# Patient Record
Sex: MALE | Race: WHITE | HISPANIC OR LATINO | Employment: OTHER | URBAN - METROPOLITAN AREA
[De-identification: names, ages, dates, MRNs, and addresses within clinical notes are randomized per-mention and may not be internally consistent; named-entity substitution may affect disease eponyms.]

---

## 2018-07-19 DIAGNOSIS — I10 ESSENTIAL HYPERTENSION: Primary | ICD-10-CM

## 2018-07-19 RX ORDER — LOSARTAN POTASSIUM 50 MG/1
TABLET ORAL EVERY 24 HOURS
COMMUNITY
Start: 2017-05-11 | End: 2018-07-19 | Stop reason: SDUPTHER

## 2018-07-19 RX ORDER — LOSARTAN POTASSIUM 50 MG/1
50 TABLET ORAL EVERY 24 HOURS
Qty: 30 TABLET | Refills: 5 | Status: SHIPPED | OUTPATIENT
Start: 2018-07-19 | End: 2018-11-26 | Stop reason: SDUPTHER

## 2018-09-10 RX ORDER — DOXYCYCLINE 100 MG/1
CAPSULE ORAL
Qty: 30 CAPSULE | OUTPATIENT
Start: 2018-09-10

## 2018-10-05 RX ORDER — DOXYCYCLINE 100 MG/1
CAPSULE ORAL
Qty: 30 CAPSULE | OUTPATIENT
Start: 2018-10-05

## 2018-10-10 RX ORDER — DOXYCYCLINE 100 MG/1
CAPSULE ORAL
Qty: 30 CAPSULE | OUTPATIENT
Start: 2018-10-10

## 2018-11-14 DIAGNOSIS — I38 CHRONIC INFECTIVE ENDOCARDITIS, DUE TO UNSPECIFIED ORGANISM: Primary | ICD-10-CM

## 2018-11-14 RX ORDER — CALCITRIOL 0.5 UG/1
CAPSULE, LIQUID FILLED ORAL
COMMUNITY
End: 2021-07-30 | Stop reason: ALTCHOICE

## 2018-11-14 RX ORDER — ROSUVASTATIN CALCIUM 20 MG/1
TABLET, COATED ORAL
COMMUNITY
Start: 2017-07-25 | End: 2019-06-21 | Stop reason: SDUPTHER

## 2018-11-14 RX ORDER — AMLODIPINE BESYLATE 10 MG/1
TABLET ORAL
COMMUNITY
Start: 2018-06-05 | End: 2019-06-21 | Stop reason: SDUPTHER

## 2018-11-14 RX ORDER — ACETAMINOPHEN 160 MG
TABLET,DISINTEGRATING ORAL EVERY 24 HOURS
COMMUNITY

## 2018-11-14 RX ORDER — BISOPROLOL FUMARATE 5 MG/1
TABLET ORAL EVERY 24 HOURS
COMMUNITY
Start: 2018-03-09 | End: 2019-08-09 | Stop reason: SDUPTHER

## 2018-11-14 RX ORDER — DOXYCYCLINE 100 MG/1
100 CAPSULE ORAL DAILY
Qty: 30 CAPSULE | Refills: 5 | Status: SHIPPED | OUTPATIENT
Start: 2018-11-14 | End: 2018-12-14

## 2018-11-14 RX ORDER — DOXYCYCLINE 100 MG/1
100 CAPSULE ORAL DAILY
Refills: 5 | COMMUNITY
Start: 2018-09-08 | End: 2018-11-14 | Stop reason: SDUPTHER

## 2018-11-26 DIAGNOSIS — I10 ESSENTIAL HYPERTENSION: ICD-10-CM

## 2018-11-28 RX ORDER — LOSARTAN POTASSIUM 50 MG/1
TABLET ORAL
Qty: 30 TABLET | Refills: 5 | Status: SHIPPED | OUTPATIENT
Start: 2018-11-28 | End: 2019-08-09 | Stop reason: SDUPTHER

## 2018-12-14 PROBLEM — Z95.1 HX OF CABG: Status: ACTIVE | Noted: 2018-12-14

## 2018-12-14 PROBLEM — N18.4 CHRONIC KIDNEY DISEASE, STAGE IV (SEVERE) (HCC): Status: ACTIVE | Noted: 2018-08-27

## 2018-12-17 ENCOUNTER — OFFICE VISIT (OUTPATIENT)
Dept: CARDIOLOGY CLINIC | Facility: CLINIC | Age: 73
End: 2018-12-17
Payer: COMMERCIAL

## 2018-12-17 VITALS
SYSTOLIC BLOOD PRESSURE: 130 MMHG | HEART RATE: 66 BPM | OXYGEN SATURATION: 98 % | WEIGHT: 185.2 LBS | HEIGHT: 68 IN | DIASTOLIC BLOOD PRESSURE: 56 MMHG | BODY MASS INDEX: 28.07 KG/M2

## 2018-12-17 DIAGNOSIS — I63.9 CEREBROVASCULAR ACCIDENT (CVA), UNSPECIFIED MECHANISM (HCC): ICD-10-CM

## 2018-12-17 DIAGNOSIS — Z95.1 HX OF CABG: ICD-10-CM

## 2018-12-17 DIAGNOSIS — N18.4 CHRONIC KIDNEY DISEASE, STAGE IV (SEVERE) (HCC): ICD-10-CM

## 2018-12-17 DIAGNOSIS — M79.10 MYALGIA DUE TO STATIN: ICD-10-CM

## 2018-12-17 DIAGNOSIS — Z95.4 HISTORY OF AORTIC VALVE REPLACEMENT WITH TISSUE GRAFT: ICD-10-CM

## 2018-12-17 DIAGNOSIS — T46.6X5A MYALGIA DUE TO STATIN: ICD-10-CM

## 2018-12-17 DIAGNOSIS — E78.2 MIXED HYPERLIPIDEMIA: ICD-10-CM

## 2018-12-17 DIAGNOSIS — I10 ESSENTIAL HYPERTENSION: ICD-10-CM

## 2018-12-17 DIAGNOSIS — R55 NEAR SYNCOPE: ICD-10-CM

## 2018-12-17 DIAGNOSIS — I25.10 CHRONIC CORONARY ARTERY DISEASE: Primary | ICD-10-CM

## 2018-12-17 PROCEDURE — 93000 ELECTROCARDIOGRAM COMPLETE: CPT | Performed by: INTERNAL MEDICINE

## 2018-12-17 PROCEDURE — 99214 OFFICE O/P EST MOD 30 MIN: CPT | Performed by: INTERNAL MEDICINE

## 2018-12-17 RX ORDER — ASPIRIN 325 MG
325 TABLET ORAL
COMMUNITY
End: 2019-08-16 | Stop reason: CLARIF

## 2018-12-17 RX ORDER — DOXYCYCLINE 100 MG/1
100 CAPSULE ORAL ONCE
COMMUNITY
End: 2019-05-20 | Stop reason: SDUPTHER

## 2018-12-17 NOTE — PROGRESS NOTES
Cardiology Follow Up    Brennan Burch  1945  [id number]  [address] [facility] [facility]  [address], [facility] [id number] [phone]    1  Chronic coronary artery disease  POCT ECG   2  Hx of CABG     3  History of aortic valve replacement with tissue graft     4  Essential hypertension     5  Mixed hyperlipidemia     6  Chronic kidney disease, stage IV (severe) ([address] )         Patient was 1st seen on October 6, 2016  He was a previous patient of Dr Kishore Bryson  In extensive cardiac history which included rheumatic heart disease as a child, coronary artery disease, severe aortic stenosis and subsequently status post bio aortic valve replacement in 2005, infective endocarditis in 2006, chronic kidney disease followed by Nephrology with an AV fistula in his right arm, multiple CVAs, hypertension, hyperlipidemia, recurrent DVT and IVC filter  05/23/2017 echocardiogram: Bovine aortic valve prosthesis which appears to 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 but stable  Aortic valve velocity 3 1 m/sec  Biatrial enlargement  Normal LV systolic function, EF 03%  Mild LVH  Grade 1 diastolic dysfunction  05/23/2017 duplex carotid ultrasound: Moderate 16-49% bilateral internal carotid stenosis  05/25/2018 chemistry:  BUN 41, creatinine 3 5, GFR 17    Interval History:   Patient that denies chest discomfort of any type  He denies dyspnea on exertion  He sometimes feels dizzy but has no true syncope  He is inactive because of his previous cerebral vascular accident leaving him with a right hemiparesis  He has a tissue prosthetic aortic valve with malfunction and an increased aortic valve velocity of 3 1 m/sec  He is known to be intolerant of statins      Patient Active Problem List   Diagnosis    Chronic coronary artery disease    Chronic kidney disease, stage IV (severe) (HCC)    Endocarditis    History of aortic valve replacement with tissue graft    HTN (hypertension)    HLD (hyperlipidemia)    Hx of CABG     No past medical history on file  Social History     Social History    Marital status:      Spouse name: N/A    Number of children: N/A    Years of education: N/A     Occupational History    Not on file  Social History Main Topics    Smoking status: Former Smoker     Packs/day: 1 00     Types: Cigarettes     Quit date: 1/1/1993    Smokeless tobacco: Never Used    Alcohol use Not on file    Drug use: Unknown    Sexual activity: Not on file     Other Topics Concern    Not on file     Social History Narrative    No narrative on file      Family History   Problem Relation Age of Onset    Diabetes Mother     Hypertension Mother     Dementia Mother     Other Father         fall gangrene    Peripheral vascular disease Sister      Past Surgical History:   Procedure Laterality Date    AORTIC VALVE REPLACEMENT  2005    Tissue valve    BLADDER SURGERY      23 yrs ago      CORONARY ARTERY BYPASS GRAFT  2005    x1    RENAL ARTERY STENT  11/2005       Current Outpatient Prescriptions:     amLODIPine (NORVASC) 10 mg tablet, take 1 tablet by oral route  every evening, Disp: , Rfl:     aspirin 325 mg tablet, Take 325 mg by mouth, Disp: , Rfl:     bisoprolol (ZEBETA) 5 mg tablet, every 24 hours, Disp: , Rfl:     calcitriol (ROCALTROL) 0 5 MCG capsule, take 1 capsule by oral route  3 times weekly, Disp: , Rfl:     Cholecalciferol (VITAMIN D3) 2000 units capsule, every 24 hours, Disp: , Rfl:     doxycycline monohydrate (MONODOX) 100 mg capsule, Take 100 mg by mouth once, Disp: , Rfl:     esomeprazole (NEXIUM) 20 mg capsule, every 24 hours, Disp: , Rfl:     losartan (COZAAR) 50 mg tablet, TAKE 1 TABLET BY MOUTH EVERY 24 HRS, Disp: 30 tablet, Rfl: 5    rosuvastatin (CRESTOR) 20 MG tablet, 1 tablet every other day, Disp: , Rfl:     sodium bicarbonate 650 mg tablet, 2  tablets bid, Disp: , Rfl:   Allergies   Allergen Reactions    Latex     Nitrofurantoin Other (See Comments)     neck pain    Other Itching       Results for orders placed or performed in visit on 12/17/18   POCT ECG    Narrative    Normal sinus rhythm at a rate of 66 beats per minute  First degree AV block  Left anterior hemiblock  Cannot rule out old anterior wall myocardial infarction  Left ventricular hypertrophy  Nonspecific ST T wave abnormalities  Review of Systems:  Review of Systems   Constitutional: Negative for activity change  Respiratory: Negative for cough, chest tightness, shortness of breath and wheezing  Cardiovascular: Negative for chest pain, palpitations and leg swelling  Musculoskeletal: Negative for gait problem  Skin: Negative for color change  Neurological: Negative for dizziness, tremors, syncope, weakness, light-headedness and headaches  Right hemiparesis with reasonable motion in right arm but very little motion in right leg  Psychiatric/Behavioral: Negative for agitation and confusion  Physical Exam:  /56   Pulse 66   Ht 5' 8" (1 727 m)   Wt 84 kg (185 lb 3 2 oz)   SpO2 98%   BMI 28 16 kg/m²   Physical Exam   Constitutional: He is oriented to person, place, and time  He appears well-developed and well-nourished  No distress  HENT:   Head: Normocephalic and atraumatic  Neck: No JVD present  Cardiovascular: Normal rate, regular rhythm and intact distal pulses  Exam reveals no gallop and no friction rub  Murmur heard  G3/6 mid peaking systolic ejection murmur radiating to carotids  Pulmonary/Chest: Effort normal and breath sounds normal  No respiratory distress  He has no wheezes  He has no rales  He exhibits no tenderness  Abdominal: Soft  Bowel sounds are normal  He exhibits no distension  Musculoskeletal: He exhibits edema  1 to 2+ pre tibial edema  Neurological: He is alert and oriented to person, place, and time  Skin: Skin is warm and dry     Psychiatric: He has a normal mood and affect  His behavior is normal        Discussion/Summary:  1  Obtain echocardiogram to monitor deterioration in patient's aortic valve  2    Return in 6 months

## 2019-01-02 ENCOUNTER — HOSPITAL ENCOUNTER (OUTPATIENT)
Dept: NON INVASIVE DIAGNOSTICS | Facility: HOSPITAL | Age: 74
Discharge: HOME | End: 2019-01-02
Payer: COMMERCIAL

## 2019-01-02 DIAGNOSIS — Z95.4 HISTORY OF AORTIC VALVE REPLACEMENT WITH TISSUE GRAFT: ICD-10-CM

## 2019-01-02 PROCEDURE — 93306 TTE W/DOPPLER COMPLETE: CPT

## 2019-01-02 PROCEDURE — 93306 TTE W/DOPPLER COMPLETE: CPT | Performed by: INTERNAL MEDICINE

## 2019-05-20 DIAGNOSIS — I38 CHRONIC INFECTIVE ENDOCARDITIS, DUE TO UNSPECIFIED ORGANISM: Primary | ICD-10-CM

## 2019-05-20 RX ORDER — DOXYCYCLINE 100 MG/1
100 CAPSULE ORAL ONCE
Qty: 1 CAPSULE | Refills: 2 | Status: SHIPPED | OUTPATIENT
Start: 2019-05-20 | End: 2019-05-20

## 2019-06-21 ENCOUNTER — OFFICE VISIT (OUTPATIENT)
Dept: CARDIOLOGY CLINIC | Facility: CLINIC | Age: 74
End: 2019-06-21
Payer: COMMERCIAL

## 2019-06-21 VITALS
WEIGHT: 179 LBS | HEART RATE: 62 BPM | DIASTOLIC BLOOD PRESSURE: 70 MMHG | BODY MASS INDEX: 27.13 KG/M2 | HEIGHT: 68 IN | SYSTOLIC BLOOD PRESSURE: 120 MMHG

## 2019-06-21 DIAGNOSIS — I10 ESSENTIAL HYPERTENSION: ICD-10-CM

## 2019-06-21 DIAGNOSIS — E78.2 MIXED HYPERLIPIDEMIA: ICD-10-CM

## 2019-06-21 DIAGNOSIS — Z95.1 HX OF CABG: ICD-10-CM

## 2019-06-21 DIAGNOSIS — I25.10 CHRONIC CORONARY ARTERY DISEASE: Primary | ICD-10-CM

## 2019-06-21 DIAGNOSIS — N18.4 CHRONIC KIDNEY DISEASE, STAGE IV (SEVERE) (HCC): ICD-10-CM

## 2019-06-21 DIAGNOSIS — T82.01XD PROSTHETIC HEART VALVE FAILURE, SUBSEQUENT ENCOUNTER: ICD-10-CM

## 2019-06-21 DIAGNOSIS — Z95.4 HISTORY OF AORTIC VALVE REPLACEMENT WITH TISSUE GRAFT: ICD-10-CM

## 2019-06-21 PROBLEM — M79.10 MYALGIA DUE TO STATIN: Status: RESOLVED | Noted: 2018-12-17 | Resolved: 2019-06-21

## 2019-06-21 PROBLEM — T46.6X5A MYALGIA DUE TO STATIN: Status: RESOLVED | Noted: 2018-12-17 | Resolved: 2019-06-21

## 2019-06-21 PROBLEM — T82.01XA PROSTHETIC VALVE MALFUNCTION: Status: ACTIVE | Noted: 2019-06-21

## 2019-06-21 PROCEDURE — 93000 ELECTROCARDIOGRAM COMPLETE: CPT | Performed by: INTERNAL MEDICINE

## 2019-06-21 PROCEDURE — 99215 OFFICE O/P EST HI 40 MIN: CPT | Performed by: INTERNAL MEDICINE

## 2019-06-21 RX ORDER — ROSUVASTATIN CALCIUM 20 MG/1
20 TABLET, COATED ORAL EVERY OTHER DAY
Qty: 30 TABLET | Refills: 5 | Status: SHIPPED | OUTPATIENT
Start: 2019-06-21 | End: 2020-01-06

## 2019-06-21 RX ORDER — AMLODIPINE BESYLATE 10 MG/1
10 TABLET ORAL EVERY EVENING
Qty: 30 TABLET | Refills: 5 | Status: SHIPPED | OUTPATIENT
Start: 2019-06-21 | End: 2020-01-06 | Stop reason: SDUPTHER

## 2019-08-06 DIAGNOSIS — I38 CHRONIC INFECTIVE ENDOCARDITIS, DUE TO UNSPECIFIED ORGANISM: ICD-10-CM

## 2019-08-09 DIAGNOSIS — I10 ESSENTIAL HYPERTENSION: ICD-10-CM

## 2019-08-12 RX ORDER — LOSARTAN POTASSIUM 50 MG/1
TABLET ORAL
Qty: 90 TABLET | Refills: 3 | Status: SHIPPED | OUTPATIENT
Start: 2019-08-12 | End: 2021-09-14 | Stop reason: HOSPADM

## 2019-08-12 RX ORDER — BISOPROLOL FUMARATE 5 MG/1
TABLET ORAL
Qty: 90 TABLET | Refills: 3 | Status: SHIPPED | OUTPATIENT
Start: 2019-08-12 | End: 2020-04-06

## 2019-08-13 RX ORDER — DOXYCYCLINE 100 MG/1
CAPSULE ORAL
Qty: 30 CAPSULE | OUTPATIENT
Start: 2019-08-13

## 2019-08-15 DIAGNOSIS — I25.10 CORONARY ARTERY DISEASE INVOLVING NATIVE CORONARY ARTERY OF NATIVE HEART WITHOUT ANGINA PECTORIS: Primary | ICD-10-CM

## 2019-08-16 RX ORDER — ASPIRIN 325 MG/1
TABLET, COATED ORAL
Qty: 90 TABLET | Refills: 3 | Status: SHIPPED | OUTPATIENT
Start: 2019-08-16 | End: 2021-10-04 | Stop reason: HOSPADM

## 2020-01-06 ENCOUNTER — OFFICE VISIT (OUTPATIENT)
Dept: CARDIOLOGY CLINIC | Facility: CLINIC | Age: 75
End: 2020-01-06
Payer: COMMERCIAL

## 2020-01-06 VITALS
HEIGHT: 68 IN | HEART RATE: 65 BPM | DIASTOLIC BLOOD PRESSURE: 62 MMHG | SYSTOLIC BLOOD PRESSURE: 158 MMHG | BODY MASS INDEX: 25.76 KG/M2 | RESPIRATION RATE: 16 BRPM | WEIGHT: 170 LBS

## 2020-01-06 DIAGNOSIS — E78.2 MIXED HYPERLIPIDEMIA: ICD-10-CM

## 2020-01-06 DIAGNOSIS — N18.4 CHRONIC KIDNEY DISEASE, STAGE IV (SEVERE) (HCC): ICD-10-CM

## 2020-01-06 DIAGNOSIS — I10 ESSENTIAL HYPERTENSION: ICD-10-CM

## 2020-01-06 DIAGNOSIS — Z95.4 HISTORY OF AORTIC VALVE REPLACEMENT WITH TISSUE GRAFT: ICD-10-CM

## 2020-01-06 DIAGNOSIS — I25.10 CHRONIC CORONARY ARTERY DISEASE: Primary | ICD-10-CM

## 2020-01-06 DIAGNOSIS — I63.9 CEREBROVASCULAR ACCIDENT (CVA), UNSPECIFIED MECHANISM (HCC): ICD-10-CM

## 2020-01-06 DIAGNOSIS — Z95.1 HX OF CABG: ICD-10-CM

## 2020-01-06 PROCEDURE — 93000 ELECTROCARDIOGRAM COMPLETE: CPT | Performed by: INTERNAL MEDICINE

## 2020-01-06 PROCEDURE — 99215 OFFICE O/P EST HI 40 MIN: CPT | Performed by: INTERNAL MEDICINE

## 2020-01-06 RX ORDER — DOXYCYCLINE HYCLATE 100 MG/1
100 CAPSULE ORAL DAILY
Status: ON HOLD | COMMUNITY
End: 2021-09-14 | Stop reason: SDUPTHER

## 2020-01-06 RX ORDER — AMLODIPINE BESYLATE 10 MG/1
10 TABLET ORAL EVERY EVENING
Qty: 30 TABLET | Refills: 5 | Status: SHIPPED | OUTPATIENT
Start: 2020-01-06 | End: 2020-06-16

## 2020-01-06 RX ORDER — ROSUVASTATIN CALCIUM 20 MG/1
20 TABLET, COATED ORAL DAILY
Qty: 30 TABLET | Refills: 5 | Status: SHIPPED | OUTPATIENT
Start: 2020-01-06 | End: 2020-07-22

## 2020-01-06 NOTE — PROGRESS NOTES
Cardiology Follow Up    Stefano Carrillo  1945  [id number]  [address] [id number] [phone]    1  Chronic coronary artery disease  POCT ECG   2  Hx of CABG  Lipid Panel with Direct LDL reflex   3  History of aortic valve replacement with tissue graft     4  Mixed hyperlipidemia  rosuvastatin (CRESTOR) 20 MG tablet    Lipid Panel with Direct LDL reflex   5  Essential hypertension  amLODIPine (NORVASC) 10 mg tablet   6  Chronic kidney disease, stage IV (severe) ([facility] [address] )     7  Cerebrovascular accident (CVA), unspecified mechanism ([facility] )         Patient was 1st seen on October 6, 2016  He was a previous patient of Dr Ganesh Dunn  In extensive cardiac history which included rheumatic heart disease as a child, coronary artery disease, severe aortic stenosis and subsequently status post bio aortic valve replacement in 2005, infective endocarditis in 2006, chronic kidney disease followed by Nephrology with an AV fistula in his right arm, multiple CVAs, hypertension, hyperlipidemia, recurrent DVT and IVC filter  His aortic valve replacement, CABG and cerebral vascular accident occurred in [place]  He has a right hemiparesis with some motion of his right arm but very little motion of his left arm  Because of patient's CVA, he has significant leg weakness and uses a motorized vehicle to ambulate  05/23/2017 echocardiogram: Bovine aortic valve prosthesis which appears to 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 but stable  Aortic valve velocity 3 1 m/sec  Biatrial enlargement  Normal LV systolic function, EF 77%  Mild LVH  Grade 1 diastolic dysfunction  05/23/2017 duplex carotid ultrasound: Moderate 16-49% bilateral internal carotid stenosis  05/25/2018 chemistry: BUN 41, creatinine 3 5, GFR 17    05/31/2000 7:24 p m   Holter monitor performed at [address] [place] [address] Drive:  PVCs: 1066 (1 2%)  Couplets: 0 Events  Triplets: 0 Events   Ventricular Runs: 0    Pauses:  Longest R-R interval: 1 9 sec at 5:10:11 AM  Drop/Late beats: 0/18    SUPRAVENTRICULAR ECTOPIC BEATS:  PACs: 24 (0 0%)  Atrial Pairs: 0 Events  Atrial Run: 0 beats at 0 BPM  Afib: 0 (0 0%) total beats with a duration of 0 0 min  _____________________________________________________________________________  IMPRESSION:   24 hour study  UNderlying rhythm sinus  Adequate quality  Frequent premature ventricular contractions (1 2% of daily beats)  No  ventricular runs  Rare premature atrial contractions  No atrial runs  No prolonged pause or high grade AV block  The patient maintained a diary without symptoms  05/21/2019 lipid profile: Total cholesterol 209, triglycerides 159, HDL 35, LDL calculated 141, non HDL cholesterol 173  Interval History:  Patient has no cardiac complaints  He denies chest discomfort or shortness of breath  His kidney disease, which is severe, appears to be stable  His last echocardiogram demonstrated moderately severe prosthetic valve aortic stenosis  He started Crestor after his last visit, 1 tablet twice a week  He then went from twice a week to every other day and is presently taking it daily  He denies muscle or joint discomfort  Patient Active Problem List   Diagnosis    Chronic coronary artery disease    Chronic kidney disease, stage IV (severe) ([facility] )    Endocarditis    History of aortic valve replacement with tissue graft    HTN (hypertension)    HLD (hyperlipidemia)    Hx of CABG    Cerebrovascular accident (CVA) ([facility] )    Near syncope    Prosthetic aortic valve malfunction     History reviewed  No pertinent past medical history    Social History     Socioeconomic History    Marital status:      Spouse name: Not on file    Number of children: Not on file    Years of education: Not on file    Highest education level: Not on file   Occupational History    Not on file   Social Needs    Financial resource strain: Not on file    Food insecurity:     Worry: Not on file     Inability: Not on file    Transportation needs:     Medical: Not on file     Non-medical: Not on file   Tobacco Use    Smoking status: Former Smoker     Packs/day: 1 00     Types: Cigarettes     Last attempt to quit: 1993     Years since quittin 0    Smokeless tobacco: Never Used   Substance and Sexual Activity    Alcohol use: Not on file    Drug use: Not on file    Sexual activity: Not on file   Lifestyle    Physical activity:     Days per week: Not on file     Minutes per session: Not on file    Stress: Not on file   Relationships    Social connections:     Talks on phone: Not on file     Gets together: Not on file     Attends Zoroastrian service: Not on file     Active member of club or organization: Not on file     Attends meetings of clubs or organizations: Not on file     Relationship status: Not on file    Intimate partner violence:     Fear of current or ex partner: Not on file     Emotionally abused: Not on file     Physically abused: Not on file     Forced sexual activity: Not on file   Other Topics Concern    Not on file   Social History Narrative    Not on file      Family History   Problem Relation Age of Onset    Diabetes Mother     Hypertension Mother     Dementia Mother     Other Father         fall gangrene    Peripheral vascular disease Sister      Past Surgical History:   Procedure Laterality Date    AORTIC VALVE REPLACEMENT      Tissue valve    BLADDER SURGERY      23 yrs ago      CORONARY ARTERY BYPASS GRAFT  2005    x1    RENAL ARTERY STENT  2005       Current Outpatient Medications:     amLODIPine (NORVASC) 10 mg tablet, Take 1 tablet (10 mg total) by mouth every evening, Disp: 30 tablet, Rfl: 5    bisoprolol (ZEBETA) 5 mg tablet, TAKE 1 TABLET BY ORAL ROUTE EVERY DAY, Disp: 90 tablet, Rfl: 3    calcitriol (ROCALTROL) 0 5 MCG capsule, , Disp: , Rfl:     Cholecalciferol (VITAMIN D3) 2000 units capsule, every 24 hours, Disp: , Rfl:     doxycycline hyclate (VIBRAMYCIN) 100 mg capsule, Take 100 mg by mouth daily , Disp: , Rfl:     ECPIRIN 325 MG EC tablet, TAKE 1 TABLET BY ORAL ROUTE EVERY DAY, Disp: 90 tablet, Rfl: 3    esomeprazole (NEXIUM) 20 mg capsule, every 24 hours, Disp: , Rfl:     losartan (COZAAR) 50 mg tablet, TAKE 1 TABLET BY MOUTH EVERY 24 HRS, Disp: 90 tablet, Rfl: 3    rosuvastatin (CRESTOR) 20 MG tablet, Take 1 tablet (20 mg total) by mouth daily, Disp: 30 tablet, Rfl: 5    sodium bicarbonate 650 mg tablet, 2  tablets bid, Disp: , Rfl:   Allergies   Allergen Reactions    Latex     Nitrofurantoin Other (See Comments)     neck pain    Other Itching       Results for orders placed or performed in visit on 01/06/20   POCT ECG    Narrative    Normal sinus rhythm at a rate of 65 beats per minute  Left bundle branch block pattern with left axis deviation and secondary ST T-wave changes  Baseline artifact  Abnormal EKG  Review of Systems:  Review of Systems   Constitutional: Negative for activity change  Respiratory: Negative for cough, chest tightness, shortness of breath and wheezing  Cardiovascular: Negative for chest pain, palpitations and leg swelling  Musculoskeletal: Positive for gait problem  Right leg weakness and difficulty ambulating  Uses a motorized vehicle  Skin: Negative for color change  Neurological: Negative for dizziness, tremors, syncope, weakness, light-headedness and headaches  Psychiatric/Behavioral: Negative for agitation and confusion  Physical Exam:  /62   Pulse 65   Resp 16   Ht 5' 8" (1 727 m)   Wt 77 1 kg (170 lb)   BMI 25 85 kg/m²    Physical Exam   Constitutional: He is oriented to person, place, and time  He appears well-developed and well-nourished  HENT:   Head: Normocephalic and atraumatic  Neck: Normal range of motion  Neck supple  No JVD present  No tracheal deviation present  Cardiovascular: Normal rate, regular rhythm and intact distal pulses  Murmur heard  Grade 3/6 mid peaking systolic ejection murmur radiating bilaterally to the carotids  Pulmonary/Chest: Effort normal and breath sounds normal  No respiratory distress  He has no wheezes  He has no rales  Abdominal: Soft  Bowel sounds are normal    Musculoskeletal: He exhibits no edema  Neurological: He is alert and oriented to person, place, and time  Skin: Skin is warm and dry  Psychiatric: He has a normal mood and affect  His behavior is normal        Discussion/Summary:  1  Obtain fasting lipid profile  2  Return in 6 months

## 2020-01-26 LAB
CHOLEST SERPL-MCNC: 162 MG/DL
CHOLEST/HDLC SERPL: 4.5 (CALC)
HDLC SERPL-MCNC: 36 MG/DL
LDLC SERPL CALC-MCNC: 101 MG/DL (CALC)
NONHDLC SERPL-MCNC: 126 MG/DL (CALC)
TRIGL SERPL-MCNC: 152 MG/DL

## 2020-01-31 ENCOUNTER — TELEPHONE (OUTPATIENT)
Dept: CARDIOLOGY CLINIC | Facility: CLINIC | Age: 75
End: 2020-01-31

## 2020-01-31 DIAGNOSIS — I25.10 CHRONIC CORONARY ARTERY DISEASE: Primary | ICD-10-CM

## 2020-01-31 RX ORDER — EZETIMIBE 10 MG/1
10 TABLET ORAL DAILY
Qty: 30 TABLET | Refills: 5 | Status: CANCELLED | OUTPATIENT
Start: 2020-01-31

## 2020-01-31 NOTE — TELEPHONE ENCOUNTER
----- Message from Claudia Pierce MD sent at 1/30/2020 12:28 PM EST -----  Call patient and see if he is taking his rosuvastatin (Crestor) daily or if he frequently misses his medication  If he is taking it daily and not missing frequently, tell him that I would like to add an additional medication to the rosuvastatin to help get his bad cholesterol lower than what it is  Tell him it is still too high  I would like him to start ezetimibe 10 mg daily in addition to the rosuvastatin 20 mg daily    If he is agreeable put in a prescription for me to sign for ezetimibe 10 mg daily, 5 refills  ----- Message -----  From: Ted Simon Lab Results In  Sent: 1/26/2020   9:30 AM EST  To: Claudia Pierce MD

## 2020-01-31 NOTE — TELEPHONE ENCOUNTER
Spoke to Mr  Juaquin Ramon, he is agreeable to starting the Ezetimibe 10MG and he states that he is taking the crestor daily

## 2020-02-10 DIAGNOSIS — I10 ESSENTIAL HYPERTENSION: Primary | ICD-10-CM

## 2020-02-10 RX ORDER — EZETIMIBE 10 MG/1
10 TABLET ORAL DAILY
Qty: 30 TABLET | Refills: 5 | Status: SHIPPED | OUTPATIENT
Start: 2020-02-10 | End: 2020-07-22

## 2020-04-06 DIAGNOSIS — I10 ESSENTIAL HYPERTENSION: ICD-10-CM

## 2020-04-06 RX ORDER — BISOPROLOL FUMARATE 5 MG/1
TABLET ORAL
Qty: 90 TABLET | Refills: 3 | Status: SHIPPED | OUTPATIENT
Start: 2020-04-06 | End: 2021-03-16

## 2020-06-15 DIAGNOSIS — I10 ESSENTIAL HYPERTENSION: ICD-10-CM

## 2020-06-16 RX ORDER — AMLODIPINE BESYLATE 10 MG/1
10 TABLET ORAL EVERY EVENING
Qty: 30 TABLET | Refills: 5 | Status: SHIPPED | OUTPATIENT
Start: 2020-06-16

## 2020-07-21 DIAGNOSIS — I10 ESSENTIAL HYPERTENSION: ICD-10-CM

## 2020-07-21 DIAGNOSIS — E78.2 MIXED HYPERLIPIDEMIA: ICD-10-CM

## 2020-07-22 RX ORDER — EZETIMIBE 10 MG/1
10 TABLET ORAL DAILY
Qty: 30 TABLET | Refills: 5 | Status: SHIPPED | OUTPATIENT
Start: 2020-07-22 | End: 2020-12-27

## 2020-07-22 RX ORDER — ROSUVASTATIN CALCIUM 20 MG/1
TABLET, COATED ORAL
Qty: 30 TABLET | Refills: 5 | Status: SHIPPED | OUTPATIENT
Start: 2020-07-22

## 2020-07-24 ENCOUNTER — OFFICE VISIT (OUTPATIENT)
Dept: CARDIOLOGY CLINIC | Facility: CLINIC | Age: 75
End: 2020-07-24
Payer: COMMERCIAL

## 2020-07-24 VITALS
TEMPERATURE: 99.4 F | BODY MASS INDEX: 26.52 KG/M2 | DIASTOLIC BLOOD PRESSURE: 60 MMHG | SYSTOLIC BLOOD PRESSURE: 138 MMHG | HEART RATE: 72 BPM | WEIGHT: 174.4 LBS

## 2020-07-24 DIAGNOSIS — Z95.1 HX OF CABG: ICD-10-CM

## 2020-07-24 DIAGNOSIS — Z95.4 HISTORY OF AORTIC VALVE REPLACEMENT WITH TISSUE GRAFT: ICD-10-CM

## 2020-07-24 DIAGNOSIS — E78.2 MIXED HYPERLIPIDEMIA: ICD-10-CM

## 2020-07-24 DIAGNOSIS — I10 ESSENTIAL HYPERTENSION: ICD-10-CM

## 2020-07-24 DIAGNOSIS — T82.01XD PROSTHETIC HEART VALVE FAILURE, SUBSEQUENT ENCOUNTER: ICD-10-CM

## 2020-07-24 DIAGNOSIS — I25.10 CHRONIC CORONARY ARTERY DISEASE: Primary | ICD-10-CM

## 2020-07-24 DIAGNOSIS — N18.4 CHRONIC KIDNEY DISEASE, STAGE IV (SEVERE) (HCC): ICD-10-CM

## 2020-07-24 PROCEDURE — 99214 OFFICE O/P EST MOD 30 MIN: CPT | Performed by: INTERNAL MEDICINE

## 2020-07-24 RX ORDER — ALLOPURINOL 100 MG/1
100 TABLET ORAL DAILY
COMMUNITY
Start: 2020-07-22 | End: 2021-10-04 | Stop reason: HOSPADM

## 2020-07-24 NOTE — LETTER
July 24, 2020     Phi Cooks, [address]    Patient: Sunil Hu   YOB: 1945   Date of Visit: 7/24/2020       Dear Dr Kari Andrews: Thank you for referring Sunil Hu to me for evaluation  Below are my notes for this consultation  If you have questions, please do not hesitate to call me  I look forward to following your patient along with you  Sincerely,        Abimael Boston MD        CC: No Recipients  Abimael Boston MD  7/24/2020  1:19 PM  Sign at close encounter                                             Cardiology Follow Up    Sunil Hu  1945  [id number]  [address]  [address] [facility] [address] [phone]    1  Chronic coronary artery disease     2  Hx of CABG     3  History of aortic valve replacement with tissue graft     4  Mixed hyperlipidemia     5  Essential hypertension     6  Prosthetic heart valve failure, subsequent encounter     7  Chronic kidney disease, stage IV (severe) ([address] )         Patient was 1st seen on October 6, 2016  He was a previous patient of Dr Tommie Ba  In extensive cardiac history which included rheumatic heart disease as a child, coronary artery disease, severe aortic stenosis and subsequently status post bio aortic valve replacement in 2005, infective endocarditis in 2006, chronic kidney disease followed by Nephrology with an AV fistula in his right arm, multiple CVAs, hypertension, hyperlipidemia, recurrent DVT and IVC filter  His aortic valve replacement, CABG and cerebral vascular accident occurred in [place]  He has a right hemiparesis with some motion of his right arm but very little motion of his left arm  Because of patient's CVA, he has significant leg weakness and uses a motorized vehicle to ambulate  05/23/2017 echocardiogram: Bovine aortic valve prosthesis which appears to 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 but stable  Aortic valve velocity 3 1 m/sec  Biatrial enlargement  Normal LV systolic function, EF 09%  Mild LVH  Grade 1 diastolic dysfunction  05/23/2017 duplex carotid ultrasound: Moderate 16-49% bilateral internal carotid stenosis  05/25/2018 chemistry: BUN 41, creatinine 3 5, GFR 17    05/31/2000 7:24 p m  Holter monitor performed at [address] [place] [address] Drive:  PVCs: 1066 (1 2%)  Couplets: 0 Events  Triplets: 0 Events   Ventricular Runs: 0    Pauses:  Longest R-R interval: 1 9 sec at 5:10:11 AM  Drop/Late beats: 0/18    SUPRAVENTRICULAR ECTOPIC BEATS:  PACs: 24 (0 0%)  Atrial Pairs: 0 Events  Atrial Run: 0 beats at 0 BPM  Afib: 0 (0 0%) total beats with a duration of 0 0 min  _____________________________________________________________________________  IMPRESSION:   24 hour study  UNderlying rhythm sinus  Adequate quality  Frequent premature ventricular contractions (1 2% of daily beats)  No  ventricular runs  Rare premature atrial contractions  No atrial runs  No prolonged pause or high grade AV block  The patient maintained a diary without symptoms  05/21/2019 lipid profile: Total cholesterol 209, triglycerides 159, HDL 35, LDL calculated 141, non HDL cholesterol 173    01/25/2020 lipid profile: Total cholesterol 162, triglycerides 152, HDL 36, LDL calculated 101, non HDL cholesterol 126    Interval History:  Patient with a history of moderate aortic stenosis of a prosthetic aortic valve, coronary artery disease with prior coronary bypass surgery, hypertension and hyperlipidemia returns  He denies chest discomfort, shortness of breath but occasionally has some lightheadedness  He is very careful not to stand up too fast   He has ambulatory dysfunction and uses a motorized vehicle for ambulating  He is considerable muscle skeletal discomfort  He tried increasing his Crestor to daily but had too many muscle aches in went back to every other day    He is able to tolerate every other day Crestor 20 mg daily       Discussion/Summary:  1  Continue present medications  2  Obtain fasting lipid profile between now and next visit  3  Return in 6 months      Patient Active Problem List   Diagnosis    Chronic coronary artery disease    Chronic kidney disease, stage IV (severe) ([facility] )    Endocarditis    History of aortic valve replacement with tissue graft    HTN (hypertension)    HLD (hyperlipidemia)    Hx of CABG    Cerebrovascular accident (CVA) ([facility] )    Near syncope    Prosthetic aortic valve malfunction     No past medical history on file    Social History     Socioeconomic History    Marital status:      Spouse name: Not on file    Number of children: Not on file    Years of education: Not on file    Highest education level: Not on file   Occupational History    Not on file   Social Needs    Financial resource strain: Not on file    Food insecurity:     Worry: Not on file     Inability: Not on file    Transportation needs:     Medical: Not on file     Non-medical: Not on file   Tobacco Use    Smoking status: Former Smoker     Packs/day: 1      Types: Cigarettes     Last attempt to quit: 1993     Years since quittin 5    Smokeless tobacco: Never Used   Substance and Sexual Activity    Alcohol use: Not on file    Drug use: Not on file    Sexual activity: Not on file   Lifestyle    Physical activity:     Days per week: Not on file     Minutes per session: Not on file    Stress: Not on file   Relationships    Social connections:     Talks on phone: Not on file     Gets together: Not on file     Attends [employer] service: Not on file     Active member of club or organization: Not on file     Attends meetings of clubs or organizations: Not on file     Relationship status: Not on file    Intimate partner violence:     Fear of current or ex partner: Not on file     Emotionally abused: Not on file     Physically abused: Not on file     Forced sexual activity: Not on file   Other Topics Concern    Not on file   Social History Narrative    Not on file      Family History   Problem Relation Age of Onset    Diabetes Mother     Hypertension Mother     Dementia Mother     Other Father         fall gangrene    Peripheral vascular disease Sister      Past Surgical History:   Procedure Laterality Date    AORTIC VALVE REPLACEMENT  2005    Tissue valve    BLADDER SURGERY      23 yrs ago   CORONARY ARTERY BYPASS GRAFT  2005    x1    RENAL ARTERY STENT  11/2005       Current Outpatient Medications:     amLODIPine (NORVASC) 10 mg tablet, TAKE 1 TABLET (10 MG TOTAL) BY MOUTH EVERY EVENING, Disp: 30 tablet, Rfl: 5    bisoprolol (ZEBETA) 5 mg tablet, TAKE 1 TABLET BY ORAL ROUTE EVERY DAY, Disp: 90 tablet, Rfl: 3    calcitriol (ROCALTROL) 0 5 MCG capsule, , Disp: , Rfl:     Cholecalciferol (VITAMIN D3) 2000 units capsule, every 24 hours, Disp: , Rfl:     doxycycline hyclate (VIBRAMYCIN) 100 mg capsule, Take 100 mg by mouth daily , Disp: , Rfl:     ECPIRIN 325 MG EC tablet, TAKE 1 TABLET BY ORAL ROUTE EVERY DAY, Disp: 90 tablet, Rfl: 3    esomeprazole (NEXIUM) 20 mg capsule, every 24 hours, Disp: , Rfl:     ezetimibe (ZETIA) 10 mg tablet, TAKE 1 TABLET (10 MG TOTAL) BY MOUTH DAILY, Disp: 30 tablet, Rfl: 5    losartan (COZAAR) 50 mg tablet, TAKE 1 TABLET BY MOUTH EVERY 24 HRS, Disp: 90 tablet, Rfl: 3    rosuvastatin (CRESTOR) 20 MG tablet, TAKE 1 TABLET (20 MG TOTAL) BY MOUTH EVERY OTHER DAY, Disp: 30 tablet, Rfl: 5    sodium bicarbonate 650 mg tablet, 2  tablets bid, Disp: , Rfl:   Allergies   Allergen Reactions    Latex     Nitrofurantoin Other (See Comments)     neck pain    Other Itching       No results found for this visit on 07/24/20  Review of Systems:  Review of Systems    Physical Exam:  There were no vitals taken for this visit    Physical Exam      --------------------------------------------------------------------------------  ECHO:   Results for orders placed during the hospital encounter of 19   Echo complete with contrast if indicated    Narrative [address]  [facility], [address] [place] [address]    Transthoracic Echocardiogram  2D, M-mode, Doppler, and Color Doppler    Study date:  2019    Patient: Ruddy Flores  MR number: [id number]  Account number: [de-identified]  : 1945  Age: 72 years  Gender: Male  Status: Outpatient  Location: [address] [place] [address]  Height: 68 in  Weight: 184 6 lb  BP: 130/ 56 mmHg    Indications: AoV disease    Diagnoses: Z95 2 - Presence of prosthetic heart valve    Sonographer:  Theodore Bay RDCS  Primary Physician:  Jaxon Bobby MD  Referring Physician:  Jamie Bell MD  Group:  [facility]  Interpreting Physician:  Jamie Bell MD    SUMMARY    SUMMARY:  1  Sinus rhythm  2  Good technical quality  3  Bioprosthetic aortic valve with mild stenosis  4  Normal left ventricular systolic function  5  Grade 1 left ventricular diastolic dysfunction  6  Moderate concentric left ventricular hypertrophy  7  Mild pulmonary hypertension  8  Biatrial enlargement    LEFT VENTRICLE:  Normal left ventricular systolic function, EF 67 percent  Normal left ventricular cavity size  Moderate concentric left ventricular hypertrophy  Normal left ventricular wall motion without regional wall motion abnormalities  Grade 1 left  ventricular diastolic dysfunction  Normal left ventricular filling pressures  LEFT ATRIUM:  Moderate left atrial enlargement  RIGHT ATRIUM:  Mild right atrial enlargement  MITRAL VALVE:  Moderately severe mitral annulus calcification  Normal mitral valve function without significant mitral regurgitation  AORTIC VALVE:  Known bioprosthetic aortic valve  Increased aortic valve velocity at 3 31 meters/second  Mean aortic valve gradient 24 mmHg  Aortic valve area 1 0-1 1 cm squared  TRICUSPID VALVE:  Mild tricuspid regurgitation      PULMONIC VALVE:  No pulmonic regurgitation  PULMONARY ARTERIES:  Mild pulmonary systolic hypertension, pulmonary artery systolic pressure is estimated at 43 mmHg  PROCEDURE: The study was performed in the [facility]  This was a routine study  The transthoracic approach was used  The study included complete 2D imaging, M-mode, complete spectral Doppler, and color Doppler  The heart rate was  65 bpm, at the start of the study  Image quality was good  LEFT VENTRICLE: Normal left ventricular systolic function, EF 67 percent  Normal left ventricular cavity size  Moderate concentric left ventricular hypertrophy  Normal left ventricular wall motion without regional wall motion  abnormalities  Grade 1 left ventricular diastolic dysfunction  Normal left ventricular filling pressures  RIGHT VENTRICLE: The size was normal  Systolic function was normal  Wall thickness was normal     LEFT ATRIUM: Moderate left atrial enlargement  RIGHT ATRIUM: Mild right atrial enlargement  MITRAL VALVE: Moderately severe mitral annulus calcification  Normal mitral valve function without significant mitral regurgitation  AORTIC VALVE: Known bioprosthetic aortic valve  Increased aortic valve velocity at 3 31 meters/second  Mean aortic valve gradient 24 mmHg  Aortic valve area 1 0-1 1 cm squared  TRICUSPID VALVE: Mild tricuspid regurgitation  PULMONIC VALVE: No pulmonic regurgitation  PERICARDIUM: There was no pericardial effusion  The pericardium was normal in appearance  AORTA: The root exhibited normal size  PULMONARY ARTERY: Mild pulmonary systolic hypertension, pulmonary artery systolic pressure is estimated at 43 mmHg      SYSTEM MEASUREMENT TABLES    2D  %FS: 35 09 %  Ao Diam: 3 08 cm  EDV(Teich): 93 39 ml  EF(Teich): 64 52 %  ESV(Teich): 33 13 ml  IVSd: 1 61 cm  LA Area: 24 9 cm2  LA Diam: 4 52 cm  LVEDV MOD A4C: 127 1 ml  LVEF MOD A4C: 66 5 %  LVESV MOD A4C: 42 58 ml  LVIDd: 4 52 cm  LVIDs: 2 93 cm  LVLd A4C: 9 45 cm  LVLs A4C: 7 24 cm  LVOT Diam: 2 01 cm  LVPWd: 1 37 cm  RA Area: 20 43 cm2  RVIDd: 3 28 cm  SV MOD A4C: 84 52 ml  SV(Teich): 60 26 ml    CW  AV Env  Ti: 333 72 ms  AV VTI: 75 33 cm  AV Vmax: 3 31 m/s  AV Vmean: 2 26 m/s  AV maxP 91 mmHg  AV meanP 27 mmHg  TR Vmax: 2 89 m/s  TR maxP 48 mmHg    MM  TAPSE: 2 38 cm    PW  JANET (VTI): 1 04 cm2  JANET Vmax: 1 06 cm2  AVAI (VTI): 0 cm2/m2  AVAI Vmax: 0 cm2/m2  E': 0 07 m/s  E/E': 13 45  LVOT Env  Ti: 322 95 ms  LVOT VTI: 24 57 cm  LVOT Vmax: 1 1 m/s  LVOT Vmean: 0 76 m/s  LVOT maxP 84 mmHg  LVOT meanP 81 mmHg  LVSI Dopp: 39 48 ml/m2  LVSV Dopp: 78 18 ml  MV A Yuniel: 1 36 m/s  MV Dec [place]in: 2 94 m/s2  MV DecT: 327 95 ms  MV E Yuniel: 0 97 m/s  MV E/A Ratio: 0 71  MV PHT: 95 11 ms  MVA By PHT: 2 31 cm2    [employer] Accredited [facility]    Prepared and electronically signed by    Michelle Haq MD  Signed 2019 16:26:15         Lab Results   Component Value Date    HDL 36 (L) 2020     Lab Results   Component Value Date    LDLCALC 101 (H) 2020     Lab Results   Component Value Date    TRIG 152 (H) 2020         Imaging:   I have personally reviewed pertinent reports  Portions of the record may have been created with voice recognition software  Occasional wrong word or "sound a like" substitutions may have occurred due to the inherent limitations of voice recognition software  Read the chart carefully and recognize, using context, where substitutions have occurred

## 2020-07-24 NOTE — PROGRESS NOTES
Cardiology Follow Up    Claudeen Russell  1945  [id number]  [address] [address]  [phone]    1  Chronic coronary artery disease     2  Hx of CABG     3  History of aortic valve replacement with tissue graft     4  Mixed hyperlipidemia  Lipid Panel with Direct LDL reflex   5  Essential hypertension     6  Prosthetic heart valve failure, subsequent encounter     7  Chronic kidney disease, stage IV (severe) ([facility] [address] [facility] )         Patient was 1st seen on October 6, 2016  He was a previous patient of Dr Raquel Oviedo  In extensive cardiac history which included rheumatic heart disease as a child, coronary artery disease, severe aortic stenosis and subsequently status post bio aortic valve replacement in 2005, infective endocarditis in 2006, chronic kidney disease followed by Nephrology with an AV fistula in his right arm, multiple CVAs, hypertension, hyperlipidemia, recurrent DVT and IVC filter  His aortic valve replacement, CABG and cerebral vascular accident occurred in [place]  He has a right hemiparesis with some motion of his right arm but very little motion of his left arm  Because of patient's CVA, he has significant leg weakness and uses a motorized vehicle to ambulate  05/23/2017 echocardiogram: Bovine aortic valve prosthesis which appears to 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 but stable  Aortic valve velocity 3 1 m/sec  Biatrial enlargement  Normal LV systolic function, EF 26%  Mild LVH  Grade 1 diastolic dysfunction  05/23/2017 duplex carotid ultrasound: Moderate 16-49% bilateral internal carotid stenosis  05/25/2018 chemistry: BUN 41, creatinine 3 5, GFR 17    05/31/2000 7:24 p m   Holter monitor performed at [address] [place] [address] Drive:  PVCs: 1066 (1 2%)  Couplets: 0 Events  Triplets: 0 Events   Ventricular Runs: 0    Pauses:  Longest R-R interval: 1 9 sec at 5:10:11 AM  Drop/Late beats: 0/18    SUPRAVENTRICULAR ECTOPIC BEATS:  PACs: 24 (0 0%)  Atrial Pairs: 0 Events  Atrial Run: 0 beats at 0 BPM  Afib: 0 (0 0%) total beats with a duration of 0 0 min  _____________________________________________________________________________  IMPRESSION:   24 hour study  UNderlying rhythm sinus  Adequate quality  Frequent premature ventricular contractions (1 2% of daily beats)  No  ventricular runs  Rare premature atrial contractions  No atrial runs  No prolonged pause or high grade AV block  The patient maintained a diary without symptoms  05/21/2019 lipid profile: Total cholesterol 209, triglycerides 159, HDL 35, LDL calculated 141, non HDL cholesterol 173    01/25/2020 lipid profile: Total cholesterol 162, triglycerides 152, HDL 36, LDL calculated 101, non HDL cholesterol 126    Interval History:  Patient with a history of moderate aortic stenosis of a prosthetic aortic valve, coronary artery disease with prior coronary bypass surgery, hypertension and hyperlipidemia returns  He denies chest discomfort, shortness of breath but occasionally has some lightheadedness  He is very careful not to stand up too fast   He has ambulatory dysfunction and uses a motorized vehicle for ambulating  He is considerable muscle skeletal discomfort  He tried increasing his Crestor to daily but had too many muscle aches in went back to every other day  He is able to tolerate every other day Crestor 20 mg daily  Discussion/Summary:  1  Continue present medications  2  Obtain fasting lipid profile between now and next visit  3   Return in 6 months      Patient Active Problem List   Diagnosis    Chronic coronary artery disease    [facility], stage IV (severe) ([facility] [address] [facility] )    Endocarditis    History of aortic valve replacement with tissue graft    HTN (hypertension)    HLD (hyperlipidemia)    Hx of CABG    [facility]    Near syncope    Prosthetic aortic valve malfunction History reviewed  No pertinent past medical history  Social History     Socioeconomic History    Marital status:      Spouse name: Not on file    Number of children: Not on file    Years of education: Not on file    Highest education level: Not on file   Occupational History    Not on file   Social Needs    Financial resource strain: Not on file    Food insecurity:     Worry: Not on file     Inability: Not on file    Transportation needs:     Medical: Not on file     Non-medical: Not on file   Tobacco Use    Smoking status: Former Smoker     Packs/day: 1 00     Types: Cigarettes     Last attempt to quit: 1993     Years since quittin 5    Smokeless tobacco: Never Used   Substance and Sexual Activity    Alcohol use: Not on file    Drug use: Not on file    Sexual activity: Not on file   Lifestyle    Physical activity:     Days per week: Not on file     Minutes per session: Not on file    Stress: Not on file   Relationships    Social connections:     Talks on phone: Not on file     Gets together: Not on file     Attends Mandaen service: Not on file     Active member of club or organization: Not on file     Attends meetings of clubs or organizations: Not on file     Relationship status: Not on file    Intimate partner violence:     Fear of current or ex partner: Not on file     Emotionally abused: Not on file     Physically abused: Not on file     Forced sexual activity: Not on file   Other Topics Concern    Not on file   Social History Narrative    Not on file      Family History   Problem Relation Age of Onset    Diabetes Mother     Hypertension Mother     Dementia Mother     Other Father         fall gangrene    Peripheral vascular disease Sister      Past Surgical History:   Procedure Laterality Date    AORTIC VALVE REPLACEMENT      Tissue valve    BLADDER SURGERY      23 yrs ago      CORONARY ARTERY BYPASS GRAFT  2005    x1    RENAL ARTERY STENT  2005 Current Outpatient Medications:     allopurinol (ZYLOPRIM) 100 mg tablet, Take 100 mg by mouth daily, Disp: , Rfl:     amLODIPine (NORVASC) 10 mg tablet, TAKE 1 TABLET (10 MG TOTAL) BY MOUTH EVERY EVENING, Disp: 30 tablet, Rfl: 5    bisoprolol (ZEBETA) 5 mg tablet, TAKE 1 TABLET BY ORAL ROUTE EVERY DAY, Disp: 90 tablet, Rfl: 3    calcitriol (ROCALTROL) 0 5 MCG capsule, One weekly, Disp: , Rfl:     Cholecalciferol (VITAMIN D3) 2000 units capsule, every 24 hours, Disp: , Rfl:     doxycycline hyclate (VIBRAMYCIN) 100 mg capsule, Take 100 mg by mouth daily , Disp: , Rfl:     ECPIRIN 325 MG EC tablet, TAKE 1 TABLET BY ORAL ROUTE EVERY DAY, Disp: 90 tablet, Rfl: 3    esomeprazole (NEXIUM) 20 mg capsule, every 24 hours, Disp: , Rfl:     losartan (COZAAR) 50 mg tablet, TAKE 1 TABLET BY MOUTH EVERY 24 HRS, Disp: 90 tablet, Rfl: 3    rosuvastatin (CRESTOR) 20 MG tablet, TAKE 1 TABLET (20 MG TOTAL) BY MOUTH EVERY OTHER DAY, Disp: 30 tablet, Rfl: 5    sodium bicarbonate 650 mg tablet, 2  tablets bid, Disp: , Rfl:     ezetimibe (ZETIA) 10 mg tablet, TAKE 1 TABLET (10 MG TOTAL) BY MOUTH DAILY (Patient not taking: Reported on 7/24/2020), Disp: 30 tablet, Rfl: 5  Allergies   Allergen Reactions    Latex     Nitrofurantoin Other (See Comments)     neck pain    Other Itching       No results found for this visit on 07/24/20        Review of Systems:  Review of Systems    Physical Exam:  /60 (BP Location: Left arm, Patient Position: Sitting, Cuff Size: Adult)   Pulse 72   Temp 99 4 °F (37 4 °C)   Wt 79 1 kg (174 lb 6 4 oz)   BMI 26 52 kg/m²   Physical Exam      --------------------------------------------------------------------------------  ECHO:   Results for orders placed during the hospital encounter of 01/02/19   Echo complete with contrast if indicated    Narrative [address]  [facility], [address] [place] [address]    Transthoracic Echocardiogram  2D, M-mode, Doppler, and Color Doppler    Study date:  2019    Patient: Jazmine Villar  MR number: [id number]  Account number: [de-identified]  : 1945  Age: 72 years  Gender: Male  Status: Outpatient  Location: [address] [place] [address]  Height: 68 in  Weight: 184 6 lb  BP: 130/ 56 mmHg    Indications: AoV disease    Diagnoses: Z95 2 - Presence of prosthetic heart valve    Sonographer:  Nancy Stinson RDCS  Primary Physician:  Eric Foley MD  Referring Physician:  Bryson Gerardo MD  Group:  [facility]  Interpreting Physician:  Bryson Gerardo MD    SUMMARY    SUMMARY:  1  Sinus rhythm  2  Good technical quality  3  Bioprosthetic aortic valve with mild stenosis  4  Normal left ventricular systolic function  5  Grade 1 left ventricular diastolic dysfunction  6  Moderate concentric left ventricular hypertrophy  7  Mild pulmonary hypertension  8  Biatrial enlargement    LEFT VENTRICLE:  Normal left ventricular systolic function, EF 67 percent  Normal left ventricular cavity size  Moderate concentric left ventricular hypertrophy  Normal left ventricular wall motion without regional wall motion abnormalities  Grade 1 left  ventricular diastolic dysfunction  Normal left ventricular filling pressures  LEFT ATRIUM:  Moderate left atrial enlargement  RIGHT ATRIUM:  Mild right atrial enlargement  MITRAL VALVE:  Moderately severe mitral annulus calcification  Normal mitral valve function without significant mitral regurgitation  AORTIC VALVE:  Known bioprosthetic aortic valve  Increased aortic valve velocity at 3 31 meters/second  Mean aortic valve gradient 24 mmHg  Aortic valve area 1 0-1 1 cm squared  TRICUSPID VALVE:  Mild tricuspid regurgitation  PULMONIC VALVE:  No pulmonic regurgitation  PULMONARY ARTERIES:  Mild pulmonary systolic hypertension, pulmonary artery systolic pressure is estimated at 43 mmHg  PROCEDURE: The study was performed in the [address] [place] [address]  This was a routine study  The transthoracic approach was used  The study included complete 2D imaging, M-mode, complete spectral Doppler, and color Doppler  The heart rate was  65 bpm, at the start of the study  Image quality was good  LEFT VENTRICLE: Normal left ventricular systolic function, EF 67 percent  Normal left ventricular cavity size  Moderate concentric left ventricular hypertrophy  Normal left ventricular wall motion without regional wall motion  abnormalities  Grade 1 left ventricular diastolic dysfunction  Normal left ventricular filling pressures  RIGHT VENTRICLE: The size was normal  Systolic function was normal  Wall thickness was normal     LEFT ATRIUM: Moderate left atrial enlargement  RIGHT ATRIUM: Mild right atrial enlargement  MITRAL VALVE: Moderately severe mitral annulus calcification  Normal mitral valve function without significant mitral regurgitation  AORTIC VALVE: Known bioprosthetic aortic valve  Increased aortic valve velocity at 3 31 meters/second  Mean aortic valve gradient 24 mmHg  Aortic valve area 1 0-1 1 cm squared  TRICUSPID VALVE: Mild tricuspid regurgitation  PULMONIC VALVE: No pulmonic regurgitation  PERICARDIUM: There was no pericardial effusion  The pericardium was normal in appearance  AORTA: The root exhibited normal size  PULMONARY ARTERY: Mild pulmonary systolic hypertension, pulmonary artery systolic pressure is estimated at 43 mmHg  SYSTEM MEASUREMENT TABLES    2D  %FS: 35 09 %  Ao Diam: 3 08 cm  EDV(Teich): 93 39 ml  EF(Teich): 64 52 %  ESV(Teich): 33 13 ml  IVSd: 1 61 cm  LA Area: 24 9 cm2  LA Diam: 4 52 cm  LVEDV MOD A4C: 127 1 ml  LVEF MOD A4C: 66 5 %  LVESV MOD A4C: 42 58 ml  LVIDd: 4 52 cm  LVIDs: 2 93 cm  LVLd A4C: 9 45 cm  LVLs A4C: 7 24 cm  LVOT Diam: 2 01 cm  LVPWd: 1 37 cm  RA Area: 20 43 cm2  RVIDd: 3 28 cm  SV MOD A4C: 84 52 ml  SV(Teich): 60 26 ml    CW  AV Env  Ti: 333 72 ms  AV VTI: 75 33 cm  AV Vmax: 3 31 m/s  AV Vmean: 2 26 m/s  AV maxP 91 mmHg  AV meanP 27 mmHg  TR Vmax: 2 89 m/s  TR maxP 48 mmHg    MM  TAPSE: 2 38 cm    PW  JANET (VTI): 1 04 cm2  JANET Vmax: 1 06 cm2  AVAI (VTI): 0 cm2/m2  AVAI Vmax: 0 cm2/m2  E': 0 07 m/s  E/E': 13 45  LVOT Env  Ti: 322 95 ms  LVOT VTI: 24 57 cm  LVOT Vmax: 1 1 m/s  LVOT Vmean: 0 76 m/s  LVOT maxP 84 mmHg  LVOT meanP 81 mmHg  LVSI Dopp: 39 48 ml/m2  LVSV Dopp: 78 18 ml  MV A Yuniel: 1 36 m/s  MV Dec [place]: 2 94 m/s2  MV DecT: 327 95 ms  MV E Yuniel: 0 97 m/s  MV E/A Ratio: 0 71  MV PHT: 95 11 ms  MVA By PHT: 2 31 cm2    [employer] Accredited [facility]ocardi[facility]    Prepared and electronically signed by    Karyle Lime, MD  Signed 2019 16:26:15         Lab Results   Component Value Date    HDL 36 (L) 2020     Lab Results   Component Value Date    LDLCALC 101 (H) 2020     Lab Results   Component Value Date    TRIG 152 (H) 2020         Imaging:   I have personally reviewed pertinent reports  Portions of the record may have been created with voice recognition software  Occasional wrong word or "sound a like" substitutions may have occurred due to the inherent limitations of voice recognition software  Read the chart carefully and recognize, using context, where substitutions have occurred

## 2020-12-26 DIAGNOSIS — I10 ESSENTIAL HYPERTENSION: ICD-10-CM

## 2020-12-27 RX ORDER — EZETIMIBE 10 MG/1
10 TABLET ORAL DAILY
Qty: 30 TABLET | Refills: 5 | Status: SHIPPED | OUTPATIENT
Start: 2020-12-27 | End: 2021-07-30 | Stop reason: ALTCHOICE

## 2021-01-26 ENCOUNTER — OFFICE VISIT (OUTPATIENT)
Dept: CARDIOLOGY CLINIC | Facility: CLINIC | Age: 76
End: 2021-01-26
Payer: COMMERCIAL

## 2021-01-26 VITALS
HEART RATE: 83 BPM | HEIGHT: 68 IN | DIASTOLIC BLOOD PRESSURE: 62 MMHG | TEMPERATURE: 97.6 F | RESPIRATION RATE: 16 BRPM | SYSTOLIC BLOOD PRESSURE: 138 MMHG | BODY MASS INDEX: 26.52 KG/M2 | WEIGHT: 175 LBS

## 2021-01-26 DIAGNOSIS — I10 ESSENTIAL HYPERTENSION: ICD-10-CM

## 2021-01-26 DIAGNOSIS — T82.01XD PROSTHETIC HEART VALVE FAILURE, SUBSEQUENT ENCOUNTER: ICD-10-CM

## 2021-01-26 DIAGNOSIS — I25.10 CHRONIC CORONARY ARTERY DISEASE: Primary | ICD-10-CM

## 2021-01-26 DIAGNOSIS — R55 NEAR SYNCOPE: ICD-10-CM

## 2021-01-26 DIAGNOSIS — E78.2 MIXED HYPERLIPIDEMIA: ICD-10-CM

## 2021-01-26 DIAGNOSIS — Z95.4 HISTORY OF AORTIC VALVE REPLACEMENT WITH TISSUE GRAFT: ICD-10-CM

## 2021-01-26 DIAGNOSIS — N18.4 CHRONIC KIDNEY DISEASE, STAGE IV (SEVERE) (HCC): ICD-10-CM

## 2021-01-26 DIAGNOSIS — I63.9 CEREBROVASCULAR ACCIDENT (CVA), UNSPECIFIED MECHANISM (HCC): ICD-10-CM

## 2021-01-26 DIAGNOSIS — Z95.1 HX OF CABG: ICD-10-CM

## 2021-01-26 PROCEDURE — 93000 ELECTROCARDIOGRAM COMPLETE: CPT | Performed by: INTERNAL MEDICINE

## 2021-01-26 PROCEDURE — 99215 OFFICE O/P EST HI 40 MIN: CPT | Performed by: INTERNAL MEDICINE

## 2021-01-26 NOTE — PROGRESS NOTES
Cardiology Follow Up    Bernice Silveira  1945  [id number]  [address] [facility] [address]  [phone]    1  Chronic coronary artery disease  POCT ECG   2  Prosthetic heart valve failure, subsequent encounter  Echo complete with contrast if indicated   3  Mixed hyperlipidemia     4  Chronic kidney disease, stage IV (severe) ([address] )     5  History of aortic valve replacement with tissue graft     6  Essential hypertension     7  Hx of CABG     8  Cerebrovascular accident (CVA), unspecified mechanism ([facility] )     9  Near syncope         Patient was 1st seen on October 6, 2016  He was a previous patient of Dr Adeline Brady  In extensive cardiac history which included rheumatic heart disease as a child, coronary artery disease, severe aortic stenosis and subsequently status post bio aortic valve replacement in 2005, infective endocarditis in 2006, chronic kidney disease followed by Nephrology with an AV fistula in his right arm, multiple CVAs, hypertension, hyperlipidemia, recurrent DVT and IVC filter  His aortic valve replacement, CABG and cerebral vascular accident occurred in [place]  He has a right hemiparesis with some motion of his right arm but very little motion of his left arm  Because of patient's CVA, he has significant leg weakness and uses a motorized vehicle to ambulate  05/23/2017 echocardiogram: Bovine aortic valve prosthesis which appears to 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 but stable  Aortic valve velocity 3 1 m/sec  Biatrial enlargement  Normal LV systolic function, EF 00%  Mild LVH  Grade 1 diastolic dysfunction  05/23/2017 duplex carotid ultrasound: Moderate 16-49% bilateral internal carotid stenosis  05/25/2018 chemistry: BUN 41, creatinine 3 5, GFR 17    05/31/2000 7:24 p m   Holter monitor performed at [address] [place] [address] Drive:  PVCs: 1066 (1 2%)  Couplets: 0 Events  Triplets: 0 Events   Ventricular Runs: 0    Pauses:  Longest R-R interval: 1 9 sec at 5:10:11 AM  Drop/Late beats: 0/18    SUPRAVENTRICULAR ECTOPIC BEATS:  PACs: 24 (0 0%)  Atrial Pairs: 0 Events  Atrial Run: 0 beats at 0 BPM  Afib: 0 (0 0%) total beats with a duration of 0 0 min    05/21/2019 lipid profile: Total cholesterol 209, triglycerides 159, HDL 35, LDL calculated 141, non HDL cholesterol 173    01/25/2020 lipid profile: Total cholesterol 162, triglycerides 152, HDL 36, LDL calculated 101, non HDL cholesterol 126    Interval History:   Patient has no cardiac complaints  He complains of pain in both shoulders which may be muscle skeletal nature  Patient denies chest discomfort or shortness of breath  Patient has no palpitations  Patient denies leg edema  Patient   Has orthostatic symptoms if he stands up too quickly  He had echocardiographic evidence to 2 years ago of aortic valve malfunction with aortic stenosis on a tissue aortic valve with normal left ventricular   Discussion/Summary:    1  Echocardiogram  2  Return in 6 months    Patient Active Problem List   Diagnosis    Chronic coronary artery disease    Chronic [facility] disease, stage IV (severe) [facility] )    Endocarditis    History of aortic valve replacement with tissue graft    HTN (hypertension)    HLD (hyperlipidemia)    Hx of CABG    [facility]    Near syncope    Prosthetic aortic valve malfunction     History reviewed  No pertinent past medical history    Social History     Socioeconomic History    Marital status:      Spouse name: Not on file    Number of children: Not on file    Years of education: Not on file    Highest education level: Not on file   Occupational History    Not on file   Social Needs    Financial resource strain: Not on file    Food insecurity     Worry: Not on file     Inability: Not on file    Transportation needs     Medical: Not on file     Non-medical: Not on file   Tobacco Use    Smoking status: Former Smoker     Packs/day: 1 00     Types: Cigarettes     Quit date: 1993     Years since quittin 0    Smokeless tobacco: Never Used   Substance and Sexual Activity    Alcohol use: Not on file    Drug use: Not on file    Sexual activity: Not on file   Lifestyle    Physical activity     Days per week: Not on file     Minutes per session: Not on file    Stress: Not on file   Relationships    Social connections     Talks on phone: Not on file     Gets together: Not on file     Attends Che[employer] service: Not on file     Active member of club or organization: Not on file     Attends meetings of clubs or organizations: Not on file     Relationship status: Not on file    Intimate partner violence     Fear of current or ex partner: Not on file     Emotionally abused: Not on file     Physically abused: Not on file     Forced sexual activity: Not on file   Other Topics Concern    Not on file   Social History Narrative    Not on file      Family History   Problem Relation Age of Onset    Diabetes Mother     Hypertension Mother     Dementia Mother     Other Father         fall gangrene    Peripheral vascular disease Sister      Past Surgical History:   Procedure Laterality Date    AORTIC VALVE REPLACEMENT      Tissue valve    BLADDER SURGERY      23 yrs ago      CORONARY ARTERY BYPASS GRAFT  2005    x1    RENAL ARTERY STENT  2005       Current Outpatient Medications:     allopurinol (ZYLOPRIM) 100 mg tablet, Take 100 mg by mouth daily, Disp: , Rfl:     amLODIPine (NORVASC) 10 mg tablet, TAKE 1 TABLET (10 MG TOTAL) BY MOUTH EVERY EVENING, Disp: 30 tablet, Rfl: 5    bisoprolol (ZEBETA) 5 mg tablet, TAKE 1 TABLET BY ORAL ROUTE EVERY DAY, Disp: 90 tablet, Rfl: 3    calcitriol (ROCALTROL) 0 5 MCG capsule, One weekly, Disp: , Rfl:     Cholecalciferol (VITAMIN D3) 2000 units capsule, every 24 hours, Disp: , Rfl:     doxycycline hyclate (VIBRAMYCIN) 100 mg capsule, Take 100 mg by mouth daily , Disp: , Rfl:     ECPIRIN 325 MG EC tablet, TAKE 1 TABLET BY ORAL ROUTE EVERY DAY, Disp: 90 tablet, Rfl: 3    esomeprazole (NEXIUM) 20 mg capsule, every 24 hours, Disp: , Rfl:     ezetimibe (ZETIA) 10 mg tablet, TAKE 1 TABLET (10 MG TOTAL) BY MOUTH DAILY, Disp: 30 tablet, Rfl: 5    losartan (COZAAR) 50 mg tablet, TAKE 1 TABLET BY MOUTH EVERY 24 HRS, Disp: 90 tablet, Rfl: 3    rosuvastatin (CRESTOR) 20 MG tablet, TAKE 1 TABLET (20 MG TOTAL) BY MOUTH EVERY OTHER DAY, Disp: 30 tablet, Rfl: 5    sodium bicarbonate 650 mg tablet, 2  tablets bid, Disp: , Rfl:   Allergies   Allergen Reactions    Latex     Nitrofurantoin Other (See Comments)     neck pain    Other Itching       Results for orders placed or performed in visit on 01/26/21   POCT ECG    Narrative    Normal sinus rhythm with occasional premature ventricular contractions and fusion complexes  First degree AV block  Left  Axis deviation  Intraventricular conduction delay  With repolarization abnormalities  Abnormal EKG  Review of Systems:  Review of Systems   Constitutional: Negative for activity change  Respiratory: Negative for cough, chest tightness, shortness of breath and wheezing  Cardiovascular: Negative for chest pain, palpitations and leg swelling  Musculoskeletal: Positive for gait problem  Bilateral shoulder and arm pain   Skin: Negative for color change  Neurological: Positive for dizziness, weakness and light-headedness  Negative for tremors, syncope and headaches  Psychiatric/Behavioral: Negative for agitation and confusion  Physical Exam:  /62   Pulse 83   Temp 97 6 °F (36 4 °C)   Resp 16   Ht 5' 8" (1 727 m)   Wt 79 4 kg (175 lb)   BMI 26 61 kg/m²   Physical Exam  Constitutional:       General: He is not in acute distress  Appearance: He is well-developed  HENT:      Head: Normocephalic and atraumatic  Neck:      Vascular: No JVD     Cardiovascular:      Rate and Rhythm: Normal rate and regular rhythm  Pulses: Normal pulses  Heart sounds: Murmur present  No friction rub  No gallop  Comments:  Grade 2/6 mid to late peaking systolic ejection murmur radiating to the base of the neck  Continues murmur in right supraclavicular fossa from fistula in right arm which is over developed enlarged    Grade 2/6 mid peaking systolic ejection murmur in left supraclavicular fossa  Pulmonary:      Effort: Pulmonary effort is normal  No respiratory distress  Breath sounds: Normal breath sounds  No wheezing or rales  Chest:      Chest wall: No tenderness  Abdominal:      General: Bowel sounds are normal  There is no distension  Palpations: Abdomen is soft  Skin:     General: Skin is warm and dry  Neurological:      Mental Status: He is alert and oriented to person, place, and time  Motor: Weakness present  Gait: Gait abnormal       Comments:   Right hemiplegia worse in right leg than right arm   Psychiatric:         Mood and Affect: Mood normal          Behavior: Behavior normal          Thought Content: Thought content normal          Judgment: Judgment normal          --------------------------------------------------------------------------------  ECHO:   Results for orders placed during the hospital encounter of 19   Echo complete with contrast if indicated    Narrative [facility] [employer] [address]    Transthoracic Echocardiogram  2D, M-mode, Doppler, and Color Doppler    Study date:  2019    Patient: Kiana Walker  MR number: [id number]  Account number: [de-identified]  : 1945  Age: 72 years  Gender: Male  Status: Outpatient  Location: [facility]  Height: 68 in  Weight: 184 6 lb  BP: 130/ 56 mmHg    Indications:  AoV disease    Diagnoses: Z95 2 - Presence of prosthetic heart valve    Sonographer:  Gerold Bence, RDCS  Primary Physician:  Lorena Perera MD  Referring Physician:  Claudell Garland, MD  Group:  [facility]  Interpreting Physician:  Claudell Garland, MD    SUMMARY    SUMMARY:  1  Sinus rhythm  2  Good technical quality  3  Bioprosthetic aortic valve with mild stenosis  4  Normal left ventricular systolic function  5  Grade 1 left ventricular diastolic dysfunction  6  Moderate concentric left ventricular hypertrophy  7  Mild pulmonary hypertension  8  Biatrial enlargement    LEFT VENTRICLE:  Normal left ventricular systolic function, EF 67 percent  Normal left ventricular cavity size  Moderate concentric left ventricular hypertrophy  Normal left ventricular wall motion without regional wall motion abnormalities  Grade 1 left  ventricular diastolic dysfunction  Normal left ventricular filling pressures  LEFT ATRIUM:  Moderate left atrial enlargement  RIGHT ATRIUM:  Mild right atrial enlargement  MITRAL VALVE:  Moderately severe mitral annulus calcification  Normal mitral valve function without significant mitral regurgitation  AORTIC VALVE:  Known bioprosthetic aortic valve  Increased aortic valve velocity at 3 31 meters/second  Mean aortic valve gradient 24 mmHg  Aortic valve area 1 0-1 1 cm squared  TRICUSPID VALVE:  Mild tricuspid regurgitation  PULMONIC VALVE:  No pulmonic regurgitation  PULMONARY ARTERIES:  Mild pulmonary systolic hypertension, pulmonary artery systolic pressure is estimated at 43 mmHg  PROCEDURE: The study was performed in the [facility]  This was a routine study  The transthoracic approach was used  The study included complete 2D imaging, M-mode, complete spectral Doppler, and color Doppler  The heart rate was  65 bpm, at the start of the study  Image quality was good  LEFT VENTRICLE: Normal left ventricular systolic function, EF 67 percent  Normal left ventricular cavity size  Moderate concentric left ventricular hypertrophy   Normal left ventricular wall motion without regional wall motion  abnormalities  Grade 1 left ventricular diastolic dysfunction  Normal left ventricular filling pressures  RIGHT VENTRICLE: The size was normal  Systolic function was normal  Wall thickness was normal     LEFT ATRIUM: Moderate left atrial enlargement  RIGHT ATRIUM: Mild right atrial enlargement  MITRAL VALVE: Moderately severe mitral annulus calcification  Normal mitral valve function without significant mitral regurgitation  AORTIC VALVE: Known bioprosthetic aortic valve  Increased aortic valve velocity at 3 31 meters/second  Mean aortic valve gradient 24 mmHg  Aortic valve area 1 0-1 1 cm squared  TRICUSPID VALVE: Mild tricuspid regurgitation  PULMONIC VALVE: No pulmonic regurgitation  PERICARDIUM: There was no pericardial effusion  The pericardium was normal in appearance  AORTA: The root exhibited normal size  PULMONARY ARTERY: Mild pulmonary systolic hypertension, pulmonary artery systolic pressure is estimated at 43 mmHg  SYSTEM MEASUREMENT TABLES    2D  %FS: 35 09 %  Ao Diam: 3 08 cm  EDV(Teich): 93 39 ml  EF(Teich): 64 52 %  ESV(Teich): 33 13 ml  IVSd: 1 61 cm  LA Area: 24 9 cm2  LA Diam: 4 52 cm  LVEDV MOD A4C: 127 1 ml  LVEF MOD A4C: 66 5 %  LVESV MOD A4C: 42 58 ml  LVIDd: 4 52 cm  LVIDs: 2 93 cm  LVLd A4C: 9 45 cm  LVLs A4C: 7 24 cm  LVOT Diam: 2 01 cm  LVPWd: 1 37 cm  RA Area: 20 43 cm2  RVIDd: 3 28 cm  SV MOD A4C: 84 52 ml  SV(Teich): 60 26 ml    CW  AV Env  Ti: 333 72 ms  AV VTI: 75 33 cm  AV Vmax: 3 31 m/s  AV Vmean: 2 26 m/s  AV maxP 91 mmHg  AV meanP 27 mmHg  TR Vmax: 2 89 m/s  TR maxP 48 mmHg    MM  TAPSE: 2 38 cm    PW  JANET (VTI): 1 04 cm2  JANET Vmax: 1 06 cm2  AVAI (VTI): 0 cm2/m2  AVAI Vmax: 0 cm2/m2  E': 0 07 m/s  E/E': 13 45  LVOT Env  Ti: 322 95 ms  LVOT VTI: 24 57 cm  LVOT Vmax: 1 1 m/s  LVOT Vmean: 0 76 m/s  LVOT maxP 84 mmHg  LVOT meanP 81 mmHg  LVSI Dopp: 39 48 ml/m2  LVSV Dopp: 78 18 ml  MV A Yuniel: 1 36 m/s  MV Dec Trinity: 2 94 m/s2  MV DecT: 327 95 ms  MV E Yuniel: 0 97 m/s  MV E/A Ratio: 0 71  MV PHT: 95 11 ms  MVA By PHT: 2 31 cm2    [employer] Accredited Echocardiography Laboratory    Prepared and electronically signed by    Samina Garcia MD  Signed 02-Jan-2019 16:26:15         Lab Results   Component Value Date    HDL 36 (L) 01/25/2020     Lab Results   Component Value Date    LDLCALC 101 (H) 01/25/2020     Lab Results   Component Value Date    TRIG 152 (H) 01/25/2020         Imaging:   I have personally reviewed pertinent reports  Portions of the record may have been created with voice recognition software  Occasional wrong word or "sound a like" substitutions may have occurred due to the inherent limitations of voice recognition software  Read the chart carefully and recognize, using context, where substitutions have occurred

## 2021-01-26 NOTE — LETTER
January 26, 2021     Madi Allred, [address]    Patient: Siena Snyder   YOB: 1945   Date of Visit: 1/26/2021       Dear Dr Syed Spence: Thank you for referring Siena Snyder to me for evaluation  Below are my notes for this consultation  If you have questions, please do not hesitate to call me  I look forward to following your patient along with you  Sincerely,        Franchesca Haq MD        CC: No Recipients  Franchesca Haq MD  1/26/2021  4:07 PM  Sign when Signing Visit                                             Cardiology Follow Up    Siena Snyder  1945  [id number]  [address]  [address] [facility] [address] [phone]    1  Chronic coronary artery disease  POCT ECG   2  Prosthetic heart valve failure, subsequent encounter  Echo complete with contrast if indicated   3  Mixed hyperlipidemia     4  Chronic kidney disease, stage IV (severe) ([address] )     5  History of aortic valve replacement with tissue graft     6  Essential hypertension     7  Hx of CABG     8  Cerebrovascular accident (CVA), unspecified mechanism ([address] )     9  Near syncope         Patient was 1st seen on October 6, 2016  He was a previous patient of Dr Genesis Lamar  In extensive cardiac history which included rheumatic heart disease as a child, coronary artery disease, severe aortic stenosis and subsequently status post bio aortic valve replacement in 2005, infective endocarditis in 2006, chronic kidney disease followed by Nephrology with an AV fistula in his right arm, multiple CVAs, hypertension, hyperlipidemia, recurrent DVT and IVC filter  His aortic valve replacement, CABG and cerebral vascular accident occurred in [place]  He has a right hemiparesis with some motion of his right arm but very little motion of his left arm  Because of patient's CVA, he has significant leg weakness and uses a motorized vehicle to ambulate      05/23/2017 echocardiogram: Bovine aortic valve prosthesis which appears to 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 but stable  Aortic valve velocity 3 1 m/sec  Biatrial enlargement  Normal LV systolic function, EF 04%  Mild LVH  Grade 1 diastolic dysfunction  05/23/2017 duplex carotid ultrasound: Moderate 16-49% bilateral internal carotid stenosis  05/25/2018 chemistry: BUN 41, creatinine 3 5, GFR 17    05/31/2000 7:24 p m  Holter monitor performed at [address]:  PVCs: 1066 (1 2%)  Couplets: 0 Events  Triplets: 0 Events   Ventricular Runs: 0    Pauses:  Longest R-R interval: 1 9 sec at 5:10:11 AM  Drop/Late beats: 0/18    SUPRAVENTRICULAR ECTOPIC BEATS:  PACs: 24 (0 0%)  Atrial Pairs: 0 Events  Atrial Run: 0 beats at 0 BPM  Afib: 0 (0 0%) total beats with a duration of 0 0 min    05/21/2019 lipid profile: Total cholesterol 209, triglycerides 159, HDL 35, LDL calculated 141, non HDL cholesterol 173    01/25/2020 lipid profile: Total cholesterol 162, triglycerides 152, HDL 36, LDL calculated 101, non HDL cholesterol 126    Interval History:   Patient has no cardiac complaints  He complains of pain in both shoulders which may be muscle skeletal nature  Patient denies chest discomfort or shortness of breath  Patient has no palpitations  Patient denies leg edema  Patient   Has orthostatic symptoms if he stands up too quickly  He had echocardiographic evidence to 2 years ago of aortic valve malfunction with aortic stenosis on a tissue aortic valve with normal left ventricular   Discussion/Summary:    1  Echocardiogram  2   Return in 6 months    Patient Active Problem List   Diagnosis    Chronic coronary artery disease    Chronic [facility], stage IV (severe) [facility]    Endocarditis    History of aortic valve replacement with tissue graft    HTN (hypertension)    HLD (hyperlipidemia)    Hx of CABG    Cerebrovascular accident (CVA) ([facility] )    Near syncope    Prosthetic aortic valve malfunction     History reviewed  No pertinent past medical history  Social History     Socioeconomic History    Marital status:      Spouse name: Not on file    Number of children: Not on file    Years of education: Not on file    Highest education level: Not on file   Occupational History    Not on file   Social Needs    Financial resource strain: Not on file    Food insecurity     Worry: Not on file     Inability: Not on file    Transportation needs     Medical: Not on file     Non-medical: Not on file   Tobacco Use    Smoking status: Former Smoker     Packs/day: 1 00     Types: Cigarettes     Quit date: 1993     Years since quittin 0    Smokeless tobacco: Never Used   Substance and Sexual Activity    Alcohol use: Not on file    Drug use: Not on file    Sexual activity: Not on file   Lifestyle    Physical activity     Days per week: Not on file     Minutes per session: Not on file    Stress: Not on file   Relationships    Social connections     Talks on phone: Not on file     Gets together: Not on file     Attends Rastafari service: Not on file     Active member of club or organization: Not on file     Attends meetings of clubs or organizations: Not on file     Relationship status: Not on file    Intimate partner violence     Fear of current or ex partner: Not on file     Emotionally abused: Not on file     Physically abused: Not on file     Forced sexual activity: Not on file   Other Topics Concern    Not on file   Social History Narrative    Not on file      Family History   Problem Relation Age of Onset    Diabetes Mother     Hypertension Mother     Dementia Mother     Other Father         fall gangrene    Peripheral vascular disease Sister      Past Surgical History:   Procedure Laterality Date    AORTIC VALVE REPLACEMENT      Tissue valve    BLADDER SURGERY      23 yrs ago      CORONARY ARTERY BYPASS GRAFT  2005    x1    RENAL ARTERY STENT  2005 Current Outpatient Medications:     allopurinol (ZYLOPRIM) 100 mg tablet, Take 100 mg by mouth daily, Disp: , Rfl:     amLODIPine (NORVASC) 10 mg tablet, TAKE 1 TABLET (10 MG TOTAL) BY MOUTH EVERY EVENING, Disp: 30 tablet, Rfl: 5    bisoprolol (ZEBETA) 5 mg tablet, TAKE 1 TABLET BY ORAL ROUTE EVERY DAY, Disp: 90 tablet, Rfl: 3    calcitriol (ROCALTROL) 0 5 MCG capsule, One weekly, Disp: , Rfl:     Cholecalciferol (VITAMIN D3) 2000 units capsule, every 24 hours, Disp: , Rfl:     doxycycline hyclate (VIBRAMYCIN) 100 mg capsule, Take 100 mg by mouth daily , Disp: , Rfl:     ECPIRIN 325 MG EC tablet, TAKE 1 TABLET BY ORAL ROUTE EVERY DAY, Disp: 90 tablet, Rfl: 3    esomeprazole (NEXIUM) 20 mg capsule, every 24 hours, Disp: , Rfl:     ezetimibe (ZETIA) 10 mg tablet, TAKE 1 TABLET (10 MG TOTAL) BY MOUTH DAILY, Disp: 30 tablet, Rfl: 5    losartan (COZAAR) 50 mg tablet, TAKE 1 TABLET BY MOUTH EVERY 24 HRS, Disp: 90 tablet, Rfl: 3    rosuvastatin (CRESTOR) 20 MG tablet, TAKE 1 TABLET (20 MG TOTAL) BY MOUTH EVERY OTHER DAY, Disp: 30 tablet, Rfl: 5    sodium bicarbonate 650 mg tablet, 2  tablets bid, Disp: , Rfl:   Allergies   Allergen Reactions    Latex     Nitrofurantoin Other (See Comments)     neck pain    Other Itching       Results for orders placed or performed in visit on 01/26/21   POCT ECG    Narrative    Normal sinus rhythm with occasional premature ventricular contractions and fusion complexes  First degree AV block  Left  Axis deviation  Intraventricular conduction delay  With repolarization abnormalities  Abnormal EKG  Review of Systems:  Review of Systems   Constitutional: Negative for activity change  Respiratory: Negative for cough, chest tightness, shortness of breath and wheezing  Cardiovascular: Negative for chest pain, palpitations and leg swelling  Musculoskeletal: Positive for gait problem  Bilateral shoulder and arm pain   Skin: Negative for color change  Neurological: Positive for dizziness, weakness and light-headedness  Negative for tremors, syncope and headaches  Psychiatric/Behavioral: Negative for agitation and confusion  Physical Exam:  /62   Pulse 83   Temp 97 6 °F (36 4 °C)   Resp 16   Ht 5' 8" (1 727 m)   Wt 79 4 kg (175 lb)   BMI 26 61 kg/m²   Physical Exam  Constitutional:       General: He is not in acute distress  Appearance: He is well-developed  HENT:      Head: Normocephalic and atraumatic  Neck:      Vascular: No JVD  Cardiovascular:      Rate and Rhythm: Normal rate and regular rhythm  Pulses: Normal pulses  Heart sounds: Murmur present  No friction rub  No gallop  Comments:  Grade 2/6 mid to late peaking systolic ejection murmur radiating to the base of the neck  Continues murmur in right supraclavicular fossa from fistula in right arm which is over developed enlarged    Grade 2/6 mid peaking systolic ejection murmur in left supraclavicular fossa  Pulmonary:      Effort: Pulmonary effort is normal  No respiratory distress  Breath sounds: Normal breath sounds  No wheezing or rales  Chest:      Chest wall: No tenderness  Abdominal:      General: Bowel sounds are normal  There is no distension  Palpations: Abdomen is soft  Skin:     General: Skin is warm and dry  Neurological:      Mental Status: He is alert and oriented to person, place, and time  Motor: Weakness present  Gait: Gait abnormal       Comments:   Right hemiplegia worse in right leg than right arm   Psychiatric:         Mood and Affect: Mood normal          Behavior: Behavior normal          Thought Content:  Thought content normal          Judgment: Judgment normal          --------------------------------------------------------------------------------  ECHO:   Results for orders placed during the hospital encounter of 01/02/19   Echo complete with contrast if indicated    Narrative [facility] [address]    Transthoracic Echocardiogram  2D, M-mode, Doppler, and Color Doppler    Study date:  2019    Patient: Bard Jane  MR number: [id number]  Account number: [de-identified]  : 1945  Age: 72 years  Gender: Male  Status: Outpatient  Location: [address] [place] [address]  Height: 68 in  Weight: 184 6 lb  BP: 130/ 56 mmHg    Indications: AoV disease    Diagnoses: Z95 2 - Presence of prosthetic heart valve    Sonographer:  Arslan Ornelas RDCS  Primary Physician:  Va Hernández MD  Referring Physician:  Napoleon Gaucher, MD  Group:  [facility]  Interpreting Physician:  Napoleon Gaucher, MD    SUMMARY    SUMMARY:  1  Sinus rhythm  2  Good technical quality  3  Bioprosthetic aortic valve with mild stenosis  4  Normal left ventricular systolic function  5  Grade 1 left ventricular diastolic dysfunction  6  Moderate concentric left ventricular hypertrophy  7  Mild pulmonary hypertension  8  Biatrial enlargement    LEFT VENTRICLE:  Normal left ventricular systolic function, EF 67 percent  Normal left ventricular cavity size  Moderate concentric left ventricular hypertrophy  Normal left ventricular wall motion without regional wall motion abnormalities  Grade 1 left  ventricular diastolic dysfunction  Normal left ventricular filling pressures  LEFT ATRIUM:  Moderate left atrial enlargement  RIGHT ATRIUM:  Mild right atrial enlargement  MITRAL VALVE:  Moderately severe mitral annulus calcification  Normal mitral valve function without significant mitral regurgitation  AORTIC VALVE:  Known bioprosthetic aortic valve  Increased aortic valve velocity at 3 31 meters/second  Mean aortic valve gradient 24 mmHg  Aortic valve area 1 0-1 1 cm squared  TRICUSPID VALVE:  Mild tricuspid regurgitation  PULMONIC VALVE:  No pulmonic regurgitation      PULMONARY ARTERIES:  Mild pulmonary systolic hypertension, pulmonary artery systolic pressure is estimated at 43 mmHg  PROCEDURE: The study was performed in the [facility]  This was a routine study  The transthoracic approach was used  The study included complete 2D imaging, M-mode, complete spectral Doppler, and color Doppler  The heart rate was  65 bpm, at the start of the study  Image quality was good  LEFT VENTRICLE: Normal left ventricular systolic function, EF 67 percent  Normal left ventricular cavity size  Moderate concentric left ventricular hypertrophy  Normal left ventricular wall motion without regional wall motion  abnormalities  Grade 1 left ventricular diastolic dysfunction  Normal left ventricular filling pressures  RIGHT VENTRICLE: The size was normal  Systolic function was normal  Wall thickness was normal     LEFT ATRIUM: Moderate left atrial enlargement  RIGHT ATRIUM: Mild right atrial enlargement  MITRAL VALVE: Moderately severe mitral annulus calcification  Normal mitral valve function without significant mitral regurgitation  AORTIC VALVE: Known bioprosthetic aortic valve  Increased aortic valve velocity at 3 31 meters/second  Mean aortic valve gradient 24 mmHg  Aortic valve area 1 0-1 1 cm squared  TRICUSPID VALVE: Mild tricuspid regurgitation  PULMONIC VALVE: No pulmonic regurgitation  PERICARDIUM: There was no pericardial effusion  The pericardium was normal in appearance  AORTA: The root exhibited normal size  PULMONARY ARTERY: Mild pulmonary systolic hypertension, pulmonary artery systolic pressure is estimated at 43 mmHg      SYSTEM MEASUREMENT TABLES    2D  %FS: 35 09 %  Ao Diam: 3 08 cm  EDV(Teich): 93 39 ml  EF(Teich): 64 52 %  ESV(Teich): 33 13 ml  IVSd: 1 61 cm  LA Area: 24 9 cm2  LA Diam: 4 52 cm  LVEDV MOD A4C: 127 1 ml  LVEF MOD A4C: 66 5 %  LVESV MOD A4C: 42 58 ml  LVIDd: 4 52 cm  LVIDs: 2 93 cm  LVLd A4C: 9 45 cm  LVLs A4C: 7 24 cm  LVOT Diam: 2 01 cm  LVPWd: 1 37 cm  RA Area: 20 43 cm2  RVIDd: 3 28 cm  SV MOD A4C: 84 52 ml  SV(Teich): 60 26 ml    CW  AV Env  Ti: 333 72 ms  AV VTI: 75 33 cm  AV Vmax: 3 31 m/s  AV Vmean: 2 26 m/s  AV maxP 91 mmHg  AV meanP 27 mmHg  TR Vmax: 2 89 m/s  TR maxP 48 mmHg    MM  TAPSE: 2 38 cm    PW  JANET (VTI): 1 04 cm2  JANET Vmax: 1 06 cm2  AVAI (VTI): 0 cm2/m2  AVAI Vmax: 0 cm2/m2  E': 0 07 m/s  E/E': 13 45  LVOT Env  Ti: 322 95 ms  LVOT VTI: 24 57 cm  LVOT Vmax: 1 1 m/s  LVOT Vmean: 0 76 m/s  LVOT maxP 84 mmHg  LVOT meanP 81 mmHg  LVSI Dopp: 39 48 ml/m2  LVSV Dopp: 78 18 ml  MV A Yuniel: 1 36 m/s  MV Dec [place]: 2 94 m/s2  MV DecT: 327 95 ms  MV E Yuniel: 0 97 m/s  MV E/A Ratio: 0 71  MV PHT: 95 11 ms  MVA By PHT: 2 31 cm2    [employer] Accredited Echocardiography [facility]    Prepared and electronically signed by    Harjinder Ortiz MD  Signed 2019 16:26:15         Lab Results   Component Value Date    HDL 36 (L) 2020     Lab Results   Component Value Date    LDLCALC 101 (H) 2020     Lab Results   Component Value Date    TRIG 152 (H) 2020         Imaging:   I have personally reviewed pertinent reports  Portions of the record may have been created with voice recognition software  Occasional wrong word or "sound a like" substitutions may have occurred due to the inherent limitations of voice recognition software  Read the chart carefully and recognize, using context, where substitutions have occurred

## 2021-02-10 ENCOUNTER — TELEPHONE (OUTPATIENT)
Dept: CARDIOLOGY CLINIC | Facility: CLINIC | Age: 76
End: 2021-02-10

## 2021-02-10 NOTE — TELEPHONE ENCOUNTER
Received fax from [facility] Dr Sravanthi Gonsales  Requesting pre operative  Surgical risk assessment for parathyroidectomy sched for 2/16/21  Ally Mueller    He is rounding at the hospital for the rest of the week and won't be back in the office until 2/16/21

## 2021-02-25 ENCOUNTER — HOSPITAL ENCOUNTER (OUTPATIENT)
Dept: NON INVASIVE DIAGNOSTICS | Facility: HOSPITAL | Age: 76
Discharge: HOME | End: 2021-02-25
Payer: COMMERCIAL

## 2021-02-25 DIAGNOSIS — T82.01XD PROSTHETIC HEART VALVE FAILURE, SUBSEQUENT ENCOUNTER: ICD-10-CM

## 2021-02-25 PROCEDURE — 93306 TTE W/DOPPLER COMPLETE: CPT

## 2021-02-25 PROCEDURE — 93306 TTE W/DOPPLER COMPLETE: CPT | Performed by: INTERNAL MEDICINE

## 2021-03-16 DIAGNOSIS — I10 ESSENTIAL HYPERTENSION: ICD-10-CM

## 2021-03-16 RX ORDER — BISOPROLOL FUMARATE 5 MG/1
TABLET ORAL
Qty: 90 TABLET | Refills: 3 | Status: SHIPPED | OUTPATIENT
Start: 2021-03-16 | End: 2021-12-22

## 2021-07-30 ENCOUNTER — OFFICE VISIT (OUTPATIENT)
Dept: CARDIOLOGY CLINIC | Facility: CLINIC | Age: 76
End: 2021-07-30
Payer: COMMERCIAL

## 2021-07-30 VITALS
HEART RATE: 68 BPM | DIASTOLIC BLOOD PRESSURE: 60 MMHG | BODY MASS INDEX: 26 KG/M2 | WEIGHT: 171 LBS | SYSTOLIC BLOOD PRESSURE: 130 MMHG

## 2021-07-30 DIAGNOSIS — I25.10 CHRONIC CORONARY ARTERY DISEASE: Primary | ICD-10-CM

## 2021-07-30 DIAGNOSIS — I63.9 CEREBROVASCULAR ACCIDENT (CVA), UNSPECIFIED MECHANISM (HCC): ICD-10-CM

## 2021-07-30 DIAGNOSIS — Z95.1 HX OF CABG: ICD-10-CM

## 2021-07-30 DIAGNOSIS — T82.01XD PROSTHETIC HEART VALVE FAILURE, SUBSEQUENT ENCOUNTER: ICD-10-CM

## 2021-07-30 DIAGNOSIS — I10 ESSENTIAL HYPERTENSION: ICD-10-CM

## 2021-07-30 DIAGNOSIS — N18.4 CHRONIC KIDNEY DISEASE, STAGE IV (SEVERE) (HCC): ICD-10-CM

## 2021-07-30 DIAGNOSIS — E78.2 MIXED HYPERLIPIDEMIA: ICD-10-CM

## 2021-07-30 DIAGNOSIS — Z95.4 HISTORY OF AORTIC VALVE REPLACEMENT WITH TISSUE GRAFT: ICD-10-CM

## 2021-07-30 PROCEDURE — 99214 OFFICE O/P EST MOD 30 MIN: CPT | Performed by: INTERNAL MEDICINE

## 2021-07-30 NOTE — PROGRESS NOTES
[facility]  [address] [facility]   [address]  [address]  Phone#  [phone]   Fax#  [phone]  *-*-*-*-*-*-*-*-*-*-*-*-*-*-*-*-*-*-*-*-*-*-*-*-*-*-*-*-*-*-*-*-*-*-*-*-*-*-*-*-*-*-*-*-*-*-*-*-*-*-*-*-*-*                                   Cardiology Follow Up      ENCOUNTER DATE: 21 3:30 PM  PATIENT NAME: Radha Pfeiffer   : 1945    MRN: [id number]  AGE:76 y o  SEX: male  0206 Bridget Cheng MD     PRIMARY CARE PHYSICIAN: Ailin Phillips MD    ACTIVE DIAGNOSIS THIS VISIT  1  Chronic coronary artery disease     2  History of aortic valve replacement with tissue graft  Echo complete with contrast if indicated   3  Essential hypertension     4  Hx of CABG     5  Cerebrovascular accident (CVA), unspecified mechanism ([address] )     6  Mixed hyperlipidemia  Lipid Panel with Direct LDL reflex   7  Chronic kidney disease, stage IV (severe) ([address] )     8  Prosthetic heart valve failure, subsequent encounter       ACTIVE PROBLEM LIST  Patient Active Problem List   Diagnosis    Chronic coronary artery disease    Chronic kidney disease, stage IV (severe) (HCC)    Endocarditis    History of aortic valve replacement with tissue graft    HTN (hypertension)    HLD (hyperlipidemia)    Hx of CABG    Cerebrovascular accident (CVA) ([address] )    Near syncope    Prosthetic aortic valve malfunction       CARDIOLOGY SPECIALTY COMMENTS  Patient was 1st seen on 2016  He was a previous patient of Dr Jany Torres  In extensive cardiac history which included rheumatic heart disease as a child, coronary artery disease, severe aortic stenosis and subsequently status post bio aortic valve replacement in , infective endocarditis in , chronic kidney disease followed by Nephrology with an AV fistula in his right arm, multiple CVAs, hypertension, hyperlipidemia, recurrent DVT and IVC filter  His aortic valve replacement, CABG and cerebral vascular accident occurred in [place]   He has a right hemiparesis with some motion of his right arm but very little motion of his left arm  Because of patient's CVA, he has significant leg weakness and uses a motorized vehicle to ambulate  05/23/2017 echocardiogram: Bovine aortic valve prosthesis which appears to 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 but stable  Aortic valve velocity 3 1 m/sec  Biatrial enlargement  Normal LV systolic function, EF 96%  Mild LVH  Grade 1 diastolic dysfunction  05/23/2017 duplex carotid ultrasound: Moderate 16-49% bilateral internal carotid stenosis  05/25/2018 chemistry: BUN 41, creatinine 3 5, GFR 17     05/31/2000 7:24 p m  Holter monitor performed at [address]:   PVCs: 1066 (1 2%)   Couplets: 0 Events   Triplets: 0 Events   Ventricular Runs: 0     Pauses:   Longest R-R interval: 1 9 sec at 5:10:11 AM   Drop/Late beats: 0/18     SUPRAVENTRICULAR ECTOPIC BEATS:   PACs: 24 (0 0%)   Atrial Pairs: 0 Events   Atrial Run: 0 beats at 0 BPM   Afib: 0 (0 0%) total beats with a duration of 0 0 min    INTERVAL HISTORY:      Patient denies chest discomfort or shortness of breath  Patient has no palpitations  Patient denies symptoms of dizziness, lightheadedness or near-syncope/syncope  Patient denies symptoms of orthopnea or paroxysmal nocturnal dyspnea  patient does have bilateral 1+ pretibial edema  He is receiving sodium bicarbonate pills from Nephrology    DISCUSSION/PLAN:         1  I would consider a 2nd aortic valve replacement or a TAVR valve within a valve  Have ever due to his severe renal insufficiency, stage IV, either of these procedures would most likely place him on dialysis  Most likely he will need dialysis in the future and at that time more aggressive management of his aortic prosthetic bioprosthesis may be in order  2  Return in 6 months   3  Echocardiogram  Prior to return visit  4   Lipid profile      Lab Studies:    Lab Results   Component Value Date    CHOLESTEROL 162 2020     Lab Results   Component Value Date    TRIG 152 (H) 2020     Lab Results   Component Value Date    HDL 36 (L) 2020     Lab Results   Component Value Date    LDLCALC 101 (H) 2020     Lab Results   Component Value Date    NONHDL 126 2020 BMP glucose 127, BUN 56, creatinine 3 6, GFR 16, BUN/creatinine ratio 16, sodium 141, potassium 4 4, chloride 106, CO2 25, calcium 8 0      Current Outpatient Medications:     allopurinol (ZYLOPRIM) 100 mg tablet, Take 100 mg by mouth daily, Disp: , Rfl:     amLODIPine (NORVASC) 10 mg tablet, TAKE 1 TABLET (10 MG TOTAL) BY MOUTH EVERY EVENING, Disp: 30 tablet, Rfl: 5    bisoprolol (ZEBETA) 5 mg tablet, TAKE 1 TABLET BY ORAL ROUTE EVERY DAY, Disp: 90 tablet, Rfl: 3    Cholecalciferol (VITAMIN D3) 2000 units capsule, every 24 hours, Disp: , Rfl:     doxycycline hyclate (VIBRAMYCIN) 100 mg capsule, Take 100 mg by mouth daily , Disp: , Rfl:     ECPIRIN 325 MG EC tablet, TAKE 1 TABLET BY ORAL ROUTE EVERY DAY, Disp: 90 tablet, Rfl: 3    rosuvastatin (CRESTOR) 20 MG tablet, TAKE 1 TABLET (20 MG TOTAL) BY MOUTH EVERY OTHER DAY, Disp: 30 tablet, Rfl: 5    sodium bicarbonate 650 mg tablet, 2  tablets bid, Disp: , Rfl:     losartan (COZAAR) 50 mg tablet, TAKE 1 TABLET BY MOUTH EVERY 24 HRS (Patient not taking: Reported on 2021), Disp: 90 tablet, Rfl: 3  Allergies   Allergen Reactions    Latex     Nitrofurantoin Other (See Comments)     neck pain    Other Itching       History reviewed  No pertinent past medical history    Social History     Socioeconomic History    Marital status:      Spouse name: Not on file    Number of children: Not on file    Years of education: Not on file    Highest education level: Not on file   Occupational History    Not on file   Tobacco Use    Smoking status: Former Smoker     Packs/day:      Types: Cigarettes     Quit date: 1993     Years since quittin 6    Smokeless tobacco: Never Used   Substance and Sexual Activity    Alcohol use: Not on file    Drug use: Not on file    Sexual activity: Not on file   Other Topics Concern    Not on file   Social History Narrative    Not on file     Social Determinants of Health     Financial Resource Strain:     Difficulty of Paying Living Expenses:    Food Insecurity:     Worried About Running Out of Food in the Last Year:     [address] in the Last Year:    Transportation Needs:     Lack of Transportation (Medical):  Lack of Transportation (Non-Medical):    Physical Activity:     Days of Exercise per Week:     Minutes of Exercise per Session:    Stress:     Feeling of Stress :    Social Connections:     Frequency of Communication with Friends and Family:     Frequency of Social Gatherings with Friends and Family:     Attends Baptism Services:     Active Member of Clubs or Organizations:     Attends Club or Organization Meetings:     Marital Status:    Intimate Partner Violence:     Fear of Current or Ex-Partner:     Emotionally Abused:     Physically Abused:     Sexually Abused:       Family History   Problem Relation Age of Onset    Diabetes Mother     Hypertension Mother     Dementia Mother     Other Father         fall gangrene    Peripheral vascular disease Sister      Past Surgical History:   Procedure Laterality Date    AORTIC VALVE REPLACEMENT  2005    Tissue valve    BLADDER SURGERY      23 yrs ago   CORONARY ARTERY BYPASS GRAFT  2005    x1    RENAL ARTERY STENT  11/2005       Review of Systems:  Review of Systems   Constitutional: Negative for activity change  Respiratory: Negative for cough, chest tightness, shortness of breath and wheezing  Cardiovascular: Negative for chest pain, palpitations and leg swelling  Musculoskeletal: Negative for gait problem  Skin: Negative for color change     Neurological: Negative for dizziness, tremors, syncope, weakness, light-headedness and headaches  Psychiatric/Behavioral: Negative for agitation and confusion  Physical Exam:  /60 (BP Location: Left arm, Patient Position: Sitting, Cuff Size: Adult)   Pulse 68   Wt 77 6 kg (171 lb)   BMI 26 00 kg/m²     PREVIOUS WEIGHTS:   Wt Readings from Last 10 Encounters:   07/30/21 77 6 kg (171 lb)   01/26/21 79 4 kg (175 lb)   07/24/20 79 1 kg (174 lb 6 4 oz)   01/06/20 77 1 kg (170 lb)   06/21/19 81 2 kg (179 lb)   12/17/18 84 kg (185 lb 3 2 oz)      Physical Exam  Vitals reviewed  Constitutional:       General: He is not in acute distress  Appearance: He is well-developed  HENT:      Head: Normocephalic and atraumatic  Neck:      Thyroid: No thyromegaly  Vascular: No carotid bruit or JVD  Trachea: No tracheal deviation  Cardiovascular:      Rate and Rhythm: Normal rate and regular rhythm  Pulses: Normal pulses  Heart sounds: Normal heart sounds  No murmur heard  No friction rub  No gallop  Pulmonary:      Effort: Pulmonary effort is normal  No respiratory distress  Breath sounds: Normal breath sounds  No wheezing, rhonchi or rales  Chest:      Chest wall: No tenderness  Abdominal:      General: Bowel sounds are normal  There is no distension  Palpations: Abdomen is soft  Tenderness: There is no abdominal tenderness  Musculoskeletal:      Cervical back: Normal range of motion and neck supple  Right lower leg: No edema  Left lower leg: No edema  Skin:     General: Skin is warm and dry  Neurological:      General: No focal deficit present  Mental Status: He is alert and oriented to person, place, and time  Gait: Gait normal    Psychiatric:         Mood and Affect: Mood normal          Behavior: Behavior normal          Thought Content:  Thought content normal          Judgment: Judgment normal        --------------------------------------------------------------------------------  ECHO:   Results for orders placed during the hospital encounter of 21    Echo complete with contrast if indicated    Narrative  [address]  [facility] [place], [address] [place] [address]    Transthoracic Echocardiogram  2D, M-mode, Doppler, and Color Doppler    Study date:  2021    Patient: Taylor Alvarado  MR number: [id number]  Account number: [de-identified]  : 1945  Age: 78 years  Gender: Male  Status: Outpatient  Location: [facility]  Height: 68 in  Weight: 174 7 lb  BP: 138/ 62 mmHg    Indications: Aortic Valve Replacement    Diagnoses: T82 857A - Stenosis of cardiac prosthetic devices, implants and grafts, initial encounter    Sonographer:  Kt Dunham RDCS  Interpreting Physician:  Izzy Diane DO  Primary Physician:  Rachel Dakin, MD  Group:  [facility]    SUMMARY    SUMMARY:  1  This is a technically adequate study  2  Left ventricle is normal in size and function  Left ventricular ejection fraction is estimated at 60%  3  Mild concentric left ventricular hypertrophy  4  Grade 1 diastolic dysfunction  5  Paradoxical septal wall motion consistent with postoperative state, otherwise without obvious high-grade regional wall motion abnormality  6  Right ventricle is mildly dilated with mildly reduced systolic function  7  Left atrium is moderately dilated in the right atrium is mildly dilated  8  Bioprosthetic valve in the aortic position that is well seated with normal gradients for valve  Trace central aortic regurgitation  DVI 0 42, Acceleration time 88 ms, iEOA 0 68   9  Mild mitral regurgitation with moderate posterior mitral annular calcification  10  Trace tricuspid regurgitation with pulmonary artery systolic pressure is estimated 25-30 mm Hg  11  Compared to report from 2019, valve does not meet criteria for stenosis, but there may be some degree of patient prosthesis mismatch   Right ventricle is mildly dilated with mildly reduced systolic function compared to prior  report  HISTORY: PRIOR HISTORY: CAD, AS, Bioprosthetic AVR, Endocarditis, CVA, HTN, HLD, DVT    PROCEDURE: The study was performed in the [address] [place] [address]  This was a routine study  The transthoracic approach was used  The study included complete 2D imaging, M-mode, complete spectral Doppler, and color Doppler  The heart rate was 80  bpm, at the start of the study  Image quality was adequate  LEFT VENTRICLE: Left ventricle is normal in size and function  Left ventricular ejection fraction is estimated 68%  Mild concentric left ventricular hypertrophy  Grade 1 diastolic dysfunction  Paradoxical septal wall motion consistent with  postoperative state otherwise no obvious high-grade regional wall motion abnormality  RIGHT VENTRICLE: Right ventricle is mildly dilated with mildly reduced systolic function  LEFT ATRIUM: Left atrium is moderately dilated  ATRIAL SEPTUM: Interatrial septum is aneurysmal without evidence of shunting by color-flow Doppler  RIGHT ATRIUM: Right atrium is mildly dilated    MITRAL VALVE: Mitral valve opens well  Moderate posterior mitral annular calcification  No evidence of mitral stenosis  Mild mitral regurgitation  AORTIC VALVE: There is a bioprosthetic valve in the aortic position  The bioprosthetic valve is well seated all  DVI 0 42, Acceleration time 88 ms, EOA 1 32 cm2, iEOA 0 68, Vmax 2 95 m/s, mean PG 20 mmHg, LVOTd 2 cm, LVOT VTI 24 9 cm  Trace central aortic regurgitation is noted    TRICUSPID VALVE: Tricuspid valve opens well  Trace tricuspid regurgitation  Pulmonary artery systolic pressures are estimated at 25-30 mm Hg    PULMONIC VALVE: Pulmonic valve is not well visualized  No evidence of pulmonic stenosis or pulmonic regurgitation  PERICARDIUM: There is no evidence of significant pericardial effusion  AORTA: Aortic root is normal in size  SYSTEMIC VEINS: IVC: IVC is not well visualized      SYSTEM MEASUREMENT TABLES    2D  %FS: 36 06 %  Ao Diam: 2 74 cm  EDV(Teich): 117 ml  EF(Teich): 65 45 %  ESV(Teich): 40 42 ml  IVSd: 1 05 cm  LA Area: 25 25 cm2  LA Diam: 4 02 cm  LVEDV MOD A4C: 169 92 ml  LVEF MOD A4C: 63 19 %  LVESV MOD A4C: 62 54 ml  LVIDd: 4 98 cm  LVIDs: 3 18 cm  LVLd A4C: 9 04 cm  LVLs A4C: 7 51 cm  LVOT Diam: 2 01 cm  LVPWd: 1 07 cm  RA Area: 19 49 cm2  RVIDd: 3 62 cm  SV MOD A4C: 107 38 ml  SV(Teich): 76 57 ml    CW  AV Env  Ti: 285 44 ms  AV VTI: 50 18 cm  AV Vmax: 2 52 m/s  AV Vmean: 1 76 m/s  AV maxP 15 mmHg  AV meanP 81 mmHg  TR Vmax: 2 46 m/s  TR maxP 26 mmHg    MM  TAPSE: 1 88 cm    PW  JANET (VTI): 1 58 cm2  JANET Vmax: 1 42 cm2  AVAI (VTI): 0 cm2/m2  AVAI Vmax: 0 cm2/m2  E' Sept: 0 08 m/s  E/E' Sept: 10 43  LVOT Env  Ti: 308 28 ms  LVOT VTI: 24 9 cm  LVOT Vmax: 1 13 m/s  LVOT Vmean: 0 81 m/s  LVOT maxP 07 mmHg  LVOT meanP 96 mmHg  LVSI Dopp: 40 98 ml/m2  LVSV Dopp: 79 1 ml  MV A Yuniel: 1 28 m/s  MV Dec Spencer: 3 32 m/s2  MV DecT: 253 47 ms  MV E Yuniel: 0 84 m/s  MV E/A Ratio: 0 66  MV PHT: 73 51 ms  MVA By PHT: 2 99 cm2    [facility]ending [facility] to [facility] Accredited [facility]    Prepared and electronically signed by    Denese Homans, DO  Signed 2021 19:16:44    No results found for this or any previous visit     -======================================================  Imaging:   I have personally reviewed pertinent reports  Portions of the record may have been created with voice recognition software  Occasional wrong word or "sound a like" substitutions may have occurred due to the inherent limitations of voice recognition software  Read the chart carefully and recognize, using context, where substitutions have occurred      SIGNATURES:   Triston Mace MD

## 2021-09-07 ENCOUNTER — APPOINTMENT (EMERGENCY)
Dept: RADIOLOGY | Facility: HOSPITAL | Age: 76
DRG: 564 | End: 2021-09-07
Payer: COMMERCIAL

## 2021-09-07 ENCOUNTER — HOSPITAL ENCOUNTER (INPATIENT)
Facility: HOSPITAL | Age: 76
LOS: 7 days | DRG: 564 | End: 2021-09-14
Attending: EMERGENCY MEDICINE | Admitting: INTERNAL MEDICINE
Payer: COMMERCIAL

## 2021-09-07 ENCOUNTER — APPOINTMENT (EMERGENCY)
Dept: CT IMAGING | Facility: HOSPITAL | Age: 76
DRG: 564 | End: 2021-09-07
Payer: COMMERCIAL

## 2021-09-07 DIAGNOSIS — M62.82 RHABDOMYOLYSIS: ICD-10-CM

## 2021-09-07 DIAGNOSIS — N17.9 AKI (ACUTE KIDNEY INJURY) (HCC): ICD-10-CM

## 2021-09-07 DIAGNOSIS — N39.0 UTI (URINARY TRACT INFECTION): ICD-10-CM

## 2021-09-07 DIAGNOSIS — N13.30 BILATERAL HYDRONEPHROSIS: ICD-10-CM

## 2021-09-07 DIAGNOSIS — R77.8 ELEVATED TROPONIN I LEVEL: ICD-10-CM

## 2021-09-07 DIAGNOSIS — R41.0 CONFUSION: ICD-10-CM

## 2021-09-07 DIAGNOSIS — I10 HYPERTENSION: ICD-10-CM

## 2021-09-07 DIAGNOSIS — D72.829 LEUKOCYTOSIS: ICD-10-CM

## 2021-09-07 DIAGNOSIS — N18.9 ACUTE KIDNEY INJURY SUPERIMPOSED ON CKD (HCC): ICD-10-CM
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
## 2021-09-07 DIAGNOSIS — R53.1 ASTHENIA: ICD-10-CM

## 2021-09-07 DIAGNOSIS — A41.9 SEPSIS (HCC): ICD-10-CM

## 2021-09-07 DIAGNOSIS — N17.9 ACUTE KIDNEY INJURY SUPERIMPOSED ON CKD (HCC): ICD-10-CM

## 2021-09-07 PROBLEM — T79.6XXA TRAUMATIC RHABDOMYOLYSIS (HCC): Status: ACTIVE | Noted: 2021-09-07

## 2021-09-07 LAB
ALBUMIN SERPL BCP-MCNC: 3 G/DL (ref 3.5–5)
ALP SERPL-CCNC: 100 U/L (ref 46–116)
ALT SERPL W P-5'-P-CCNC: 39 U/L (ref 12–78)
ANION GAP SERPL CALCULATED.3IONS-SCNC: 18 MMOL/L (ref 4–13)
ANISOCYTOSIS BLD QL SMEAR: PRESENT
AST SERPL W P-5'-P-CCNC: 68 U/L (ref 5–45)
ATRIAL RATE: 85 BPM
BACTERIA UR QL AUTO: ABNORMAL /HPF
BASOPHILS # BLD MANUAL: 0 THOUSAND/UL (ref 0–0.1)
BASOPHILS NFR MAR MANUAL: 0 % (ref 0–1)
BILIRUB SERPL-MCNC: 0.64 MG/DL (ref 0.2–1)
BILIRUB UR QL STRIP: NEGATIVE
BUN SERPL-MCNC: 87 MG/DL (ref 5–25)
CALCIUM ALBUM COR SERPL-MCNC: 9 MG/DL (ref 8.3–10.1)
CALCIUM SERPL-MCNC: 8.2 MG/DL (ref 8.3–10.1)
CHLORIDE SERPL-SCNC: 106 MMOL/L (ref 100–108)
CK MB SERPL-MCNC: 8.2 NG/ML (ref 0–5)
CK MB SERPL-MCNC: <1 % (ref 0–2.5)
CK SERPL-CCNC: 1566 U/L (ref 39–308)
CLARITY UR: CLEAR
CO2 SERPL-SCNC: 18 MMOL/L (ref 21–32)
COLOR UR: YELLOW
CREAT SERPL-MCNC: 4.28 MG/DL (ref 0.6–1.3)
EOSINOPHIL # BLD MANUAL: 0 THOUSAND/UL (ref 0–0.4)
EOSINOPHIL NFR BLD MANUAL: 0 % (ref 0–6)
ERYTHROCYTE [DISTWIDTH] IN BLOOD BY AUTOMATED COUNT: 15.9 % (ref 11.6–15.1)
GFR SERPL CREATININE-BSD FRML MDRD: 13 ML/MIN/1.73SQ M
GLUCOSE SERPL-MCNC: 152 MG/DL (ref 65–140)
GLUCOSE UR STRIP-MCNC: NEGATIVE MG/DL
HCT VFR BLD AUTO: 41.4 % (ref 36.5–49.3)
HGB BLD-MCNC: 13.1 G/DL (ref 12–17)
HGB UR QL STRIP.AUTO: ABNORMAL
KETONES UR STRIP-MCNC: NEGATIVE MG/DL
LEUKOCYTE ESTERASE UR QL STRIP: ABNORMAL
LYMPHOCYTES # BLD AUTO: 0.45 THOUSAND/UL (ref 0.6–4.47)
LYMPHOCYTES # BLD AUTO: 2 % (ref 14–44)
MACROCYTES BLD QL AUTO: PRESENT
MCH RBC QN AUTO: 29.8 PG (ref 26.8–34.3)
MCHC RBC AUTO-ENTMCNC: 31.6 G/DL (ref 31.4–37.4)
MCV RBC AUTO: 94 FL (ref 82–98)
MONOCYTES # BLD AUTO: 0.22 THOUSAND/UL (ref 0–1.22)
MONOCYTES NFR BLD: 1 % (ref 4–12)
NEUTROPHILS # BLD MANUAL: 21.67 THOUSAND/UL (ref 1.85–7.62)
NEUTS BAND NFR BLD MANUAL: 3 % (ref 0–8)
NEUTS SEG NFR BLD AUTO: 94 % (ref 43–75)
NITRITE UR QL STRIP: NEGATIVE
NON-SQ EPI CELLS URNS QL MICRO: ABNORMAL /HPF
P AXIS: 64 DEGREES
PH UR STRIP.AUTO: 6 [PH] (ref 4.5–8)
PHOSPHATE SERPL-MCNC: 3.5 MG/DL (ref 2.3–4.1)
PLATELET # BLD AUTO: 148 THOUSANDS/UL (ref 149–390)
PLATELET BLD QL SMEAR: ABNORMAL
PMV BLD AUTO: 11.4 FL (ref 8.9–12.7)
POIKILOCYTOSIS BLD QL SMEAR: PRESENT
POTASSIUM SERPL-SCNC: 4 MMOL/L (ref 3.5–5.3)
PR INTERVAL: 262 MS
PROT SERPL-MCNC: 8.1 G/DL (ref 6.4–8.2)
PROT UR STRIP-MCNC: ABNORMAL MG/DL
QRS AXIS: -61 DEGREES
QRSD INTERVAL: 144 MS
QT INTERVAL: 414 MS
QTC INTERVAL: 492 MS
RBC # BLD AUTO: 4.4 MILLION/UL (ref 3.88–5.62)
RBC #/AREA URNS AUTO: ABNORMAL /HPF
SODIUM SERPL-SCNC: 142 MMOL/L (ref 136–145)
SP GR UR STRIP.AUTO: 1.01 (ref 1–1.03)
T WAVE AXIS: 82 DEGREES
TROPONIN I SERPL-MCNC: 0.3 NG/ML
TROPONIN I SERPL-MCNC: 0.45 NG/ML
TROPONIN I SERPL-MCNC: 0.63 NG/ML
UROBILINOGEN UR QL STRIP.AUTO: 0.2 E.U./DL
VENTRICULAR RATE: 85 BPM
WBC # BLD AUTO: 22.34 THOUSAND/UL (ref 4.31–10.16)
WBC #/AREA URNS AUTO: ABNORMAL /HPF

## 2021-09-07 PROCEDURE — 36415 COLL VENOUS BLD VENIPUNCTURE: CPT

## 2021-09-07 PROCEDURE — 72128 CT CHEST SPINE W/O DYE: CPT

## 2021-09-07 PROCEDURE — 72125 CT NECK SPINE W/O DYE: CPT

## 2021-09-07 PROCEDURE — 99285 EMERGENCY DEPT VISIT HI MDM: CPT

## 2021-09-07 PROCEDURE — G1004 CDSM NDSC: HCPCS

## 2021-09-07 PROCEDURE — 93010 ELECTROCARDIOGRAM REPORT: CPT

## 2021-09-07 PROCEDURE — 84484 ASSAY OF TROPONIN QUANT: CPT | Performed by: PHYSICIAN ASSISTANT

## 2021-09-07 PROCEDURE — 87186 SC STD MICRODIL/AGAR DIL: CPT

## 2021-09-07 PROCEDURE — 85007 BL SMEAR W/DIFF WBC COUNT: CPT

## 2021-09-07 PROCEDURE — 84484 ASSAY OF TROPONIN QUANT: CPT

## 2021-09-07 PROCEDURE — 82550 ASSAY OF CK (CPK): CPT

## 2021-09-07 PROCEDURE — 87086 URINE CULTURE/COLONY COUNT: CPT

## 2021-09-07 PROCEDURE — 85027 COMPLETE CBC AUTOMATED: CPT

## 2021-09-07 PROCEDURE — 99223 1ST HOSP IP/OBS HIGH 75: CPT | Performed by: INTERNAL MEDICINE

## 2021-09-07 PROCEDURE — 84100 ASSAY OF PHOSPHORUS: CPT

## 2021-09-07 PROCEDURE — 80053 COMPREHEN METABOLIC PANEL: CPT

## 2021-09-07 PROCEDURE — 71045 X-RAY EXAM CHEST 1 VIEW: CPT

## 2021-09-07 PROCEDURE — 99285 EMERGENCY DEPT VISIT HI MDM: CPT | Performed by: EMERGENCY MEDICINE

## 2021-09-07 PROCEDURE — 81001 URINALYSIS AUTO W/SCOPE: CPT

## 2021-09-07 PROCEDURE — 93005 ELECTROCARDIOGRAM TRACING: CPT

## 2021-09-07 PROCEDURE — 82553 CREATINE MB FRACTION: CPT

## 2021-09-07 PROCEDURE — 96360 HYDRATION IV INFUSION INIT: CPT

## 2021-09-07 PROCEDURE — 96361 HYDRATE IV INFUSION ADD-ON: CPT

## 2021-09-07 PROCEDURE — 70450 CT HEAD/BRAIN W/O DYE: CPT

## 2021-09-07 PROCEDURE — 72131 CT LUMBAR SPINE W/O DYE: CPT

## 2021-09-07 PROCEDURE — 87077 CULTURE AEROBIC IDENTIFY: CPT

## 2021-09-07 PROCEDURE — 85025 COMPLETE CBC W/AUTO DIFF WBC: CPT

## 2021-09-07 RX ORDER — ONDANSETRON 2 MG/ML
4 INJECTION INTRAMUSCULAR; INTRAVENOUS EVERY 6 HOURS PRN
Status: DISCONTINUED | OUTPATIENT
Start: 2021-09-07 | End: 2021-09-14 | Stop reason: HOSPADM

## 2021-09-07 RX ORDER — DOXYCYCLINE HYCLATE 100 MG/1
100 CAPSULE ORAL DAILY
Status: DISCONTINUED | OUTPATIENT
Start: 2021-09-08 | End: 2021-09-11

## 2021-09-07 RX ORDER — HEPARIN SODIUM 5000 [USP'U]/ML
5000 INJECTION, SOLUTION INTRAVENOUS; SUBCUTANEOUS EVERY 8 HOURS SCHEDULED
Status: DISCONTINUED | OUTPATIENT
Start: 2021-09-07 | End: 2021-09-09

## 2021-09-07 RX ORDER — AMLODIPINE BESYLATE 10 MG/1
10 TABLET ORAL EVERY EVENING
Status: DISCONTINUED | OUTPATIENT
Start: 2021-09-07 | End: 2021-09-08

## 2021-09-07 RX ORDER — BISOPROLOL FUMARATE 5 MG/1
5 TABLET ORAL DAILY
Status: DISCONTINUED | OUTPATIENT
Start: 2021-09-08 | End: 2021-09-14 | Stop reason: HOSPADM

## 2021-09-07 RX ORDER — SODIUM CHLORIDE, SODIUM LACTATE, POTASSIUM CHLORIDE, CALCIUM CHLORIDE 600; 310; 30; 20 MG/100ML; MG/100ML; MG/100ML; MG/100ML
125 INJECTION, SOLUTION INTRAVENOUS CONTINUOUS
Status: DISCONTINUED | OUTPATIENT
Start: 2021-09-07 | End: 2021-09-07

## 2021-09-07 RX ORDER — ASPIRIN 325 MG
325 TABLET ORAL ONCE
Status: COMPLETED | OUTPATIENT
Start: 2021-09-07 | End: 2021-09-07

## 2021-09-07 RX ORDER — SODIUM BICARBONATE 650 MG/1
650 TABLET ORAL
Status: DISCONTINUED | OUTPATIENT
Start: 2021-09-07 | End: 2021-09-09

## 2021-09-07 RX ORDER — SODIUM CHLORIDE, SODIUM LACTATE, POTASSIUM CHLORIDE, CALCIUM CHLORIDE 600; 310; 30; 20 MG/100ML; MG/100ML; MG/100ML; MG/100ML
100 INJECTION, SOLUTION INTRAVENOUS CONTINUOUS
Status: DISCONTINUED | OUTPATIENT
Start: 2021-09-07 | End: 2021-09-08

## 2021-09-07 RX ORDER — MAGNESIUM HYDROXIDE/ALUMINUM HYDROXICE/SIMETHICONE 120; 1200; 1200 MG/30ML; MG/30ML; MG/30ML
30 SUSPENSION ORAL EVERY 6 HOURS PRN
Status: DISCONTINUED | OUTPATIENT
Start: 2021-09-07 | End: 2021-09-14

## 2021-09-07 RX ORDER — ACETAMINOPHEN 325 MG/1
650 TABLET ORAL EVERY 6 HOURS PRN
Status: DISCONTINUED | OUTPATIENT
Start: 2021-09-07 | End: 2021-09-14 | Stop reason: HOSPADM

## 2021-09-07 RX ORDER — CALCIUM CARBONATE 200(500)MG
500 TABLET,CHEWABLE ORAL DAILY PRN
Status: DISCONTINUED | OUTPATIENT
Start: 2021-09-07 | End: 2021-09-08

## 2021-09-07 RX ORDER — FAMOTIDINE 20 MG/1
10 TABLET, FILM COATED ORAL EVERY OTHER DAY
Status: DISCONTINUED | OUTPATIENT
Start: 2021-09-08 | End: 2021-09-14 | Stop reason: HOSPADM

## 2021-09-07 RX ORDER — DOCUSATE SODIUM 100 MG/1
100 CAPSULE, LIQUID FILLED ORAL 2 TIMES DAILY
Status: DISCONTINUED | OUTPATIENT
Start: 2021-09-07 | End: 2021-09-14 | Stop reason: HOSPADM

## 2021-09-07 RX ADMIN — SODIUM BICARBONATE 650 MG TABLET 650 MG: at 22:33

## 2021-09-07 RX ADMIN — DOCUSATE SODIUM 100 MG: 100 CAPSULE ORAL at 19:34

## 2021-09-07 RX ADMIN — HEPARIN SODIUM 5000 UNITS: 5000 INJECTION INTRAVENOUS; SUBCUTANEOUS at 22:32

## 2021-09-07 RX ADMIN — SODIUM CHLORIDE, SODIUM LACTATE, POTASSIUM CHLORIDE, AND CALCIUM CHLORIDE 100 ML/HR: .6; .31; .03; .02 INJECTION, SOLUTION INTRAVENOUS at 14:43

## 2021-09-07 RX ADMIN — ASPIRIN 325 MG ORAL TABLET 325 MG: 325 PILL ORAL at 16:18

## 2021-09-07 RX ADMIN — AMLODIPINE BESYLATE 10 MG: 10 TABLET ORAL at 19:34

## 2021-09-07 NOTE — ASSESSMENT & PLAN NOTE
Patient found down on ground in home after having fallen 3 days ago and being unable to get up  States that he is getting out of bed and felt weak, he lowered himself to the ground and was unable to get up after that  Denies any loss of consciousness, head strike    · Patient unable to access any food/water during that time or take any medications  · CK significantly elevated at 1566  · IV hydration started in the ED, will continue with lactated Ringer's continuous  · Nephrology consulted, appreciate recommendations  · PT/OT consulted for safe discharge planning

## 2021-09-07 NOTE — ASSESSMENT & PLAN NOTE
· With creatinine of 4 2 weight  · Appears that the patient's baseline creatinine is 3 3-4  · Most recent creatinine 3 6  · See assessment and plan under CKD stage 4  · Nephrology consult, appreciate recommendations  · Avoid hypotension and nephrotoxins  · Will hold patient's losartan at this time  · IV hydration with lactated Ringer's  · BMP in a m

## 2021-09-07 NOTE — ASSESSMENT & PLAN NOTE
· Mildly elevated troponin at 0 63  · Suspect likely secondary to rhabdomyolysis and will trend down after IV hydration  · Will trend troponins  · Telemetry

## 2021-09-07 NOTE — H&P
[address] [facility]  H&P- Katiana Nj 1945, 78 y o  male MRN: [id number]  Unit/Bed#: ED 10 Encounter: [id number]  Primary Care Provider: Malina Hicks MD   Date and time admitted to hospital: 9/7/2021  1:06 PM    * Traumatic rhabdomyolysis [facility])  Assessment & Plan  Patient found down on ground in home after having fallen 3 days ago and being unable to get up  States that he is getting out of bed and felt weak, he lowered himself to the ground and was unable to get up after that  Denies any loss of consciousness, head strike  · Patient unable to access any food/water during that time or take any medications  · CK significantly elevated at 1566  · IV hydration started in the ED, will continue with lactated Ringer's continuous  · Nephrology consulted, appreciate recommendations  · PT/OT consulted for safe discharge planning    LORENZA (acute kidney injury) ([facility] [address] )  Assessment & Plan  · With creatinine of 4 2 weight  · Appears that the patient's baseline creatinine is 3 3-4  · Most recent creatinine 3 6  · See assessment and plan under CKD stage 4  · Nephrology consult, appreciate recommendations  · Avoid hypotension and nephrotoxins  · Will hold patient's losartan at this time  · IV hydration with lactated Ringer's  · BMP in a m  Leukocytosis  Assessment & Plan  · White blood cell count 22 34  · Suspect likely inflammatory in the setting of rhabdomyolysis  · Recheck CBC in a m  · No signs of infection at this time    Elevated troponin  Assessment & Plan  · Mildly elevated troponin at 0 63  · Suspect likely secondary to rhabdomyolysis and will trend down after IV hydration  · Will trend troponins  · Telemetry    Hx of CABG  Assessment & Plan  · Noted    HTN (hypertension)  Assessment & Plan  · Maintained on bisoprolol 5 q d , amlodipine 10 mg q p m    · Hold losartan 50 due to LORENZA  · Monitor per unit routine    History of aortic valve replacement with tissue graft  Assessment & Plan  · 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  · On suppressive antibiotics with doxycycline 100 mg q d  Chronic kidney disease, stage IV (severe) ([facility])  Assessment & Plan  Lab Results   Component Value Date    EGFR 13 09/07/2021    CREATININE 4 28 (H) 09/07/2021     · Patient with horseshoe kidney, transplantation has been discussed however he is declining at this time  · Appears to baseline creatinine is 3 3-4  · Follows with outpatient Nephrology  · With mature AV fistula  · Sodium bicarb 650 t i d , continue  · Nephrology consulted, appreciate recommendations    VTE Prophylaxis: Heparin  / sequential compression device   Code Status:  Full code  POLST: There is no POLST form on file for this patient (pre-hospital)  Discussion with family:  Discussed plan of care with patient and patient's grandson at bedside  Answered any questions  Anticipated Length of Stay:  Patient will be admitted on an Inpatient basis with an anticipated length of stay of  greater than 2 midnights  Justification for Hospital Stay:  Rhabdo, IV fluids    Total Time for Visit, including Counseling / Coordination of Care: 70 minutes  Greater than 50% of this total time spent on direct patient counseling and coordination of care  Chief Complaint:   Found on ground after 3 days    History of Present Illness: Radha Pfeiffer is a 78 y o  male with past medical history of coronary disease, hypertension, stage IV CKD, prosthetic aortic valve who presents after being found on the ground in his home after falling 3 days ago  According to the patient, he was getting out of bed approximately 3 days ago when he felt weak and subsequently lowered himself to the ground  He denies any loss of consciousness, head strike  States that he attempted to get up but was unable to go  Could not call for help, lives alone  Was unable to eat/drink during that period of time, could not take medications    Patient was found by family members today who brought him to the ED for further evaluation  While in the ED, the patient was found to have significantly elevated CK, elevated troponin, and LORENZA  Will be admitted for IV fluid administration and consultation with Nephrology    Review of Systems:    Review of Systems   Constitutional: Negative for chills, diaphoresis, fatigue and fever  HENT: Negative for ear pain and sore throat  Eyes: Negative for pain and visual disturbance  Respiratory: Negative for cough, shortness of breath and wheezing  Cardiovascular: Negative for chest pain and palpitations  Gastrointestinal: Negative for abdominal pain, constipation, diarrhea, nausea and vomiting  Genitourinary: Negative for dysuria and hematuria  Musculoskeletal: Negative for arthralgias and back pain  Skin: Negative for color change and rash  Neurological: Positive for weakness  Negative for dizziness, tremors, seizures, syncope, facial asymmetry, light-headedness, numbness and headaches  Past Medical and Surgical History:     Past Medical History:   Diagnosis Date    Coronary artery disease     Renal disorder        Past Surgical History:   Procedure Laterality Date    AORTIC VALVE REPLACEMENT  2005    Tissue valve    BLADDER SURGERY      23 yrs ago   CORONARY ARTERY BYPASS GRAFT  2005    x1    RENAL ARTERY STENT  11/2005       Meds/Allergies:    Prior to Admission medications    Medication Sig Start Date End Date Taking?  Authorizing Provider   allopurinol (ZYLOPRIM) 100 mg tablet Take 100 mg by mouth daily 7/22/20  Yes Historical Provider, MD   amLODIPine (NORVASC) 10 mg tablet TAKE 1 TABLET (10 MG TOTAL) BY MOUTH EVERY EVENING 6/16/20  Yes Haiele Haro MD   bisoprolol (ZEBETA) 5 mg tablet TAKE 1 TABLET BY ORAL ROUTE EVERY DAY 3/16/21  Yes Hailee Haro MD   Cholecalciferol (VITAMIN D3) 2000 units capsule every 24 hours   Yes Historical Provider, MD   doxycycline hyclate (VIBRAMYCIN) 100 mg capsule Take 100 mg by mouth daily    Yes Historical Provider, MD   ECPIRIN 325 MG EC tablet TAKE 1 TABLET BY ORAL ROUTE EVERY DAY 19  Yes Melva Dickens MD   losartan (COZAAR) 50 mg tablet TAKE 1 TABLET BY MOUTH EVERY 24 HRS 19  Yes Melva Dickens MD   rosuvastatin (CRESTOR) 20 MG tablet TAKE 1 TABLET (20 MG TOTAL) BY MOUTH EVERY OTHER DAY 20  Yes Melva Dickens MD   SODIUM BICARBONATE PO Three tablets daily   Yes Historical Provider, MD     I have reviewed home medications with patient personally  Allergies: Allergies   Allergen Reactions    Latex     Nitrofurantoin Other (See Comments)     neck pain    Other Itching       Social History:     Marital Status:    Occupation:  None  Patient Pre-hospital Living Situation:  Home alone  Patient Pre-hospital Level of Mobility:  Ambulatory  Patient Pre-hospital Diet Restrictions:  None  Substance Use History:   Social History     Substance and Sexual Activity   Alcohol Use None     Social History     Tobacco Use   Smoking Status Former Smoker    Packs/day:     Types: Cigarettes    Quit date: 1993    Years since quittin 7   Smokeless Tobacco Never Used     Social History     Substance and Sexual Activity   Drug Use Not on file       Family History:    Family History   Problem Relation Age of Onset    Diabetes Mother     Hypertension Mother     Dementia Mother     Other Father         fall gangrene    Peripheral vascular disease Sister        Physical Exam:     Vitals:   Blood Pressure: 151/74 (21 1622)  Pulse: 77 (21 1622)  Temperature: 97 6 °F (36 4 °C) (21 1316)  Temp Source: Oral (21 131)  Respirations: 18 (21 1622)  Height: 5' 8" (172 7 cm) (21 1316)  Weight - Scale: 76 7 kg (169 lb) (21 1316)  SpO2: 96 % (21 1622)    Physical Exam  Vitals and nursing note reviewed  Constitutional:       General: He is not in acute distress  Appearance: Normal appearance  He is well-developed and normal weight   He is not ill-appearing, toxic-appearing or diaphoretic  HENT:      Head: Normocephalic and atraumatic  Eyes:      General: No scleral icterus  Conjunctiva/sclera: Conjunctivae normal    Cardiovascular:      Rate and Rhythm: Normal rate and regular rhythm  Heart sounds: No murmur heard  No friction rub  No gallop  Pulmonary:      Effort: Pulmonary effort is normal  No respiratory distress  Breath sounds: Normal breath sounds  No stridor  No wheezing, rhonchi or rales  Chest:      Chest wall: No tenderness  Abdominal:      Palpations: Abdomen is soft  Tenderness: There is no abdominal tenderness  Musculoskeletal:      Cervical back: Neck supple  Right lower leg: No edema  Left lower leg: No edema  Skin:     General: Skin is warm and dry  Capillary Refill: Capillary refill takes more than 3 seconds  Coloration: Skin is not jaundiced or pale  Findings: No erythema  Neurological:      General: No focal deficit present  Mental Status: He is alert and oriented to person, place, and time  Mental status is at baseline  Cranial Nerves: No cranial nerve deficit  Additional Data:     Lab Results: I have personally reviewed pertinent reports  Results from last 7 days   Lab Units 09/07/21  1439   WBC Thousand/uL 22 34*   HEMOGLOBIN g/dL 13 1   HEMATOCRIT % 41 4   PLATELETS Thousands/uL 148*     Results from last 7 days   Lab Units 09/07/21  1439   SODIUM mmol/L 142   POTASSIUM mmol/L 4 0   CHLORIDE mmol/L 106   CO2 mmol/L 18*   BUN mg/dL 87*   CREATININE mg/dL 4 28*   ANION GAP mmol/L 18*   CALCIUM mg/dL 8 2*   ALBUMIN g/dL 3 0*   TOTAL BILIRUBIN mg/dL 0 64   ALK PHOS U/L 100   ALT U/L 39   AST U/L 68*   GLUCOSE RANDOM mg/dL 152*                       Imaging: I have personally reviewed pertinent reports  CT head without contrast   Final Result by Samantha Monson MD (09/07 1622)      No acute intracranial abnormality  Microangiopathic changes  Workstation performed: [id number]         CT spine thoracic & lumbar wo contrast   Final Result by Nadeem Dumont MD (09/07 1632)         1  No acute fracture or subluxation  2   Mild to moderate multilevel spondylosis with evidence of diffuse idiopathic skeletal hyperostosis at the lumbosacral junction  3   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 and associated renal parenchymal volume loss  4   Double-J stent in the left upper pole of the horseshoe kidney with dilated left ureter  5   Trabeculated bladder with bladder diverticulum likely related to chronic urostasis  6   IVC filter            Workstation performed: [id number]         CT spine cervical without contrast   Final Result by Nadeem Dumont MD (09/07 1625)      No cervical spine fracture or traumatic malalignment  Mild spondylosis  Workstation performed: [id number]         XR chest 1 view   ED Interpretation by Amber López MD (09/07 1638)   Primary reviewed: No acute abnormality          EKG, Pathology, and Other Studies Reviewed on Admission:   · EKG:  Sinus rhythm first-degree AV block, left bundle branch block    Allscripts / Epic Records Reviewed: Yes     ** Please Note: This note has been constructed using a voice recognition system   **

## 2021-09-07 NOTE — ASSESSMENT & PLAN NOTE
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

## 2021-09-07 NOTE — ASSESSMENT & PLAN NOTE
· White blood cell count 22 34  · Suspect likely inflammatory in the setting of rhabdomyolysis  · Recheck CBC in a m    · No signs of infection at this time

## 2021-09-07 NOTE — ED NOTES
Pt aware of necessary urine specimen  Pt unable to void at this time        Arti Garcia  09/07/21 [id number]

## 2021-09-07 NOTE — ED PROVIDER NOTES
History  Chief Complaint   Patient presents with    Fall     PATIENT ARRIVES VIA EMS FROM HOME, REPORTS HE SLID OUT OF BED 2 DAYS AGO AND HAS BEEN LAYING ON FLOOR SINCE  PATIENT FOUND BY BROTHER  PT NORMALLY HAS TROUBLE AMBULATING AND USES A WALKER/CANE  PT C/O GENERALIZED ACHES  70-year-old male with history of CAD, CVA presents to emergency department after a fall  History provided by patient and patient's daughter  Patient has limited mobility at baseline  Right-side UE and LE weakness following stroke 10 years ago  Patient states he slid off the bed intentionally onto the floor but was too weak to get up  He laid there for 2 days unable to get up  Unable to access food or water  Eventually found by daughter and grandson who states that they found patient on the floor next to the bedroom door  He appeared confused, speaking to people who were not present, and saying nonsensical things  His only complaints are generalized aches and mild heartburn  Denies head strike or traumatic fall  Denies any traumatic injuries, nausea, vomiting, chest pain, back pain, abdominal pain, or new neuro deficits  According to daughter, patient ambulates with a cane with limited use of his right side  Lives at home alone  Has fallen before and found hours later  Prior to Admission Medications   Prescriptions Last Dose Informant Patient Reported? Taking?    Cholecalciferol (VITAMIN D3) 2000 units capsule Past Week at Unknown time Self Yes Yes   Sig: every 24 hours   ECPIRIN 325 MG EC tablet Past Week at Unknown time Self No Yes   Sig: TAKE 1 TABLET BY ORAL ROUTE EVERY DAY   SODIUM BICARBONATE PO Past Week at Unknown time Self Yes Yes   Sig: Three tablets daily   allopurinol (ZYLOPRIM) 100 mg tablet Past Week at Unknown time Self Yes Yes   Sig: Take 100 mg by mouth daily   amLODIPine (NORVASC) 10 mg tablet Past Week at Unknown time Self No Yes   Sig: TAKE 1 TABLET (10 MG TOTAL) BY MOUTH EVERY EVENING   bisoprolol (ZEBETA) 5 mg tablet Past Week at Unknown time  No Yes   Sig: TAKE 1 TABLET BY ORAL ROUTE EVERY DAY   doxycycline hyclate (VIBRAMYCIN) 100 mg capsule Past Week at Unknown time Self Yes Yes   Sig: Take 100 mg by mouth daily    losartan (COZAAR) 50 mg tablet Past Week at Unknown time Self No Yes   Sig: TAKE 1 TABLET BY MOUTH EVERY 24 HRS   rosuvastatin (CRESTOR) 20 MG tablet Past Week at Unknown time Self No Yes   Sig: TAKE 1 TABLET (20 MG TOTAL) BY MOUTH EVERY OTHER DAY      Facility-Administered Medications: None       Past Medical History:   Diagnosis Date    Coronary artery disease     Renal disorder        Past Surgical History:   Procedure Laterality Date    AORTIC VALVE REPLACEMENT      Tissue valve    BLADDER SURGERY      23 yrs ago   CORONARY ARTERY BYPASS GRAFT  2005    x1    RENAL ARTERY STENT  2005       Family History   Problem Relation Age of Onset    Diabetes Mother     Hypertension Mother     Dementia Mother     Other Father         fall gangrene    Peripheral vascular disease Sister      I have reviewed and agree with the history as documented  E-Cigarette/Vaping    E-Cigarette Use Never User      E-Cigarette/Vaping Substances    Nicotine No     THC No     CBD No     Flavoring No     Other No     Unknown No      Social History     Tobacco Use    Smoking status: Former Smoker     Packs/day:      Types: Cigarettes     Quit date: 1993     Years since quittin 7    Smokeless tobacco: Never Used   Vaping Use    Vaping Use: Never used   Substance Use Topics    Alcohol use: Not Currently    Drug use: Never        Review of Systems   All other systems reviewed and are negative        Physical Exam  ED Triage Vitals   Temperature Pulse Respirations Blood Pressure SpO2   21 1316 21 1316 21 1316 21 1316 21 1316   97 6 °F (36 4 °C) 86 19 162/70 99 %      Temp Source Heart Rate Source Patient Position - Orthostatic VS BP Location FiO2 (%) 09/07/21 1316 09/07/21 1316 09/07/21 1414 09/07/21 1316 --   Oral Monitor Lying Left arm       Pain Score       09/07/21 1316       Worst Possible Pain             Orthostatic Vital Signs  Vitals:    09/09/21 1740 09/09/21 2229 09/10/21 0706 09/10/21 1530   BP: 120/56 116/63 131/62 136/62   Pulse:  78 61 67   Patient Position - Orthostatic VS:  Lying Lying Lying       Physical Exam  Vitals and nursing note reviewed  Constitutional:       General: He is not in acute distress  Appearance: Normal appearance  He is well-developed  He is not ill-appearing, toxic-appearing or diaphoretic  Comments: Resting comfortably in bed  A&O x3  Cooperative   HENT:      Head: Normocephalic and atraumatic  Right Ear: External ear normal       Left Ear: External ear normal       Nose: Nose normal       Mouth/Throat:      Mouth: Mucous membranes are moist       Pharynx: Oropharynx is clear  No oropharyngeal exudate or posterior oropharyngeal erythema  Eyes:      General:         Right eye: No discharge  Left eye: No discharge  Extraocular Movements: Extraocular movements intact  Pupils: Pupils are equal, round, and reactive to light  Comments: Conjunctiva injected bilaterally   Cardiovascular:      Rate and Rhythm: Normal rate and regular rhythm  Pulses: Normal pulses  Heart sounds: Normal heart sounds  No murmur heard  Pulmonary:      Effort: Pulmonary effort is normal  No respiratory distress  Breath sounds: Normal breath sounds  No wheezing  Chest:      Chest wall: No tenderness  Abdominal:      General: Abdomen is flat  Bowel sounds are normal  There is no distension  Palpations: Abdomen is soft  Tenderness: There is no abdominal tenderness  Musculoskeletal:         General: No swelling, tenderness, deformity or signs of injury  Cervical back: Normal range of motion and neck supple  No rigidity or tenderness  Right lower leg: No edema  Skin:     General: Skin is warm and dry  Capillary Refill: Capillary refill takes less than 2 seconds  Neurological:      Mental Status: He is alert and oriented to person, place, and time  Mental status is at baseline  Cranial Nerves: No cranial nerve deficit  Sensory: No sensory deficit  Motor: Weakness present  Comments: Right upper extremity and right lower extremity weakness, at baseline     Psychiatric:         Mood and Affect: Mood normal          Behavior: Behavior normal          ED Medications  Medications   bisoprolol (ZEBETA) tablet 5 mg (5 mg Oral Given 9/10/21 0844)   acetaminophen (TYLENOL) tablet 650 mg (has no administration in time range)   docusate sodium (COLACE) capsule 100 mg (100 mg Oral Given 9/10/21 0844)   ondansetron (ZOFRAN) injection 4 mg (has no administration in time range)   aluminum-magnesium hydroxide-simethicone (MYLANTA) oral suspension 30 mL (has no administration in time range)   doxycycline hyclate (VIBRAMYCIN) capsule 100 mg (100 mg Oral Given 9/10/21 1203)   famotidine (PEPCID) tablet 10 mg (10 mg Oral Given 9/10/21 0500)   amLODIPine (NORVASC) tablet 10 mg (10 mg Oral Not Given 9/9/21 1741)   calcium carbonate (TUMS) chewable tablet 1,250 mg (1,250 mg Oral Given 9/10/21 0843)   cholecalciferol (VITAMIN D3) tablet 1,000 Units (1,000 Units Oral Given 9/10/21 0844)   saccharomyces boulardii (FLORASTOR) capsule 250 mg (250 mg Oral Given 9/10/21 0844)   sodium bicarbonate tablet 1,300 mg (1,300 mg Oral Given 9/10/21 0943)   aspirin tablet 325 mg (325 mg Oral Given 9/7/21 1618)   ceftriaxone (ROCEPHIN) 1 g/50 mL in dextrose IVPB (0 mg Intravenous Stopped 9/10/21 1403)       Diagnostic Studies  Results Reviewed     Procedure Component Value Units Date/Time    Urine culture [[id number]]  (Abnormal)  (Susceptibility) Collected: 09/07/21 2037    Lab Status: Final result Specimen: Urine, Clean Catch Updated: 09/09/21 1609     Urine Culture >100,000 cfu/ml Enterobacter cloacae complex      >100,000 cfu/ml     Susceptibility     Enterobacter cloacae complex (1)     Antibiotic Interpretation Microscan Method Status    ZID Performed  Yes  GISSEL Final    Amoxicillin + Clavulanate Resistant >16/8 ug/ml GISSEL Final    Ampicillin ($$) Resistant >16 00 ug/ml GISSEL Final    Ampicillin + Sulbactam ($) Resistant 8/4 ug/ml GISSEL Final    Aztreonam ($$$)  Susceptible <=4 ug/ml GISSEL Final    Cefazolin ($) Resistant >16 00 ug/ml GISSEL Final    Cefotaxime ($) Susceptible <=2 00 ug/ml GISSEL Final    Ceftazidime ($$) Susceptible <=1 ug/ml GISSEL Final    Ceftriaxone ($$) Susceptible <=1 00 ug/ml GISSEL Final    Cefuroxime ($$) Resistant >16 ug/ml GISSEL Final    Ciprofloxacin ($)  Susceptible <=1 00 ug/ml GISSEL Final    Ertapenem ($$$) Susceptible <=0 5 ug/ml GISSEL Final    Gentamicin ($$) Susceptible <=1 ug/ml GISSEL Final    Levofloxacin ($) Susceptible <=0 25 ug/ml GISSEL Final    Nitrofurantoin Resistant >64 ug/ml GISSEL Final    Piperacillin + Tazobactam ($$$) Susceptible 8 ug/ml GISSEL Final    Tetracycline Susceptible <=4 ug/ml GISSEL Final    Tobramycin ($) Susceptible <=1 ug/ml GISSEL Final    Trimethoprim + Sulfamethoxazole ($$$) Susceptible <=2/38 ug/ml GISSEL Final                   Comprehensive metabolic panel [[id number]]  (Abnormal) Collected: 09/09/21 0425    Lab Status: Final result Specimen: Blood from Arm, Right Updated: 09/09/21 0559     Sodium 140 mmol/L      Potassium 4 3 mmol/L      Chloride 107 mmol/L      CO2 18 mmol/L      ANION GAP 15 mmol/L      BUN 94 mg/dL      Creatinine 3 80 mg/dL      Glucose 111 mg/dL      Calcium 7 3 mg/dL      Corrected Calcium 8 7 mg/dL      AST 34 U/L      ALT 24 U/L      Alkaline Phosphatase 121 U/L      Total Protein 6 2 g/dL      Albumin 2 2 g/dL      Total Bilirubin 0 42 mg/dL      eGFR 15 ml/min/1 73sq m     Narrative:      [place] guidelines for Chronic Kidney Disease (CKD):     Stage 1 with normal or high GFR (GFR > 90 mL/min/1 73 square meters)    Stage 2 Mild CKD (GFR = 60-89 mL/min/1 73 square meters)    Stage 3A Moderate CKD (GFR = 45-59 mL/min/1 73 square meters)    Stage 3B Moderate CKD (GFR = 30-44 mL/min/1 73 square meters)    Stage 4 Severe CKD (GFR = 15-29 mL/min/1 73 square meters)    Stage 5 End Stage CKD (GFR <15 mL/min/1 73 square meters)  Note: GFR calculation is accurate only with a steady state creatinine    Phosphorus [[id number]]  (Normal) Collected: 09/09/21 0425    Lab Status: Final result Specimen: Blood from Arm, Right Updated: 09/09/21 0556     Phosphorus 2 5 mg/dL     Magnesium [[id number]]  (Normal) Collected: 09/09/21 0425    Lab Status: Final result Specimen: Blood from Arm, Right Updated: 09/09/21 0553     Magnesium 2 2 mg/dL     CK (with reflex to MB) [[id number]]  (Normal) Collected: 09/09/21 0425    Lab Status: Final result Specimen: Blood from Arm, Right Updated: 09/09/21 0553     Total  U/L     CBC and differential [[id number]]  (Abnormal) Collected: 09/09/21 0425    Lab Status: Final result Specimen: Blood from Arm, Right Updated: 09/09/21 0521     WBC 12 51 Thousand/uL      RBC 3 73 Million/uL      Hemoglobin 10 9 g/dL      Hematocrit 35 4 %      MCV 95 fL      MCH 29 2 pg      MCHC 30 8 g/dL      RDW 15 9 %      MPV 12 7 fL      Platelets 87 Thousands/uL      nRBC 0 /100 WBCs     Procalcitonin with AM Reflex [[id number]]  (Abnormal) Collected: 09/08/21 1809    Lab Status: Final result Specimen: Blood from Arm, Left Updated: 09/08/21 2343     Procalcitonin 411 08 ng/ml     Lactic acid, plasma [[id number]]  (Normal) Collected: 09/08/21 1809    Lab Status: Final result Specimen: Blood from Arm, Left Updated: 09/08/21 1835     LACTIC ACID 1 3 mmol/L     Narrative:      Result may be elevated if tourniquet was used during collection      CKMB [[id number]]  (Normal) Collected: 09/08/21 0628    Lab Status: Final result Specimen: Blood from Arm, Left Updated: 09/08/21 1003     CK-MB Index <1 0 %      CK-MB 3 4 ng/mL Manual Differential(PHLEBS Do Not Order) [[id number]]  (Abnormal) Collected: 09/08/21 0628    Lab Status: Final result Specimen: Blood from Arm, Left Updated: 09/08/21 0955     Segmented % 78 %      Bands % 14 %      Lymphocytes % 3 %      Monocytes % 4 %      Eosinophils, % 0 %      Basophils % 0 %      Metamyelocytes% 1 %      Absolute Neutrophils 18 34 Thousand/uL      Lymphocytes Absolute 0 60 Thousand/uL      Monocytes Absolute 0 80 Thousand/uL      Eosinophils Absolute 0 00 Thousand/uL      Basophils Absolute 0 00 Thousand/uL      Total Counted --     Anisocytosis Present     Platelet Estimate Borderline    Basic metabolic panel [[id number]]  (Abnormal) Collected: 09/08/21 0628    Lab Status: Final result Specimen: Blood from Arm, Left Updated: 09/08/21 0854     Sodium 143 mmol/L      Potassium 4 0 mmol/L      Chloride 109 mmol/L      CO2 19 mmol/L      ANION GAP 15 mmol/L      BUN 94 mg/dL      Creatinine 4 34 mg/dL      Glucose 139 mg/dL      Calcium 7 9 mg/dL      eGFR 13 ml/min/1 73sq m     Narrative:      [employer] guidelines for Chronic Kidney Disease (CKD):     Stage 1 with normal or high GFR (GFR > 90 mL/min/1 73 square meters)    Stage 2 Mild CKD (GFR = 60-89 mL/min/1 73 square meters)    Stage 3A Moderate CKD (GFR = 45-59 mL/min/1 73 square meters)    Stage 3B Moderate CKD (GFR = 30-44 mL/min/1 73 square meters)    Stage 4 Severe CKD (GFR = 15-29 mL/min/1 73 square meters)    Stage 5 End Stage CKD (GFR <15 mL/min/1 73 square meters)  Note: GFR calculation is accurate only with a steady state creatinine    CK (with reflex to MB) [[id number]]  (Abnormal) Collected: 09/08/21 0628    Lab Status: Final result Specimen: Blood from Arm, Left Updated: 09/08/21 0854     Total  U/L     CBC and differential [[id number]]  (Abnormal) Collected: 09/08/21 0628    Lab Status: Final result Specimen: Blood from Arm, Left Updated: 09/08/21 0847     WBC 19 93 Thousand/uL      RBC 4 05 Million/uL      Hemoglobin 12 0 g/dL      Hematocrit 36 7 %      MCV 91 fL      MCH 29 6 pg      MCHC 32 7 g/dL      RDW 15 4 %      MPV 11 8 fL      Platelets 885 Thousands/uL     Narrative: This is an appended report  These results have been appended to a previously verified report      Troponin I [[id number]]  (Abnormal) Collected: 09/08/21 0124    Lab Status: Final result Specimen: Blood from Arm, Left Updated: 09/08/21 0226     Troponin I 0 57 ng/mL     Troponin I [[id number]]  (Abnormal) Collected: 09/07/21 2230    Lab Status: Final result Specimen: Blood from Arm, Left Updated: 09/07/21 2257     Troponin I 0 30 ng/mL     Urine Microscopic [[id number]]  (Abnormal) Collected: 09/07/21 2037    Lab Status: Final result Specimen: Urine, Clean Catch Updated: 09/07/21 2130     RBC, UA 2-4 /hpf      WBC, UA 30-50 /hpf      Epithelial Cells Moderate /hpf      Bacteria, UA Moderate /hpf     Manual Differential(PHLEBS Do Not Order) [[id number]]  (Abnormal) Collected: 09/07/21 1439    Lab Status: Final result Specimen: Blood from Arm, Left Updated: 09/07/21 2049     Segmented % 94 %      Bands % 3 %      Lymphocytes % 2 %      Monocytes % 1 %      Eosinophils, % 0 %      Basophils % 0 %      Absolute Neutrophils 21 67 Thousand/uL      Lymphocytes Absolute 0 45 Thousand/uL      Monocytes Absolute 0 22 Thousand/uL      Eosinophils Absolute 0 00 Thousand/uL      Basophils Absolute 0 00 Thousand/uL      Total Counted --     Anisocytosis Present     Macrocytes Present     Poikilocytes Present     Platelet Estimate Borderline    Urine Macroscopic, POC [[id number]]  (Abnormal) Collected: 09/07/21 2037    Lab Status: Final result Specimen: Urine Updated: 09/07/21 2038     Color, UA Yellow     Clarity, UA Clear     pH, UA 6 0     Leukocytes, UA Large     Nitrite, UA Negative     Protein,  (2+) mg/dl      Glucose, UA Negative mg/dl      Ketones, UA Negative mg/dl      Urobilinogen, UA 0 2 E U /dl      Bilirubin, UA Negative Blood, UA Moderate     Specific [place], UA 1 015    Narrative:      CLINITEK RESULT    Troponin I [[id number]]  (Abnormal) Collected: 09/07/21 1758    Lab Status: Final result Specimen: Blood from Arm, Left Updated: 09/07/21 1949     Troponin I 0 45 ng/mL     Phosphorus [[id number]]  (Normal) Collected: 09/07/21 1439    Lab Status: Final result Specimen: Blood from Arm, Left Updated: 09/07/21 1619     Phosphorus 3 5 mg/dL     CKMB [[id number]]  (Abnormal) Collected: 09/07/21 1439    Lab Status: Final result Specimen: Blood from Arm, Left Updated: 09/07/21 1614     CK-MB Index <1 0 %      CK-MB 8 2 ng/mL     CBC and differential [[id number]]  (Abnormal) Collected: 09/07/21 1439    Lab Status: Final result Specimen: Blood from Arm, Left Updated: 09/07/21 1559     WBC 22 34 Thousand/uL      RBC 4 40 Million/uL      Hemoglobin 13 1 g/dL      Hematocrit 41 4 %      MCV 94 fL      MCH 29 8 pg      MCHC 31 6 g/dL      RDW 15 9 %      MPV 11 4 fL      Platelets 025 Thousands/uL     Narrative: This is an appended report  These results have been appended to a previously verified report      CK (with reflex to MB) [[id number]]  (Abnormal) Collected: 09/07/21 1439    Lab Status: Final result Specimen: Blood from Arm, Left Updated: 09/07/21 1525     Total CK 1,566 U/L     Troponin I [[id number]]  (Abnormal) Collected: 09/07/21 1439    Lab Status: Final result Specimen: Blood from Arm, Left Updated: 09/07/21 1509     Troponin I 0 63 ng/mL     Comprehensive metabolic panel [[id number]]  (Abnormal) Collected: 09/07/21 1439    Lab Status: Final result Specimen: Blood from Arm, Left Updated: 09/07/21 1506     Sodium 142 mmol/L      Potassium 4 0 mmol/L      Chloride 106 mmol/L      CO2 18 mmol/L      ANION GAP 18 mmol/L      BUN 87 mg/dL      Creatinine 4 28 mg/dL      Glucose 152 mg/dL      Calcium 8 2 mg/dL      Corrected Calcium 9 0 mg/dL      AST 68 U/L      ALT 39 U/L      Alkaline Phosphatase 100 U/L      Total Protein 8 1 g/dL      Albumin 3 0 g/dL      Total Bilirubin 0 64 mg/dL      eGFR 13 ml/min/1 73sq m     Narrative:      [place] guidelines for Chronic Kidney Disease (CKD):     Stage 1 with normal or high GFR (GFR > 90 mL/min/1 73 square meters)    Stage 2 Mild CKD (GFR = 60-89 mL/min/1 73 square meters)    Stage 3A Moderate CKD (GFR = 45-59 mL/min/1 73 square meters)    Stage 3B Moderate CKD (GFR = 30-44 mL/min/1 73 square meters)    Stage 4 Severe CKD (GFR = 15-29 mL/min/1 73 square meters)    Stage 5 End Stage CKD (GFR <15 mL/min/1 73 square meters)  Note: GFR calculation is accurate only with a steady state creatinine                 CT head without contrast   Final Result by Sirisha Carrion MD (09/07 1622)      No acute intracranial abnormality  Microangiopathic changes  Workstation performed: [id number]         CT spine thoracic & lumbar wo contrast   Final Result by Sirisha Carrion MD (09/07 1632)         1  No acute fracture or subluxation  2   Mild to moderate multilevel spondylosis with evidence of diffuse idiopathic skeletal hyperostosis at the lumbosacral junction  3   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 and associated renal parenchymal volume loss  4   Double-J stent in the left upper pole of the horseshoe kidney with dilated left ureter  5   Trabeculated bladder with bladder diverticulum likely related to chronic urostasis  6   IVC filter            Workstation performed: [id number]         CT spine cervical without contrast   Final Result by Sirisha Carrion MD (09/07 1625)      No cervical spine fracture or traumatic malalignment  Mild spondylosis  Workstation performed: [id number]         XR chest 1 view   ED Interpretation by Donald Ohara MD (09/07 1638)   Primary reviewed: No acute abnormality      Final Result by Demarcus Rivera MD (09/08 0602)      No acute cardiopulmonary disease  Workstation performed: [id number]               Procedures  ECG 12 Lead Documentation Only    Date/Time: 9/7/2021 2:55 PM  Performed by: Sonja Brandon MD  Authorized by: Sonja Brandon MD     Indications / Diagnosis:  Fall  ECG reviewed by me, the ED Provider: yes    Patient location:  ED  Previous ECG:     Previous ECG:  Unavailable    Comparison to cardiac monitor: No    Interpretation:     Interpretation: normal    Rate:     ECG rate:  85    ECG rate assessment: normal    Rhythm:     Rhythm: sinus rhythm    Ectopy:     Ectopy: none    QRS:     QRS axis:  Normal    QRS intervals:  Normal  Conduction:     Conduction: normal    ST segments:     ST segments:  Normal  T waves:     T waves: normal            ED Course                         Initial Sepsis Screening     Row Name 09/08/21 1130                Is the patient's history suggestive of a new or worsening infection? (!) Yes (Proceed)  -CY        Suspected source of infection  urinary tract infection  -CY        Are two or more of the following signs & symptoms of infection both present and new to the patient? (!) Yes (Proceed)  -CY        Indicate SIRS criteria  Leukocytosis (WBC > 49585 IJL);WBC > 10% bands  -CY        If the answer is yes to both questions, suspicion of sepsis is present  --        If severe sepsis is present AND tissue hypoperfusion perists in the hour after fluid resuscitation or lactate > 4, the patient meets criteria for SEPTIC SHOCK  --        Are any of the following organ dysfunction criteria present within 6 hours of suspected infection and SIRS criteria that are NOT considered to be chronic conditions?   No LORENZA on CKD likely due to  dehydration  -CY        Organ dysfunction  --        Date of presentation of severe sepsis  --        Time of presentation of severe sepsis  --        Tissue hypoperfusion persists in the hour after crystalloid fluid administration, evidenced, by either:  --        Was hypotension present within one hour of the conclusion of crystalloid fluid administration?  --        Date of presentation of septic shock  --        Time of presentation of septic shock  --          User Key  (r) = Recorded By, (t) = Taken By, (c) = Cosigned By    [address] Name Provider Type    CHATO Brandon [place], [address]  Nurse Practitioner          SBIRT 22yo+      Most Recent Value   SBIRT (23 yo +)   In order to provide better care to our patients, we are screening all of our patients for alcohol and drug use  Would it be okay to ask you these screening questions? No Filed at: 09/07/2021 1616                MDM  Number of Diagnoses or Management Options  LORENZA (acute kidney injury) [facility]): new and requires workup  Asthenia  Confusion  Elevated troponin I level: new and requires workup  Fall, initial encounter: new and requires workup  Hypertension  Leukocytosis: new and requires workup  Rhabdomyolysis: new and requires workup  Diagnosis management comments: Impression:  Fall with possible traumatic and/or metabolic abnormalities      Plan:  CT head and spine, CBC, CMP, CK, troponin, EKG, UA           Amount and/or Complexity of Data Reviewed  Clinical lab tests: ordered and reviewed  Tests in the radiology section of CPT®: ordered and reviewed  Obtain history from someone other than the patient: yes  Review and summarize past medical records: yes  Independent visualization of images, tracings, or specimens: yes    Risk of Complications, Morbidity, and/or Mortality  Presenting problems: moderate  Diagnostic procedures: low  Management options: moderate    Patient Progress  Patient progress: stable      Disposition  Final diagnoses:   Fall, initial encounter   Rhabdomyolysis   Leukocytosis   LORENZA (acute kidney injury) ([facility] [address] )   Elevated troponin I level   Asthenia   Confusion   Hypertension     Time reflects when diagnosis was documented in both MDM as applicable and the Disposition within this note     Time User Action Codes Description Comment    9/7/2021  2:10 PM Fabiene Oats Add [A78  NFXD] Fall, initial encounter     9/7/2021  3:32 PM Fabiene Oats Add [M62 82] Rhabdomyolysis     9/7/2021  3:33 PM Fabiene Oats Add [Y36 820] Leukocytosis     9/7/2021  3:35 PM Fabiene Oats Add [N17 9] LORENZA (acute kidney injury) ([facility] [address] )     9/7/2021  3:45 PM Fabiene Oats Add [R77 8] Elevated troponin I level     9/7/2021  5:20 PM Jess Zamora Add [R53 1] Asthenia     9/7/2021  5:20 PM Liang Zamora Add [R41 0] Confusion     9/7/2021  5:20 PM Liang Zamora Add [I10] Hypertension     9/8/2021  4:45 PM Juan Joel Add [N39 0] UTI (urinary tract infection)     9/8/2021  4:45 PM Juan Joel Add [A41 9] Sepsis ([facility] [address] )     9/8/2021  4:45 PM Juan Joel Add [N17 9,  N18 9] Acute kidney injury superimposed on CKD [facility])       ED Disposition     ED Disposition Condition Date/Time Comment    Admit Stable Tue Sep 7, 2021  5:01 PM Case was discussed with KEYA and the patient's admission status was agreed to be Admission Status: inpatient status to the service of Dr Roberto Carlos Kothari          Follow-up Information    None         Current Discharge Medication List      CONTINUE these medications which have NOT CHANGED    Details   allopurinol (ZYLOPRIM) 100 mg tablet Take 100 mg by mouth daily      amLODIPine (NORVASC) 10 mg tablet TAKE 1 TABLET (10 MG TOTAL) BY MOUTH EVERY EVENING  Qty: 30 tablet, Refills: 5    Associated Diagnoses: Essential hypertension      bisoprolol (ZEBETA) 5 mg tablet TAKE 1 TABLET BY ORAL ROUTE EVERY DAY  Qty: 90 tablet, Refills: 3    Associated Diagnoses: Essential hypertension      Cholecalciferol (VITAMIN D3) 2000 units capsule every 24 hours      doxycycline hyclate (VIBRAMYCIN) 100 mg capsule Take 100 mg by mouth daily       ECPIRIN 325 MG EC tablet TAKE 1 TABLET BY ORAL ROUTE EVERY DAY  Qty: 90 tablet, Refills: 3    Associated Diagnoses: Coronary artery disease involving native coronary artery of native heart without angina pectoris      losartan (COZAAR) 50 mg tablet TAKE 1 TABLET BY MOUTH EVERY 24 HRS  Qty: 90 tablet, Refills: 3    Associated Diagnoses: Essential hypertension      rosuvastatin (CRESTOR) 20 MG tablet TAKE 1 TABLET (20 MG TOTAL) BY MOUTH EVERY OTHER DAY  Qty: 30 tablet, Refills: 5    Associated Diagnoses: Mixed hyperlipidemia      SODIUM BICARBONATE PO Three tablets daily           No discharge procedures on file  PDMP Review     None           ED Provider  Attending physically available and evaluated Dashawn Gallegos I managed the patient along with the ED Attending      Electronically Signed by         Teagan Pope MD  09/10/21 9816

## 2021-09-07 NOTE — ASSESSMENT & PLAN NOTE
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

## 2021-09-07 NOTE — ASSESSMENT & PLAN NOTE
· Maintained on bisoprolol 5 q d , amlodipine 10 mg q p m    · Hold losartan 50 due to LORENZA  · Monitor per unit routine

## 2021-09-07 NOTE — ED ATTENDING ATTESTATION
9/7/2021  IJorje MD, saw and evaluated the patient  I have discussed the patient with the resident/non-physician practitioner and agree with the resident's/non-physician practitioner's findings, Plan of Care, and MDM as documented in the resident's/non-physician practitioner's note, except where noted  All available labs and Radiology studies were reviewed  I was present for key portions of any procedure(s) performed by the resident/non-physician practitioner and I was immediately available to provide assistance  At this point I agree with the current assessment done in the Emergency Department  I have conducted an independent evaluation of this patient a history and physical is as follows:    79-year-old male presents for evaluation after being found on the floor by family  Patient states he felt too weak the other bedside on his buttocks 2 days ago  He has been trialed on the floor since then  He complains of diffuse back pain  Patient states that it patient appeared to be hallucinating worse  At times because he has been telling things are not known to have happen  Ten systems reviewed otherwise negative  On exam no distress, lungs normal cardiac normal abdomen normal, neuro nonfocal   Medical decision making;-fall-will do CT head rule out acute CNS pathology, chest x-ray, cardiac/metabolic/septic workup, IV fluids, admit      ED Course  ED Course as of Sep 07 1720   Tue Sep 07, 2021   1557 Total CK(!): 1,566         Critical Care Time  Procedures

## 2021-09-08 PROBLEM — N39.0 UTI (URINARY TRACT INFECTION): Status: ACTIVE | Noted: 2021-09-07

## 2021-09-08 PROBLEM — N18.9 ACUTE KIDNEY INJURY SUPERIMPOSED ON CKD (HCC): Status: ACTIVE | Noted: 2021-09-07

## 2021-09-08 PROBLEM — A41.9 SEPSIS (HCC): Status: ACTIVE | Noted: 2021-09-08

## 2021-09-08 LAB
ANION GAP SERPL CALCULATED.3IONS-SCNC: 15 MMOL/L (ref 4–13)
ANISOCYTOSIS BLD QL SMEAR: PRESENT
BASOPHILS # BLD MANUAL: 0 THOUSAND/UL (ref 0–0.1)
BASOPHILS NFR MAR MANUAL: 0 % (ref 0–1)
BUN SERPL-MCNC: 94 MG/DL (ref 5–25)
CALCIUM SERPL-MCNC: 7.9 MG/DL (ref 8.3–10.1)
CHLORIDE SERPL-SCNC: 109 MMOL/L (ref 100–108)
CK MB SERPL-MCNC: 3.4 NG/ML (ref 0–5)
CK MB SERPL-MCNC: <1 % (ref 0–2.5)
CK SERPL-CCNC: 533 U/L (ref 39–308)
CO2 SERPL-SCNC: 19 MMOL/L (ref 21–32)
CREAT SERPL-MCNC: 4.34 MG/DL (ref 0.6–1.3)
EOSINOPHIL # BLD MANUAL: 0 THOUSAND/UL (ref 0–0.4)
EOSINOPHIL NFR BLD MANUAL: 0 % (ref 0–6)
ERYTHROCYTE [DISTWIDTH] IN BLOOD BY AUTOMATED COUNT: 15.4 % (ref 11.6–15.1)
GFR SERPL CREATININE-BSD FRML MDRD: 13 ML/MIN/1.73SQ M
GLUCOSE SERPL-MCNC: 139 MG/DL (ref 65–140)
HCT VFR BLD AUTO: 36.7 % (ref 36.5–49.3)
HGB BLD-MCNC: 12 G/DL (ref 12–17)
LACTATE SERPL-SCNC: 1.3 MMOL/L (ref 0.5–2)
LYMPHOCYTES # BLD AUTO: 0.6 THOUSAND/UL (ref 0.6–4.47)
LYMPHOCYTES # BLD AUTO: 3 % (ref 14–44)
MCH RBC QN AUTO: 29.6 PG (ref 26.8–34.3)
MCHC RBC AUTO-ENTMCNC: 32.7 G/DL (ref 31.4–37.4)
MCV RBC AUTO: 91 FL (ref 82–98)
METAMYELOCYTES NFR BLD MANUAL: 1 % (ref 0–1)
MONOCYTES # BLD AUTO: 0.8 THOUSAND/UL (ref 0–1.22)
MONOCYTES NFR BLD: 4 % (ref 4–12)
NEUTROPHILS # BLD MANUAL: 18.34 THOUSAND/UL (ref 1.85–7.62)
NEUTS BAND NFR BLD MANUAL: 14 % (ref 0–8)
NEUTS SEG NFR BLD AUTO: 78 % (ref 43–75)
PLATELET # BLD AUTO: 127 THOUSANDS/UL (ref 149–390)
PLATELET BLD QL SMEAR: ABNORMAL
PMV BLD AUTO: 11.8 FL (ref 8.9–12.7)
POTASSIUM SERPL-SCNC: 4 MMOL/L (ref 3.5–5.3)
PROCALCITONIN SERPL-MCNC: 411.08 NG/ML
RBC # BLD AUTO: 4.05 MILLION/UL (ref 3.88–5.62)
SODIUM SERPL-SCNC: 143 MMOL/L (ref 136–145)
TROPONIN I SERPL-MCNC: 0.57 NG/ML
WBC # BLD AUTO: 19.93 THOUSAND/UL (ref 4.31–10.16)

## 2021-09-08 PROCEDURE — 99223 1ST HOSP IP/OBS HIGH 75: CPT | Performed by: INTERNAL MEDICINE

## 2021-09-08 PROCEDURE — 82550 ASSAY OF CK (CPK): CPT | Performed by: PHYSICIAN ASSISTANT

## 2021-09-08 PROCEDURE — 85007 BL SMEAR W/DIFF WBC COUNT: CPT | Performed by: PHYSICIAN ASSISTANT

## 2021-09-08 PROCEDURE — 84484 ASSAY OF TROPONIN QUANT: CPT | Performed by: PHYSICIAN ASSISTANT

## 2021-09-08 PROCEDURE — 97167 OT EVAL HIGH COMPLEX 60 MIN: CPT

## 2021-09-08 PROCEDURE — 99232 SBSQ HOSP IP/OBS MODERATE 35: CPT | Performed by: NURSE PRACTITIONER

## 2021-09-08 PROCEDURE — 85025 COMPLETE CBC W/AUTO DIFF WBC: CPT | Performed by: PHYSICIAN ASSISTANT

## 2021-09-08 PROCEDURE — 82553 CREATINE MB FRACTION: CPT | Performed by: PHYSICIAN ASSISTANT

## 2021-09-08 PROCEDURE — 85027 COMPLETE CBC AUTOMATED: CPT | Performed by: PHYSICIAN ASSISTANT

## 2021-09-08 PROCEDURE — 36415 COLL VENOUS BLD VENIPUNCTURE: CPT | Performed by: PHYSICIAN ASSISTANT

## 2021-09-08 PROCEDURE — 84145 PROCALCITONIN (PCT): CPT | Performed by: NURSE PRACTITIONER

## 2021-09-08 PROCEDURE — 97163 PT EVAL HIGH COMPLEX 45 MIN: CPT

## 2021-09-08 PROCEDURE — 83605 ASSAY OF LACTIC ACID: CPT | Performed by: NURSE PRACTITIONER

## 2021-09-08 PROCEDURE — 80048 BASIC METABOLIC PNL TOTAL CA: CPT | Performed by: PHYSICIAN ASSISTANT

## 2021-09-08 RX ORDER — SODIUM CHLORIDE, SODIUM GLUCONATE, SODIUM ACETATE, POTASSIUM CHLORIDE, MAGNESIUM CHLORIDE, SODIUM PHOSPHATE, DIBASIC, AND POTASSIUM PHOSPHATE .53; .5; .37; .037; .03; .012; .00082 G/100ML; G/100ML; G/100ML; G/100ML; G/100ML; G/100ML; G/100ML
100 INJECTION, SOLUTION INTRAVENOUS ONCE
Status: DISCONTINUED | OUTPATIENT
Start: 2021-09-08 | End: 2021-09-08

## 2021-09-08 RX ORDER — MELATONIN
1000 DAILY
Status: DISCONTINUED | OUTPATIENT
Start: 2021-09-09 | End: 2021-09-14 | Stop reason: HOSPADM

## 2021-09-08 RX ORDER — SODIUM CHLORIDE, SODIUM GLUCONATE, SODIUM ACETATE, POTASSIUM CHLORIDE, MAGNESIUM CHLORIDE, SODIUM PHOSPHATE, DIBASIC, AND POTASSIUM PHOSPHATE .53; .5; .37; .037; .03; .012; .00082 G/100ML; G/100ML; G/100ML; G/100ML; G/100ML; G/100ML; G/100ML
100 INJECTION, SOLUTION INTRAVENOUS CONTINUOUS
Status: DISCONTINUED | OUTPATIENT
Start: 2021-09-08 | End: 2021-09-09

## 2021-09-08 RX ORDER — AMLODIPINE BESYLATE 10 MG/1
10 TABLET ORAL EVERY EVENING
Status: DISCONTINUED | OUTPATIENT
Start: 2021-09-08 | End: 2021-09-14 | Stop reason: HOSPADM

## 2021-09-08 RX ORDER — CALCIUM CARBONATE 200(500)MG
1250 TABLET,CHEWABLE ORAL DAILY
Status: DISCONTINUED | OUTPATIENT
Start: 2021-09-09 | End: 2021-09-14

## 2021-09-08 RX ORDER — SACCHAROMYCES BOULARDII 250 MG
250 CAPSULE ORAL 2 TIMES DAILY
Status: DISCONTINUED | OUTPATIENT
Start: 2021-09-08 | End: 2021-09-14 | Stop reason: HOSPADM

## 2021-09-08 RX ADMIN — HEPARIN SODIUM 5000 UNITS: 5000 INJECTION INTRAVENOUS; SUBCUTANEOUS at 23:48

## 2021-09-08 RX ADMIN — SODIUM CHLORIDE, SODIUM GLUCONATE, SODIUM ACETATE, POTASSIUM CHLORIDE, MAGNESIUM CHLORIDE, SODIUM PHOSPHATE, DIBASIC, AND POTASSIUM PHOSPHATE 100 ML/HR: .53; .5; .37; .037; .03; .012; .00082 INJECTION, SOLUTION INTRAVENOUS at 17:35

## 2021-09-08 RX ADMIN — BISOPROLOL FUMARATE 5 MG: 5 TABLET ORAL at 09:40

## 2021-09-08 RX ADMIN — HEPARIN SODIUM 5000 UNITS: 5000 INJECTION INTRAVENOUS; SUBCUTANEOUS at 15:19

## 2021-09-08 RX ADMIN — HEPARIN SODIUM 5000 UNITS: 5000 INJECTION INTRAVENOUS; SUBCUTANEOUS at 06:31

## 2021-09-08 RX ADMIN — DOCUSATE SODIUM 100 MG: 100 CAPSULE ORAL at 17:29

## 2021-09-08 RX ADMIN — SODIUM BICARBONATE 650 MG TABLET 650 MG: at 17:29

## 2021-09-08 RX ADMIN — AMLODIPINE BESYLATE 10 MG: 10 TABLET ORAL at 17:29

## 2021-09-08 RX ADMIN — Medication 250 MG: at 17:29

## 2021-09-08 RX ADMIN — SODIUM CHLORIDE, SODIUM GLUCONATE, SODIUM ACETATE, POTASSIUM CHLORIDE, MAGNESIUM CHLORIDE, SODIUM PHOSPHATE, DIBASIC, AND POTASSIUM PHOSPHATE 100 ML/HR: .53; .5; .37; .037; .03; .012; .00082 INJECTION, SOLUTION INTRAVENOUS at 09:37

## 2021-09-08 RX ADMIN — FAMOTIDINE 10 MG: 20 TABLET ORAL at 06:29

## 2021-09-08 RX ADMIN — SODIUM BICARBONATE 650 MG TABLET 650 MG: at 09:40

## 2021-09-08 RX ADMIN — ANTACID TABLETS 500 MG: 500 TABLET, CHEWABLE ORAL at 00:07

## 2021-09-08 RX ADMIN — DOXYCYCLINE 100 MG: 100 CAPSULE ORAL at 09:40

## 2021-09-08 RX ADMIN — CEFTRIAXONE SODIUM 1000 MG: 10 INJECTION, POWDER, FOR SOLUTION INTRAVENOUS at 15:20

## 2021-09-08 NOTE — SEPSIS NOTE
Sepsis Note   Kiara Civil 78 y o  male MRN: [id number]  Unit/Bed#: ED 10 Encounter: [id number]      qSOFA     Row Name 09/08/21 1533 09/08/21 1354 09/08/21 1056 09/08/21 0633 09/08/21 0345    Altered mental status GCS < 15  --  0  --  --  --    Respiratory Rate > / =22  0  --  0  0  0    Systolic BP < / =130  0  --  0  0  0    Q Sofa Score  0  0  0  0  0    Row Name 09/07/21 2324 09/07/21 2304 09/07/21 1933 09/07/21 1856 09/07/21 1622    Altered mental status GCS < 15  0  --  --  0  --    Respiratory Rate > / =22  --  --  0  --  0    Systolic BP < / =692  --  0  0  --  0    Q Sofa Score  0  0  0  0  1    Row Name 09/07/21 1414 09/07/21 1344 09/07/21 1316          Altered mental status GCS < 15  --  1  --      Respiratory Rate > / =22  0  --  0      Systolic BP < / =994  0  --  0      Q Sofa Score  1  1  0          Initial Sepsis Screening     Row Name 09/08/21 1130                Is the patient's history suggestive of a new or worsening infection? (!) Yes (Proceed)  -CY        Suspected source of infection  urinary tract infection  -CY        Are two or more of the following signs & symptoms of infection both present and new to the patient? (!) Yes (Proceed)  -CY        Indicate SIRS criteria  Leukocytosis (WBC > 93514 IJL);WBC > 10% bands  -CY        If the answer is yes to both questions, suspicion of sepsis is present  --        If severe sepsis is present AND tissue hypoperfusion perists in the hour after fluid resuscitation or lactate > 4, the patient meets criteria for SEPTIC SHOCK  --        Are any of the following organ dysfunction criteria present within 6 hours of suspected infection and SIRS criteria that are NOT considered to be chronic conditions?   No LORENZA on CKD likely due to  dehydration  -CY        Organ dysfunction  --        Date of presentation of severe sepsis  --        Time of presentation of severe sepsis  --        Tissue hypoperfusion persists in the hour after crystalloid fluid administration, evidenced, by either:  --        Was hypotension present within one hour of the conclusion of crystalloid fluid administration?  --        Date of presentation of septic shock  --        Time of presentation of septic shock  --          User Key  (r) = Recorded By, (t) = Taken By, (c) = Cosigned By    [address] Name Provider Type    [address], [address]  Nurse Practitioner

## 2021-09-08 NOTE — PLAN OF CARE
Problem: OCCUPATIONAL THERAPY ADULT  Goal: Performs self-care activities at highest level of function for planned discharge setting  See evaluation for individualized goals  Description: Treatment Interventions: ADL retraining, Functional transfer training, UE strengthening/ROM, Endurance training, Patient/family training, Equipment evaluation/education, Activityengagement          See flowsheet documentation for full assessment, interventions and recommendations  9/8/2021 1315 by Jade Fields OT  Note: Limitation: Decreased ADL status, Decreased UE strength, Decreased UE ROM, Decreased endurance, Decreased self-care trans, Decreased high-level ADLs  Prognosis: Fair  Assessment: Pt admit 9/7/21 with traumatic rhabdomyolysis s/p "controlled descent" to floor per pt  Chart states fall but pt denies  OT completed extensive review of pt's medical and social history  Pt with h/o CKD, CVA, HTN, CABG, aortic valve replacement  Prior to admit was living alone in [facility] apt w/ elevator  Reports being (I) PLOF to the best of his abilities  - drives  - falls  Supportive family whom assists as able  Pt presents to OT below baseline due to the following performance deficits: ROM; strength; pain; balance; stand tolerance; functional mobility; problem solving; awareness; coping; community integration; self care; and IADLs  Therefore, pt would benefit from OT services to achieve optimal level of performance  Occupational performance areas to be addressed include: bathing, toileting, dressing, activity tolerance, functional mobility, and clothing management  Pt required Mod/Min for bed mobility and assist x 2 for transfers and functional mobility  Heavy posterior lean noted  Requires increased assist for ADLS due to decreased shld ROM and weakness in R UE s/p CVA  Admits to staying in clothing at times  Decreased STM noted  Family does not feel pt can manage independently any longer, but pt stubborn   Based on findings, pt is of high complexity  The patient's raw score on the AM-PAC Daily Activity inpatient short form is 15, standardized score is 34 69, less than 39 4  Patients at this level are likely to benefit from discharge to post-acute rehabilitation services  Recommend STR to achieve functional independence in order to return home alone  OT Discharge Recommendation: Post acute rehabilitation services       9/8/2021 1315 by Derrick Lee OT  Note: Limitation: Decreased ADL status, Decreased UE strength, Decreased UE ROM, Decreased endurance, Decreased self-care trans, Decreased high-level ADLs  Prognosis: Fair  Assessment: Pt admit 9/7/21 with traumatic rhabdomyolysis s/p "controlled descent" to floor per pt  Chart states fall but pt denies  OT completed extensive review of pt's medical and social history  Pt with h/o CKD, CVA, HTN, CABG, aortic valve replacement  Prior to admit was living alone in [facility] apt w/ elevator  Reports being (I) PLOF to the best of his abilities  - drives  - falls  Supportive family whom assists as able  Pt presents to OT below baseline due to the following performance deficits: ROM; strength; pain; balance; stand tolerance; functional mobility; problem solving; awareness; coping; community integration; self care; and IADLs  Therefore, pt would benefit from OT services to achieve optimal level of performance  Occupational performance areas to be addressed include: bathing, toileting, dressing, activity tolerance, functional mobility, and clothing management  Pt required Mod/Min for bed mobility and assist x 2 for transfers and functional mobility  Heavy posterior lean noted  Requires increased assist for ADLS due to decreased shld ROM and weakness in R UE s/p CVA  Admits to staying in clothing at times  Decreased STM noted  Family does not feel pt can manage independently any longer, but pt stubborn  Based on findings, pt is of high complexity   The patient's raw score on the AM-PAC Daily Activity inpatient short form is 15, standardized score is 34 69, less than 39 4  Patients at this level are likely to benefit from discharge to post-acute rehabilitation services  Recommend STR to achieve functional independence in order to return home alone        OT Discharge Recommendation: Post acute rehabilitation services

## 2021-09-08 NOTE — ASSESSMENT & PLAN NOTE
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

## 2021-09-08 NOTE — PHYSICAL THERAPY NOTE
PHYSICAL THERAPY EVALUATION NOTE          Patient Name: Alex Swenson  Today's Date: 9/8/2021   PT EVALUATION    78 y o     [id number]    Generalized body aches [R52]    Past Medical History:   Diagnosis Date    Coronary artery disease     Renal disorder      Past Surgical History:   Procedure Laterality Date    AORTIC VALVE REPLACEMENT  2005    Tissue valve    BLADDER SURGERY      23 yrs ago   CORONARY ARTERY BYPASS GRAFT  2005    x1    RENAL ARTERY STENT  11/2005 09/08/21 1216   PT Last Visit   PT Visit Date 09/08/21   Note Type   Note type Evaluation   Pain Assessment   Pain Assessment Tool 0-10   Pain Score 4   Pain Location/Orientation Location: Generalized   Hospital Pain Intervention(s) Repositioned; Ambulation/increased activity; Emotional support; Rest   Home Living   Type of Home Apartment   Home Layout One level;Elevator   Bathroom Shower/Tub Tub/shower unit   Bathroom Toilet Standard   Bathroom Equipment Grab bars in shower; Shower chair   Home Equipment Cane;Electric scooter; Other (Comment); Life alert  (Rollator)   Additional Comments lives in sixth floor apt   Prior Function   Level of Stokes Independent with ADLs and functional mobility   Lives With Alone   Receives Help From [facility] in the last 6 months 0   Comments pt has supportive family that tries to help as able  -  reports ambulate w Rollator at all times, does have new electric scooter but reports it is broke  also had a brace for RLE (?AFO) but reports doesnt wear it because it is uncomfortable  Restrictions/Precautions   Other Precautions Chair Alarm; Bed Alarm;Multiple lines; Fall Risk;Pain   General   Additional Pertinent History pt admitted 9/7/21 for traumatic rhabdomyolysis      Family/Caregiver Present Yes   Cognition   Overall Cognitive Status WFL   Arousal/Participation Cooperative   Orientation Level Oriented to person;Oriented to time;Oriented to situation  (oriented to year only)   Memory Within functional limits   Following Commands Follows one step commands with increased time or repetition   Comments ?insight into deficits  family reports pt is stubborn and often refuses help offered   RLE Assessment   RLE Assessment X   Strength RLE   R Hip ABduction 3+/5   R Hip ADduction 3+/5   R Knee Extension 3-/5   R Ankle Dorsiflexion 0/5   LLE Assessment   LLE Assessment X   Strength LLE   L Hip ABduction 4/5   L Hip ADduction 4/5   L Knee Extension 3-/5   L Ankle Dorsiflexion 4/5   Coordination   Sensation X  (RLE)   Bed Mobility   Supine to Sit 3  Moderate assistance   Additional items Assist x 1;HOB elevated; Increased time required;Verbal cues; Other  (trunk support)   Sit to Supine 4  Minimal assistance   Additional items Assist x 1; Increased time required;Verbal cues;LE management   Transfers   Sit to Stand 4  Minimal assistance   Additional items Assist x 2; Increased time required; Other;Verbal cues  (RW)   Stand to Sit 4  Minimal assistance   Additional items Assist x 2; Increased time required;Verbal cues; Other  (RW)   Ambulation/Elevation   Gait pattern R Foot drag;R Hemiparesis; Improper Weight shift;Decreased foot clearance; Short stride; Excessively slow   Gait Assistance 4  Minimal assist   Additional items Assist x 2;Verbal cues   Assistive Device Rolling walker   Distance 2' fwd>bwd, 1' side stepping to R   Balance   Static Standing Poor +   Dynamic Standing Poor   Ambulatory Poor   Activity Tolerance   Activity Tolerance Patient tolerated treatment well;Treatment limited secondary to medical complications (Comment)  (residual R sided weakness 2* stroke)   Medical Staff Carol Ann Mora 405 OT   Nurse Made Aware Rin MCCAULEY   Assessment   Prognosis Fair   Problem List Decreased strength; Impaired balance;Decreased mobility; Impaired judgement;Decreased safety awareness; Impaired sensation;Pain   Assessment Shaista Sutton is a 78 y o  male admitted to [address] on 9/7/2021 for Traumatic rhabdomyolysis ([facility] [address] )  PT was consulted and pt was seen on 9/8/2021 for mobility assessment and d/c planning  Pt presents w high fall risk, multiple lines, chronic pain  At baseline is indep w Rollator  Recently got an electric scooter to use in community but reports it broke and has not been fixed  Per discussion w family and patient, it appears pt has difficulty at home and family tries to assist as able but are not able to provide the level of support they believe he needs  Pt is currently functioning at a min- mod assistance x1 level for bed mobility, minimum assistance x2 level for transfers, minimum assistance x2 level for ambulation with Rolling Walker  Pt demonstrated difficulty w mobility d/t pain, decreased R shoulder ROM residual from stroke, RLE weakness (maria elena distal) from prev stroke, and decreased insight impacting safe and independent mobility  At increased risk from falls given poor R foot clearance during walking  Pt will benefit from continued skilled IP PT to address the above mentioned impairments  in order to maximize recovery and increase functional independence when completing mobility and ADLs  At this time PT recommendations for d/c are STR given social barriers and decline in function  Likely will require increased assistance long term given impact of residual deficits on baseline function  Barriers to Discharge Decreased caregiver support   Barriers to Discharge Comments lives alone   Goals   Patient Goals to get better   STG Expiration Date 09/22/21   Short Term Goal #1 1)  Pt will perform bed mobility with Ember demonstrating appropriate technique 100% of the time in order to improve function  2)  Perform all transfers with Ember demonstrating safe and appropriate technique 100% of the time in order to improve ability to negotiate safely in home environment  3) Amb with least restrictive AD > 50'x1 with mod I in order to demonstrate ability to negotiate in home environment  4)  Improve overall strength and balance 1/2 grade in order to optimize ability to perform functional tasks and reduce fall risk  5) Increase activity tolerance to 45 minutes in order to improve endurance to functional tasks  6) PT for ongoing patient and family/caregiver education, DME needs and d/c planning in order to promote highest level of function in least restrictive environment  PT Treatment Day 0   Plan   Treatment/Interventions Functional transfer training;LE strengthening/ROM; Therapeutic exercise;Patient/family training;Cognitive reorientation;Equipment eval/education; Bed mobility;Gait training;Continued evaluation; Compensatory technique education;Spoke to nursing;OT   PT Frequency Other (Comment)  (4-5x)   Recommendation   PT Discharge Recommendation Post acute rehabilitation services   PT - OK to Discharge Yes   Additional Comments The patient's AM-PAC Basic Mobility Inpatient Short Form Raw Score is 15, Standardized Score is 36 97  A standardized score less than 42 9 suggests the patient may benefit from discharge to post-acute rehabilitation services  Please also refer to the recommendation of the Physical Therapist for safe discharge planning     AM-PAC Basic Mobility Inpatient   Turning in Bed Without Bedrails 2   Lying on Back to Sitting on Edge of Flat Bed 2   Moving Bed to Chair 3   Standing Up From Chair 3   Walk in Room 3   Climb 3-5 Stairs 2   Basic Mobility Inpatient Raw Score 15   Basic Mobility Standardized Score 36 97   History: co - morbidities, age, coping styles, social background, fall risk, use of assistive device, cognition, multiple lines  Exam: impairments in systems including musculoskeletal (decreased R shoulder ROM, strength), neuromuscular (balance, gait, transfers, motor function and sensation), am-pac, cognition  Clinical: unstable/unpredictable  Complexity:high      Jorge Frames, PT

## 2021-09-08 NOTE — ASSESSMENT & PLAN NOTE
Lab Results   Component Value Date    EGFR 13 09/08/2021    EGFR 13 09/07/2021    CREATININE 4 34 (H) 09/08/2021    CREATININE 4 28 (H) 09/07/2021     · Patient with horseshoe kidney, transplantation has been discussed however he is declining at this time  · Appears baseline creatinine is 3-3 5  · Follows with outpatient Nephrology  · With mature AV fistula right upper arm  · On Sodium bicarb 650 t i d  At home    Dose increased as above  · Nephrology consulted, appreciate recommendations

## 2021-09-08 NOTE — UTILIZATION REVIEW
Initial Clinical Review    Admission: Date/Time/Statement:   Admission Orders (From admission, onward)     Ordered        09/07/21 1701  Inpatient Admission  Once                   Orders Placed This Encounter   Procedures    Inpatient Admission     Standing Status:   Standing     Number of Occurrences:   1     Order Specific Question:   Level of Care     Answer:   Med Surg [16]     Order Specific Question:   Estimated length of stay     Answer:   More than 2 Midnights     Order Specific Question:   Certification     Answer:   I certify that inpatient services are medically necessary for this patient for a duration of greater than two midnights  See H&P and MD Progress Notes for additional information about the patient's course of treatment  ED Arrival Information     Expected Arrival Acuity    - 9/7/2021 13:06 Emergent         Means of arrival Escorted by Service Admission type    Ambulance [facility] ([address]) Hospitalist Emergency         Arrival complaint    Fall        Chief Complaint   Patient presents with    Fall     PATIENT ARRIVES VIA EMS FROM HOME, REPORTS HE SLID OUT OF BED 2 DAYS AGO AND HAS BEEN LAYING ON FLOOR SINCE  PATIENT FOUND BY BROTHER  PT NORMALLY HAS TROUBLE AMBULATING AND USES A WALKER/CANE  PT C/O GENERALIZED ACHES  Initial Presentation:  77 yo m with a pmh of CKD, CVA , HTN, CABG, Hx of aortic valve replacement  He was getting out of bed felt weak and lowered himself to the ground 3 days prior  and was unable to get up  He lived alone and could not call for help, he was unable to eat - drink nor take his medications   He was found by family today and brought to the ED for evaluation  In the Ed he was found to have significantly elevated  CK (1566), troponin( 0 63)  and LORENZA Cr od 4 2 ( baseline 3-3 4), wbc 22 34  Marivel Coker He is admitted inpatient for IVF administration and Nephrology consult  Date: 9/8/2021   Day 2: Pt with traumatic Rhabdo, cpk trending down   IVF's continue  Trend  Troponin possibly demand ischemia in the setting of acute kidney injury  He is A & O x 4, GCS 15  He has been incontinent of urine  PT eval recommending STR given social barriers and decline in function  He will likely require increased assistance long term given impact of residual deficits on baseline function, as he lives alone  Nephrology  Consult -  LORENZA on CKD stage 4/5  Etiology suspected to prerenal azotemia wit rhabdomyolysis, failure to regulate in the presence of ARB  CR on admit 4 28 today 4 34, with a baseline  Cr of 3 3-4 0  Currently on LR @ 100 ml/hr plan to change to  Isolyte @ 100 ml/hr  He has a mature AVF in his RUE  With positive bruit and thrill  He follows with Urology  With replacement  of a L ureteral stent every 4-6 weeks due to chronic L hydronephrosis  Last exchange 8/5/21  Plan to check PVR with bladder scan  Avoid NSAIDs , nephrotoxic agents  Monitor volume status strict I & O , daily weights  Monitor electrolytes, acid base, suspect elevated anion gap metabolic acidosis with bicarb 19 and anion gap 15, Adjust IVF's  His BP is stable, Hold ARB  And add hpld parameters to his home antihypertensives  Maintain MAP   65  Monitor for anemia transfuse if Hg <7  Continue to trend CPK he remains  on IVF's for rhabdo             ED Triage Vitals   Temperature Pulse Respirations Blood Pressure SpO2   09/07/21 1316 09/07/21 1316 09/07/21 1316 09/07/21 1316 09/07/21 1316   97 6 °F (36 4 °C) 86 19 162/70 99 %      Temp Source Heart Rate Source Patient Position - Orthostatic VS BP Location FiO2 (%)   09/07/21 1316 09/07/21 1316 09/07/21 1414 09/07/21 1316 --   Oral Monitor Lying Left arm       Pain Score       09/07/21 1316       Worst Possible Pain          Wt Readings from Last 1 Encounters:   09/07/21 76 7 kg (169 lb)     Additional Vital Signs:   Date/Time  Temp  Pulse  Resp  BP  SpO2  O2 Device  Patient Position - Orthostatic VS   09/08/21 1056  --  75  16  146/70  99 % None (Room air)  Lying   09/08/21 0633  --  73  20  139/60  94 %  None (Room air)  Lying   09/08/21 0345  --  84  16  130/64  97 %  None (Room air)  Lying   09/07/21 2304  --  90  --  121/65  96 %  None (Room air)  Lying   09/07/21 1933  --  86  18  135/61  97 %  None (Room air)  Lying   09/07/21 1622  --  77  18  151/74  96 %  None (Room air)  Lying   09/07/21 1414  --  85  19  160/69  99 %  None (Room air)  Lying             Pertinent Labs/Diagnostic Test Results:       Results from last 7 days   Lab Units 09/08/21  0628 09/07/21  1439   WBC Thousand/uL 19 93* 22 34*   HEMOGLOBIN g/dL 12 0 13 1   HEMATOCRIT % 36 7 41 4   PLATELETS Thousands/uL 127* 148*   BANDS PCT % 14* 3         Results from last 7 days   Lab Units 09/08/21  0628 09/07/21  1439   SODIUM mmol/L 143 142   POTASSIUM mmol/L 4 0 4 0   CHLORIDE mmol/L 109* 106   CO2 mmol/L 19* 18*   ANION GAP mmol/L 15* 18*   BUN mg/dL 94* 87*   CREATININE mg/dL 4 34* 4 28*   EGFR ml/min/1 73sq m 13 13   CALCIUM mg/dL 7 9* 8 2*   PHOSPHORUS mg/dL  --  3 5     Results from last 7 days   Lab Units 09/07/21  1439   AST U/L 68*   ALT U/L 39   ALK PHOS U/L 100   TOTAL PROTEIN g/dL 8 1   ALBUMIN g/dL 3 0*   TOTAL BILIRUBIN mg/dL 0 64         Results from last 7 days   Lab Units 09/08/21  0628 09/07/21  1439   GLUCOSE RANDOM mg/dL 139 152*       Results from last 7 days   Lab Units 09/08/21  0628 09/07/21  1439   CK TOTAL U/L 533* 1,566*   CK MB INDEX % <1 0 <1 0   CK MB ng/mL 3 4 8 2*     Results from last 7 days   Lab Units 09/08/21  0124 09/07/21  2230 09/07/21  1758 09/07/21  1439   TROPONIN I ng/mL 0 57* 0 30* 0 45* 0 63*       Results from last 7 days   Lab Units 09/07/21 2037   CLARITY UA  Clear   COLOR UA  Yellow   SPEC GRAV UA  1 015   PH UA  6 0   GLUCOSE UA mg/dl Negative   KETONES UA mg/dl Negative   BLOOD UA  Moderate*   PROTEIN UA mg/dl 100 (2+)*   NITRITE UA  Negative   BILIRUBIN UA  Negative   UROBILINOGEN UA E U /dl 0 2   LEUKOCYTES UA  Large*   WBC UA /hpf 30-50*   RBC UA /hpf 2-4   BACTERIA UA /hpf Moderate*   EPITHELIAL CELLS WET PREP /hpf Moderate*     CT Head - 9/7 -  No acute abnormality  CT Spine thoracic  and Lumbar  Spine - 9/7 -   1   No acute fracture or subluxation  2   Mild to moderate multilevel spondylosis with evidence of diffuse idiopathic skeletal hyperostosis at the lumbosacral junction  3   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 and associated renal parenchymal volume loss  4   Double-J stent in the left upper pole of the horseshoe kidney with dilated left ureter  5   Trabeculated bladder with bladder diverticulum likely related to chronic urostasis  6   IVC filter   CT Cervical Spine - 9/7 - No cervical spine fracture or traumatic malalignment  Mild spondylosis     CXR 9/8 -  No acute    ECG - 9/7 - NSR  With 1st degree AV Block  - LBBB, Left axis deviation         ED Treatment:   Medication Administration from 09/07/2021 1306 to 09/08/2021 1115       Date/Time Order Dose Route Action Comments     09/07/2021 1443 lactated ringers infusion 100 mL/hr Intravenous New Bag      09/07/2021 1618 aspirin tablet 325 mg 325 mg Oral Given      09/08/2021 0007 calcium carbonate (TUMS) chewable tablet 500 mg 500 mg Oral Given      09/07/2021 1934 amLODIPine (NORVASC) tablet 10 mg 10 mg Oral Given      09/08/2021 0940 bisoprolol (ZEBETA) tablet 5 mg 5 mg Oral Given      09/07/2021 1934 docusate sodium (COLACE) capsule 100 mg 100 mg Oral Given      09/08/2021 0631 heparin (porcine) subcutaneous injection 5,000 Units 5,000 Units Subcutaneous Given      09/07/2021 2232 heparin (porcine) subcutaneous injection 5,000 Units 5,000 Units Subcutaneous Given      09/08/2021 0940 sodium bicarbonate tablet 650 mg 650 mg Oral Given      09/07/2021 2233 sodium bicarbonate tablet 650 mg 650 mg Oral Given      09/08/2021 0940 doxycycline hyclate (VIBRAMYCIN) capsule 100 mg 100 mg Oral Given      09/08/2021 0629 famotidine (PEPCID) tablet 10 mg 10 mg Oral Given      09/08/2021 3331 multi-electrolyte (PLASMALYTE-A/ISOLYTE-S PH 7 4) IV solution 100 mL/hr Intravenous New Bag         Past Medical History:   Diagnosis Date    Coronary artery disease     Renal disorder      Present on Admission:   HTN (hypertension)   Chronic kidney disease, stage IV (severe) (HCC)   Traumatic rhabdomyolysis (HCC)   LORENZA (acute kidney injury) ([facility] [address] )   Elevated troponin      Admitting Diagnosis: Generalized body aches [R52]  Age/Sex: 78 y o  male         Admission Orders:  Scheduled Medications:  amLODIPine, 10 mg, Oral, QPM  bisoprolol, 5 mg, Oral, Daily  [START ON 9/9/2021] calcium carbonate, 1,250 mg, Oral, Daily  [START ON 9/9/2021] cholecalciferol, 1,000 Units, Oral, Daily  docusate sodium, 100 mg, Oral, BID  doxycycline hyclate, 100 mg, Oral, Daily  famotidine, 10 mg, Oral, Every Other Day  heparin (porcine), 5,000 Units, Subcutaneous, Q8H [address]  sodium bicarbonate, 650 mg, Oral, TID after meals      Continuous IV Infusions:  multi-electrolyte, 100 mL/hr, Intravenous, Continuous - started 9/8 @ 09:37  LR @ 100 ml/hr - started 9/7 @ 1400 d/c'd 9/8 @ 09:29      PRN Meds:  acetaminophen, 650 mg, Oral, Q6H PRN  aluminum-magnesium hydroxide-simethicone, 30 mL, Oral, Q6H PRN  ondansetron, 4 mg, Intravenous, Q6H PRN      Nursing Orders - VS - telem - I & O q shift - daily weights - check PVR's - SCD's to le's -   Diet regular house  - PT/OT evaluation     Network Utilization Review Department  ATTENTION: Please call with any questions or concerns to [phone] and carefully listen to the prompts so that you are directed to the right person  All voicemails are confidential   Lenora Roach all requests for admission clinical reviews, approved or denied determinations and any other requests to dedicated fax number below belonging to the campus where the patient is receiving treatment   List of dedicated fax numbers for the Facilities:  [address] DENIALS (Administrative/Medical Necessity) [phone]   [address] (Maternity/NICU/Pediatrics) 261 [facility],[address] [place] [address] [address] [place] [address] Lashell Salazar [address] [facility] [address] [facility] [phone]

## 2021-09-08 NOTE — ASSESSMENT & PLAN NOTE
· POA, appears that the patient's baseline creatinine is 3-3 5  · Creatinine 4 28 on  Admission  · Creatinine today 4 34   · Acute kidney injury most likely secondary to prerenal azotemia, rhabdo, and component of CKD progression,+ on ARB  · Nephrology consult, appreciate recommendations  · Avoid hypotension and nephrotoxins  · Continue to hold patient's losartan at this time  ·  IV fluids per Renal  · Check PVR  · BMP in a m

## 2021-09-08 NOTE — PROGRESS NOTES
[address]  Progress Note - Lilliam Escort 1945, 78 y o  male MRN: [id number]  Unit/Bed#: ED 10 Encounter: [id number]  Primary Care Provider: Xiao Amanda MD   Date and time admitted to hospital: 9/7/2021  1:06 PM    * Acute kidney injury superimposed on CKD [facility])  Assessment & Plan  · POA, appears that the patient's baseline creatinine is 3-3 5  · Creatinine 4 28 on  Admission  · Creatinine today 4 34   · Acute kidney injury most likely secondary to prerenal azotemia, rhabdo, and component of CKD progression,+ on ARB  · Nephrology consult, appreciate recommendations  · Avoid hypotension and nephrotoxins  · Continue to hold patient's losartan at this time  ·  IV fluids per Renal  · Check PVR  · BMP in a m  Traumatic rhabdomyolysis [facility])  Assessment & Plan  Patient found down on ground in home after having fallen 3 days ago and being unable to get up  States that he is getting out of bed and felt weak, he lowered himself to the ground and was unable to get up after that  Denies any loss of consciousness, head strike  · Patient unable to access any food/water during that time or take any medications  · CK significantly elevated at 1566  Improved with IV hydration  · Nephrology consulted, appreciate recommendations  · PT/OT consulted for safe discharge planning  ·  IV fluids per Renal  ·  Repeat CK in a m     UTI (urinary tract infection)  Assessment & Plan  · White blood cell count 22 34 on  Admission  · WBC today 19 93 with bandemia  · Suspect due to UTI  · UA showed WBC 30-50, large leukocytes  · CT showed - 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 and associated renal parenchymal volume loss  Double-J stent in the left upper pole of the horseshoe kidney with dilated left ureter  Trabeculated bladder with bladder diverticulum likely related to chronic urostasis  · Patient follows Urology in  [facility]    · Urine culture pending  · Patient denies history of  frequent UTIs  · Start IV Rocephin  · Will consult Urology in view of abnormal findings on CT    Sepsis [facility])  Assessment & Plan   Evolving since admission  As evidenced by  Leukocytosis, bandemia, with suspected source of infection  UTI  Check lactic acid   Obtain blood culture if fever above 100 4   Started patient on IV Rocephin while pending urine culture   Check procalcitonin    Repeat CBC with diff in the morning        Results from last 7 days   Lab Units 09/08/21  0628 09/07/21  1439   WBC Thousand/uL 19 93* 22 34*   TOTAL NEUT ABS Thousand/uL 18 34* 21 67*   BANDS PCT % 14* 3             Elevated troponin  Assessment & Plan  · Mildly elevated troponin at 0 63, down trending  · Suspect likely secondary to rhabdomyolysis  ·  Patient denies chest pain  ·  EKG unremarkable  · Telemetry    Chronic kidney disease, stage IV (severe) [facility])  Assessment & Plan  Lab Results   Component Value Date    EGFR 13 09/08/2021    EGFR 13 09/07/2021    CREATININE 4 34 (H) 09/08/2021    CREATININE 4 28 (H) 09/07/2021     · Patient with horseshoe kidney, transplantation has been discussed however he is declining at this time  · Appears to baseline creatinine is 3-3 5  · Follows with outpatient Nephrology  · With mature AV fistula right upper arm  · Continue Sodium bicarb 650 t i d   · Nephrology consulted, appreciate recommendations    Cerebrovascular accident (CVA) [facility])  Assessment & Plan   History of CVA with right-sided weakness   On statin at home  On hold due to [facility]  Hx of CABG  Assessment & Plan  · Noted    HTN (hypertension)  Assessment & Plan  · Maintained on bisoprolol 5 q d , amlodipine 10 mg q p m  · Hold losartan 50 due to LORENZA  · Monitor per unit routine  · BP stable    History of aortic valve replacement with tissue graft  Assessment & Plan  · 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  · On suppressive antibiotics with doxycycline 100 mg q d  Will continue        VTE Pharmacologic Prophylaxis: Pharmacologic: Heparin  Mechanical VTE Prophylaxis in Place: No    Patient Centered Rounds: I have performed bedside rounds with nursing staff today  Discussions with Specialists or Other Care Team Provider:  yes    Education and Discussions with Family / Patient:  yes    Time Spent for Care: 20 minutes  More than 50% of total time spent on counseling and coordination of care as described above  Current Length of Stay: 1 day(s)    Current Patient Status: Inpatient   Certification Statement: The patient will continue to require additional inpatient hospital stay due to Acute kidney injury on CKD, UTI    Discharge Plan:  Pending progress    Code Status: Level 1 - Full Code      Subjective:    patient denies chest pain, headache, dizziness,  Nausea, vomiting, diarrhea, constipation, fever or chills  Reports urinary urgency  Which is likely chronic  Denies flank pain, back pain, abdominal pain  Objective:     Vitals:   No data recorded  HR:  [73-90] 78  Resp:  [16-20] 17  BP: (121-146)/(59-70) 126/59  SpO2:  [94 %-99 %] 97 %  Body mass index is 25 7 kg/m²  Input and Output Summary (last 24 hours): Intake/Output Summary (Last 24 hours) at 9/8/2021 1636  Last data filed at 9/8/2021 1536  Gross per 24 hour   Intake 2060 ml   Output --   Net 2060 ml       Physical Exam:     Physical Exam  Vitals and nursing note reviewed  Constitutional:       Appearance: He is well-developed  HENT:      Head: Normocephalic and atraumatic  Neck:      Thyroid: No thyromegaly  Vascular: No JVD  Trachea: No tracheal deviation  Cardiovascular:      Rate and Rhythm: Normal rate and regular rhythm  Heart sounds: Normal heart sounds  Comments: Right arm AV fistula  Pulmonary:      Effort: Pulmonary effort is normal  No respiratory distress  Breath sounds: Normal breath sounds  No wheezing or rales  Abdominal:      General: Bowel sounds are normal  There is no distension  Palpations: Abdomen is soft  Tenderness: There is no abdominal tenderness  There is no guarding  Musculoskeletal:         General: Swelling and deformity present  Cervical back: Neck supple  Right lower leg: Edema present  Left lower leg: Edema present  Skin:     General: Skin is warm and dry  Neurological:      Mental Status: He is alert and oriented to person, place, and time  Comments:  Right-sided weakness from old stroke   Psychiatric:         Mood and Affect: Mood normal          Judgment: Judgment normal          Additional Data:     Labs:    Results from last 7 days   Lab Units 09/08/21  0628   WBC Thousand/uL 19 93*   HEMOGLOBIN g/dL 12 0   HEMATOCRIT % 36 7   PLATELETS Thousands/uL 127*   LYMPHO PCT % 3*   MONO PCT % 4   EOS PCT % 0     Results from last 7 days   Lab Units 09/08/21  0628 09/07/21  1439   POTASSIUM mmol/L 4 0 4 0   CHLORIDE mmol/L 109* 106   CO2 mmol/L 19* 18*   BUN mg/dL 94* 87*   CREATININE mg/dL 4 34* 4 28*   CALCIUM mg/dL 7 9* 8 2*   ALK PHOS U/L  --  100   ALT U/L  --  39   AST U/L  --  68*           * I Have Reviewed All Lab Data Listed Above  * Additional Pertinent Lab Tests Reviewed:  Tej[facility] 68 Admission Reviewed    Imaging:    Imaging Reports Reviewed Today Include:   CT spine thoracic and lumbar, CT cervical spine, CT head, chest x-ray  Imaging Personally Reviewed by Myself Includes:   none    Recent Cultures (last 7 days):           Last 24 Hours Medication List:   Current Facility-Administered Medications   Medication Dose Route Frequency Provider Last Rate    acetaminophen  650 mg Oral Q6H PRN Shira Castañeda PA-C      aluminum-magnesium hydroxide-simethicone  30 mL Oral Q6H PRN Shira Castañeda PA-C      amLODIPine  10 mg Oral QPM OFELIA Saunders      bisoprolol  5 mg Oral Daily [place], [place]      [START ON 9/9/2021] calcium carbonate  1,250 mg Oral Daily OFELIA Saunders      cefTRIAXone  1,000 mg Intravenous Q24H OFELIA [place] 1,000 mg (09/08/21 1520)    [START ON 9/9/2021] cholecalciferol  1,000 Units Oral Daily OFELIA Saunders      docusate sodium  100 mg Oral BID NICK Arshad      doxycycline hyclate  100 mg Oral Daily [place], [place]      famotidine  10 mg Oral Every Other Day Chung Rosales MD      heparin (porcine)  5,000 Units Subcutaneous [place], [place]      multi-electrolyte  100 mL/hr Intravenous Continuous Narinder Brasher  mL/hr (09/08/21 0937)    ondansetron  4 mg Intravenous Q6H PRN NICK Arshad      sodium bicarbonate  650 mg Oral TID after meals NICK Arshad          Today, Patient Was Seen By: OFELIA Cool    ** Please Note: Dragon 360 Dictation voice to text software may have been used in the creation of this document   **

## 2021-09-08 NOTE — ED NOTES
Patient complaining of heartburn  Requesting PRN tums to alleviate symptoms        Saqib Haq RN  09/08/21 7814

## 2021-09-08 NOTE — PLAN OF CARE
Problem: PHYSICAL THERAPY ADULT  Goal: Performs mobility at highest level of function for planned discharge setting  See evaluation for individualized goals  Description: Treatment/Interventions: Functional transfer training, LE strengthening/ROM, Therapeutic exercise, Patient/family training, Cognitive reorientation, Equipment eval/education, Bed mobility, Gait training, Continued evaluation, Compensatory technique education, Spoke to nursing, OT          See flowsheet documentation for full assessment, interventions and recommendations  9/8/2021 1304 by Lisandra Munguia, PT  Note: Prognosis: Fair  Problem List: Decreased strength, Impaired balance, Decreased mobility, Impaired judgement, Decreased safety awareness, Impaired sensation, Pain  Assessment: Jaki Guo is a 78 y o  male admitted to [place] on 9/7/2021 for Traumatic rhabdomyolysis ([facility] [address] )  PT was consulted and pt was seen on 9/8/2021 for mobility assessment and d/c planning  Pt presents w high fall risk, multiple lines, chronic pain  At baseline is indep w Rollator  Recently got an electric scooter to use in community but reports it broke and has not been fixed  Per discussion w family and patient, it appears pt has difficulty at home and family tries to assist as able but are not able to provide the level of support they believe he needs  Pt is currently functioning at a min- mod assistance x1 level for bed mobility, minimum assistance x2 level for transfers, minimum assistance x2 level for ambulation with Rolling Walker  Pt demonstrated difficulty w mobility d/t pain, decreased R shoulder ROM residual from stroke, RLE weakness (maria elena distal) from prev stroke, and decreased insight impacting safe and independent mobility  At increased risk from falls given poor R foot clearance during walking   Pt will benefit from continued skilled IP PT to address the above mentioned impairments  in order to maximize recovery and increase functional independence when completing mobility and ADLs  At this time PT recommendations for d/c are STR given social barriers and decline in function  Likely will require increased assistance long term given impact of residual deficits on baseline function  Barriers to Discharge: Decreased caregiver support  Barriers to Discharge Comments: lives alone     PT Discharge Recommendation: Post acute rehabilitation services     PT - OK to Discharge: Yes    See flowsheet documentation for full assessment  9/8/2021 1304 by Hema Diggs, PT  Note: Prognosis: Fair  Problem List: Decreased strength, Impaired balance, Decreased mobility, Impaired judgement, Decreased safety awareness, Impaired sensation, Pain  Assessment: Carlos Benavides is a 78 y o  male admitted to [facility] on 9/7/2021 for Traumatic rhabdomyolysis ([facility] [address] )  PT was consulted and pt was seen on 9/8/2021 for mobility assessment and d/c planning  Pt presents w high fall risk, multiple lines, chronic pain  At baseline is indep w Rollator  Recently got an electric scooter to use in community but reports it broke and has not been fixed  Per discussion w family and patient, it appears pt has difficulty at home and family tries to assist as able but are not able to provide the level of support they believe he needs  Pt is currently functioning at a min- mod assistance x1 level for bed mobility, minimum assistance x2 level for transfers, minimum assistance x2 level for ambulation with Rolling Walker  Pt demonstrated difficulty w mobility d/t pain, decreased R shoulder ROM residual from stroke, RLE weakness (maria elena distal) from prev stroke, and decreased insight impacting safe and independent mobility  At increased risk from falls given poor R foot clearance during walking  Pt will benefit from continued skilled IP PT to address the above mentioned impairments  in order to maximize recovery and increase functional independence when completing mobility and ADLs   At this time PT recommendations for d/c are STR given social barriers and decline in function  Likely will require increased assistance long term given impact of residual deficits on baseline function  Barriers to Discharge: Decreased caregiver support  Barriers to Discharge Comments: lives alone     PT Discharge Recommendation: Post acute rehabilitation services     PT - OK to Discharge: Yes    See flowsheet documentation for full assessment

## 2021-09-08 NOTE — ASSESSMENT & PLAN NOTE
Patient found down on ground in home after having fallen 3 days ago and being unable to get up  States that he is getting out of bed and felt weak, he lowered himself to the ground and was unable to get up after that  Denies any loss of consciousness, head strike  · Patient unable to access any food/water during that time or take any medications  · CK significantly elevated at 1566  Improved with IV hydration  · Nephrology consulted, appreciate recommendations  · PT/OT consulted for safe discharge planning  ·  IV fluids per Renal  ·  Repeat CK in a m

## 2021-09-08 NOTE — CONSULTS
Consultation - Nephrology   Cherrie Silver 78 y o  male MRN: [id number]  Unit/Bed#: ED 10 Encounter: [id number]    ASSESSMENT and PLAN:  1  Acute kidney injury (POA) on chronic kidney disease, stage IV/V:  - etiology suspect secondary to prerenal azotemia secondary with rhabdomyolysis, failure to auto regulate in the presence of ARB  - underlying chronic kidney disease secondary to chronic obstructive uropathy, PCKD/horseshoe kidney, hypertensive nephrosclerosis, age-related nephron loss  - outpatient nephrologist, Dr Adelia Horner at [facility]  - upon review medical records, baseline creatinine 3 3-4 0 mg/dL  - creatinine 4 28 mg/dL yesterday upon admission, 9/7/21   - most recent creatinine 4 34 mg/dL today  - currently on LR at 100 mL/hour; will discontinue and initiate isolyte at 100 mL/hour for now  - access: Mature right upper extremity AVF with positive bruit and thrill     - UA, moderate blood, large leukocytes, +2 protein, 2-4 RBC, 30-50 WBC, moderate bacteria  - with history of complex cysts/ ureteral stent exchanges for chronic left hydronephrosis, follows with Urology as an outpatient with replacement of left ureteral stent every 4-6 months with most recent exchange on 8/5/21    - check PVR with bladder scan, maintain urinary retention protocol    - patient is agreeable to hemodialysis if warranted  - avoid NSAIDs, nephrotoxic agents, IV contrast   - adjust medications to appropriate GFR  - monitor volume status with strict intake/output, daily weight  - will continue to monitor renal indices with repeat lab studies  2  Electrolytes, acid/base:  - suspected elevated anion gap metabolic acidosis with bicarbonate 19 and anion gap of 15    - outpatient regimen includes: Sodium bicarbonate 650 mg TID, continue same     - adjust fluids as above  - will continue to monitor with repeat lab studies  3  Blood pressure, hypertension:  - blood pressure stable today    - outpatient regimen: Losartan 50 mg daily, amlodipine 5 mg daily, bisoprolol 5 mg daily  - to note, antihypertensive regimen adjusted on 8/17/21 at nephrologist appointment; losartan added back to regimen and amlodipine decreased from 10 mg to 5 mg daily  - currently on: Amlodipine 10 mg daily, bisoprolol 5 mg daily  - recommendations: Continue to hold ARB, add hold parameters to antihypertensives  - optimize hemodynamics, avoid hypotension and fluctuations of blood pressure   - maintain MAP > 65     4  H/H, anemia:  - most recent hemoglobin 12 grams/deciliter   - goal hemoglobin greater than 8 grams/deciliter  - recommend PRBC transfusion for hemoglobin less than 7, per primary team     5  Mineral bone disease of chronic kidney disease:  - with history of primary parathyroid adenoma status post subtotal PTX, secondary hyperparathyroidism, vitamin-D deficiency, hypocalcemia  - outpatient regimen includes: Calcium carbonate 1250 mg daily, vitamin-D 1000 units daily; will add while inpatient  - most recent phosphorus 3 5   - will continue monitor PTH and phosphorus as an outpatient  6  Rhabdomyolysis:  - CK 1566 upon admission, most recent 533 today  - adjust IV fluids as above, will continue to monitor and trend CK levels  7  Other: Gout, leukocytosis, elevated troponin, history of CABG/AVR  HISTORY OF PRESENT ILLNESS:  Requesting Physician: Marko Heard MD  Reason for Consult:  Acute kidney injury, rhabdomyolysis    Tracey Coulter is a 78 y o  male with history of CVA, hypertension, CAD status post CABG, AVR, chronic kidney disease, stage IV/V who was admitted to [facility] on 9/7/21 after presenting post being found down on the ground in his home after 3 days  Upon assessment and evaluation, patient resting in bed comfortably  Patient is without acute shortness of breath or chest pain  Patient endorses heart burn    Per patient, he did not fall to the floor, however he lowered himself to the ground and was unable to get back up for 3 days due to weakness  During those 3 days, patient states he did not eat or drink or take any of his medications  Patient denies NSAID use  He voids well without difficulty  A renal consultation is requested today for assistance in the management of acute kidney injury, rhabdomyolysis  PAST MEDICAL HISTORY:  Past Medical History:   Diagnosis Date    Coronary artery disease     Renal disorder        PAST SURGICAL HISTORY:  Past Surgical History:   Procedure Laterality Date    AORTIC VALVE REPLACEMENT      Tissue valve    BLADDER SURGERY      23 yrs ago      CORONARY ARTERY BYPASS GRAFT  2005    x1    RENAL ARTERY STENT  2005       ALLERGIES:  Allergies   Allergen Reactions    Latex     Nitrofurantoin Other (See Comments)     neck pain    Other Itching       SOCIAL HISTORY:  Social History     Substance and Sexual Activity   Alcohol Use None     Social History     Substance and Sexual Activity   Drug Use Not on file     Social History     Tobacco Use   Smoking Status Former Smoker    Packs/day:     Types: Cigarettes    Quit date: 1993    Years since quittin 7   Smokeless Tobacco Never Used       FAMILY HISTORY:  Family History   Problem Relation Age of Onset    Diabetes Mother     Hypertension Mother     Dementia Mother     Other Father         fall gangrene    Peripheral vascular disease Sister        MEDICATIONS:    Current Facility-Administered Medications:     acetaminophen (TYLENOL) tablet 650 mg, 650 mg, Oral, Q6H PRN, Nahun Dunn PA-C    aluminum-magnesium hydroxide-simethicone (MYLANTA) oral suspension 30 mL, 30 mL, Oral, Q6H PRN, Nahun Dunn PA-C    amLODIPine (NORVASC) tablet 10 mg, 10 mg, Oral, QPM, Nahun Dunn PA-C, 10 mg at 21    bisoprolol (ZEBETA) tablet 5 mg, 5 mg, Oral, Daily, Nahun Dunn PA-C    calcium carbonate (TUMS) chewable tablet 500 mg, 500 mg, Oral, Daily PRN, Pilo Chávez MD, 500 mg at 09/08/21 0007    docusate sodium (COLACE) capsule 100 mg, 100 mg, Oral, BID, Carlos Mckeon PA-C, 100 mg at 09/07/21 1934    doxycycline hyclate (VIBRAMYCIN) capsule 100 mg, 100 mg, Oral, Daily, Carlos Mckeon PA-C    famotidine (PEPCID) tablet 10 mg, 10 mg, Oral, Every Other Day, Khushbu Holder MD, 10 mg at 09/08/21 8574    heparin (porcine) subcutaneous injection 5,000 Units, 5,000 Units, Subcutaneous, Q8H [facility], 5,000 Units at 09/08/21 0631 **AND** [CANCELED] Platelet count, , , Once, Carlos Mckeon PA-C    lactated ringers infusion, 100 mL/hr, Intravenous, Continuous, Malick Cortés MD, Last Rate: 100 mL/hr at 09/07/21 1443, 100 mL/hr at 09/07/21 1443    ondansetron (ZOFRAN) injection 4 mg, 4 mg, Intravenous, Q6H PRN, Carlos Mckeon PA-C    sodium bicarbonate tablet 650 mg, 650 mg, Oral, TID after meals, Carlos Mckeon PA-C, 650 mg at 09/07/21 2233    Current Outpatient Medications:     allopurinol (ZYLOPRIM) 100 mg tablet, Take 100 mg by mouth daily, Disp: , Rfl:     amLODIPine (NORVASC) 10 mg tablet, TAKE 1 TABLET (10 MG TOTAL) BY MOUTH EVERY EVENING, Disp: 30 tablet, Rfl: 5    bisoprolol (ZEBETA) 5 mg tablet, TAKE 1 TABLET BY ORAL ROUTE EVERY DAY, Disp: 90 tablet, Rfl: 3    Cholecalciferol (VITAMIN D3) 2000 units capsule, every 24 hours, Disp: , Rfl:     doxycycline hyclate (VIBRAMYCIN) 100 mg capsule, Take 100 mg by mouth daily , Disp: , Rfl:     ECPIRIN 325 MG EC tablet, TAKE 1 TABLET BY ORAL ROUTE EVERY DAY, Disp: 90 tablet, Rfl: 3    losartan (COZAAR) 50 mg tablet, TAKE 1 TABLET BY MOUTH EVERY 24 HRS, Disp: 90 tablet, Rfl: 3    rosuvastatin (CRESTOR) 20 MG tablet, TAKE 1 TABLET (20 MG TOTAL) BY MOUTH EVERY OTHER DAY, Disp: 30 tablet, Rfl: 5    SODIUM BICARBONATE PO, Three tablets daily, Disp: , Rfl:     REVIEW OF SYSTEMS:  All the systems were reviewed and were negative except as documented on the HPI      PHYSICAL EXAM:  Current Weight: Weight - Scale: 76 7 kg (169 lb)  First Weight: Weight - Scale: 76 7 kg (169 lb)  Vitals:    09/07/21 1933 09/07/21 2304 09/08/21 0345 09/08/21 0633   BP: 135/61 121/65 130/64 139/60   BP Location: Left arm Left arm Left arm Left arm   Pulse: 86 90 84 73   Resp: 18  16 20   Temp:       TempSrc:       SpO2: 97% 96% 97% 94%   Weight:       Height:         No intake or output data in the 24 hours ending 09/08/21 0819  General: conscious, coherent, cooperative, not in acute distress  Skin: no rash, warm, dry  Eyes: pale conjunctivae, anicteric sclerae  ENT: moist lips and mucous membranes  Neck: supple, with trachea midline  Chest: clear breath sounds bilaterally  CVS: normal rate, regular rhythm  Abdomen: soft, non-tender, non-distended   Extremities: no edema of both legs  Neuro: awake, alert  Psych: appropriate affect       Invasive Devices:        Lab Results:   Results from last 7 days   Lab Units 09/08/21  0628 09/07/21  1439   WBC Thousand/uL 19 93* 22 34*   HEMOGLOBIN g/dL 12 0 13 1   HEMATOCRIT % 36 7 41 4   PLATELETS Thousands/uL 127* 148*   POTASSIUM mmol/L  --  4 0   CHLORIDE mmol/L  --  106   CO2 mmol/L  --  18*   BUN mg/dL  --  87*   CREATININE mg/dL  --  4 28*   CALCIUM mg/dL  --  8 2*   PHOSPHORUS mg/dL  --  3 5   ALK PHOS U/L  --  100   ALT U/L  --  39   AST U/L  --  68*

## 2021-09-08 NOTE — ASSESSMENT & PLAN NOTE
Evolving since admission  As evidenced by leukocytosis, bandemia, with suspected source of infection UTI  Lactic acid normal  Obtain blood culture if fever above 100 4  Started patient on IV Rocephin  Procalcitonin significantly elevated  Repeat pending today    WBC downtrending  Patient remains afebrile    Results from last 7 days   Lab Units 09/08/21  1809   LACTIC ACID mmol/L 1 3   PROCALCITONIN ng/ml 411 08*     Results from last 7 days   Lab Units 09/09/21  0425 09/08/21  0628 09/07/21  1439   WBC Thousand/uL 12 51* 19 93* 22 34*   TOTAL NEUT ABS Thousand/uL  --  18 34* 21 67*   BANDS PCT %  --  14* 3

## 2021-09-08 NOTE — OCCUPATIONAL THERAPY NOTE
Occupational Therapy Evaluation     Patient Name: Katiana Nj  Today's Date: 9/8/2021  Problem List  Principal Problem:    Traumatic rhabdomyolysis [facility])  Active Problems:    Chronic kidney disease, stage IV (severe) ([facility] [address] )    History of aortic valve replacement with tissue graft    HTN (hypertension)    Hx of CABG    LORENZA (acute kidney [facility]) ([facility] )    Elevated troponin    Leukocytosis    Past Medical History  Past Medical History:   Diagnosis Date    Coronary artery disease     Renal disorder      Past Surgical History  Past Surgical History:   Procedure Laterality Date    AORTIC VALVE REPLACEMENT  2005    Tissue valve    BLADDER SURGERY      23 yrs ago   CORONARY ARTERY BYPASS GRAFT  2005    x1    RENAL ARTERY STENT  11/2005 09/08/21 1215   OT Last Visit   OT Visit Date 09/08/21   Note Type   Note type Evaluation   Restrictions/Precautions   Other Precautions Chair Alarm; Bed Alarm;Multiple lines;Limb alert; Fall Risk;Pain  (R limb alert )   Pain Assessment   Pain Assessment Tool 0-10   Pain Score 5   Pain Location/Orientation Location: Generalized   Patient's Stated Pain Goal   ("I always have pain" )   Hospital Pain Intervention(s) Repositioned; Ambulation/increased activity; Emotional support   Home Living   Type of Home Apartment  ([facility] fl )   Home Layout One level;Elevator   Bathroom Shower/Tub Tub/shower unit   Bathroom Toilet Standard   Bathroom Equipment Grab bars in shower; Shower chair   [address] Walker;Cane;Electric scooter;Life alert  (Rollator, Scooter not working, Life alert doesnt use )   Prior Function   [facility] [address] [facility] From Family   ADL Assistance Independent  (to the best of his abilities )   IADLs Independent  (to the best of his abilities )   Falls in the last 6 months 0   Lifestyle   Autonomy Pt states he's Independent w/ ADLs and IADLs to the best of his abilities   Admits to sometimes staying in his clothes due to inability to get dressed and undressed  Supportive family assist as able  Ambulates using rollator  Recently got scooter but states it's broken and doesn't trust it as it left him stranded  -   - falls  Family doesn't feel pt can manage on his own anymore but pt is very stubborn  Reciprocal Relationships Family    Psychosocial   Psychosocial (WDL) WDL   Subjective   Subjective "I'm feeling better than I did the past few days"    ADL   Eating Assistance 6  Modified independent   Grooming Assistance 5  Supervision/Setup   Grooming Deficit Setup   UB Bathing Assistance 4  Minimal Assistance   UB Bathing Deficit Setup   LB Bathing Assistance 4  Minimal Assistance   LB Bathing Deficit Setup   UB Dressing Assistance 4  Minimal Assistance   UB Dressing Deficit Setup   LB Dressing Assistance 3  Moderate Assistance   LB Dressing Deficit Setup   Toileting Assistance  4  Minimal Assistance   Additional Comments Decreased R shld ROM due to h/o CVA    Bed Mobility   Supine to Sit 3  Moderate assistance   Additional items HOB elevated; Bedrails; Increased time required;LE management  (Trunk management )   Sit to Supine 4  Minimal assistance   Additional items HOB elevated; Increased time required;LE management   Additional Comments Remains in bed w/ all needs and family present    Transfers   Sit to Stand 4  Minimal assistance   Additional items Assist x 2;Armrests; Increased time required;Verbal cues   Stand to Sit 4  Minimal assistance   Additional items Assist x 2;Armrests; Increased time required;Verbal cues   Additional Comments Heavy posterior lean when standing  Functional Mobility   Functional Mobility 4  Minimal assistance   Additional Comments Min x 2 using RW  Drags R LE  Balance   Static Sitting Fair +   Dynamic Sitting Fair   Static Standing Poor   Dynamic Standing Poor -   Ambulatory Poor -   Activity Tolerance   Activity Tolerance Patient limited by pain; Patient limited by fatigue   Medical Staff Made Aware PT   Nurse Made Aware GARRICK Gar    RUE Assessment   RUE Assessment X  (PROM FF 40*; Distal 3/5)   LUE Assessment   LUE Assessment WFL  (4-/5)   Hand Function   Gross Motor Coordination Functional   Fine Motor Coordination Impaired  (R hand )   Cognition   Arousal/Participation Cooperative   Attention Attends with cues to redirect   Orientation Level Oriented to person;Oriented to situation   Memory Decreased short term memory   Following Commands Follows one step commands with increased time or repetition   Comments Pt unable to state month and looked at board for name of hospital  Decreased STM noted  Family corrected some answers to questions asked  Assessment   Limitation Decreased ADL status; Decreased UE strength;Decreased UE ROM; Decreased endurance;Decreased self-care trans;Decreased high-level ADLs   Prognosis Fair   Assessment Pt admit 9/7/21 with traumatic rhabdomyolysis s/p "controlled descent" to floor per pt  Chart states fall but pt denies  OT completed extensive review of pt's medical and social history  Pt with h/o CKD, CVA, HTN, CABG, aortic valve replacement  Prior to admit was living alone in [facility] apt w/ elevator  Reports being (I) PLOF to the best of his abilities  - drives  - falls  Supportive family whom assists as able  Pt presents to OT below baseline due to the following performance deficits: ROM; strength; pain; balance; stand tolerance; functional mobility; problem solving; awareness; coping; community integration; self care; and IADLs  Therefore, pt would benefit from OT services to achieve optimal level of performance  Occupational performance areas to be addressed include: bathing, toileting, dressing, activity tolerance, functional mobility, and clothing management  Pt required Mod/Min for bed mobility and assist x 2 for transfers and functional mobility  Heavy posterior lean noted  Requires increased assist for ADLS due to decreased shld ROM and weakness in R UE s/p CVA  Admits to staying in clothing at times  Decreased STM noted  Family does not feel pt can manage independently any longer, but pt stubborn  Based on findings, pt is of high complexity  The patient's raw score on the AM-PAC Daily Activity inpatient short form is 15, standardized score is 34 69, less than 39 4  Patients at this level are likely to benefit from discharge to post-acute rehabilitation services  Recommend STR to achieve functional independence in order to return home alone  Goals   Patient Goals To get an electric wheelchair    Plan   Treatment Interventions ADL retraining;Functional transfer training;UE strengthening/ROM; Endurance training;Patient/family training;Equipment evaluation/education; Activityengagement   Goal Expiration Date 09/22/21   OT Treatment Day 0   OT Frequency 3-5x/wk   Recommendation   OT Discharge Recommendation Post acute rehabilitation services   [facility] Daily Activity Inpatient   Lower Body Dressing 2   Bathing 3   Toileting 2   Upper Body Dressing 2   Grooming 3   Eating 3   Daily Activity Raw Score 15   Daily Activity Standardized Score (Calc for Raw Score >=11) 34 69   AM-PAC Applied Cognition Inpatient   Following a Speech/Presentation 3   Understanding Ordinary Conversation 4   Taking Medications 3   Remembering Where Things Are Placed or Put Away 3   Remembering List of 4-5 Errands 2   Taking Care of Complicated Tasks 2   Applied Cognition Raw Score 17   Applied Cognition Standardized Score 36 52     GOALS:      Pt will complete UB ADL's w/ MI     Pt will complete LB ADL's w/ MI using AE PRN     Pt will complete toileting including hygiene and clothing management w/ MI      Pt will complete functional transfers w/ MI using AD      Pt will complete dynamic stand balance w/ F+ for safe clothing management      Pt will improve stand tolerance to 5-10 mins for safety w/ ADL tasks      Pt will improve activity tolerance to G for 20- 30 min tx session for increased engagement in functional tasks      Pt will achieve R UE MMT 4-/5 and L UE MMT 4/5 to improve ADL txfs     Pt will participate in cognitive assessment to determine level of safety for returning home      [facility] MS, OTR/L

## 2021-09-08 NOTE — ASSESSMENT & PLAN NOTE
History of CVA with right-sided weakness   On statin at home  On hold due to [facility]

## 2021-09-08 NOTE — ASSESSMENT & PLAN NOTE
Patient found down on ground in home after having fallen 3 days ago and being unable to get up  States that he is getting out of bed and felt weak, he lowered himself to the ground and was unable to get up after that  Denies any loss of consciousness, head strike  · Patient unable to access any food/water during that time or take any medications  · CK significantly elevated at 1566  Normalized with IV hydration  · Nephrology consulted, appreciate recommendations  · PT/OT consulted for safe discharge planning   Recommend rehab

## 2021-09-08 NOTE — ASSESSMENT & PLAN NOTE
· Mildly elevated troponin at 0 63, down trending    · Suspect likely secondary to rhabdomyolysis  ·  Patient denies chest pain  ·  EKG unremarkable  · Telemetry

## 2021-09-08 NOTE — ASSESSMENT & PLAN NOTE
· White blood cell count 22 34 on  Admission  · WBC 19 93 with bandemia yesterday  · Suspect due to UTI  · UA showed WBC 30-50, large leukocytes  · Urine culture prelim report grew >100,000 GNR  · CT showed - 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 and associated renal parenchymal volume loss  Double-J stent in the left upper pole of the horseshoe kidney with dilated left ureter  Trabeculated bladder with bladder diverticulum likely related to chronic urostasis  · Patient follows Urology in  [facility]  · Patient denies history of  frequent UTIs  · Started IV Rocephin, will continue  Plan to treat for 7-10 days total    · Appreciate urology input  · Chronic left ureteral stent with q 6 month exchanges  · Status post last stent exchange at [facility] on 8/5/2021    · No urologic surgical intervention indicated at this time  · Follow-up with primary Urologist post discharge

## 2021-09-08 NOTE — ASSESSMENT & PLAN NOTE
Lab Results   Component Value Date    EGFR 13 09/08/2021    EGFR 13 09/07/2021    CREATININE 4 34 (H) 09/08/2021    CREATININE 4 28 (H) 09/07/2021     · Patient with horseshoe kidney, transplantation has been discussed however he is declining at this time  · Appears to baseline creatinine is 3-3 5  · Follows with outpatient Nephrology  · With mature AV fistula right upper arm  · Continue Sodium bicarb 650 t i d   · Nephrology consulted, appreciate recommendations

## 2021-09-08 NOTE — ASSESSMENT & PLAN NOTE
· POA, appears that the patient's baseline creatinine is 3-3 5  · Creatinine 4 28 on  Admission  · Creatinine today 3 8  · AG 15, bicarb 20 today  · Acute kidney injury most likely secondary to prerenal azotemia, rhabdo, and component of CKD progression,+ on ARB  · Nephrology consult, appreciate recommendations  · Avoid hypotension and nephrotoxins  · Continue to hold patient's losartan at this time  · Received IV fluids per Renal  · Increased sodium bicarbonate dose today  ·   · BMP in a m

## 2021-09-08 NOTE — ASSESSMENT & PLAN NOTE
· Mildly elevated troponin at 0 63, down trending  · Suspect likely secondary to rhabdomyolysis  ·  Patient denies chest pain  ·  EKG unremarkable  · Telemetry SR  Discontinued

## 2021-09-08 NOTE — ASSESSMENT & PLAN NOTE
· Maintained on bisoprolol 5 q d , amlodipine 10 mg q p m  · Hold losartan 50 due to LORENZA  Continue to hold on discharge per Renal recommendation    · Monitor per unit routine  · BP stable

## 2021-09-08 NOTE — PLAN OF CARE
Problem: Potential for Falls  Goal: Patient will remain free of falls  Description: INTERVENTIONS:  - Educate patient/family on patient safety including physical limitations  - Instruct patient to call for assistance with activity   - Consult OT/PT to assist with strengthening/mobility   - Keep Call bell within reach  - Keep bed low and locked with side rails adjusted as appropriate  - Keep care items and personal belongings within reach  - Initiate and maintain comfort rounds  - Make Fall Risk Sign visible to staff  - Offer Toileting every 2 Hours, in advance of need  - Apply yellow socks and bracelet for high fall risk patients  - Consider moving patient to room near nurses station  9/8/2021 1836 by Micaela Meza RN  Outcome: Progressing  9/8/2021 [address] by Micaela Meza RN  Outcome: Progressing     Problem: Prexisting or High Potential for Compromised Skin Integrity  Goal: Skin integrity is maintained or improved  Description: INTERVENTIONS:  - Identify patients at risk for skin breakdown  - Assess and monitor skin integrity  - Assess and monitor nutrition and hydration status  - Monitor labs   - Assess for incontinence   - Turn and reposition patient  - Assist with mobility/ambulation  - Relieve pressure over bony prominences  - Avoid friction and shearing  - Provide appropriate hygiene as needed including keeping skin clean and dry  - Evaluate need for skin moisturizer/barrier cream  - Collaborate with interdisciplinary team   - Patient/family teaching  - Consider wound care consult   9/8/2021 1836 by Micaela Meza RN  Outcome: Progressing  9/8/2021 [address] by Micaela Meza RN  Outcome: Progressing     Problem: SAFETY ADULT  Goal: Patient will remain free of falls  Description: INTERVENTIONS:  - Educate patient/family on patient safety including physical limitations  - Instruct patient to call for assistance with activity   - Consult OT/PT to assist with strengthening/mobility   - Keep Call bell within reach  - Keep bed low and locked with side rails adjusted as appropriate  - Keep care items and personal belongings within reach  - Initiate and maintain comfort rounds  - Make Fall Risk Sign visible to staff  - Offer Toileting every 2 Hours, in advance of need  - Apply yellow socks and bracelet for high fall risk patients  - Consider moving patient to room near nurses station  9/8/2021 1836 by Khadijah Estrada RN  Outcome: Progressing  9/8/2021 1835 by Khadijah Estrada RN  Outcome: Progressing  Goal: Maintain or return to baseline ADL function  Description: INTERVENTIONS:  -  Assess patient's ability to carry out ADLs; assess patient's baseline for ADL function and identify physical deficits which impact ability to perform ADLs (bathing, care of mouth/teeth, toileting, grooming, dressing, etc )  - Assess/evaluate cause of self-care deficits   - Assess range of motion  - Assess patient's mobility; develop plan if impaired  - Assess patient's need for assistive devices and provide as appropriate  - Encourage maximum independence but intervene and supervise when necessary  - Involve family in performance of ADLs  - Assess for home care needs following discharge   - Consider OT consult to assist with ADL evaluation and planning for discharge  - Provide patient education as appropriate  Outcome: Progressing  Goal: Maintains/Returns to pre admission functional level  Description: INTERVENTIONS:  - Perform BMAT or MOVE assessment daily    - Set and communicate daily mobility goal to care team and patient/family/caregiver  - Collaborate with rehabilitation services on mobility goals if consulted  - Perform Range of Motion 3 times a day  - Reposition patient every 2 hours    - Dangle patient 3 times a day  - Stand patient 3 times a day  - Ambulate patient 3 times a day  - Out of bed to chair 3 times a day   - Out of bed for meals 3 times a day  - Out of bed for toileting  - Record patient progress and toleration of activity level   Outcome: Progressing     Problem: DISCHARGE PLANNING  Goal: Discharge to home or other facility with appropriate resources  Description: INTERVENTIONS:  - Identify barriers to discharge w/patient and caregiver  - Arrange for needed discharge resources and transportation as appropriate  - Identify discharge learning needs (meds, wound care, etc )  - Arrange for interpretive services to assist at discharge as needed  - Refer to Case Management Department for coordinating discharge planning if the patient needs post-hospital services based on physician/advanced practitioner order or complex needs related to functional status, cognitive ability, or social support system  Outcome: Progressing

## 2021-09-08 NOTE — ASSESSMENT & PLAN NOTE
· Maintained on bisoprolol 5 q d , amlodipine 10 mg q p m    · Hold losartan 50 due to LORENZA  · Monitor per unit routine  · BP stable

## 2021-09-08 NOTE — ASSESSMENT & PLAN NOTE
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

## 2021-09-09 PROBLEM — D69.6 THROMBOCYTOPENIA (HCC): Status: ACTIVE | Noted: 2021-09-09

## 2021-09-09 PROBLEM — Q63.1 HORSESHOE KIDNEY: Status: ACTIVE | Noted: 2021-09-09

## 2021-09-09 LAB
ALBUMIN SERPL BCP-MCNC: 2.2 G/DL (ref 3.5–5)
ALP SERPL-CCNC: 121 U/L (ref 46–116)
ALT SERPL W P-5'-P-CCNC: 24 U/L (ref 12–78)
ANION GAP SERPL CALCULATED.3IONS-SCNC: 15 MMOL/L (ref 4–13)
AST SERPL W P-5'-P-CCNC: 34 U/L (ref 5–45)
BACTERIA UR CULT: ABNORMAL
BACTERIA UR CULT: ABNORMAL
BILIRUB SERPL-MCNC: 0.42 MG/DL (ref 0.2–1)
BUN SERPL-MCNC: 94 MG/DL (ref 5–25)
CALCIUM ALBUM COR SERPL-MCNC: 8.7 MG/DL (ref 8.3–10.1)
CALCIUM SERPL-MCNC: 7.3 MG/DL (ref 8.3–10.1)
CHLORIDE SERPL-SCNC: 107 MMOL/L (ref 100–108)
CK MB SERPL-MCNC: 1 NG/ML (ref 0–5)
CK MB SERPL-MCNC: <1 % (ref 0–2.5)
CK SERPL-CCNC: 187 U/L (ref 39–308)
CO2 SERPL-SCNC: 18 MMOL/L (ref 21–32)
CREAT SERPL-MCNC: 3.8 MG/DL (ref 0.6–1.3)
ERYTHROCYTE [DISTWIDTH] IN BLOOD BY AUTOMATED COUNT: 15.9 % (ref 11.6–15.1)
GFR SERPL CREATININE-BSD FRML MDRD: 15 ML/MIN/1.73SQ M
GLUCOSE SERPL-MCNC: 111 MG/DL (ref 65–140)
HCT VFR BLD AUTO: 35.4 % (ref 36.5–49.3)
HGB BLD-MCNC: 10.9 G/DL (ref 12–17)
MAGNESIUM SERPL-MCNC: 2.2 MG/DL (ref 1.6–2.6)
MCH RBC QN AUTO: 29.2 PG (ref 26.8–34.3)
MCHC RBC AUTO-ENTMCNC: 30.8 G/DL (ref 31.4–37.4)
MCV RBC AUTO: 95 FL (ref 82–98)
NRBC BLD AUTO-RTO: 0 /100 WBCS
PHOSPHATE SERPL-MCNC: 2.5 MG/DL (ref 2.3–4.1)
PLATELET # BLD AUTO: 87 THOUSANDS/UL (ref 149–390)
PMV BLD AUTO: 12.7 FL (ref 8.9–12.7)
POTASSIUM SERPL-SCNC: 4.3 MMOL/L (ref 3.5–5.3)
PROT SERPL-MCNC: 6.2 G/DL (ref 6.4–8.2)
RBC # BLD AUTO: 3.73 MILLION/UL (ref 3.88–5.62)
SODIUM SERPL-SCNC: 140 MMOL/L (ref 136–145)
WBC # BLD AUTO: 12.51 THOUSAND/UL (ref 4.31–10.16)

## 2021-09-09 PROCEDURE — 99232 SBSQ HOSP IP/OBS MODERATE 35: CPT | Performed by: NURSE PRACTITIONER

## 2021-09-09 PROCEDURE — 80053 COMPREHEN METABOLIC PANEL: CPT | Performed by: NURSE PRACTITIONER

## 2021-09-09 PROCEDURE — 84100 ASSAY OF PHOSPHORUS: CPT | Performed by: NURSE PRACTITIONER

## 2021-09-09 PROCEDURE — 99222 1ST HOSP IP/OBS MODERATE 55: CPT | Performed by: PHYSICIAN ASSISTANT

## 2021-09-09 PROCEDURE — 84145 PROCALCITONIN (PCT): CPT | Performed by: NURSE PRACTITIONER

## 2021-09-09 PROCEDURE — 85025 COMPLETE CBC W/AUTO DIFF WBC: CPT | Performed by: NURSE PRACTITIONER

## 2021-09-09 PROCEDURE — 97116 GAIT TRAINING THERAPY: CPT | Performed by: PHYSICAL THERAPIST

## 2021-09-09 PROCEDURE — 97530 THERAPEUTIC ACTIVITIES: CPT

## 2021-09-09 PROCEDURE — 97535 SELF CARE MNGMENT TRAINING: CPT

## 2021-09-09 PROCEDURE — 83735 ASSAY OF MAGNESIUM: CPT | Performed by: NURSE PRACTITIONER

## 2021-09-09 PROCEDURE — 82550 ASSAY OF CK (CPK): CPT | Performed by: NURSE PRACTITIONER

## 2021-09-09 PROCEDURE — 97112 NEUROMUSCULAR REEDUCATION: CPT | Performed by: PHYSICAL THERAPIST

## 2021-09-09 PROCEDURE — 99232 SBSQ HOSP IP/OBS MODERATE 35: CPT | Performed by: INTERNAL MEDICINE

## 2021-09-09 PROCEDURE — 82553 CREATINE MB FRACTION: CPT | Performed by: NURSE PRACTITIONER

## 2021-09-09 RX ORDER — SODIUM BICARBONATE 650 MG/1
1300 TABLET ORAL
Status: DISCONTINUED | OUTPATIENT
Start: 2021-09-09 | End: 2021-09-14 | Stop reason: HOSPADM

## 2021-09-09 RX ADMIN — Medication 250 MG: at 17:44

## 2021-09-09 RX ADMIN — DOCUSATE SODIUM 100 MG: 100 CAPSULE ORAL at 08:03

## 2021-09-09 RX ADMIN — HEPARIN SODIUM 5000 UNITS: 5000 INJECTION INTRAVENOUS; SUBCUTANEOUS at 05:43

## 2021-09-09 RX ADMIN — Medication 250 MG: at 08:02

## 2021-09-09 RX ADMIN — SODIUM BICARBONATE 650 MG TABLET 1300 MG: at 19:14

## 2021-09-09 RX ADMIN — DOXYCYCLINE 100 MG: 100 CAPSULE ORAL at 08:03

## 2021-09-09 RX ADMIN — DOCUSATE SODIUM 100 MG: 100 CAPSULE ORAL at 17:44

## 2021-09-09 RX ADMIN — SODIUM BICARBONATE 650 MG TABLET 650 MG: at 08:03

## 2021-09-09 RX ADMIN — HEPARIN SODIUM 5000 UNITS: 5000 INJECTION INTRAVENOUS; SUBCUTANEOUS at 13:59

## 2021-09-09 RX ADMIN — SODIUM BICARBONATE 650 MG TABLET 1300 MG: at 12:07

## 2021-09-09 RX ADMIN — BISOPROLOL FUMARATE 5 MG: 5 TABLET ORAL at 08:02

## 2021-09-09 RX ADMIN — Medication 1000 UNITS: at 08:03

## 2021-09-09 RX ADMIN — CALCIUM CARBONATE (ANTACID) CHEW TAB 500 MG 1250 MG: 500 CHEW TAB at 08:03

## 2021-09-09 RX ADMIN — CEFTRIAXONE SODIUM 1000 MG: 10 INJECTION, POWDER, FOR SOLUTION INTRAVENOUS at 13:59

## 2021-09-09 NOTE — PHYSICAL THERAPY NOTE
Physical Therapy Progress Note     09/09/21 1455   PT Last Visit   PT Visit Date 09/09/21   Note Type   Note Type Treatment   Pain Assessment   Pain Assessment Tool 0-10   Pain Score 5   Pain Location/Orientation Location: Generalized   Restrictions/Precautions   Other Precautions Cognitive;Multiple lines; Fall Risk;Pain   General   Chart Reviewed Yes   Response to Previous Treatment Patient with no complaints from previous session  Family/Caregiver Present No   Cognition   Overall Cognitive Status WFL   Orientation Level Oriented X4   Subjective   Subjective i am doing better but i have pain all over, that limits me   i dont want to do anything that aggravates my pain   Bed Mobility   Supine to Sit 3  Moderate assistance   Additional items Assist x 1;HOB elevated; Increased time required;Verbal cues;LE management   Sit to Supine 4  Minimal assistance   Additional items Assist x 1; Increased time required;Verbal cues;LE management   Transfers   Sit to Stand 4  Minimal assistance   Additional items Assist x 1; Increased time required;Verbal cues  (bed elevated)   Stand to Sit 4  Minimal assistance   Additional items Assist x 1; Increased time required   Ambulation/Elevation   Gait pattern R Foot drag;Improper Weight shift; Forward Flexion;Decreased foot clearance; Inconsistent cait; Short stride; Step to;Excessively slow   Gait Assistance 4  Minimal assist   Additional items Verbal cues; Tactile cues; Assist x 1   Assistive Device Rolling walker   Distance 35'   Balance   Static Sitting Fair +   Dynamic Sitting Fair   Static Standing Poor +   Dynamic Standing Poor   Ambulatory Poor   Endurance Deficit   Endurance Deficit Yes   Endurance Deficit Description pain   Activity Tolerance   Activity Tolerance Patient limited by pain   Medical Staff Made Aware OT   Nurse Made Aware yes   Exercises   Hip Abduction Sitting;10 reps;AROM; Bilateral   Hip Adduction Sitting;10 reps;AROM; Bilateral   TKR   (refused)   Assessment Prognosis Fair   Problem List Decreased strength;Decreased range of motion;Decreased endurance; Impaired balance;Decreased mobility; Impaired judgement;Decreased safety awareness; Impaired tone;Pain   Assessment pt seen for PT treatment session  pt agreeable to gait training but not ther ex , due to pain  pt worked on bed mobility, transfers and amb using rw  pt needed verbal cues and physical assist for rolling, supine to sitting  pt reported pain as limiting factor  pt needed encouragement but ultimately able to assist  pt then worked on sit to stand transfers  pt needed verbal cues for safe hand placement and technique  pt tends to be sligthly retropulsive, even when sitting  pt needed min assist with bed elevated  pt then amb using rw for 35'  needed frequent cues for posture, sequencing, balance and turning  pt tolerated 30'  noted foot drop on right  pt reports had brace in past but is intolerant of them  pt only performed 1 ther ex, refused others citing pain  pt will need continued skilled PT for deficits in strength, rom, balance, gait sequencing and stability, activity tolerance due to uncontrolled pain  recommend rehab   Barriers to Discharge Decreased caregiver support   Goals   Patient Goals get better , hopefully go home  STG Expiration Date 09/22/21   PT Treatment Day 1   Plan   Treatment/Interventions Functional transfer training;LE strengthening/ROM; Therapeutic exercise; Endurance training;Patient/family training;Equipment eval/education; Bed mobility;Gait training; Compensatory technique education;Spoke to nursing;OT   Progress Slow progress, decreased activity tolerance   PT Frequency   (4-5x/week)   Recommendation   PT Discharge Recommendation Post acute rehabilitation services   PT - OK to Discharge Yes  (rehab)   Renu 8 in Bed Without Bedrails 2   Lying on Back to Sitting on Edge of Flat Bed 2   Moving Bed to Chair 3   Standing Up From Chair 3   Walk in Room 3 Climb 3-5 Stairs 2   Basic Mobility Inpatient Raw Score 15   Basic Mobility Standardized Score 36 97     An AM-PAC Basic Mobility standardized score less than 42 9 suggests the patient may benefit from discharge to post-acute rehab services      Alfredo Graf, PT

## 2021-09-09 NOTE — CONSULTS
moderate epithelial cells, and moderate bacteria  Only 2-4 RBCs  Urology was consulted to review CT images and possible UTI  Today patient denies any abdominal or flank pain  He denies any urinary symptoms or difficulty voiding  He denies any episodes of gross hematuria  He denies any fevers or chills  He is tolerating oral diet without difficulty  He denies any nausea or vomiting  He denies any diarrhea constipation  His renal function is improving, down to 3 8 from 4 34  It appears the patient has a baseline creatinine of 3 3-3 4  Review of Systems   Constitutional: Negative for chills and fever  HENT: Negative  Respiratory: Negative  Cardiovascular: Negative  Gastrointestinal: Negative for abdominal pain, constipation, diarrhea, nausea and vomiting  Genitourinary: Negative for decreased urine volume, difficulty urinating, dysuria, flank pain, hematuria and urgency  Musculoskeletal: Negative  Skin: Negative  Neurological: Negative  Objective:  Vitals: Blood pressure 116/57, pulse 70, temperature 97 6 °F (36 4 °C), temperature source Tympanic, resp  rate 18, height 5' 8" (1 727 m), weight 77 2 kg (170 lb 3 1 oz), SpO2 94 %  ,Body mass index is 25 88 kg/m²  Physical Exam  Vitals reviewed  Constitutional:       General: He is not in acute distress  Appearance: Normal appearance  He is not ill-appearing, toxic-appearing or diaphoretic  Comments: Patient resting comfortably in his bed  HENT:      Head: Normocephalic and atraumatic  Eyes:      Conjunctiva/sclera: Conjunctivae normal    Cardiovascular:      Rate and Rhythm: Normal rate and regular rhythm  Heart sounds: Normal heart sounds  Pulmonary:      Effort: Pulmonary effort is normal  No respiratory distress  Breath sounds: Normal breath sounds  Abdominal:      General: There is no distension  Palpations: Abdomen is soft  Tenderness: There is no abdominal tenderness   There is no right CVA tenderness, left CVA tenderness, guarding or rebound  Musculoskeletal:      Cervical back: Normal range of motion  Skin:     General: Skin is warm and dry  Neurological:      General: No focal deficit present  Mental Status: He is alert and oriented to person, place, and time  Psychiatric:         Mood and Affect: Mood normal          Behavior: Behavior normal          Thought Content: Thought content normal          Judgment: Judgment normal          Imagin2021:    CT thoracic and lumbar spine without contrast:  IMPRESSION:  1  No acute fracture or subluxation  2   Mild to moderate multilevel spondylosis with evidence of diffuse idiopathic skeletal hyperostosis at the lumbosacral junction  3   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 and associated renal parenchymal volume loss  4   Double-J stent in the left upper pole of the horseshoe kidney with dilated left ureter  5   Trabeculated bladder with bladder diverticulum likely related to chronic urostasis    6   IVC filter    Labs:  Recent Labs     21  1439 21  0628 21  0425   WBC 22 34* 19 93* 12 51*     Recent Labs     21  1439 21  0628 21  0425   HGB 13 1 12 0 10 9*       Recent Labs     21  1439 21  0628 21  0425   CREATININE 4 28* 4 34* 3 80*       Microbiology:   Ref Range & Units 21   Color, UA  Yellow    Clarity, UA  Clear    pH, UA 4 5 - 8 0 6 0    Leukocytes, UA Negative Large Abnormal     Nitrite, UA Negative Negative    Protein, UA Negative mg/dl 100 (2+) Abnormal     Glucose, UA Negative mg/dl Negative    Ketones, UA Negative mg/dl Negative    Urobilinogen, UA 0 2, 1 0 E U /dl E U /dl 0 2    Bilirubin, UA Negative Negative    Blood, UA Negative Moderate Abnormal     Specific [place], UA 1 003 - 1 030 1 015       Ref Range & Units 21   RBC, UA None Seen, 2-4 /hpf 2-4    WBC, UA None Seen, 2-4, 5-60 /hpf 30-50 Abnormal Epithelial Cells None Seen, Occasional /hpf Moderate Abnormal     Bacteria, UA None Seen, Occasional /hpf Moderate Abnormal      Urine Culture 21 >100,000 cfu/ml Gram Negative RodAbnormal               History:  Social History     Socioeconomic History    Marital status:      Spouse name: None    Number of children: None    Years of education: None    Highest education level: None   Occupational History    None   Tobacco Use    Smoking status: Former Smoker     Packs/day: 1 00     Types: Cigarettes     Quit date: 1993     Years since quittin 7    Smokeless tobacco: Never Used   Vaping Use    Vaping Use: Never used   Substance and Sexual Activity    Alcohol use: Not Currently    Drug use: Never    Sexual activity: None   Other Topics Concern    None   Social History Narrative    None     Social Determinants of Health     Financial Resource Strain:     Difficulty of Paying Living Expenses:    Food Insecurity:     Worried About Running Out of Food in the Last Year:     Ran Out of Food in the Last Year:    Transportation Needs:     Lack of Transportation (Medical):      Lack of Transportation (Non-Medical):    Physical Activity:     Days of Exercise per Week:     Minutes of Exercise per Session:    Stress:     Feeling of Stress :    Social Connections:     Frequency of Communication with Friends and Family:     Frequency of Social Gatherings with Friends and Family:     Attends Restorationism Services:     Active Member of Clubs or Organizations:     Attends Club or Organization Meetings:     Marital Status:    Intimate Partner Violence:     Fear of Current or Ex-Partner:     Emotionally Abused:     Physically Abused:     Sexually Abused:      Financial Resource Strain:     Difficulty of Paying Living Expenses:    Food Insecurity:     Worried About Running Out of Food in the Last Year:     [address] in the Last Year:    Transportation Needs:     Lack of Transportation (Medical):  Lack of Transportation (Non-Medical):    Physical Activity:     Days of Exercise per Week:     Minutes of Exercise per Session:    Stress:     Feeling of Stress :    Social Connections:     Frequency of Communication with Friends and Family:     Frequency of Social Gatherings with Friends and Family:     Attends Episcopal Services:     Active Member of Clubs or Organizations:     Attends Club or Organization Meetings:     Marital Status:    Intimate Partner Violence:     Fear of Current or Ex-Partner:     Emotionally Abused:     Physically Abused:     Sexually Abused:       Diagnosis Date    Coronary artery disease     Renal disorder      Past Surgical History:   Procedure Laterality Date    AORTIC VALVE REPLACEMENT  2005    Tissue valve    BLADDER SURGERY      23 yrs ago      CORONARY ARTERY BYPASS GRAFT  2005    x1    RENAL ARTERY STENT  11/2005     Family History   Problem Relation Age of Onset    Diabetes Mother     Hypertension Mother     Dementia Mother     Other Father         fall gangrene    Peripheral vascular disease Sister        Felix Tori [place]  Date: 9/9/2021 Time: 11:58 AM

## 2021-09-09 NOTE — CASE MANAGEMENT
LOS: 2 GMLOS: N/A RISK OF READMISSION: 19 BUNDLE: NO    CM called pt's gson to discuss discharge needs  Pt lives in a 6th fl apt, alone; has elevator access  ADL's are completed with some assistance  Pt's son, Sulma Haq and nephew assist pt at home  Pt has a cane, rollator, electric scooter, life alert, grab bars and a SC  Natty Boyle reports that pt uses the rollator mostly  PCP identified as Dr Barb Frances  Pharmacy identified as [facility]  Pt has no hx of VNA or STR; hx of OPPT  Pt's son or nephew provide transportation  Natty Boyle unsure if his dad is pt's POA; son is primary decision maker  Natty Boyle asked that the SNF list be left in pt's room  He will be stopping in later today and will discuss w/ pt   CM continuing to follow for discharge planning needs  A post acute care recommendation was made by your care team for STR  Discussed Freedom of Choice with caregiver  List of facilities given to caregiver via in person  caregiver aware the list is custom filtered for them by preference  and that [facility] post acute providers are designated  CM reviewed d/c planning process including the following: identifying help at home, patient preference for d/c planning needs, Discharge Lounge, [facility] Meds to Bed program, availability of treatment team to discuss questions or concerns patient and/or family may have regarding understanding medications and recognizing signs and symptoms once discharged  CM also encouraged patient to follow up with all recommended appointments after discharge  Patient advised of importance for patient and family to participate in managing patients medical well being

## 2021-09-09 NOTE — PROGRESS NOTES
Follow up Consultation    Nephrology   Olga Lidiajackie Civil 78 y o  male MRN: [id number]  Unit/Bed#: E2 -01 Encounter: [id number]      Physician Requesting Consult: Hannah Clemente MD        ASSESSMENT/PLAN:  81-year-old male with multiple comorbidities including history of CVA, hypertension, CAD status post CABG/AVR, CKD stage 4 admitted after being found on the ground in his home  Nephrology consulted for evaluation management of acute kidney injury            · Acute kidney injury (POA) on CKD stage 4:  - LORENZA most likely secondary to prerenal azotemia plus some component of underlying rhabdo as well as failure to auto regulate presence of ARB plus there may be some component of CKD progression   - After review of records In [facility] as well as Care everywhere it appears that the patient has a baseline Creatinine of 3-3 5 mg/dL  - patient was admitted with a creatinine of 4 28 mg/dL on 09/07/2021   - patient's creatinine today is at 3 80 mg/dL, stable and improving towards baseline   - no acute indication for renal replacement therapy at this time  - patient does have right upper extremity AV fistula in place which is ready to use  - hold further IV fluids and diuretics at this time  - check BMP in a m  If still in the hospital  - did have a detailed discussion with the patient that if his renal parameters continue to deteriorate and or if he develops uremic symptoms then we will need to pursue renal replacement therapy  However if parameters are stable and improved then we can consider discharge with follow-up outpatient with his outpatient nephrologist   - Await renal recovery  - CT thoracic spine from 09/07/2021:  Double-J stent in the left upper pole of the horseshoe kidney with dilated left ureter  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 and associated renal parenchymal volume loss  - most recent left ureteral stent change on 08/05/2021    - Optimize hemodynamic status to avoid was discussed with the team in [address] MD, FASN, 2021, 10:59 AM              Objective :   Patient seen and examined in his room no overnight events hemodynamically stable remains afebrile happy to hear renal parameters are improved, urine output 800 cc plus  Overall reports he feels much better as compared to prior and would like to go home  PHYSICAL EXAM  /57 (BP Location: Left arm)   Pulse 70   Temp 97 6 °F (36 4 °C) (Tympanic)   Resp 18   Ht 5' 8" (1 727 m)   Wt 77 2 kg (170 lb 3 1 oz)   SpO2 94%   BMI 25 88 kg/m²   Temp (24hrs), Av °F (36 7 °C), Min:97 4 °F (36 3 °C), Max:98 9 °F (37 2 °C)        Intake/Output Summary (Last 24 hours) at 2021 1059  Last data filed at 2021 0545  Gross per 24 hour   Intake 1506 67 ml   Output 800 ml   Net 706 67 ml       I/O last 24 hours: In: 3386 7 [P O :660; I V :2676 7; IV Piggyback:50]  Out: 800 [Urine:800]      Current Weight: Weight - Scale: 77 2 kg (170 lb 3 1 oz)  First Weight: Weight - Scale: 76 7 kg (169 lb)  Physical Exam  Vitals and nursing note reviewed  Constitutional:       General: He is not in acute distress  Appearance: Normal appearance  He is normal weight  He is not ill-appearing, toxic-appearing or diaphoretic  HENT:      Head: Normocephalic and atraumatic  Mouth/Throat:      Mouth: Mucous membranes are moist       Pharynx: Oropharynx is clear  No oropharyngeal exudate  Eyes:      General: No scleral icterus  Conjunctiva/sclera: Conjunctivae normal    Cardiovascular:      Rate and Rhythm: Normal rate  Heart sounds: Normal heart sounds  No friction rub  Pulmonary:      Effort: Pulmonary effort is normal  No respiratory distress  Breath sounds: Normal breath sounds  No stridor  No wheezing  Abdominal:      General: Abdomen is flat  There is no distension  Palpations: Abdomen is soft  There is no mass  Tenderness: There is no abdominal tenderness     Musculoskeletal: General: No swelling  Cervical back: Neck supple  Comments: Right AV fistula with an bruit   Skin:     General: Skin is warm  Coloration: Skin is not jaundiced  Neurological:      General: No focal deficit present  Mental Status: He is alert and oriented to person, place, and time  Psychiatric:         Mood and Affect: Mood normal          Behavior: Behavior normal              Review of Systems   Constitutional: Negative for appetite change, chills, fatigue and fever  HENT: Negative for congestion  Respiratory: Negative for cough, shortness of breath and wheezing  Cardiovascular: Negative for leg swelling  Gastrointestinal: Negative for abdominal pain, diarrhea, nausea and vomiting  Genitourinary: Negative for dysuria  Musculoskeletal: Negative for back pain  Skin: Negative for rash  Neurological: Negative for dizziness and headaches  Psychiatric/Behavioral: Negative for confusion  All other systems reviewed and are negative        Scheduled Meds:  Current Facility-Administered Medications   Medication Dose Route Frequency Provider Last Rate    acetaminophen  650 mg Oral Q6H PRN Mary New PA-C      aluminum-magnesium hydroxide-simethicone  30 mL Oral Q6H PRN Mary New PA-C      amLODIPine  10 mg Oral QPM OFELIA Saunders      bisoprolol  5 mg Oral Daily Mary New [place]      calcium carbonate  1,250 mg Oral Daily OFELIA Saunders      cefTRIAXone  1,000 mg Intravenous Q24H OFELIA Hudson Stopped (09/08/21 1550)    cholecalciferol  1,000 Units Oral Daily OFELIA Saunders      docusate sodium  100 mg Oral BID Mary New PA-C      doxycycline hyclate  100 mg Oral Daily Mary New, [place]      famotidine  10 mg Oral Every Other Day Marielos Foley MD      heparin (porcine)  5,000 Units Subcutaneous [facility] Mayr New PA-C      influenza vaccine  0 7 mL Intramuscular Once Marielos Foley MD      ondansetron  4 mg Intravenous Q6H PRN Miles Hernandes NICK Nava      saccharomyces boulardii  250 mg Oral BID Shiloh BreauxOFELIA      sodium bicarbonate  650 mg Oral TID after meals Brooklynn Alfredo PA-C         PRN Meds:   acetaminophen    aluminum-magnesium hydroxide-simethicone    ondansetron    Continuous Infusions:       Invasive Devices: Invasive Devices     Peripheral Intravenous Line            Peripheral IV 09/07/21 Dorsal (posterior); Left Forearm 1 day    Peripheral IV 09/08/21 Left Forearm 1 day                  LABORATORY:    Results from last 7 days   Lab Units 09/09/21  0425 09/08/21  0628 09/07/21  1439   WBC Thousand/uL 12 51* 19 93* 22 34*   HEMOGLOBIN g/dL 10 9* 12 0 13 1   HEMATOCRIT % 35 4* 36 7 41 4   PLATELETS Thousands/uL 87* 127* 148*   POTASSIUM mmol/L 4 3 4 0 4 0   CHLORIDE mmol/L 107 109* 106   CO2 mmol/L 18* 19* 18*   BUN mg/dL 94* 94* 87*   CREATININE mg/dL 3 80* 4 34* 4 28*   CALCIUM mg/dL 7 3* 7 9* 8 2*   MAGNESIUM mg/dL 2 2  --   --    PHOSPHORUS mg/dL 2 5  --  3 5      rest all reviewed    RADIOLOGY:  CT head without contrast   Final Result by Rochelle Graves MD (09/07 1622)      No acute intracranial abnormality  Microangiopathic changes  Workstation performed: [id number]         CT spine thoracic & lumbar wo contrast   Final Result by Rochelle Graves MD (09/07 1632)         1  No acute fracture or subluxation  2   Mild to moderate multilevel spondylosis with evidence of diffuse idiopathic skeletal hyperostosis at the lumbosacral junction  3   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 and associated renal parenchymal volume loss  4   Double-J stent in the left upper pole of the horseshoe kidney with dilated left ureter  5   Trabeculated bladder with bladder diverticulum likely related to chronic urostasis     6   IVC filter            Workstation performed: [id number]         CT spine cervical without contrast   Final Result by Rochelle Graves MD (09/07 1625)      No cervical spine fracture or traumatic malalignment  Mild spondylosis  Workstation performed: [id number]         XR chest 1 view   ED Interpretation by Gildardo Doll MD (09/07 1638)   Primary reviewed: No acute abnormality      Final Result by Charlotte Palmer MD (09/08 0602)      No acute cardiopulmonary disease  Workstation performed: [id number]           Rest all reviewed    Portions of the record may have been created with voice recognition software  Occasional wrong word or "sound a like" substitutions may have occurred due to the inherent limitations of voice recognition software  Read the chart carefully and recognize, using context, where substitutions have occurred  If you have any questions, please contact the dictating provider

## 2021-09-09 NOTE — PROGRESS NOTES
to chronic urostasis  · Patient follows Urology in  [facility]  · Patient denies history of  frequent UTIs  · Started IV Rocephin, will continue  Plan to treat for 7-10 days total    · Appreciate urology input  · Chronic left ureteral stent with q 6 month exchanges  · Status post last stent exchange at [facility] on 8/5/2021  · No urologic surgical intervention indicated at this time  · Follow-up with primary Urologist post discharge    Sepsis [facility])  Assessment & Plan  Evolving since admission  As evidenced by leukocytosis, bandemia, with suspected source of infection UTI  Lactic acid normal  Obtain blood culture if fever above 100 4  Started patient on IV Rocephin  Procalcitonin significantly elevated  Repeat pending today  WBC downtrending  Patient remains afebrile    Results from last 7 days   Lab Units 09/08/21  1809   LACTIC ACID mmol/L 1 3   PROCALCITONIN ng/ml 411 08*     Results from last 7 days   Lab Units 09/09/21  0425 09/08/21  0628 09/07/21  1439   WBC Thousand/uL 12 51* 19 93* 22 34*   TOTAL NEUT ABS Thousand/uL  --  18 34* 21 67*   BANDS PCT %  --  14* 3             Elevated troponin  Assessment & Plan  · Mildly elevated troponin at 0 63, down trending  · Suspect likely secondary to rhabdomyolysis  ·  Patient denies chest pain  ·  EKG unremarkable  · Telemetry SR  Discontinued  Chronic kidney disease, stage IV (severe) [facility])  Assessment & Plan  Lab Results   Component Value Date    EGFR 13 09/08/2021    EGFR 13 09/07/2021    CREATININE 4 34 (H) 09/08/2021    CREATININE 4 28 (H) 09/07/2021     · Patient with horseshoe kidney, transplantation has been discussed however he is declining at this time  · Appears baseline creatinine is 3-3 5  · Follows with outpatient Nephrology  · With mature AV fistula right upper arm  · On Sodium bicarb 650 t i d  At home    Dose increased as above  · Nephrology consulted, appreciate recommendations    Cerebrovascular accident (CVA) [facility])  Assessment & Plan   History of CVA with right-sided weakness   On statin at home  On hold due to [facility]  Hx of CABG  Assessment & Plan  · Noted    HTN (hypertension)  Assessment & Plan  · Maintained on bisoprolol 5 q d , amlodipine 10 mg q p m  · Hold losartan 50 due to LORENZA  Continue to hold on discharge per Renal recommendation  · Monitor per unit routine  · BP stable    History of aortic valve replacement with tissue graft  Assessment & Plan  · 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  · On suppressive antibiotics with doxycycline 100 mg q d  Will continue  VTE Pharmacologic Prophylaxis:   Pharmacologic: Pharmacologic VTE Prophylaxis contraindicated due to Thrombocytopenia  Mechanical VTE Prophylaxis in Place: Yes    Patient Centered Rounds: I have performed bedside rounds with nursing staff today  Discussions with Specialists or Other Care Team Provider:  Attending    Education and Discussions with Family / Patient:  Yes    Time Spent for Care: 20 minutes  More than 50% of total time spent on counseling and coordination of care as described above  Current Length of Stay: 2 day(s)    Current Patient Status: Inpatient   Certification Statement: The patient will continue to require additional inpatient hospital stay due to Sepsis, UTI    Discharge Plan:  Likely short-term rehab in 48-72 hours    Code Status: Level 1 - Full Code      Subjective:   Patient denies bilateral flank pain, back pain, abdominal pain  Denies fever, chills  Denies chest pain, headache, dizziness, SOB  Reports feeling better today  Objective:     Vitals:   Temp (24hrs), Av °F (36 7 °C), Min:97 4 °F (36 3 °C), Max:98 9 °F (37 2 °C)    Temp:  [97 4 °F (36 3 °C)-98 9 °F (37 2 °C)] 97 6 °F (36 4 °C)  HR:  [70-80] 70  Resp:  [18-20] 18  BP: (116-139)/(57-65) 116/57  SpO2:  [94 %-98 %] 94 %  Body mass index is 25 88 kg/m²  Input and Output Summary (last 24 hours):        Intake/Output Summary (Last 24 hours) at 2021 0882  Last data filed at 9/9/2021 1404  Gross per 24 hour   Intake 1506 67 ml   Output 1600 ml   Net -93 33 ml       Physical Exam:     Physical Exam  Vitals and nursing note reviewed  Constitutional:       Appearance: He is well-developed  HENT:      Head: Normocephalic and atraumatic  Neck:      Thyroid: No thyromegaly  Vascular: No JVD  Trachea: No tracheal deviation  Cardiovascular:      Rate and Rhythm: Normal rate and regular rhythm  Heart sounds: Murmur heard  Pulmonary:      Effort: Pulmonary effort is normal  No respiratory distress  Breath sounds: Normal breath sounds  No wheezing or rales  Abdominal:      General: Bowel sounds are normal  There is no distension  Palpations: Abdomen is soft  Tenderness: There is no abdominal tenderness  There is no guarding  Musculoskeletal:         General: No swelling or deformity  Cervical back: Neck supple  Right lower leg: No edema  Left lower leg: No edema  Comments: Right shoulder limited ROM due to pain which is chronic   Skin:     General: Skin is warm and dry  Neurological:      Mental Status: He is alert  Comments: Right-sided weakness, patient awake alert x3, follows commands  Psychiatric:         Mood and Affect: Mood normal          Judgment: Judgment normal          Additional Data:     Labs:    Results from last 7 days   Lab Units 09/09/21  0425 09/08/21  0628   WBC Thousand/uL 12 51* 19 93*   HEMOGLOBIN g/dL 10 9* 12 0   HEMATOCRIT % 35 4* 36 7   PLATELETS Thousands/uL 87* 127*   LYMPHO PCT %  --  3*   MONO PCT %  --  4   EOS PCT %  --  0     Results from last 7 days   Lab Units 09/09/21  0425   POTASSIUM mmol/L 4 3   CHLORIDE mmol/L 107   CO2 mmol/L 18*   BUN mg/dL 94*   CREATININE mg/dL 3 80*   CALCIUM mg/dL 7 3*   ALK PHOS U/L 121*   ALT U/L 24   AST U/L 34           * I Have Reviewed All Lab Data Listed Above  * Additional Pertinent Lab Tests Reviewed:  Tejinder 68 Admission Reviewed    Imaging:    Imaging Reports Reviewed Today Include:  CT abdomen pelvis  Imaging Personally Reviewed by Myself Includes:  None    Recent Cultures (last 7 days):     Results from last 7 days   Lab Units 09/07/21 2037   URINE CULTURE  >100,000 cfu/ml Gram Negative Will*       Last 24 Hours Medication List:   Current Facility-Administered Medications   Medication Dose Route Frequency Provider Last Rate    acetaminophen  650 mg Oral Q6H PRN Tita Charles PA-C      aluminum-magnesium hydroxide-simethicone  30 mL Oral Q6H PRN Tita Charles PA-C      amLODIPine  10 mg Oral QPM OFELIA Saunders      bisoprolol  5 mg Oral Daily Tita Charles PA-C      calcium carbonate  1,250 mg Oral Daily OFELIA Saunders      cefTRIAXone  1,000 mg Intravenous Q24H OFELIA Hudson Stopped (09/09/21 5670)    cholecalciferol  1,000 Units Oral Daily OFELIA Saunders      docusate sodium  100 mg Oral BID Tita Charles PA-C      doxycycline hyclate  100 mg Oral Daily Corrine Fang      famotidine  10 mg Oral Every Other Day Radha Lynn MD      ondansetron  4 mg Intravenous Q6H PRN Tita Charles PA-C      saccharomyces boulardii  250 mg Oral BID OFELIA Hudson      sodium bicarbonate  1,300 mg Oral BID after meals Emiliano Foster MD          Today, Patient Was Seen By: OFELIA Jorgensen    ** Please Note: Dragon 360 Dictation voice to text software may have been used in the creation of this document   **

## 2021-09-09 NOTE — ASSESSMENT & PLAN NOTE
· UA positive for infection 9/7  · Urine culture growing Gram negative rods  · Patient empirically on IV Rocephin  · Narrow per sensitivities  · Currently denies any urinary symptoms, difficulty voiding, or gross hematuria     · Continue IV Abx per primary team

## 2021-09-09 NOTE — PLAN OF CARE
Problem: OCCUPATIONAL THERAPY ADULT  Goal: Performs self-care activities at highest level of function for planned discharge setting  See evaluation for individualized goals  Description: Treatment Interventions: ADL retraining, Functional transfer training, UE strengthening/ROM, Endurance training, Patient/family training, Equipment evaluation/education, Activityengagement          See flowsheet documentation for full assessment, interventions and recommendations  Outcome: Progressing  Note: Limitation: Decreased ADL status, Decreased UE strength, Decreased UE ROM, Decreased endurance, Decreased self-care trans, Decreased high-level ADLs  Prognosis: Fair  Assessment: Pt seen for OT tx session w/ focus on bed mobility, transfers, self care, and activity tolerance  Denies pain at rest  However, later reports 5/10 generalized pain during movements and ambulation  Pt does continue to comment about pain in "undercarriage" but denies swelling or open areas  Pt needed Mod for bed mobility w/ cues for log roll tech to conserve back  Requires Min to stand from higher surface and cues for hand placement  Ambulated in room w/ maximum encouragement w/ CG using RW  Increased time to complete ambulation  Pt donned gown w/ (S) and increased time  Declined exercises  Overall pt requires maximum encouragement to complete tasks  Decreased motivation noted  States "I'm lazy " Educated on importance of moving and completing tasks due to living alone  Decreased insight and judgement  Continue OT to achieve optimal performance  Recommendation for rehab remains appropriate due to remaining below functional baseline        OT Discharge Recommendation: Post acute rehabilitation services

## 2021-09-09 NOTE — PLAN OF CARE
Problem: Potential for Falls  Goal: Patient will remain free of falls  Description: INTERVENTIONS:  - Educate patient/family on patient safety including physical limitations  - Instruct patient to call for assistance with activity   - Consult OT/PT to assist with strengthening/mobility   - Keep Call bell within reach  - Keep bed low and locked with side rails adjusted as appropriate  - Keep care items and personal belongings within reach  - Initiate and maintain comfort rounds  - Make Fall Risk Sign visible to staff  - Offer Toileting every  Hours, in advance of need  - Initiate/Maintain alarm  - Obtain necessary fall risk management equipment:   - Apply yellow socks and bracelet for high fall risk patients  - Consider moving patient to room near nurses station  Outcome: Progressing     Problem: Prexisting or High Potential for Compromised Skin Integrity  Goal: Skin integrity is maintained or improved  Description: INTERVENTIONS:  - Identify patients at risk for skin breakdown  - Assess and monitor skin integrity  - Assess and monitor nutrition and hydration status  - Monitor labs   - Assess for incontinence   - Turn and reposition patient  - Assist with mobility/ambulation  - Relieve pressure over bony prominences  - Avoid friction and shearing  - Provide appropriate hygiene as needed including keeping skin clean and dry  - Evaluate need for skin moisturizer/barrier cream  - Collaborate with interdisciplinary team   - Patient/family teaching  - Consider wound care consult   Outcome: Progressing     Problem: SAFETY ADULT  Goal: Patient will remain free of falls  Description: INTERVENTIONS:  - Educate patient/family on patient safety including physical limitations  - Instruct patient to call for assistance with activity   - Consult OT/PT to assist with strengthening/mobility   - Keep Call bell within reach  - Keep bed low and locked with side rails adjusted as appropriate  - Keep care items and personal belongings within reach  - Initiate and maintain comfort rounds  - Make Fall Risk Sign visible to staff  - Offer Toileting every  Hours, in advance of need  - Initiate/Maintain alarm  - Obtain necessary fall risk management equipment:   - Apply yellow socks and bracelet for high fall risk patients  - Consider moving patient to room near nurses station  Outcome: Progressing  Goal: Maintain or return to baseline ADL function  Description: INTERVENTIONS:  -  Assess patient's ability to carry out ADLs; assess patient's baseline for ADL function and identify physical deficits which impact ability to perform ADLs (bathing, care of mouth/teeth, toileting, grooming, dressing, etc )  - Assess/evaluate cause of self-care deficits   - Assess range of motion  - Assess patient's mobility; develop plan if impaired  - Assess patient's need for assistive devices and provide as appropriate  - Encourage maximum independence but intervene and supervise when necessary  - Involve family in performance of ADLs  - Assess for home care needs following discharge   - Consider OT consult to assist with ADL evaluation and planning for discharge  - Provide patient education as appropriate  Outcome: Progressing  Goal: Maintains/Returns to pre admission functional level  Description: INTERVENTIONS:  - Perform BMAT or MOVE assessment daily    - Set and communicate daily mobility goal to care team and patient/family/caregiver  - Collaborate with rehabilitation services on mobility goals if consulted  - Perform Range of Motion  times a day  - Reposition patient every  hours    - Dangle patient  times a day  - Stand patient  times a day  - Ambulate patient  times a day  - Out of bed to chair  times a day   - Out of bed for meals  times a day  - Out of bed for toileting  - Record patient progress and toleration of activity level   Outcome: Progressing     Problem: DISCHARGE PLANNING  Goal: Discharge to home or other facility with appropriate resources  Description: INTERVENTIONS:  - Identify barriers to discharge w/patient and caregiver  - Arrange for needed discharge resources and transportation as appropriate  - Identify discharge learning needs (meds, wound care, etc )  - Arrange for interpretive services to assist at discharge as needed  - Refer to Case Management Department for coordinating discharge planning if the patient needs post-hospital services based on physician/advanced practitioner order or complex needs related to functional status, cognitive ability, or social support system  Outcome: Progressing

## 2021-09-09 NOTE — ASSESSMENT & PLAN NOTE
· Followed by [facility]rology  · Managed with chronic left ureteral stents with Q6 month exchanges  · [facility] [address] [facility] [address] [facility]  · CT thoracic and lumbar spine revealed chronic hydronephrosis of both renal moieties and double-J left ureteral stent in appropriate position  · Creatinine improving from 4 34 to 3 8 without surgical intervention  Baseline around 3 3-3 4  · Afebrile, leukocytosis improving from 22 34 to 12 51    · Recommend continuing medical optimization of IVF and IV Abx  No Urologic surgical intervention indicated at this time  Patient should follow up with his primary Urologist once discharged  · Urology will sign off at this time  We are always available for re-consultation and happy to answer any questions

## 2021-09-09 NOTE — ASSESSMENT & PLAN NOTE
Platelet count 87 today  It was 148 on admission  Baseline platelet count appears to be normal in care everywhere  Likely due to heparin subQ  Discontinue heparin subQ  SCDs  Repeat CBC in a wicho

## 2021-09-09 NOTE — PLAN OF CARE
Problem: PHYSICAL THERAPY ADULT  Goal: Performs mobility at highest level of function for planned discharge setting  See evaluation for individualized goals  Description: Treatment/Interventions: Functional transfer training, LE strengthening/ROM, Therapeutic exercise, Patient/family training, Cognitive reorientation, Equipment eval/education, Bed mobility, Gait training, Continued evaluation, Compensatory technique education, Spoke to nursing, OT          See flowsheet documentation for full assessment, interventions and recommendations  Outcome: Progressing  Note: Prognosis: Fair  Problem List: Decreased strength, Decreased range of motion, Decreased endurance, Impaired balance, Decreased mobility, Impaired judgement, Decreased safety awareness, Impaired tone, Pain  Assessment: pt seen for PT treatment session  pt agreeable to gait training but not ther ex , due to pain  pt worked on bed mobility, transfers and amb using rw  pt needed verbal cues and physical assist for rolling, supine to sitting  pt reported pain as limiting factor  pt needed encouragement but ultimately able to assist  pt then worked on sit to stand transfers  pt needed verbal cues for safe hand placement and technique  pt tends to be sligthly retropulsive, even when sitting  pt needed min assist with bed elevated  pt then amb using rw for 35'  needed frequent cues for posture, sequencing, balance and turning  pt tolerated 30'  noted foot drop on right  pt reports had brace in past but is intolerant of them  pt only performed 1 ther ex, refused others citing pain  pt will need continued skilled PT for deficits in strength, rom, balance, gait sequencing and stability, activity tolerance due to uncontrolled pain   recommend rehab  Barriers to Discharge: Decreased caregiver support  Barriers to Discharge Comments: lives alone     PT Discharge Recommendation: Post acute rehabilitation services     PT - OK to Discharge: Yes (rehab)    See flowsheet documentation for full assessment

## 2021-09-09 NOTE — OCCUPATIONAL THERAPY NOTE
Occupational Therapy Progress Note     Patient Name: Denise Mcadams  Today's Date: 9/9/2021  Problem List  Principal Problem:    Acute kidney injury superimposed on CKD [facility])  Active Problems:    Chronic kidney disease, stage IV (severe) ([facility] )    History of aortic valve replacement with tissue graft    HTN (hypertension)    Hx of CABG    Cerebrovascular accident (CVA) ([facility] )    Traumatic rhabdomyolysis ([facility] )    Elevated troponin    UTI (urinary tract infection)    Sepsis ([facility] )    Horseshoe kidney          09/09/21 1500   OT Last Visit   OT Visit Date 09/09/21   Note Type   Note Type Treatment   Restrictions/Precautions   Other Precautions Cognitive; Fall Risk;Multiple lines;Limb alert;Pain   Pain Assessment   Pain Assessment Tool 0-10   Pain Score 5   Pain Location/Orientation Location: Generalized   Patient's Stated Pain Goal No pain   Hospital Pain Intervention(s) Repositioned; Ambulation/increased activity; Emotional support   ADL   UB Dressing Assistance 5  Supervision/Setup   UB Dressing Deficit Thread RUE; Thread LUE;Pull around back   UB Dressing Comments Pt required increased encouragement to don clean gown  PRoper tech used threading R UE first  Pt reports he doesn't wear any shirts at home  Functional Standing Tolerance   Time 5 mins   Activity Functional mobility in room   Bed Mobility   Supine to Sit 3  Moderate assistance   Additional items Assist x 1;HOB elevated; Bedrails; Increased time required;LE management  (Educated on log roll tech for back )   Sit to Supine 4  Minimal assistance   Additional items Assist x 1; Increased time required;LE management   Additional Comments Remains in bed w/ all needs    Transfers   Sit to Stand 4  Minimal assistance   Additional items Assist x 1;HOB elevated; Increased time required  (Slight posterior lean today )   Stand to Sit 4  Minimal assistance   Additional items Increased time required;Verbal cues   Stand pivot   (CG using RW )   Functional Mobility   Functional Mobility   (CG using RW )   Additional Comments Increased time    Cognition   Arousal/Participation Cooperative   Attention Attends with cues to redirect   Orientation Level Oriented X4   Memory Decreased short term memory   Following Commands Follows one step commands with increased time or repetition   Comments Does not remember conversation held yesterday about having increased assist at home vs rehab  Activity Tolerance   Activity Tolerance Patient limited by pain; Patient limited by fatigue   Medical Staff Made Aware PT & RN   Assessment   Assessment Pt seen for OT tx session w/ focus on bed mobility, transfers, self care, and activity tolerance  Denies pain at rest  However, later reports 5/10 generalized pain during movements and ambulation  Pt does continue to comment about pain in "undercarriage" but denies swelling or open areas  Pt needed Mod for bed mobility w/ cues for log roll tech to conserve back  Requires Min to stand from higher surface and cues for hand placement  Ambulated in room w/ maximum encouragement w/ CG using RW  Increased time to complete ambulation  Pt donned gown w/ (S) and increased time  Declined exercises  Overall pt requires maximum encouragement to complete tasks  Decreased motivation noted  States "I'm lazy " Educated on importance of moving and completing tasks due to living alone  Decreased insight and judgement  Continue OT to achieve optimal performance  Recommendation for rehab remains appropriate due to remaining below functional baseline  Plan   Treatment Interventions ADL retraining;Functional transfer training; Endurance training;Patient/family training; Activityengagement   Goal Expiration Date 09/22/21   OT Treatment Day 1   OT Frequency 3-5x/wk   Recommendation   OT Discharge Recommendation Post acute rehabilitation services   AM-PAC Daily Activity Inpatient   Lower Body Dressing 2   Bathing 3   Toileting 2   Upper Body Dressing 2   Grooming 3   Eating 3   Daily Activity Raw Score 15   Daily Activity Standardized Score (Calc for Raw Score >=11) 34 69   AM-PAC Applied Cognition Inpatient   Following a Speech/Presentation 3   Understanding Ordinary Conversation 4   Taking Medications 3   Remembering Where Things Are Placed or Put Away 3   Remembering List of 4-5 Errands 2   Taking Care of Complicated Tasks 2   Applied Cognition Raw Score 17   Applied Cognition Standardized Score 36 52       [facility] Liat EDWARDS, OTR/L

## 2021-09-10 LAB
ANION GAP SERPL CALCULATED.3IONS-SCNC: 14 MMOL/L (ref 4–13)
BUN SERPL-MCNC: 92 MG/DL (ref 5–25)
CALCIUM SERPL-MCNC: 7.3 MG/DL (ref 8.3–10.1)
CHLORIDE SERPL-SCNC: 108 MMOL/L (ref 100–108)
CO2 SERPL-SCNC: 19 MMOL/L (ref 21–32)
CREAT SERPL-MCNC: 3.95 MG/DL (ref 0.6–1.3)
ERYTHROCYTE [DISTWIDTH] IN BLOOD BY AUTOMATED COUNT: 15.9 % (ref 11.6–15.1)
GFR SERPL CREATININE-BSD FRML MDRD: 14 ML/MIN/1.73SQ M
GLUCOSE SERPL-MCNC: 156 MG/DL (ref 65–140)
HCT VFR BLD AUTO: 35.3 % (ref 36.5–49.3)
HGB BLD-MCNC: 11.2 G/DL (ref 12–17)
MAGNESIUM SERPL-MCNC: 2.4 MG/DL (ref 1.6–2.6)
MCH RBC QN AUTO: 29.5 PG (ref 26.8–34.3)
MCHC RBC AUTO-ENTMCNC: 31.7 G/DL (ref 31.4–37.4)
MCV RBC AUTO: 93 FL (ref 82–98)
NRBC BLD AUTO-RTO: 0 /100 WBCS
PLATELET # BLD AUTO: 81 THOUSANDS/UL (ref 149–390)
PMV BLD AUTO: 14.5 FL (ref 8.9–12.7)
POTASSIUM SERPL-SCNC: 4 MMOL/L (ref 3.5–5.3)
PROCALCITONIN SERPL-MCNC: 149.97 NG/ML
PROCALCITONIN SERPL-MCNC: 220.04 NG/ML
RBC # BLD AUTO: 3.8 MILLION/UL (ref 3.88–5.62)
SODIUM SERPL-SCNC: 141 MMOL/L (ref 136–145)
WBC # BLD AUTO: 12.37 THOUSAND/UL (ref 4.31–10.16)

## 2021-09-10 PROCEDURE — 84145 PROCALCITONIN (PCT): CPT | Performed by: NURSE PRACTITIONER

## 2021-09-10 PROCEDURE — 99232 SBSQ HOSP IP/OBS MODERATE 35: CPT | Performed by: INTERNAL MEDICINE

## 2021-09-10 PROCEDURE — 85027 COMPLETE CBC AUTOMATED: CPT | Performed by: NURSE PRACTITIONER

## 2021-09-10 PROCEDURE — 83735 ASSAY OF MAGNESIUM: CPT | Performed by: NURSE PRACTITIONER

## 2021-09-10 PROCEDURE — 80048 BASIC METABOLIC PNL TOTAL CA: CPT | Performed by: INTERNAL MEDICINE

## 2021-09-10 PROCEDURE — 97110 THERAPEUTIC EXERCISES: CPT

## 2021-09-10 PROCEDURE — 87040 BLOOD CULTURE FOR BACTERIA: CPT | Performed by: STUDENT IN AN ORGANIZED HEALTH CARE EDUCATION/TRAINING PROGRAM

## 2021-09-10 PROCEDURE — 97116 GAIT TRAINING THERAPY: CPT

## 2021-09-10 PROCEDURE — 97530 THERAPEUTIC ACTIVITIES: CPT

## 2021-09-10 PROCEDURE — 99232 SBSQ HOSP IP/OBS MODERATE 35: CPT | Performed by: PHYSICIAN ASSISTANT

## 2021-09-10 RX ADMIN — DOCUSATE SODIUM 100 MG: 100 CAPSULE ORAL at 17:12

## 2021-09-10 RX ADMIN — DOCUSATE SODIUM 100 MG: 100 CAPSULE ORAL at 08:44

## 2021-09-10 RX ADMIN — SODIUM BICARBONATE 650 MG TABLET 1300 MG: at 09:43

## 2021-09-10 RX ADMIN — Medication 1000 UNITS: at 08:44

## 2021-09-10 RX ADMIN — CALCIUM CARBONATE (ANTACID) CHEW TAB 500 MG 1250 MG: 500 CHEW TAB at 08:43

## 2021-09-10 RX ADMIN — DOXYCYCLINE 100 MG: 100 CAPSULE ORAL at 12:03

## 2021-09-10 RX ADMIN — FAMOTIDINE 10 MG: 20 TABLET ORAL at 05:00

## 2021-09-10 RX ADMIN — BISOPROLOL FUMARATE 5 MG: 5 TABLET ORAL at 08:44

## 2021-09-10 RX ADMIN — AMLODIPINE BESYLATE 10 MG: 10 TABLET ORAL at 17:12

## 2021-09-10 RX ADMIN — SODIUM BICARBONATE 650 MG TABLET 1300 MG: at 17:12

## 2021-09-10 RX ADMIN — Medication 250 MG: at 08:44

## 2021-09-10 RX ADMIN — Medication 250 MG: at 17:12

## 2021-09-10 RX ADMIN — PIPERACILLIN SODIUM AND TAZOBACTAM SODIUM 3.38 G: 36; 4.5 INJECTION, POWDER, LYOPHILIZED, FOR SOLUTION INTRAVENOUS at 19:31

## 2021-09-10 RX ADMIN — CEFTRIAXONE SODIUM 1000 MG: 10 INJECTION, POWDER, FOR SOLUTION INTRAVENOUS at 13:06

## 2021-09-10 RX ADMIN — ACETAMINOPHEN 650 MG: 325 TABLET, FILM COATED ORAL at 17:37

## 2021-09-10 NOTE — PROGRESS NOTES
Follow up Consultation    Nephrology   Lilliam Dasilva 78 y o  male MRN: [id number]  Unit/Bed#: E2 -01 Encounter: [id number]      Physician Requesting Consult: Donato Alfaro MD        ASSESSMENT/PLAN:  45-year-old male with multiple comorbidities including history of CVA, hypertension, CAD status post CABG/AVR, CKD stage 4 admitted after being found on the ground in his home  Nephrology consulted for evaluation management of acute kidney injury            · Acute kidney injury (POA) on CKD stage 4:  - LORENZA most likely secondary to prerenal azotemia plus some component of underlying rhabdo as well as failure to auto regulate presence of ARB plus there may be some component of CKD progression   - After review of records In [facility] as well as Care everywhere it appears that the patient has a baseline Creatinine of 3-3 5 mg/dL  - patient was admitted with a creatinine of 4 28 mg/dL on 09/07/2021   - patient's creatinine today is at 3 95 mg/dL, stable close to baseline   - advised patient we would have to wait a period of 3 months to see where his new baseline creatinine would be  - no acute indication for renal replacement therapy at this time  - patient does have right upper extremity AV fistula in place which is ready to use  - hold further IV fluids and diuretics at this time  - check BMP in a m  If still in the hospital  - Await renal recovery  - CT thoracic spine from 09/07/2021:  Double-J stent in the left upper pole of the horseshoe kidney with dilated left ureter  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 and associated renal parenchymal volume loss  - most recent left ureteral stent change on 08/05/2021  - Optimize hemodynamic status to avoid delay in renal recovery    - Place on a renal diet when allowed diet order    - Avoid nephrotoxins, adjust meds to appropriate GFR   - Strict I/O   - Daily weights  - Urinary retention protocol if patient does not have a Granda  - Most hemodynamically stable remains afebrile urine output 1 8 L no overt uremic symptoms awaiting discharge to rehab happy to hear renal parameters stable  PHYSICAL EXAM  /62 (BP Location: Left arm)   Pulse 61   Temp 98 1 °F (36 7 °C) (Oral)   Resp 16   Ht 5' 8" (1 727 m)   Wt 78 2 kg (172 lb 6 4 oz)   SpO2 96%   BMI 26 21 kg/m²   Temp (24hrs), Av 2 °F (36 8 °C), Min:98 1 °F (36 7 °C), Max:98 4 °F (36 9 °C)        Intake/Output Summary (Last 24 hours) at 9/10/2021 1054  Last data filed at 9/10/2021 0629  Gross per 24 hour   Intake --   Output 1839 ml   Net -1839 ml       I/O last 24 hours: In: -   Out: 183 [Urine:1839]      Current Weight: Weight - Scale: 78 2 kg (172 lb 6 4 oz)  First Weight: Weight - Scale: 76 7 kg (169 lb)  Physical Exam  Vitals and nursing note reviewed  Constitutional:       General: He is not in acute distress  Appearance: Normal appearance  He is normal weight  He is not ill-appearing, toxic-appearing or diaphoretic  HENT:      Head: Normocephalic and atraumatic  Mouth/Throat:      Mouth: Mucous membranes are moist       Pharynx: Oropharynx is clear  No oropharyngeal exudate  Eyes:      General: No scleral icterus  Conjunctiva/sclera: Conjunctivae normal    Cardiovascular:      Rate and Rhythm: Normal rate  Heart sounds: Normal heart sounds  No friction rub  Pulmonary:      Effort: Pulmonary effort is normal  No respiratory distress  Breath sounds: Normal breath sounds  No stridor  No wheezing  Abdominal:      General: There is no distension  Palpations: Abdomen is soft  There is no mass  Tenderness: There is no abdominal tenderness  Musculoskeletal:         General: No swelling  Cervical back: Normal range of motion and neck supple  Comments: Right AV fistula with an bruit   Skin:     General: Skin is warm  Coloration: Skin is not jaundiced  Neurological:      General: No focal deficit present        Mental Status: He is alert and oriented to person, place, and time  Psychiatric:         Mood and Affect: Mood normal          Behavior: Behavior normal              Review of Systems   Constitutional: Negative for appetite change, chills, fatigue and fever  HENT: Negative for congestion  Respiratory: Negative for cough, shortness of breath and wheezing  Cardiovascular: Negative for leg swelling  Gastrointestinal: Negative for abdominal pain, constipation, diarrhea, nausea and vomiting  Genitourinary: Negative for dysuria  Musculoskeletal: Negative for back pain  Skin: Negative for rash  Neurological: Negative for dizziness and headaches  Psychiatric/Behavioral: Negative for agitation and confusion  All other systems reviewed and are negative  Scheduled Meds:  Current Facility-Administered Medications   Medication Dose Route Frequency Provider Last Rate    acetaminophen  650 mg Oral Q6H PRN [facility], [place]      aluminum-magnesium hydroxide-simethicone  30 mL Oral Q6H PRN [facility], [place]      amLODIPine  10 mg Oral QPM OFELIA Saunders      bisoprolol  5 mg Oral Daily [facility], [place]      calcium carbonate  1,250 mg Oral Daily OFELIA Saunders      cefTRIAXone  1,000 mg Intravenous Q24H Juan Lowery, [place]      cholecalciferol  1,000 Units Oral Daily OFELIA Saunders      docusate sodium  100 mg Oral BID [facility], [place]      doxycycline hyclate  100 mg Oral Daily [place], [place]      famotidine  10 mg Oral Every Other Day Jasiel Weber MD      ondansetron  4 mg Intravenous Q6H PRN [facility], [place]      saccharomyces boulardii  250 mg Oral BID OFELIA Hudson      sodium bicarbonate  1,300 mg Oral BID after meals Ortiz Niño MD         PRN Meds:   acetaminophen    aluminum-magnesium hydroxide-simethicone    ondansetron    Continuous Infusions:       Invasive Devices:      Invasive Devices     Peripheral Intravenous Line            Peripheral IV 09/07/21 Dorsal (posterior); Left Forearm 2 days    Peripheral IV 09/08/21 Left Forearm 2 days                  LABORATORY:    Results from last 7 days   Lab Units 09/10/21  0438 09/09/21  0425 09/08/21  0628 09/07/21  1439   WBC Thousand/uL 12 37* 12 51* 19 93* 22 34*   HEMOGLOBIN g/dL 11 2* 10 9* 12 0 13 1   HEMATOCRIT % 35 3* 35 4* 36 7 41 4   PLATELETS Thousands/uL 81* 87* 127* 148*   POTASSIUM mmol/L 4 0 4 3 4 0 4 0   CHLORIDE mmol/L 108 107 109* 106   CO2 mmol/L 19* 18* 19* 18*   BUN mg/dL 92* 94* 94* 87*   CREATININE mg/dL 3 95* 3 80* 4 34* 4 28*   CALCIUM mg/dL 7 3* 7 3* 7 9* 8 2*   MAGNESIUM mg/dL 2 4 2 2  --   --    PHOSPHORUS mg/dL  --  2 5  --  3 5      rest all reviewed    RADIOLOGY:  CT head without contrast   Final Result by Gildardo Cheng MD (09/07 1622)      No acute intracranial abnormality  Microangiopathic changes  Workstation performed: [id number]         CT spine thoracic & lumbar wo contrast   Final Result by Gildardo Cheng MD (09/07 1632)         1  No acute fracture or subluxation  2   Mild to moderate multilevel spondylosis with evidence of diffuse idiopathic skeletal hyperostosis at the lumbosacral junction  3   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 and associated renal parenchymal volume loss  4   Double-J stent in the left upper pole of the horseshoe kidney with dilated left ureter  5   Trabeculated bladder with bladder diverticulum likely related to chronic urostasis  6   IVC filter            Workstation performed: [id number]         CT spine cervical without contrast   Final Result by Gildardo Cheng MD (09/07 1625)      No cervical spine fracture or traumatic malalignment  Mild spondylosis                     Workstation performed: [id number]         XR chest 1 view   ED Interpretation by Yong Montes MD (09/07 1638)   Primary reviewed: No acute abnormality      Final Result by Zayra Bowman MD (09/08 6443) No acute cardiopulmonary disease  Workstation performed: [id number]           Rest all reviewed    Portions of the record may have been created with voice recognition software  Occasional wrong word or "sound a like" substitutions may have occurred due to the inherent limitations of voice recognition software  Read the chart carefully and recognize, using context, where substitutions have occurred  If you have any questions, please contact the dictating provider

## 2021-09-10 NOTE — ASSESSMENT & PLAN NOTE
· Patient found down on ground in home after having fallen 3 days prior and being unable to get up  States that he is getting out of bed and felt weak, he lowered himself to the ground and was unable to get up after that  Denies any loss of consciousness, head strike  · Patient unable to access any food/water during that time or take any medications  · CK significantly elevated at 1566  Normalized with IV hydration  · Nephrology consulted, appreciate recommendations  · PT/OT consulted for safe discharge planning   Recommend rehab

## 2021-09-10 NOTE — ASSESSMENT & PLAN NOTE
· Platelet count stable in the 80s  It was 148 on admission  · Baseline platelet count appears to be normal in care everywhere    · Heparin on hold  · SCDs

## 2021-09-10 NOTE — PROGRESS NOTES
Javon Cai  Progress Note - Shaista Sutton 1945, 78 y o  male MRN: [id number]  Unit/Bed#: E2 -01 Encounter: [id number]  Primary Care Provider: Dex Alejandro MD   Date and time admitted to hospital: 9/7/2021  1:06 PM    * Acute kidney injury superimposed on CKD [facility])  Assessment & Plan  · POA, appears that the patient's baseline creatinine is 3-3 5  · Creatinine 4 28 on admission  · Creatinine today 3 95  · Acute kidney injury most likely secondary to prerenal azotemia, rhabdo, and component of CKD progression,+ on ARB  · Nephrology consult, appreciate recommendations  · Continue to hold patient's losartan at this time  · Received IV fluids per Renal  · Diuretics remain on hold  · Increased sodium bicarbonate dose  · BMP in a m  Await renal recovery  · Patient follow-up with Nephrology at [facility]  Has a right upper extremity AV fistula in place which is ready for use  Traumatic rhabdomyolysis [facility])  Assessment & Plan  · Patient found down on ground in home after having fallen 3 days prior and being unable to get up  States that he is getting out of bed and felt weak, he lowered himself to the ground and was unable to get up after that  Denies any loss of consciousness, head strike  · Patient unable to access any food/water during that time or take any medications  · CK significantly elevated at 1566  Normalized with IV hydration  · Nephrology consulted, appreciate recommendations  · PT/OT consulted for safe discharge planning  Recommend rehab    UTI (urinary tract infection)  Assessment & Plan  · Patient is asymptomatic without evidence of hypotension or fever  · UA showed WBC 30-50, large leukocytes and culture positive for Enterobacter cloacae  Unclear if this is pathogenic or represents colonization  · CT showed - 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 and associated renal parenchymal volume loss   Double-J stent in the left upper pole of the horseshoe kidney with dilated left ureter  Trabeculated bladder with bladder diverticulum likely related to chronic urostasis  · Patient follows Urology in [facility] and shin follow-up post discharge  · Started IV Rocephin, discontinue after today day 3    · Appreciate urology input  Chronic left ureteral stent with q 6 month exchanges  · Status post last stent exchange at [facility] on 8/5/2021  · No urologic surgical intervention indicated at this time    Sepsis [facility])  Assessment & Plan  · Evolving since admission  As evidenced by leukocytosis, bandemia, with suspected source of infection UTI  · Today is day 3 of IV Rocephin  Will likely discontinue  · Procalcitonin significantly elevated and improving  · WBC downtrending  · Patient remains afebrile     Thrombocytopenia (HCC)  Assessment & Plan  · Platelet count stable in the 80s  It was 148 on admission  · Baseline platelet count appears to be normal in care everywhere  · Heparin on hold  · SCDs    Elevated troponin  Assessment & Plan  · Mildly elevated troponin at 0 63, down trending  · Suspect likely secondary to rhabdomyolysis  · Patient denies chest pain  · EKG unremarkable    Cerebrovascular accident (CVA) [facility])  Assessment & Plan  · History of CVA with right-sided weakness  · On statin at home  On hold due to 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  · On suppressive antibiotics with doxycycline 100 mg q d  Will continue  VTE Pharmacologic Prophylaxis:   Moderate Risk (Score 3-4) - Pharmacological DVT Prophylaxis Contraindicated  Sequential Compression Devices Ordered      Patient Centered Rounds: I performed bedside rounds with nursing staff today  Discussions with Specialists or Other Care Team Provider: nursing, cm, renal     Education and Discussions with Family / Patient: Patient declined call to   Time Spent for Care: 30 minutes  More than 50% of total time spent on counseling and coordination of care as described above  Current Length of Stay: 3 day(s)  Current Patient Status: Inpatient   Certification Statement: The patient will continue to require additional inpatient hospital stay due to worsening renal function, IV abx, safe d/c planning   Discharge Plan: Anticipate discharge in 24-48 hrs to rehab facility  Code Status: Level 1 - Full Code    Subjective:   Patient seen examined at bedside  Offers no new complaints  Does not want to get out of bed  No bowel movement yet today  Voiding normally without dysuria or hematuria  No fevers or chills     Appetite poor though sleeping well  Objective:     Vitals:   Temp (24hrs), Av 2 °F (36 8 °C), Min:98 1 °F (36 7 °C), Max:98 4 °F (36 9 °C)    Temp:  [98 1 °F (36 7 °C)-98 4 °F (36 9 °C)] 98 1 °F (36 7 °C)  HR:  [61-78] 61  Resp:  [16-20] 16  BP: (116-131)/(56-63) 131/62  SpO2:  [95 %-97 %] 96 %  Body mass index is 26 21 kg/m²  Input and Output Summary (last 24 hours): Intake/Output Summary (Last 24 hours) at 9/10/2021 0944  Last data filed at 9/10/2021 8539  Gross per 24 hour   Intake --   Output 1839 ml   Net -1839 ml       Physical Exam:   Physical Exam  Constitutional:       Appearance: Normal appearance  He is not ill-appearing  HENT:      Head: Normocephalic and atraumatic  Mouth/Throat:      Mouth: Mucous membranes are moist    Eyes:      Extraocular Movements: Extraocular movements intact  Cardiovascular:      Rate and Rhythm: Normal rate and regular rhythm  Heart sounds: Murmur heard       Pulmonary:      Effort: Pulmonary effort is normal       Breath sounds: Normal breath sounds  Abdominal:      General: Abdomen is flat  Palpations: Abdomen is soft  Musculoskeletal:         General: No swelling  Normal range of motion  Cervical back: Normal range of motion and neck supple  Skin:     General: Skin is warm and dry  Neurological:      General: No focal deficit present  Mental Status: He is alert  Psychiatric:         Mood and Affect: Mood normal          Behavior: Behavior normal        Additional Data:     Labs:  Results from last 7 days   Lab Units 09/10/21  0438 09/08/21  0628   WBC Thousand/uL 12 37* 19 93*   HEMOGLOBIN g/dL 11 2* 12 0   HEMATOCRIT % 35 3* 36 7   PLATELETS Thousands/uL 81* 127*   BANDS PCT %  --  14*   LYMPHO PCT %  --  3*   MONO PCT %  --  4   EOS PCT %  --  0     Results from last 7 days   Lab Units 09/10/21  0438 09/09/21  0425   SODIUM mmol/L 141 140   POTASSIUM mmol/L 4 0 4 3   CHLORIDE mmol/L 108 107   CO2 mmol/L 19* 18*   BUN mg/dL 92* 94*   CREATININE mg/dL 3 95* 3 80*   ANION GAP mmol/L 14* 15*   CALCIUM mg/dL 7 3* 7 3*   ALBUMIN g/dL  --  2 2*   TOTAL BILIRUBIN mg/dL  --  0 42   ALK PHOS U/L  --  121*   ALT U/L  --  24   AST U/L  --  34   GLUCOSE RANDOM mg/dL 156* 111                 Results from last 7 days   Lab Units 09/09/21  1911 09/08/21  1809   LACTIC ACID mmol/L  --  1 3   PROCALCITONIN ng/ml 220 04* 411 08*       Lines/Drains:  Invasive Devices     Peripheral Intravenous Line            Peripheral IV 09/07/21 Dorsal (posterior); Left Forearm 2 days    Peripheral IV 09/08/21 Left Forearm 2 days                      Imaging: Reviewed radiology reports from this admission including: chest xray    Recent Cultures (last 7 days):   Results from last 7 days   Lab Units 09/07/21 2037   URINE CULTURE  >100,000 cfu/ml Enterobacter cloacae complex*  >100,000 cfu/ml        Last 24 Hours Medication List:   Current Facility-Administered Medications   Medication Dose Route Frequency Provider Last Rate    acetaminophen  650 mg Oral Q6H PRN Juju Gregory PA-C      aluminum-magnesium hydroxide-simethicone  30 mL Oral Q6H PRN Juju Gregory PA-C      amLODIPine  10 mg Oral QPM OFELIA Saunders      bisoprolol  5 mg Oral Daily Juju Gregory PA-C      calcium carbonate  1,250 mg Oral Daily OFELIA Saunders      cefTRIAXone  1,000 mg Intravenous Q24H Juan Lowery PA-C      cholecalciferol  1,000 Units Oral Daily OFELIA Saunders      docusate sodium  100 mg Oral BID Juju Gregory PA-C      doxycycline hyclate  100 mg Oral Daily Juju Gregory, [place]      famotidine  10 mg Oral Every Other Day Josh Seo MD      ondansetron  4 mg Intravenous Q6H PRN Juju Gregory PA-C      saccharomyces boulardii  250 mg Oral BID OFELIA Hudson      sodium bicarbonate  1,300 mg Oral BID after meals Juleen Siemens, MD          Today, Patient Was Seen By: Junaid Snow PA-C    **Please Note: This note may have been constructed using a voice recognition system  **

## 2021-09-10 NOTE — PHYSICAL THERAPY NOTE
Physical Therapy Treatment Note       09/10/21 1600   Note Type   Note Type Treatment   Pain Assessment   Pain Assessment Tool Pain Assessment not indicated - pt denies pain   Pain Score No Pain   Restrictions/Precautions   Other Precautions Chair Alarm; Bed Alarm; Fall Risk   General   Chart Reviewed Yes   Family/Caregiver Present Yes  (son)   Cognition   Overall Cognitive Status WFL   Arousal/Participation Responsive; Cooperative; Alert   Attention Attends with cues to redirect   Orientation Level Oriented X4   Subjective   Subjective pt reports no pain and reports feeling better  Bed Mobility   Supine to Sit 3  Moderate assistance   Additional items Assist x 1;HOB elevated; Increased time required;Verbal cues;LE management   Transfers   Sit to Stand 4  Minimal assistance   Additional items Assist x 2; Increased time required;Verbal cues   Stand to Sit 4  Minimal assistance   Additional items Assist x 1; Increased time required;Verbal cues   Toilet transfer 4  Minimal assistance  (mod a x2 for sit to stand from low toilet w use of grab bar)   Additional items Assist x 1; Increased time required;Standard toilet;Verbal cues  (use of gra jose manuel)   Ambulation/Elevation   Gait pattern Poor UE support; Improper Weight shift;R Foot drag; Forward Flexion;Decreased foot clearance; Short stride; Step to;Excessively slow   Gait Assistance 4  Minimal assist   Additional items Assist x 1;Verbal cues; Tactile cues   Assistive Device Rolling walker   Distance 15' x2   Balance   Static Sitting Fair +   Dynamic Sitting Fair -   Static Standing Poor +   Dynamic Standing Poor   Ambulatory Poor   Activity Tolerance   Activity Tolerance Patient limited by fatigue;Patient limited by pain   Exercises   Heelslides Supine;10 reps;AROM; Left  (6 reps R le  )   Hip Flexion Supine;10 reps;AROM; Bilateral   Hip Abduction Supine;10 reps;AROM; Left  (arom r le x 7 reps   )   Knee AROM Long Arc Quad Sitting;10 reps;AROM; Left  (a/a R le x 5 reps   ) Assessment   Prognosis Fair   Problem List Decreased strength;Decreased range of motion;Decreased mobility; Impaired balance;Decreased endurance;Decreased safety awareness; Impaired judgement; Impaired sensation;Pain   Assessment Pt seen for pt treatment session consisting of bed mobility, transfers and gait training and le ther x for rom and strengthening exercises  Pt  Requires mod assist for supine to sit and rolling to the L with cues  Pt able to sit eob for brief period of time due to discomfort in genitals  Pt able to maintain static sitting balance without ue support, noting slightl retropulsion however no posterior lob noted  Pt  Requires min assist x2 for sit to stand from bed level and mod assist x1 from toilet with use of grab bar  Noted retropulsion with sit to stand  Pt ambulates with use of rw with min assist x1 demonstrating gait deviations of forward flexed posture, decreased R , impaired weightshiftnig and decreased r foot clearance, decreased stride on R and step to gait pattern  Pt limited by fatigue  n cues for improved posture, balance, le sequencing,  turning and backing up  Noted improved tolerance to le exercises this session though continues to be limited by pain and discomfort on R tolerating 5-7 reps and 10 reps on L  Decreased fluency, coordination and increased tone on r  Noted R foot drop, pt reports having MAFO however does not fit well and is uncomfortable  Pt reports losing weight since it was issued to him  The patient's AM-PAC Basic Mobility Inpatient Short Form Raw Score is 15, Standardized Score is 36 97  A standardized score less than 42 9 suggests the patient may benefit from discharge to post-acute rehabilitation services  Please also refer to the recommendation of the Physical Therapist for safe discharge planning  Due to impairments as noted decreased caregiver support and decline from baseline  STR is recommended at d/c      Goals   Patient Goals " Be able to get around by myself "     STG Expiration Date 09/22/21   PT Treatment Day 2   Plan   Treatment/Interventions Functional transfer training;LE strengthening/ROM; Therapeutic exercise; Endurance training;Cognitive reorientation;Patient/family training;Equipment eval/education; Bed mobility;Gait training;Spoke to nursing   Progress Slow progress, decreased activity tolerance   PT Frequency Other (Comment)  (4-5x/ week)   Recommendation   PT Discharge Recommendation Post acute rehabilitation services   PT - OK to Discharge Yes   Lashell Brennan 435   Turning in Bed Without Bedrails 2   Lying on Back to Sitting on Edge of Flat Bed 2   Moving Bed to Chair 3   Standing Up From Chair 3   Walk in Room 3   Climb 3-5 Stairs 2   Basic Mobility Inpatient Raw Score 15   Basic Mobility Standardized Score 36 97        09/10/21 1600   Note Type   Note Type Treatment   Pain Assessment   Pain Assessment Tool Pain Assessment not indicated - pt denies pain   Pain Score No Pain   Restrictions/Precautions   Other Precautions Chair Alarm; Bed Alarm; Fall Risk   General   Chart Reviewed Yes   Family/Caregiver Present Yes  (son)   Cognition   Overall Cognitive Status WFL   Arousal/Participation Responsive; Cooperative; Alert   Attention Attends with cues to redirect   Orientation Level Oriented X4   Subjective   Subjective pt reports no pain and reports feeling better  Bed Mobility   Supine to Sit 3  Moderate assistance   Additional items Assist x 1;HOB elevated; Increased time required;Verbal cues;LE management   Transfers   Sit to Stand 4  Minimal assistance   Additional items Assist x 2; Increased time required;Verbal cues   Stand to Sit 4  Minimal assistance   Additional items Assist x 1; Increased time required;Verbal cues   Toilet transfer 4  Minimal assistance  (mod a x2 for sit to stand from low toilet w use of grab bar)   Additional items Assist x 1; Increased time required;Standard toilet;Verbal cues  (use of gra jose manuel) Ambulation/Elevation   Gait pattern Poor UE support; Improper Weight shift;R Foot drag; Forward Flexion;Decreased foot clearance; Short stride; Step to;Excessively slow   Gait Assistance 4  Minimal assist   Additional items Assist x 1;Verbal cues; Tactile cues   Assistive Device Rolling walker   Distance 15' x2   Balance   Static Sitting Fair +   Dynamic Sitting Fair -   Static Standing Poor +   Dynamic Standing Poor   Ambulatory Poor   Activity Tolerance   Activity Tolerance Patient limited by fatigue;Patient limited by pain   Exercises   Heelslides Supine;10 reps;AROM; Left  (6 reps R le  )   Hip Flexion Supine;10 reps;AROM; Bilateral   Hip Abduction Supine;10 reps;AROM; Left  (arom r le x 7 reps   )   Knee AROM Long Arc Quad Sitting;10 reps;AROM; Left  (a/a R le x 5 reps   )   Assessment   Prognosis Fair   Problem List Decreased strength;Decreased range of motion;Decreased mobility; Impaired balance;Decreased endurance;Decreased safety awareness; Impaired judgement; Impaired sensation;Pain   Assessment Pt seen for pt treatment session consisting of bed mobility, transfers and gait training and le ther x for rom and strengthening exercises  Pt  Requires mod assist for supine to sit and rolling to the L with cues  Pt able to sit eob for brief period of time due to discomfort in genitals  Pt able to maintain static sitting balance without ue support, noting slightl retropulsion however no posterior lob noted  Pt  Requires min assist x2 for sit to stand from bed level and mod assist x1 from toilet with use of grab bar  Noted retropulsion with sit to stand  Pt ambulates with use of rw with min assist x1 demonstrating gait deviations of forward flexed posture, decreased R , impaired weightshiftnig and decreased r foot clearance, decreased stride on R and step to gait pattern  Pt limited by fatigue  n cues for improved posture, balance, le sequencing,  turning and backing up  Noted improved tolerance to le exercises this session though continues to be limited by pain and discomfort on R tolerating 5-7 reps and 10 reps on L  Decreased fluency, coordination and increased tone on r  Noted R foot drop, pt reports having MAFO however does not fit well and is uncomfortable  Pt reports losing weight since it was issued to him  The patient's AM-PAC Basic Mobility Inpatient Short Form Raw Score is 15, Standardized Score is 36 97  A standardized score less than 42 9 suggests the patient may benefit from discharge to post-acute rehabilitation services  Please also refer to the recommendation of the Physical Therapist for safe discharge planning  Due to impairments as noted decreased caregiver support and decline from baseline  STR is recommended at d/c  Goals   Patient Goals " Be able to get around by myself "     STG Expiration Date 09/22/21   PT Treatment Day 2   Plan   Treatment/Interventions Functional transfer training;LE strengthening/ROM; Therapeutic exercise; Endurance training;Cognitive reorientation;Patient/family training;Equipment eval/education; Bed mobility;Gait training;Spoke to nursing   Progress Slow progress, decreased activity tolerance   PT Frequency Other (Comment)  (4-5x/ week)   Recommendation   PT Discharge Recommendation Post acute rehabilitation services   PT - OK to Discharge Yes   AM-PAC Basic Mobility Inpatient   Turning in Bed Without Bedrails 2   Lying on Back to Sitting on Edge of Flat Bed 2   Moving Bed to Chair 3   Standing Up From Chair 3   Walk in Room 3   Climb 3-5 Stairs 2   Basic Mobility Inpatient Raw Score 15   Basic Mobility Standardized Score 36 97       Sherrie Rinne, PTA

## 2021-09-10 NOTE — PLAN OF CARE
Problem: PHYSICAL THERAPY ADULT  Goal: Performs mobility at highest level of function for planned discharge setting  See evaluation for individualized goals  Description: Treatment/Interventions: Functional transfer training, LE strengthening/ROM, Therapeutic exercise, Patient/family training, Cognitive reorientation, Equipment eval/education, Bed mobility, Gait training, Continued evaluation, Compensatory technique education, Spoke to nursing, OT          See flowsheet documentation for full assessment, interventions and recommendations  Outcome: Progressing  Note: Prognosis: Fair  Problem List: Decreased strength, Decreased range of motion, Decreased mobility, Impaired balance, Decreased endurance, Decreased safety awareness, Impaired judgement, Impaired sensation, Pain  Assessment: Pt seen for pt treatment session consisting of bed mobility, transfers and gait training and le ther x for rom and strengthening exercises  Pt  Requires mod assist for supine to sit and rolling to the L with cues  Pt able to sit eob for brief period of time due to discomfort in genitals  Pt able to maintain static sitting balance without ue support, noting slightl retropulsion however no posterior lob noted  Pt  Requires min assist x2 for sit to stand from bed level and mod assist x1 from toilet with use of grab bar  Noted retropulsion with sit to stand  Pt ambulates with use of rw with min assist x1 demonstrating gait deviations of forward flexed posture, decreased R , impaired weightshiftnig and decreased r foot clearance, decreased stride on R and step to gait pattern  Pt limited by fatigue  n cues for improved posture, balance, le sequencing,  turning and backing up  Noted improved tolerance to le exercises this session though continues to be limited by pain and discomfort on R tolerating 5-7 reps and 10 reps on L  Decreased fluency, coordination and increased tone on r    Noted R foot drop, pt reports having MAFO however does not fit well and is uncomfortable  Pt reports losing weight since it was issued to him  The patient's AM-PAC Basic Mobility Inpatient Short Form Raw Score is 15, Standardized Score is 36 97  A standardized score less than 42 9 suggests the patient may benefit from discharge to post-acute rehabilitation services  Please also refer to the recommendation of the Physical Therapist for safe discharge planning  Due to impairments as noted decreased caregiver support and decline from baseline  STR is recommended at d/c    Barriers to Discharge: Decreased caregiver support  Barriers to Discharge Comments: lives alone     PT Discharge Recommendation: Post acute rehabilitation services     PT - OK to Discharge: Yes    See flowsheet documentation for full assessment

## 2021-09-10 NOTE — ASSESSMENT & PLAN NOTE
· Evolving since admission  As evidenced by leukocytosis, bandemia, with suspected source of infection UTI  · Today is day 3 of IV Rocephin  Will likely discontinue    · Procalcitonin significantly elevated and improving  · WBC downtrending  · Patient remains afebrile

## 2021-09-10 NOTE — ASSESSMENT & PLAN NOTE
· POA, appears that the patient's baseline creatinine is 3-3 5  · Creatinine 4 28 on admission  · Creatinine today 3 95  · Acute kidney injury most likely secondary to prerenal azotemia, rhabdo, and component of CKD progression,+ on ARB  · Nephrology consult, appreciate recommendations  · Continue to hold patient's losartan at this time  · Received IV fluids per Renal  · Diuretics remain on hold  · Increased sodium bicarbonate dose  · BMP in a m  Await renal recovery  · Patient follow-up with Nephrology at [facility]  Has a right upper extremity AV fistula in place which is ready for use

## 2021-09-10 NOTE — ASSESSMENT & PLAN NOTE
· Mildly elevated troponin at 0 63, down trending    · Suspect likely secondary to rhabdomyolysis  · Patient denies chest pain  · EKG unremarkable

## 2021-09-10 NOTE — ASSESSMENT & PLAN NOTE
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

## 2021-09-10 NOTE — ASSESSMENT & PLAN NOTE
· History of CVA with right-sided weakness  · On statin at home  On hold due to [facility], can likely resume upon discharge

## 2021-09-11 LAB
ANION GAP SERPL CALCULATED.3IONS-SCNC: 16 MMOL/L (ref 4–13)
BUN SERPL-MCNC: 88 MG/DL (ref 5–25)
CALCIUM SERPL-MCNC: 7.2 MG/DL (ref 8.3–10.1)
CHLORIDE SERPL-SCNC: 108 MMOL/L (ref 100–108)
CO2 SERPL-SCNC: 16 MMOL/L (ref 21–32)
CREAT SERPL-MCNC: 3.37 MG/DL (ref 0.6–1.3)
ERYTHROCYTE [DISTWIDTH] IN BLOOD BY AUTOMATED COUNT: 16.2 % (ref 11.6–15.1)
GFR SERPL CREATININE-BSD FRML MDRD: 17 ML/MIN/1.73SQ M
GLUCOSE SERPL-MCNC: 52 MG/DL (ref 65–140)
HCT VFR BLD AUTO: 36.2 % (ref 36.5–49.3)
HGB BLD-MCNC: 11.4 G/DL (ref 12–17)
MCH RBC QN AUTO: 29.3 PG (ref 26.8–34.3)
MCHC RBC AUTO-ENTMCNC: 31.5 G/DL (ref 31.4–37.4)
MCV RBC AUTO: 93 FL (ref 82–98)
PLATELET # BLD AUTO: 74 THOUSANDS/UL (ref 149–390)
PMV BLD AUTO: 14 FL (ref 8.9–12.7)
POTASSIUM SERPL-SCNC: 4.6 MMOL/L (ref 3.5–5.3)
PROCALCITONIN SERPL-MCNC: 111.97 NG/ML
RBC # BLD AUTO: 3.89 MILLION/UL (ref 3.88–5.62)
SODIUM SERPL-SCNC: 140 MMOL/L (ref 136–145)
WBC # BLD AUTO: 13.55 THOUSAND/UL (ref 4.31–10.16)

## 2021-09-11 PROCEDURE — 85027 COMPLETE CBC AUTOMATED: CPT | Performed by: PHYSICIAN ASSISTANT

## 2021-09-11 PROCEDURE — 99232 SBSQ HOSP IP/OBS MODERATE 35: CPT | Performed by: PHYSICIAN ASSISTANT

## 2021-09-11 PROCEDURE — 99223 1ST HOSP IP/OBS HIGH 75: CPT | Performed by: INTERNAL MEDICINE

## 2021-09-11 PROCEDURE — 84145 PROCALCITONIN (PCT): CPT | Performed by: NURSE PRACTITIONER

## 2021-09-11 PROCEDURE — 86022 PLATELET ANTIBODIES: CPT | Performed by: PHYSICIAN ASSISTANT

## 2021-09-11 PROCEDURE — 80048 BASIC METABOLIC PNL TOTAL CA: CPT | Performed by: PHYSICIAN ASSISTANT

## 2021-09-11 RX ADMIN — CEFEPIME HYDROCHLORIDE 1000 MG: 1 INJECTION, POWDER, FOR SOLUTION INTRAMUSCULAR; INTRAVENOUS at 16:24

## 2021-09-11 RX ADMIN — PIPERACILLIN SODIUM AND TAZOBACTAM SODIUM 3.38 G: 36; 4.5 INJECTION, POWDER, LYOPHILIZED, FOR SOLUTION INTRAVENOUS at 06:26

## 2021-09-11 RX ADMIN — DOCUSATE SODIUM 100 MG: 100 CAPSULE ORAL at 17:18

## 2021-09-11 RX ADMIN — BISOPROLOL FUMARATE 5 MG: 5 TABLET ORAL at 09:51

## 2021-09-11 RX ADMIN — Medication 250 MG: at 17:18

## 2021-09-11 RX ADMIN — CALCIUM CARBONATE (ANTACID) CHEW TAB 500 MG 1250 MG: 500 CHEW TAB at 09:50

## 2021-09-11 RX ADMIN — SODIUM BICARBONATE 650 MG TABLET 1300 MG: at 17:18

## 2021-09-11 RX ADMIN — Medication 1000 UNITS: at 09:51

## 2021-09-11 RX ADMIN — Medication 250 MG: at 09:50

## 2021-09-11 RX ADMIN — DOXYCYCLINE 100 MG: 100 CAPSULE ORAL at 11:17

## 2021-09-11 RX ADMIN — CEFTRIAXONE SODIUM 1000 MG: 10 INJECTION, POWDER, FOR SOLUTION INTRAVENOUS at 11:17

## 2021-09-11 RX ADMIN — PIPERACILLIN SODIUM AND TAZOBACTAM SODIUM 3.38 G: 36; 4.5 INJECTION, POWDER, LYOPHILIZED, FOR SOLUTION INTRAVENOUS at 00:53

## 2021-09-11 RX ADMIN — SODIUM BICARBONATE 650 MG TABLET 1300 MG: at 09:51

## 2021-09-11 RX ADMIN — AMLODIPINE BESYLATE 10 MG: 10 TABLET ORAL at 17:18

## 2021-09-11 RX ADMIN — DOCUSATE SODIUM 100 MG: 100 CAPSULE ORAL at 09:50

## 2021-09-11 NOTE — PROGRESS NOTES
PHYSICAL MEDICINE AND REHABILITATION   PREADMISSION ASSESSMENT     Projected [facility] Diagnoses:  Impairment of mobility, safety and Activities of Daily Living (ADLs) due to Debility:  16  Debility (Non-cardiac/Non-pulmonary)  Etiologic: Traumatic Rhabdomyolysis,  Acute Kidney Injury superimposed on CKD  Date of Onset: 9/7/21    Date of surgery: N/A    PATIENT INFORMATION  Name: Cherrie Silver Phone #: [phone]  Address: [address]  [address]  [address]  YOB: 1945 Age: 78 y o  SS#   Marital Status:   Ethnicity:    Employment Status: retired  Extended Emergency Contact Information  Primary Emergency Contact: [address] Phone: [phone]  Relation: [address] [place]  Secondary Emergency Contact: [address] of Evelio Olguin Phone: [phone]  Relation: Child  Advance Directive: Full Code - no ACP docs     INSURANCE/COVERAGE:     Primary Payor: [address] [place] [address] Reena Duque / Plan: Ирина Donovan 1967 [address] [facility] REP / Product Type: Medicare HMO /   Secondary Payer:<NONE>   Payer Contact: Odilia Gilmore  Payer Contact:   Contact Phone: [phone] ext   Contact Fax: [phone]  Contact Phone:     Authorization #: [id number]  Coverage Dates: 9/14-9/20 (7 Days)   LCD: 9/20, please send clinical updates to [address] #: N/A  Medicare Days: N/A   Medical Record #: [id number]    REFERRAL SOURCE:   Referring provider: Leonie Gonzales MD  Referring facility: [address] [place] [address]   Room: [place] [address] /E2 MS 80-*  PCP: Karol Azul MD PCP phone number: [phone]    MEDICAL INFORMATION  HPI: Patient is a 78year old male with hx of CVA and R side weakness admitted with from home after being found on floor by family member  Patient reports he attempted to stand up from his bed, his legs felt weak and he lowering himself to the floor, states he did not fall and did not hit his head     Patient reported that he was unable to get himself up and  remained on the floor for approximately 3 days, he was unable to access food, water or take his medications  Patient was diagnosed with Traumatic rhabdomyolosis, LORENZA on CKD, Leukocytosis and found to have elevated troponins  Patient was placed on telemetry and his troponins continued to be trended  Patient's CPKs were significantly elevated on admission, he was placed on IV hydration and nephrology was consulted as patient has elevated creatinine of 4 2 on admission with a baseline of 3 3-4  Incidentally, patient is noted to have a mature AV fistula in his RUEfor possible need for HD in the future as 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 however declines option kidney transplantation  Patient was followed by [facility] nephrology prior to admission  A CT of the thoracic spine completed 9/7/21 found a Double-J stent in the left upper pole of the horseshoe kidney with dilated left ureter  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 and associated renal parenchymal volume loss  Per records, the most recent left ureteral stent change on 08/05/2021  Patient was diagnosed with LORENZA on CKD, his losartan was placed on hold and will continue to be held on discharge  Patient's diuretics remain on hold, his sodium bicarbonate was increased however nephrology does not feel that hemodialysis is indicated at this time  Patient has a history of AVR with tissue graft, with  a history of endocarditis  He is on suppressive antibiotics with doxycycline 100 mg that were discontinued by Infectious Disease in order to start IV cefepime to treat sepsis associated with UTI  Per SLIM the doxycycline will remain on hold until patient follows up with his Cardiologist as an out-patient  A urology consultation was completed 9/9, with the recommendation of  continuing medical optimization of IVF and IV Abx   No Urologic surgical intervention was indicated and it was recommended that patient follow up with his primary Urologist once discharged  Patient's heparin will remain on hold due to thrombocytopenia  Patient was evaluated by PT and OT who noted patient to have significant functional decline as compared to level of function prior to admission  Patient's case has been reviewed by [facility] medical director for admission, patient has demonstrated ability to tolerate at leas three hours of therapy per day and it is believed that patient will demonstrate overall improvement in functional mobility and ADL/iADL skills as a result of acute rehabilitation  Patient is medically stable and ready for discharge to [facility] today  COVID-19 Status: Full Vaccinated - Moderna 6/1 & 6/29 - Current Testing: negative 9/14     Past Medical History:   Past Surgical History: Allergies:     Past Medical History:   Diagnosis Date    Coronary artery disease     Renal disorder     Past Surgical History:   Procedure Laterality Date    AORTIC VALVE REPLACEMENT  2005    Tissue valve    BLADDER SURGERY      23 yrs ago   CORONARY ARTERY BYPASS GRAFT  2005    x1    RENAL ARTERY STENT  11/2005     Allergies   Allergen Reactions    Latex     Nitrofurantoin Other (See Comments)     neck pain    Other Itching         Comorbidities/Surgeries in the last 100 days: LORENZA on CKD, Traumatic rhabdomyolysis, UTI, Sepsis, Thrombocytopenia, Elevated troponin, CVA; R hemiparesis, HTN, hx AVR with tissue graft associated with endocarditis - on suppressive antibiotics, bilateral hydronephrosis with chronic left stents changed q 6 mos        CURRENT VITAL SIGNS:   Temp:  [97 2 °F (36 2 °C)-98 °F (36 7 °C)] 98 °F (36 7 °C)  HR:  [60-65] 63  Resp:  [18] 18  BP: (128-142)/(60-66) 128/60   Intake/Output Summary (Last 24 hours) at 9/14/2021 1053  Last data filed at 9/13/2021 2001  Gross per 24 hour   Intake 480 ml   Output --   Net 480 ml        LABORATORY RESULTS:      Lab Results   Component Value Date    HGB 11 4 (L) 09/14/2021    HCT 35 1 (L) 09/14/2021    WBC 12 76 (H) 09/14/2021     Lab Results   Component Value Date    BUN 66 (H) 09/14/2021    K 3 2 (L) 09/14/2021     (H) 09/14/2021    CREATININE 3 35 (H) 09/14/2021     No results found for: PROTIME, INR     DIAGNOSTIC STUDIES:  CT head without contrast    Result Date: 9/7/2021  Impression: No acute intracranial abnormality  Microangiopathic changes  Workstation performed: [id number]     CT spine cervical without contrast    Result Date: 9/7/2021  Impression: No cervical spine fracture or traumatic malalignment  Mild spondylosis  Workstation performed: [id number]     XR chest 1 view    Result Date: 9/8/2021  Impression: No acute cardiopulmonary disease  Workstation performed: [id number]     CT spine thoracic & lumbar wo contrast    Result Date: 9/7/2021  Impression: 1  No acute fracture or subluxation  2   Mild to moderate multilevel spondylosis with evidence of diffuse idiopathic skeletal hyperostosis at the lumbosacral junction  3   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 and associated renal parenchymal volume loss  4   Double-J stent in the left upper pole of the horseshoe kidney with dilated left ureter  5   Trabeculated bladder with bladder diverticulum likely related to chronic urostasis   6   IVC filter Workstation performed: [id number]       PRECAUTIONS/SPECIAL NEEDS:  Regular Diet, RUE AV Fistula, Strict (NO dialysis)  Is/Os, SCDs, daily weights, fall risk     MEDICATIONS:     Current Facility-Administered Medications:     acetaminophen (TYLENOL) tablet 650 mg, 650 mg, Oral, Q6H PRN, Tita Charles PA-C, 650 mg at 09/10/21 1737    aluminum-magnesium hydroxide-simethicone (MYLANTA) oral suspension 30 mL, 30 mL, Oral, Q6H PRN, Tita Charles PA-C    amLODIPine (NORVASC) tablet 10 mg, 10 mg, Oral, QPM, OFELIA Saunders, 10 mg at 09/12/21 1702    bisoprolol (ZEBETA) tablet 5 mg, 5 mg, Oral, Daily, Vernadalia Tao PA-C, 5 mg at 09/14/21 9816    calcium carbonate (TUMS) chewable tablet 1,250 mg, 1,250 mg, Oral, Daily, OFELIA Saunders, 1,250 mg at 09/14/21 7102    cholecalciferol (VITAMIN D3) tablet 1,000 Units, 1,000 Units, Oral, Daily, OFELIA Saunders, 1,000 Units at 09/14/21 1181    docusate sodium (COLACE) capsule 100 mg, 100 mg, Oral, BID, Vertuan Tao PA-C, 100 mg at 09/14/21 3703    famotidine (PEPCID) tablet 10 mg, 10 mg, Oral, Every Other Day, Kelsey Ledezma MD, 10 mg at 09/14/21 0531    [START ON 9/16/2021] levofloxacin (LEVAQUIN) tablet 500 mg, 500 mg, Oral, Q24H, Samreen Vega PA-C    levofloxacin (LEVAQUIN) tablet 750 mg, 750 mg, Oral, Q24H, Samreen Vega PA-C    ondansetron (ZOFRAN) injection 4 mg, 4 mg, Intravenous, Q6H PRN, Vernadalia Tao PA-C    saccharomyces boulardii (FLORASTOR) capsule 250 mg, 250 mg, Oral, BID, OFELIA Hudson, 250 mg at 09/14/21 7934    sodium bicarbonate tablet 1,300 mg, 1,300 mg, Oral, BID after meals, Fatemeh García MD, 1,300 mg at 09/14/21 5701    SKIN INTEGRITY:   Abdominal busing, RUE fistula - + Thrill/Bruit (not being used for dialysis yet)     PRIOR LEVEL OF FUNCTION:  He lives in [facility] apartment- 6th floor with elevator   Shaista Sutton is single and lives alone  Self Care: Independent, Indoor Mobility: Independent, Stairs (in/outdoor): Not applicable and Cognition: Independent    FALLS IN THE LAST 6 MONTHS: 1 (patient denies fall, states he lowered self to floor)     HOME ENVIRONMENT:  The living area: elevator, bedroom on 1st floor and bathroom on 1st floor  There are No steps to enter the home  The patient will not have 24 hour supervision/physical assistance available upon discharge  Patient has 4 children in local area who check on him frequently, however patient is resistive to family checking on him because "they have their own lives" and he does not want to be a burden    Patient also has home alert system, admits he does not wear consistently  PREVIOUS DME:  Equipment in home (previous DME): Shower Chair, Grab Bars and Rollator, Scooter, Life Alert System     FUNCTIONAL STATUS:  Physical Therapy Occupational Therapy Speech Therapy      PT Visit Date 09/12/21   Note Type   Note Type Treatment   Pain Assessment   Pain Assessment Tool 0-10   Pain Score 8   Pain Location/Orientation Location: [facility] Pain Intervention(s) Ambulation/increased activity;Repositioned   Restrictions/Precautions   Weight Bearing Precautions Per Order No   Other Precautions Chair Alarm; Bed Alarm; Fall Risk;Pain;Cognitive;Limb alert   General   Chart Reviewed Yes   Response to Previous Treatment Patient reporting fatigue but able to participate  Family/Caregiver Present No   Subjective   Subjective Willing to participate in therapy this PM    Bed Mobility   Supine to Sit 4  Minimal assistance   Additional items Assist x 2;HOB elevated; Bedrails;Leg ; Increased time required;Verbal cues;LE management   Sit to Supine 4  Minimal assistance   Additional items Assist x 1;Bedrails; Increased time required;Verbal cues;LE management   Transfers   Sit to Stand 4  Minimal assistance   Additional items Assist x 2;Bedrails; Increased time required;Verbal cues   Stand to Sit 4  Minimal assistance   Additional items Assist x 2;Bedrails; Increased time required;Verbal cues   Toilet transfer 4  Minimal assistance   Additional items Assist x 2;Armrests; Increased time required;Verbal cues;Standard toilet; Other  (grab bar use)   Ambulation/Elevation   Gait pattern Decreased foot clearance; Forward Flexion;Narrow SUBHASH; Improper Weight shift; Short stride; Excessively slow; Step to; Inconsistent cait; Shuffling;Decreased R stance;R Hemiparesis;R Foot drag;Ataxia   Gait Assistance 4  Minimal assist   Additional items Verbal cues; Tactile cues; Assist x 1;Other (Comment)  (with second present for safety)   Assistive Device Rolling walker Distance 20' x 2 with seated toileting break in between   Balance   Static Sitting Fair +   Dynamic Sitting Fair -   Static Standing Poor +   Dynamic Standing Poor   Ambulatory Poor   Endurance Deficit   Endurance Deficit Yes   Endurance Deficit Description fatigue/pain/weakness   Activity Tolerance   Activity Tolerance Patient limited by fatigue;Patient limited by pain   Medical Staff [address]    Nurse Made Aware Yes   Exercises   THR Supine;Sitting;10 reps;AAROM; Bilateral   Assessment   Prognosis Fair   Problem List Decreased strength;Decreased range of motion;Decreased endurance; Impaired balance;Decreased mobility; Decreased coordination;Decreased cognition; Impaired judgement;Decreased safety awareness; Impaired tone; Impaired sensation;Decreased skin integrity;Pain   Assessment Pt  supine in bed upon my arrival  Pt  reporting fatigue/pain, however agreeable to therapeutic intervention  Performance of HEP with cues provided for proper completion  Progressed with transfers requiring A of therapist with cues for hand placement/technique  Pt  required increased A while positioned seated at EOB to avoid LOB  Progressed with OOB mobility with use of RW and A of therapist with cues provided for LE sequencing  Pt  requested to use br  After completion of br trial, pt  returned to supine in bed with alarm active at end of treatment session  PT will continue to recommend d/c to rehab when medically stable for continued improvement of noted impairments  09/12/21 1412    OT Last Visit   OT Visit Date 09/12/21   Note Type   Note Type Treatment   Restrictions/Precautions   Weight Bearing Precautions Per Order No   Other Precautions Chair Alarm; Bed Alarm;Cognitive;Limb alert; Fall Risk;Pain   General   Response to Previous Treatment Patient with no complaints from previous session   Pain Assessment   Pain Assessment Tool 0-10   Pain Score 8   Pain Location/Orientation Location: Groin   Patient's Stated Pain Goal No pain   Hospital Pain Intervention(s) Repositioned; Ambulation/increased activity; Emotional support; Rest   Multiple Pain Sites No   ADL   Grooming Assistance 4  Minimal Assistance   Grooming Deficit Setup;Steadying;Verbal cueing;Supervision/safety; Increased time to complete;Wash/dry hands   Grooming Comments Min A required for balance/steadying when standing to sink to wash hands 2* decreased dynamic balance deficits demonstrated when standing w/o UE support to wash/dry hands   UB Dressing Assistance 4  Minimal Assistance   UB Dressing Deficit Setup;Verbal cueing;Supervision/safety; Increased time to complete;Pull around back;Pull down in back   LB Dressing Assistance 2  Maximal Assistance   LB Dressing Deficit Setup;Steadying; Requires assistive device for steadying;Verbal cueing;Supervision/safety; Increased time to complete; Don/doff R sock; Don/doff L sock; Thread RLE into pants; Thread LLE into pants;Pull up over hips   Toileting Assistance  4  Minimal Assistance   Toileting Deficit Setup;Steadying;Verbal cueing;Supervison/safety; Increased time to complete;Clothing management up;Clothing management down   Functional Standing Tolerance   Time 5 mins   Activity Dynamic standing balance activity, grooming activity at sink   Comments Min A for balance/steadying   Bed Mobility   Supine to Sit 4  Minimal assistance   Additional items Assist x 2;HOB elevated; Bedrails; Increased time required;Verbal cues;LE management   Sit to Supine 4  Minimal assistance   Additional items Assist x 1; Increased time required;Verbal cues;LE management   Additional Comments Pt lying supine at end of session with call bell and phone within reach  Bed alarm activated  All needs met and pt reports no further questions for OT at this time   Transfers   Sit to Stand 4  Minimal assistance   Additional items Assist x 2; Increased time required;Verbal cues   Stand to Sit 4  Minimal assistance   Additional items Assist x 2; Increased time required;Verbal cues   Toilet transfer 4  Minimal assistance   Additional items Assist x 2; Increased time required;Verbal cues;Standard toilet  (grab bar use)   Additional Comments Cues for safe technique and hand placement   Functional Mobility   Functional Mobility 4  Minimal assistance   Additional Comments Assist x1 w/ 2nd therapist present   Additional items Rolling walker   Toilet Transfers   Toilet Transfer From Rolling walker   Toilet Transfer Type To and from   Toilet Transfer to Standard toilet   Toilet Transfer Technique Ambulating   Toilet Transfers Minimal assistance;Verbal cues   Toilet Transfers Comments Assist x1 w/ grab bar use   Cognition   Overall Cognitive Status Impaired   Arousal/Participation Alert; Cooperative   Attention Attends with cues to redirect   Orientation Level Oriented X4   Memory Decreased recall of precautions   Following Commands Follows one step commands with increased time or repetition   Comments Limited insight into deficits   Activity Tolerance   Activity Tolerance Patient limited by pain; Patient tolerated treatment well   Medical Staff Made Aware Shaw Marley, RN   Assessment   Assessment Pt seen for OT treatment session focusing on functional activity tolerance, bed mobility, ADLs, and functional mobility/transfers  Pt alert and cooperative throughout session  Pt lying supine at start of session, completing bed mobility (supine>sit) w/ Min A of 2 with assist for LE management and trunk support  Transfers (sit<>stand, RW<>standard toilet) completed w/ Min A of 2 with assist to initiate forward weight shift from surface for sit>stand and assist for controlled descent to surface for stand>sit  Pt required verbal cues for hand placement during sit<>stand transitions  Min A required for functional mobility with assist for balance/steadying w/ use of RW and 2nd person standby for optimal pt safety   Toileting tasks completed w/ Min A with assist for clothing management up/down 2* decreased dynamic standing balance demonstrated  Min A required for balance/steadying when standing to sink to wash hands 2* decreased dynamic balance deficits demonstrated when standing w/o UE support to wash/dry hands  Dressing tasks completed while seated EOB  UB dressing completed w/ Min A with assist to bring gown over shldrs/down in back  LB dressing completed w/ Max A w/ limited functional reach to don/doff B/L socks and limited functional reach and decreased ROM of R LE to thread B/L LEs into pants  Pt required assist in standing to pull pants over hips w/ 1 LOB noted  Pt lying supine at end of session with call bell and phone within reach  Bed alarm activated  All needs met and pt reports no further questions for OT at this time  The patient's raw score on the -PAC Daily Activity inpatient short form is 16, standardized score is 35 96, less than 39 4  Patients at this level are likely to benefit from discharge to post-acute rehabilitation services  Please refer to the recommendation of the Occupational Therapist for safe discharge planning  Continue to recommend STR when medically cleared  OT to follow pt on caseload  CURRENT GAP IN FUNCTION  Prior to Admission: Functional Status: Patient was independent with mobility/ambulation, transfers, ADL's, IADL's  Ambulates short household distances, uses scooter for community mobility  Estimated length of stay: 10 to 14 days    Anticipated Post-Discharge Disposition/Treatment  Disposition: Return to previous home/apartment    Outpatient Services: Physical Therapy (PT) and Occupational Therapy (OT)    BARRIERS TO DISCHARGE  Weakness, Balance Difficulty, Home Accessibility, Caregiver Accessibility, Equipment Needs and Lives Alone     INTERVENTIONS FOR DISCHARGE  Adaptive equipment, Patient/Family/Caregiver Education, [employer], [employer] DME needs, Home Modifcations, Therapy exercises and Energy conservation education     REQUIRED THERAPY:  Patient will require PT and OT 90 minutes each per day, five days per week to achieve rehab goals  REQUIRED FUNCTIONAL AND MEDICAL MANAGEMENT FOR INPATIENT REHABILITATION:  Skin:  There are no pressure sores currently, Pain Management: Overall pain is well controlled, Fluid/Electrolytes/Nutrition:  Current Diet: general and renal, Deep Vein Thrombosis (DVT) Prophylaxis:  SCD's while in bed Nursing to provide education/training for medication and disease management, provide bowel/bladder management training  Internal Medicine to monitor and manage medical conditions, PM&R to maximize function and provide medical oversight of rehabilitation program   PT/OT interventions, Speech Therapy evaluation and treatment as clinically indicated  Patient/family education and training and additional consults will be provided as needed  RECOMMENDED LEVEL OF CARE: Patient is a 78-year-old male admitted to [facility] with complaints of weakness  Patient diagnosed with LORENZA on CKD, Sepsis and Traumatic Rhabdomyolysis  Prior to admission patient was fully independent with functional mobility, self-care and IADL tasks  Patient currently requires Min A x 2 with transfers and ambulation up to 20 feet with use of a RW  Patient also requires Min with UB bathing and dressing and Max A with LB bathing and dressing  Patient will benefit from continued PT and OT services to progress functional mobility and self-care skills to highest level of independence  Nursing is recommended to provide continued patient/family education and training in areas of medication management, chronic disease management and bowel/bladder training  Internal Medicine will continue to monitor medical conditions, PM&R to maximize function and provide medical oversight of rehabilitation program    In-patient rehabilitation is required to maximize self-care, mobility, strength, and endurance for discharge home with family

## 2021-09-11 NOTE — PLAN OF CARE
Problem: Potential for Falls  Goal: Patient will remain free of falls  Description: INTERVENTIONS:  - Educate patient/family on patient safety including physical limitations  - Instruct patient to call for assistance with activity   - Consult OT/PT to assist with strengthening/mobility   - Keep Call bell within reach  - Keep bed low and locked with side rails adjusted as appropriate  - Keep care items and personal belongings within reach  - Initiate and maintain comfort rounds  - Make Fall Risk Sign visible to staff  - Apply yellow socks and bracelet for high fall risk patients  - Consider moving patient to room near nurses station  Outcome: Progressing     Problem: Prexisting or High Potential for Compromised Skin Integrity  Goal: Skin integrity is maintained or improved  Description: INTERVENTIONS:  - Identify patients at risk for skin breakdown  - Assess and monitor skin integrity  - Assess and monitor nutrition and hydration status  - Monitor labs   - Assess for incontinence   - Turn and reposition patient  - Assist with mobility/ambulation  - Relieve pressure over bony prominences  - Avoid friction and shearing  - Provide appropriate hygiene as needed including keeping skin clean and dry  - Evaluate need for skin moisturizer/barrier cream  - Collaborate with interdisciplinary team   - Patient/family teaching  - Consider wound care consult   Outcome: Progressing

## 2021-09-11 NOTE — ASSESSMENT & PLAN NOTE
· Patient is asymptomatic without evidence of hypotension or fever  · UA showed WBC 30-50, large leukocytes and culture positive for Enterobacter cloacae  Unclear if this is pathogenic or represents colonization  · CT showed - 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 and associated renal parenchymal volume loss  Double-J stent in the left upper pole of the horseshoe kidney with dilated left ureter  Trabeculated bladder with bladder diverticulum likely related to chronic urostasis  · Patient follows Urology in [facility] and shin follow-up post discharge  · Continue IV rocephin, today day 4  · Appreciate urology input  Chronic left ureteral stent with q 6 month exchanges  · Status post last stent exchange at [facility] on 8/5/2021    · No urologic surgical intervention indicated at this time

## 2021-09-11 NOTE — [facility] ADMISSION
[facility] referral received 9/10 and [facility]C continues to follow  Patient's case has been reviewed with [facility] admitting physician and patient has been accepted to [facility] pending medical stability and insurance authorization  Will follow up Monday 9/13        Gail Murillo MS OTR/L   [facility] Admissions

## 2021-09-11 NOTE — ASSESSMENT & PLAN NOTE
· POA, appears that the patient's baseline creatinine is 3-3 5  · Creatinine 4 28 on admission  · Creatinine today 3 37  · Acute kidney injury most likely secondary to prerenal azotemia, rhabdo, and component of CKD progression,+ on ARB  · Nephrology consult, appreciate recommendations  · Continue to hold patient's losartan at this time  · Received IV fluids per Renal  · Diuretics remain on hold  · Increased sodium bicarbonate dose  · BMP in a m  Await renal recovery  · Patient follow-up with Nephrology at [facility]  Has a right upper extremity AV fistula in place which is ready for use

## 2021-09-11 NOTE — PROGRESS NOTES
Javon Cai  Progress Note - Ana Mcfadden 1945, 78 y o  male MRN: [id number]  Unit/Bed#: E2 -01 Encounter: [id number]  Primary Care Provider: Humble Payton MD   Date and time admitted to hospital: 9/7/2021  1:06 PM    * Acute kidney injury superimposed on CKD [facility])  Assessment & Plan  · POA, appears that the patient's baseline creatinine is 3-3 5  · Creatinine 4 28 on admission  · Creatinine today 3 37  · Acute kidney injury most likely secondary to prerenal azotemia, rhabdo, and component of CKD progression,+ on ARB  · Nephrology consult, appreciate recommendations  · Continue to hold patient's losartan at this time  · Received IV fluids per Renal  · Diuretics remain on hold  · Increased sodium bicarbonate dose  · BMP in a m  Await renal recovery  · Patient follow-up with Nephrology at [facility]  Has a right upper extremity AV fistula in place which is ready for use  Traumatic rhabdomyolysis [facility])  Assessment & Plan  · Patient found down on ground in home after having fallen 3 days prior and being unable to get up  States that he is getting out of bed and felt weak, he lowered himself to the ground and was unable to get up after that  Denies any loss of consciousness, head strike  · Patient unable to access any food/water during that time or take any medications  · CK significantly elevated at 1566  Normalized with IV hydration  · Nephrology consulted, appreciate recommendations  · PT/OT consulted for safe discharge planning  Recommend rehab    UTI (urinary tract infection)  Assessment & Plan  · Patient is asymptomatic without evidence of hypotension or fever  · UA showed WBC 30-50, large leukocytes and culture positive for Enterobacter cloacae  Unclear if this is pathogenic or represents colonization  · CT showed - 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 and associated renal parenchymal volume loss   Double-J stent in the left upper pole of the horseshoe kidney with dilated left ureter  Trabeculated bladder with bladder diverticulum likely related to chronic urostasis  · Patient follows Urology in [facility] and shin follow-up post discharge  · Continue IV rocephin, today day 4  · Appreciate urology input  Chronic left ureteral stent with q 6 month exchanges  · Status post last stent exchange at [facility] on 8/5/2021  · No urologic surgical intervention indicated at this time    Sepsis [facility])  Assessment & Plan  · Evolving since admission  As evidenced by leukocytosis, bandemia, with suspected source of infection UTI  · Continue IV rocpehin   · Procalcitonin significantly elevated though down trending   · WBC downtrending  · On 9/10 pt spike fever, 102*F  Follow up repeat blood cultures  PCT improving  Thrombocytopenia (HCC)  Assessment & Plan  · Platelet count stable in the 80s  It was 148 on admission  · Baseline platelet count appears to be normal in care everywhere  · Heparin on hold  · Check HIT panel   · SCDs    Elevated troponin  Assessment & Plan  · Mildly elevated troponin at 0 63, down trending  · Suspect likely secondary to rhabdomyolysis  · Patient denies chest pain  · EKG unremarkable    Cerebrovascular accident (CVA) [facility])  Assessment & Plan  · History of CVA with right-sided weakness  · On statin at home  On hold due to [facility], can likely resume upon discharge  HTN (hypertension)  Assessment & Plan  · Maintained on bisoprolol 5 q d , amlodipine 10 mg q p m  · Hold losartan 50 due to LORENZA  Continue to hold on discharge per Renal recommendation  · Monitor per unit routine  · BP stable    History of aortic valve replacement with tissue graft  Assessment & Plan  · 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  · On suppressive antibiotics with doxycycline 100 mg q d  Will continue      VTE Pharmacologic Prophylaxis:   CI due to thrombocytopenia     Patient Centered Rounds: I performed bedside rounds with nursing staff today   Discussions with Specialists or Other Care Team Provider:     Education and Discussions with Family / Patient: Updated  (grandson) via phone  Time Spent for Care: 30 minutes  More than 50% of total time spent on counseling and coordination of care as described above  Current Length of Stay: 4 day(s)  Current Patient Status: Inpatient   Certification Statement: The patient will continue to require additional inpatient hospital stay due to pending placement, resolution of fevers on IV abx, pending cultures   Discharge Plan: Anticipate discharge in 24-48 hrs to rehab facility  Code Status: Level 1 - Full Code    Subjective:   Pt seen and examined at bedside  Notes feeling stronger today, wants to get out of bed  No cp or sob  No difficulty urinating  Objective:     Vitals:   Temp (24hrs), Av 8 °F (37 7 °C), Min:97 8 °F (36 6 °C), Max:102 °F (38 9 °C)    Temp:  [97 8 °F (36 6 °C)-102 °F (38 9 °C)] 97 8 °F (36 6 °C)  HR:  [61-73] 61  Resp:  [18-20] 18  BP: (115-136)/(56-63) 131/63  SpO2:  [95 %-97 %] 95 %  Body mass index is 26 38 kg/m²  Input and Output Summary (last 24 hours): Intake/Output Summary (Last 24 hours) at 2021 1057  Last data filed at 2021 0601  Gross per 24 hour   Intake --   Output 900 ml   Net -900 ml     Physical Exam:   Physical Exam  Constitutional:       Appearance: Normal appearance  HENT:      Head: Normocephalic and atraumatic  Mouth/Throat:      Mouth: Mucous membranes are moist    Eyes:      Extraocular Movements: Extraocular movements intact  Cardiovascular:      Rate and Rhythm: Normal rate and regular rhythm  Heart sounds: Murmur heard  Pulmonary:      Effort: Pulmonary effort is normal       Breath sounds: Normal breath sounds  Abdominal:      General: Abdomen is flat  Palpations: Abdomen is soft  Musculoskeletal:         General: No swelling  Normal range of motion        Cervical back: Normal range of motion and neck supple  Skin:     General: Skin is warm and dry  Neurological:      General: No focal deficit present  Mental Status: He is alert and oriented to person, place, and time  Psychiatric:         Mood and Affect: Mood normal          Behavior: Behavior normal         Additional Data:     Labs:  Results from last 7 days   Lab Units 09/11/21  0427 09/08/21  0628   WBC Thousand/uL 13 55* 19 93*   HEMOGLOBIN g/dL 11 4* 12 0   HEMATOCRIT % 36 2* 36 7   PLATELETS Thousands/uL 74* 127*   BANDS PCT %  --  14*   LYMPHO PCT %  --  3*   MONO PCT %  --  4   EOS PCT %  --  0     Results from last 7 days   Lab Units 09/11/21  0427 09/09/21  0425   SODIUM mmol/L 140 140   POTASSIUM mmol/L 4 6 4 3   CHLORIDE mmol/L 108 107   CO2 mmol/L 16* 18*   BUN mg/dL 88* 94*   CREATININE mg/dL 3 37* 3 80*   ANION GAP mmol/L 16* 15*   CALCIUM mg/dL 7 2* 7 3*   ALBUMIN g/dL  --  2 2*   TOTAL BILIRUBIN mg/dL  --  0 42   ALK PHOS U/L  --  121*   ALT U/L  --  24   AST U/L  --  34   GLUCOSE RANDOM mg/dL 52* 111                 Results from last 7 days   Lab Units 09/10/21  0438 09/09/21 1911 09/08/21  1809   LACTIC ACID mmol/L  --   --  1 3   PROCALCITONIN ng/ml 149 97* 220 04* 411 08*       Lines/Drains:  Invasive Devices     Peripheral Intravenous Line            Peripheral IV 09/07/21 Dorsal (posterior); Left Forearm 3 days    Peripheral IV 09/08/21 Left Forearm 3 days          Line            Hemodialysis AV Fistula 09/11/21 Right Upper arm <1 day                      Imaging: Reviewed radiology reports from this admission including: chest xray    Recent Cultures (last 7 days):   Results from last 7 days   Lab Units 09/10/21  1927 09/10/21  1912 09/07/21  2037   BLOOD CULTURE  Received in Microbiology Lab  Culture in Progress  Received in Microbiology Lab  Culture in Progress    --    URINE CULTURE   --   --  >100,000 cfu/ml Enterobacter cloacae complex*  >100,000 cfu/ml        Last 24 Hours Medication List:   Current Facility-Administered Medications   Medication Dose Route Frequency Provider Last Rate    acetaminophen  650 mg Oral Q6H PRN Danika Conteh PA-C      aluminum-magnesium hydroxide-simethicone  30 mL Oral Q6H PRN Danika Conteh PA-C      amLODIPine  10 mg Oral QPM OFELIA Saunders      bisoprolol  5 mg Oral Daily Danika Conteh PA-C      calcium carbonate  1,250 mg Oral Daily OFELIA Saunders      cefTRIAXone  1,000 mg Intravenous Q24H Juan Lowery PA-C      cholecalciferol  1,000 Units Oral Daily OFELIA Saunders      docusate sodium  100 mg Oral BID Danika Conteh PA-C      doxycycline hyclate  100 mg Oral Daily Danika Conteh, [place]      famotidine  10 mg Oral Every Other Day Duane Diss, MD      ondansetron  4 mg Intravenous Q6H PRN Danika Conteh PA-C      saccharomyces boulardii  250 mg Oral BID OFELIA Hudson      sodium bicarbonate  1,300 mg Oral BID after meals Wan Francisco MD          Today, Patient Was Seen By: Ana Anglin PA-C    **Please Note: This note may have been constructed using a voice recognition system  **

## 2021-09-11 NOTE — ASSESSMENT & PLAN NOTE
· Platelet count stable in the 80s  It was 148 on admission  · Baseline platelet count appears to be normal in care everywhere    · Heparin on hold  · Check HIT panel   · SCDs

## 2021-09-11 NOTE — ASSESSMENT & PLAN NOTE
· Evolving since admission  As evidenced by leukocytosis, bandemia, with suspected source of infection UTI  · Continue IV rocpehin   · Procalcitonin significantly elevated though down trending   · WBC downtrending  · On 9/10 pt spike fever, 102*F  Follow up repeat blood cultures  PCT improving

## 2021-09-11 NOTE — CONSULTS
Consultation - Infectious Disease   Tracey Coulter 78 y o  male MRN: [id number]  Unit/Bed#: E2 -01 Encounter: [id number]      IMPRESSION & RECOMMENDATIONS:   1  Sepsis-evolving since admission  Patient with fever, leukocytosis  Suspect secondary to urinary tract infection  No other clear source appreciated  The patient has no respiratory symptoms and his chest x-ray was clear of any pneumonia  Consideration for the possibility of bacteremia  Thus far the blood cultures are negative  He has grown Enterobacter out of the urine which can have inducible beta lactamase leading to resistance to ceftriaxone   -discontinue ceftriaxone doxycycline  -begin cefepime 1 g IV q 12 hours  -follow up blood cultures  -check CT the abdomen pelvis with oral contrast only  -recheck CBC with diff and BMP  -check procalcitonin level  -supportive care    2  Urinary tract infection-in a patient with a 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  Imaging of the lumbar and thoracic spine did show some evidence of hydronephrosis that apparently is chronic but also some left-sided more proximal hydronephrosis in and around the stent  -antibiotics as above  -check CT abdomen and pelvis  -urology follow-up  -additional workup as needed    3  Acute kidney injury-complicating chronic kidney disease  Suspected secondary to pre renal issues  Consideration for the possibility of myoglobinuria playing a role in the setting of rhabdomyolysis  The patient's renal function has been slowly improving   -recheck BMP  -dose adjusted antibiotics as needed  -volume management  -nephrology follow-up    4  Thrombocytopenia-possibly secondary to sepsis  Possibly medication effect  The heparin has been on hold  -recheck CBC with diff  -treatment of sepsis as above  -check hit panel    5  Traumatic rhabdomyolysis-after the patient fell and could not get up  Initially with high CPK which is now normalized    -no additional ID workup for now    Have discussed the above management plan in detail with the primary service    Extensive review of the medical records in epic including review of the notes, radiographs, and laboratory results     HISTORY OF PRESENT ILLNESS:  Reason for Consult:  Sepsis  HPI: Uriah Lau is a 78y o  year old male with chronic kidney disease, 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, aortic valve replacement, coronary artery disease admitted to [facility] after sustaining a fall and being found to have rhabdomyolysis who we are asked to assist with antibiotic management for sepsis associated with UTI  Apparently the patient was getting out of bed approximately 3 days prior to admission and felt weak and subsequently fell to the ground  He tempted to get up but was unable to do so  Patient was eventually found by his family and brought to the emergency department for further evaluation  Emergency department the patient was found to have rhabdomyolysis, acute kidney injury, a brisk leukocytosis  He had blood cultures and urine cultures obtained and was started on ceftriaxone and doxycycline admitted for further management  His urine cultures have now grown Enterobacter  He spiked a high fever yesterday and was given a single dose of piperacillin tazobactam   The patient denies any dysuria but admits to having some hematuria  He denies any abdominal or flank pain, denies any cough or difficulty breathing, denies any rash or skin lesions, denies any sore throat or rhinorrhea or nasal congestion, denies any other joint or muscle pains  REVIEW OF SYSTEMS:  A complete review of systems is negative other than that noted in the HPI  PAST MEDICAL HISTORY:  Past Medical History:   Diagnosis Date    Coronary artery disease     Renal disorder      Past Surgical History:   Procedure Laterality Date    AORTIC VALVE REPLACEMENT  2005    Tissue valve    BLADDER SURGERY      23 yrs ago      CORONARY ARTERY BYPASS GRAFT  2005    x1    RENAL ARTERY STENT  2005       FAMILY HISTORY:  Non-contributory    SOCIAL HISTORY:  Social History   Social History     Substance and Sexual Activity   Alcohol Use Not Currently     Social History     Substance and Sexual Activity   Drug Use Never     Social History     Tobacco Use   Smoking Status Former Smoker    Packs/day: 1 00    Types: Cigarettes    Quit date: 1993    Years since quittin 7   Smokeless Tobacco Never Used       ALLERGIES:  Allergies   Allergen Reactions    Latex     Nitrofurantoin Other (See Comments)     neck pain    Other Itching       MEDICATIONS:  All current active medications have been reviewed  Antibiotics:  Ceftriaxone 4, doxycycline 4, piperacillin tazobactam x1 dose    PHYSICAL EXAM:  Temp:  [97 8 °F (36 6 °C)-102 °F (38 9 °C)] 97 8 °F (36 6 °C)  HR:  [61-73] 61  Resp:  [18-20] 18  BP: (115-136)/(56-63) 131/63  SpO2:  [95 %-97 %] 95 %  Temp (24hrs), Av 8 °F (37 7 °C), Min:97 8 °F (36 6 °C), Max:102 °F (38 9 °C)  Current: Temperature: 97 8 °F (36 6 °C)    Intake/Output Summary (Last 24 hours) at 2021 1251  Last data filed at 2021 0601  Gross per 24 hour   Intake --   Output 900 ml   Net -900 ml       General Appearance:  Appearing well, nontoxic, and in no distress   Head:  Normocephalic, without obvious abnormality, atraumatic   Eyes:  Conjunctiva pink and sclera anicteric, both eyes   Nose: Nares normal, mucosa normal, no drainage   Throat: Oropharynx moist without lesions   Neck: Supple, symmetrical, no adenopathy, no tenderness/mass/nodules   Back:   Symmetric, no curvature, ROM normal, no CVA tenderness   Lungs:   Clear to auscultation bilaterally, respirations unlabored   Chest Wall:  No tenderness or deformity   Heart:  RRR; soft systolic murmur, rub or gallop   Abdomen:   Soft, non-tender, non-distended, positive bowel sounds    Extremities: No cyanosis, clubbing or edema   Skin: No rashes or lesions   No draining wounds noted  Lymph nodes: Cervical, supraclavicular nodes normal   Neurologic: Alert and oriented times 3, extremity strength 5/5 and symmetric       LABS, IMAGING, & OTHER STUDIES:  Lab Results:  I have personally reviewed pertinent labs  Results from last 7 days   Lab Units 09/11/21  0427 09/10/21  0438 09/09/21  0425   WBC Thousand/uL 13 55* 12 37* 12 51*   HEMOGLOBIN g/dL 11 4* 11 2* 10 9*   PLATELETS Thousands/uL 74* 81* 87*     Results from last 7 days   Lab Units 09/11/21  0427 09/10/21  0438 09/09/21  0425 09/07/21  1439   SODIUM mmol/L 140 141 140 142   POTASSIUM mmol/L 4 6 4 0 4 3 4 0   CHLORIDE mmol/L 108 108 107 106   CO2 mmol/L 16* 19* 18* 18*   BUN mg/dL 88* 92* 94* 87*   CREATININE mg/dL 3 37* 3 95* 3 80* 4 28*   EGFR ml/min/1 73sq m 17 14 15 13   CALCIUM mg/dL 7 2* 7 3* 7 3* 8 2*   AST U/L  --   --  34 68*   ALT U/L  --   --  24 39   ALK PHOS U/L  --   --  121* 100     Results from last 7 days   Lab Units 09/10/21  1927 09/10/21  1912 09/07/21  2037   BLOOD CULTURE  Received in Microbiology Lab  Culture in Progress  Received in Microbiology Lab  Culture in Progress  --    URINE CULTURE   --   --  >100,000 cfu/ml Enterobacter cloacae complex*  >100,000 cfu/ml      Results from last 7 days   Lab Units 09/10/21  0438 09/09/21  1911 09/08/21  1809   PROCALCITONIN ng/ml 149 97* 220 04* 411 08*                   Imaging Studies:     CT thoracic and lumbar spine-no fracture  Horseshoe kidney with chronic hydronephrosis  Left stent in place      Chest x-ray-no acute cardiopulmonary disease    Images personally reviewed by me in PACS

## 2021-09-11 NOTE — ASSESSMENT & PLAN NOTE
· History of CVA with right-sided weakness  · On statin at home  On hold due to [facility], can likely resume upon discharge

## 2021-09-12 ENCOUNTER — APPOINTMENT (INPATIENT)
Dept: CT IMAGING | Facility: HOSPITAL | Age: 76
DRG: 564 | End: 2021-09-12
Payer: COMMERCIAL

## 2021-09-12 LAB
ANION GAP SERPL CALCULATED.3IONS-SCNC: 10 MMOL/L (ref 4–13)
BASOPHILS # BLD AUTO: 0.06 THOUSANDS/ΜL (ref 0–0.1)
BASOPHILS NFR BLD AUTO: 0 % (ref 0–1)
BUN SERPL-MCNC: 82 MG/DL (ref 5–25)
CALCIUM SERPL-MCNC: 7.1 MG/DL (ref 8.3–10.1)
CHLORIDE SERPL-SCNC: 108 MMOL/L (ref 100–108)
CO2 SERPL-SCNC: 24 MMOL/L (ref 21–32)
CREAT SERPL-MCNC: 3.79 MG/DL (ref 0.6–1.3)
EOSINOPHIL # BLD AUTO: 0.17 THOUSAND/ΜL (ref 0–0.61)
EOSINOPHIL NFR BLD AUTO: 1 % (ref 0–6)
ERYTHROCYTE [DISTWIDTH] IN BLOOD BY AUTOMATED COUNT: 15.9 % (ref 11.6–15.1)
GFR SERPL CREATININE-BSD FRML MDRD: 15 ML/MIN/1.73SQ M
GLUCOSE SERPL-MCNC: 128 MG/DL (ref 65–140)
HCT VFR BLD AUTO: 35.2 % (ref 36.5–49.3)
HGB BLD-MCNC: 11.5 G/DL (ref 12–17)
IMM GRANULOCYTES # BLD AUTO: 0.38 THOUSAND/UL (ref 0–0.2)
IMM GRANULOCYTES NFR BLD AUTO: 2 % (ref 0–2)
LYMPHOCYTES # BLD AUTO: 1.41 THOUSANDS/ΜL (ref 0.6–4.47)
LYMPHOCYTES NFR BLD AUTO: 9 % (ref 14–44)
MCH RBC QN AUTO: 28.8 PG (ref 26.8–34.3)
MCHC RBC AUTO-ENTMCNC: 32.7 G/DL (ref 31.4–37.4)
MCV RBC AUTO: 88 FL (ref 82–98)
MONOCYTES # BLD AUTO: 0.95 THOUSAND/ΜL (ref 0.17–1.22)
MONOCYTES NFR BLD AUTO: 6 % (ref 4–12)
NEUTROPHILS # BLD AUTO: 13.69 THOUSANDS/ΜL (ref 1.85–7.62)
NEUTS SEG NFR BLD AUTO: 82 % (ref 43–75)
NRBC BLD AUTO-RTO: 0 /100 WBCS
PLATELET # BLD AUTO: 109 THOUSANDS/UL (ref 149–390)
PMV BLD AUTO: 12.2 FL (ref 8.9–12.7)
POTASSIUM SERPL-SCNC: 3.2 MMOL/L (ref 3.5–5.3)
PROCALCITONIN SERPL-MCNC: 151.97 NG/ML
RBC # BLD AUTO: 4 MILLION/UL (ref 3.88–5.62)
SODIUM SERPL-SCNC: 142 MMOL/L (ref 136–145)
WBC # BLD AUTO: 16.66 THOUSAND/UL (ref 4.31–10.16)

## 2021-09-12 PROCEDURE — 84145 PROCALCITONIN (PCT): CPT | Performed by: INTERNAL MEDICINE

## 2021-09-12 PROCEDURE — G1004 CDSM NDSC: HCPCS

## 2021-09-12 PROCEDURE — 97535 SELF CARE MNGMENT TRAINING: CPT

## 2021-09-12 PROCEDURE — 97530 THERAPEUTIC ACTIVITIES: CPT

## 2021-09-12 PROCEDURE — 80048 BASIC METABOLIC PNL TOTAL CA: CPT | Performed by: PHYSICIAN ASSISTANT

## 2021-09-12 PROCEDURE — 99232 SBSQ HOSP IP/OBS MODERATE 35: CPT | Performed by: PHYSICIAN ASSISTANT

## 2021-09-12 PROCEDURE — 99232 SBSQ HOSP IP/OBS MODERATE 35: CPT | Performed by: INTERNAL MEDICINE

## 2021-09-12 PROCEDURE — 97116 GAIT TRAINING THERAPY: CPT

## 2021-09-12 PROCEDURE — 85025 COMPLETE CBC W/AUTO DIFF WBC: CPT | Performed by: PHYSICIAN ASSISTANT

## 2021-09-12 PROCEDURE — 74176 CT ABD & PELVIS W/O CONTRAST: CPT

## 2021-09-12 PROCEDURE — 97110 THERAPEUTIC EXERCISES: CPT

## 2021-09-12 RX ADMIN — BISOPROLOL FUMARATE 5 MG: 5 TABLET ORAL at 08:20

## 2021-09-12 RX ADMIN — Medication 250 MG: at 08:19

## 2021-09-12 RX ADMIN — SODIUM BICARBONATE 650 MG TABLET 1300 MG: at 17:02

## 2021-09-12 RX ADMIN — Medication 1000 UNITS: at 08:19

## 2021-09-12 RX ADMIN — AMLODIPINE BESYLATE 10 MG: 10 TABLET ORAL at 17:02

## 2021-09-12 RX ADMIN — CEFEPIME HYDROCHLORIDE 1000 MG: 1 INJECTION, POWDER, FOR SOLUTION INTRAMUSCULAR; INTRAVENOUS at 04:44

## 2021-09-12 RX ADMIN — CALCIUM CARBONATE (ANTACID) CHEW TAB 500 MG 1250 MG: 500 CHEW TAB at 08:20

## 2021-09-12 RX ADMIN — FAMOTIDINE 10 MG: 20 TABLET ORAL at 05:47

## 2021-09-12 RX ADMIN — DOCUSATE SODIUM 100 MG: 100 CAPSULE ORAL at 17:02

## 2021-09-12 RX ADMIN — DOCUSATE SODIUM 100 MG: 100 CAPSULE ORAL at 08:19

## 2021-09-12 RX ADMIN — CEFEPIME HYDROCHLORIDE 1000 MG: 1 INJECTION, POWDER, FOR SOLUTION INTRAMUSCULAR; INTRAVENOUS at 17:02

## 2021-09-12 RX ADMIN — Medication 250 MG: at 17:02

## 2021-09-12 RX ADMIN — SODIUM BICARBONATE 650 MG TABLET 1300 MG: at 08:19

## 2021-09-12 NOTE — PLAN OF CARE
Problem: OCCUPATIONAL THERAPY ADULT  Goal: Performs self-care activities at highest level of function for planned discharge setting  See evaluation for individualized goals  Description: Treatment Interventions: ADL retraining, Functional transfer training, UE strengthening/ROM, Endurance training, Patient/family training, Equipment evaluation/education, Activityengagement          See flowsheet documentation for full assessment, interventions and recommendations  Outcome: Progressing  Note: Limitation: Decreased ADL status, Decreased UE strength, Decreased UE ROM, Decreased endurance, Decreased self-care trans, Decreased high-level ADLs  Prognosis: Fair  Assessment: Pt seen for OT treatment session focusing on functional activity tolerance, bed mobility, ADLs, and functional mobility/transfers  Pt alert and cooperative throughout session  Pt lying supine at start of session, completing bed mobility (supine>sit) w/ Min A of 2 with assist for LE management and trunk support  Transfers (sit<>stand, RW<>standard toilet) completed w/ Min A of 2 with assist to initiate forward weight shift from surface for sit>stand and assist for controlled descent to surface for stand>sit  Pt required verbal cues for hand placement during sit<>stand transitions  Min A required for functional mobility with assist for balance/steadying w/ use of RW and 2nd person standby for optimal pt safety  Toileting tasks completed w/ Min A with assist for clothing management up/down 2* decreased dynamic standing balance demonstrated  Min A required for balance/steadying when standing to sink to wash hands 2* decreased dynamic balance deficits demonstrated when standing w/o UE support to wash/dry hands  Dressing tasks completed while seated EOB  UB dressing completed w/ Min A with assist to bring gown over shldrs/down in back   LB dressing completed w/ Max A w/ limited functional reach to don/doff B/L socks and limited functional reach and decreased ROM of R LE to thread B/L LEs into pants  Pt required assist in standing to pull pants over hips w/ 1 LOB noted  Pt lying supine at end of session with call bell and phone within reach  Bed alarm activated  All needs met and pt reports no further questions for OT at this time  The patient's raw score on the AM-PAC Daily Activity inpatient short form is 16, standardized score is 35 96, less than 39 4  Patients at this level are likely to benefit from discharge to post-acute rehabilitation services  Please refer to the recommendation of the Occupational Therapist for safe discharge planning  Continue to recommend STR when medically cleared  OT to follow pt on caseload        OT Discharge Recommendation: Post acute rehabilitation services  OT - OK to Discharge: Yes (when medically cleared to rehab)

## 2021-09-12 NOTE — OCCUPATIONAL THERAPY NOTE
Occupational Therapy Progress Note     Patient Name: Katiana Nj  Today's Date: 9/12/2021  Problem List  Principal Problem:    Acute kidney injury superimposed on CKD [facility])  Active Problems:    History of aortic valve replacement with tissue graft    HTN (hypertension)    Cerebrovascular accident (CVA) ([facility] )    Tra[facility]olysis ([facility] )    Elevated troponin    UTI (urinary tract infection)    Sepsis ([facility] )    Thrombocytopenia ([facility] )          09/12/21 1412   OT Last Visit   OT Visit Date 09/12/21   Note Type   Note Type Treatment   Restrictions/Precautions   Weight Bearing Precautions Per Order No   Other Precautions Chair Alarm; Bed Alarm;Cognitive;Limb alert; Fall Risk;Pain   General   Response to Previous Treatment Patient with no complaints from previous session   Pain Assessment   Pain Assessment Tool 0-10   Pain Score 8   Pain Location/Orientation Location: Groin   Patient's Stated Pain Goal No pain   Hospital Pain Intervention(s) Repositioned; Ambulation/increased activity; Emotional support; Rest   Multiple Pain Sites No   ADL   Grooming Assistance 4  Minimal Assistance   Grooming Deficit Setup;Steadying;Verbal cueing;Supervision/safety; Increased time to complete;Wash/dry hands   Grooming Comments Min A required for balance/steadying when standing to sink to wash hands 2* decreased dynamic balance deficits demonstrated when standing w/o UE support to wash/dry hands   UB Dressing Assistance 4  Minimal Assistance   UB Dressing Deficit Setup;Verbal cueing;Supervision/safety; Increased time to complete;Pull around back;Pull down in back   LB Dressing Assistance 2  Maximal Assistance   LB Dressing Deficit Setup;Steadying; Requires assistive device for steadying;Verbal cueing;Supervision/safety; Increased time to complete; Don/doff R sock; Don/doff L sock; Thread RLE into pants; Thread LLE into pants;Pull up over hips   Toileting Assistance  4  Minimal Assistance   Toileting Deficit Setup;Steadying;Verbal cueing;Supervison/safety; Increased time to complete;Clothing management up;Clothing management down   Functional Standing Tolerance   Time 5 mins   Activity Dynamic standing balance activity, grooming activity at sink   Comments Min A for balance/steadying   Bed Mobility   Supine to Sit 4  Minimal assistance   Additional items Assist x 2;HOB elevated; Bedrails; Increased time required;Verbal cues;LE management   Sit to Supine 4  Minimal assistance   Additional items Assist x 1; Increased time required;Verbal cues;LE management   Additional Comments Pt lying supine at end of session with call bell and phone within reach  Bed alarm activated  All needs met and pt reports no further questions for OT at this time   Transfers   Sit to Stand 4  Minimal assistance   Additional items Assist x 2; Increased time required;Verbal cues   Stand to Sit 4  Minimal assistance   Additional items Assist x 2; Increased time required;Verbal cues   Toilet transfer 4  Minimal assistance   Additional items Assist x 2; Increased time required;Verbal cues;Standard toilet  (grab bar use)   Additional Comments Cues for safe technique and hand placement   Functional Mobility   Functional Mobility 4  Minimal assistance   Additional Comments Assist x1 w/ 2nd therapist present   Additional items Rolling walker   Toilet Transfers   Toilet Transfer From Rolling walker   Toilet Transfer Type To and from   Toilet Transfer to Standard toilet   Toilet Transfer Technique Ambulating   Toilet Transfers Minimal assistance;Verbal cues   Toilet Transfers Comments Assist x1 w/ grab bar use   Cognition   Overall Cognitive Status Impaired   Arousal/Participation Alert; Cooperative   Attention Attends with cues to redirect   Orientation Level Oriented X4   Memory Decreased recall of precautions   Following Commands Follows one step commands with increased time or repetition   Comments Limited insight into deficits   Activity Tolerance   Activity Tolerance Patient limited by pain; Patient tolerated treatment well   Medical Staff Made Aware Shanice Jones, RN   Assessment   Assessment Pt seen for OT treatment session focusing on functional activity tolerance, bed mobility, ADLs, and functional mobility/transfers  Pt alert and cooperative throughout session  Pt lying supine at start of session, completing bed mobility (supine>sit) w/ Min A of 2 with assist for LE management and trunk support  Transfers (sit<>stand, RW<>standard toilet) completed w/ Min A of 2 with assist to initiate forward weight shift from surface for sit>stand and assist for controlled descent to surface for stand>sit  Pt required verbal cues for hand placement during sit<>stand transitions  Min A required for functional mobility with assist for balance/steadying w/ use of RW and 2nd person standby for optimal pt safety  Toileting tasks completed w/ Min A with assist for clothing management up/down 2* decreased dynamic standing balance demonstrated  Min A required for balance/steadying when standing to sink to wash hands 2* decreased dynamic balance deficits demonstrated when standing w/o UE support to wash/dry hands  Dressing tasks completed while seated EOB  UB dressing completed w/ Min A with assist to bring gown over shldrs/down in back  LB dressing completed w/ Max A w/ limited functional reach to don/doff B/L socks and limited functional reach and decreased ROM of R LE to thread B/L LEs into pants  Pt required assist in standing to pull pants over hips w/ 1 LOB noted  Pt lying supine at end of session with call bell and phone within reach  Bed alarm activated  All needs met and pt reports no further questions for OT at this time  The patient's raw score on the AM-PAC Daily Activity inpatient short form is 16, standardized score is 35 96, less than 39 4  Patients at this level are likely to benefit from discharge to post-acute rehabilitation services   Please refer to the recommendation of the Occupational Therapist for safe discharge planning  Continue to recommend STR when medically cleared  OT to follow pt on caseload  Plan   Treatment Interventions ADL retraining;Functional transfer training;UE strengthening/ROM; Endurance training;Patient/family training;Equipment evaluation/education; Compensatory technique education; Energy conservation; Activityengagement   Goal Expiration Date 09/22/21   OT Treatment Day 2   OT Frequency 3-5x/wk   Recommendation   OT Discharge Recommendation Post acute rehabilitation services   OT - OK to Discharge Yes  (when medically cleared to rehab)   AM-PAC Daily Activity Inpatient   Lower Body Dressing 2   Bathing 2   Toileting 3   Upper Body Dressing 3   Grooming 3   Eating 3   Daily Activity Raw Score 16   Daily Activity Standardized Score (Calc for Raw Score >=11) 35 96   AM-PAC Applied Cognition Inpatient   Following a Speech/Presentation 3   Understanding Ordinary Conversation 4   Taking Medications 3   Remembering Where Things Are Placed or Put Away 3   Remembering List of 4-5 Errands 2   Taking Care of Complicated Tasks 2   Applied Cognition Raw Score 17   Applied Cognition Standardized Score 36 52       Alex Ojeda, OTR/L

## 2021-09-12 NOTE — PLAN OF CARE
Problem: PHYSICAL THERAPY ADULT  Goal: Performs mobility at highest level of function for planned discharge setting  See evaluation for individualized goals  Description: Treatment/Interventions: Functional transfer training, LE strengthening/ROM, Therapeutic exercise, Patient/family training, Cognitive reorientation, Equipment eval/education, Bed mobility, Gait training, Continued evaluation, Compensatory technique education, Spoke to nursing, OT          See flowsheet documentation for full assessment, interventions and recommendations  Outcome: Progressing  Note: Prognosis: Fair  Problem List: Decreased strength, Decreased range of motion, Decreased endurance, Impaired balance, Decreased mobility, Decreased coordination, Decreased cognition, Impaired judgement, Decreased safety awareness, Impaired tone, Impaired sensation, Decreased skin integrity, Pain  Assessment: Pt  supine in bed upon my arrival  Pt  reporting fatigue/pain, however agreeable to therapeutic intervention  Performance of HEP with cues provided for proper completion  Progressed with transfers requiring A of therapist with cues for hand placement/technique  Pt  required increased A while positioned seated at EOB to avoid LOB  Progressed with OOB mobility with use of RW and A of therapist with cues provided for LE sequencing  Pt  requested to use br  After completion of br trial, pt  returned to supine in bed with alarm active at end of treatment session  PT will continue to recommend d/c to rehab when medically stable for continued improvement of noted impairments  Barriers to Discharge: Inaccessible home environment, Decreased caregiver support  Barriers to Discharge Comments: Level of support at home  PT Discharge Recommendation: Post acute rehabilitation services     PT - OK to Discharge: Yes (if d/c to rehab when medically stable )    See flowsheet documentation for full assessment

## 2021-09-12 NOTE — PROGRESS NOTES
[address] [place] [address]  Progress Note - Beatris Bender 1945, 78 y o  male MRN: [id number]  Unit/Bed#: E2 -01 Encounter: [id number]  Primary Care Provider: Wendi Foster MD   Date and time admitted to hospital: 9/7/2021  1:06 PM      Sepsis [facility])  Assessment & Plan  · Evolving since admission  As evidenced by leukocytosis, bandemia, with suspected source of infection UTI  · Cefepime   · Procalcitonin significantly elevated though down trending   · WBC downtrending  · On 9/10 pt spike fever, 102*F  Follow up repeat blood cultures  PCT improving  · CT of abdomen and pelvis pending  · Case reviewed with ID; recommended at least an additional 24 hours of IV antibiotics    UTI (urinary tract infection)  Assessment & Plan  · Patient is asymptomatic without evidence of hypotension or fever  · UA showed WBC 30-50, large leukocytes and culture positive for Enterobacter cloacae  Unclear if this is pathogenic or represents colonization  · CT showed - 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 and associated renal parenchymal volume loss  Double-J stent in the left upper pole of the horseshoe kidney with dilated left ureter  Trabeculated bladder with bladder diverticulum likely related to chronic urostasis  · Patient follows Urology in [facility] and shin follow-up post discharge  · Rocephin recently changed to Cefepime by ID  · Appreciate urology input  Chronic left ureteral stent with q 6 month exchanges  · Status post last stent exchange at [facility] on 8/5/2021  · No urologic surgical intervention indicated at this time    * Acute kidney injury superimposed on CKD [facility])  Assessment & Plan  · POA, appears that the patient's baseline creatinine is 3-3 5  · Creatinine 4 28 on admission  · Creatinine today 3 37  · Acute kidney injury most likely secondary to prerenal azotemia, rhabdo, and component of CKD progression,+ on ARB    · Nephrology consult, appreciate recommendations  · Continue to hold patient's losartan at this time  · Received IV fluids per Renal  · Diuretics remain on hold  · Sodium bicarbonate dose recently increased   · Trend BMP for renal recovery  · Patient follow-up with Nephrology at [facility]  Has a right upper extremity AV fistula in place which is ready for use  Traumatic rhabdomyolysis [facility])  Assessment & Plan  · Patient found down on ground in home after having fallen 3 days prior and being unable to get up  States that he is getting out of bed and felt weak, he lowered himself to the ground and was unable to get up after that  Denies any loss of consciousness, head strike  · Patient unable to access any food/water during that time or take any medications  · CK significantly elevated at 1566  Normalized with IV hydration  · Nephrology consulted, appreciate recommendations  · PT/OT consulted for safe discharge planning  Recommend rehab    Thrombocytopenia ([facility])  Assessment & Plan  · Platelet count stable in the 80s  It was 148 on admission  · Baseline platelet count appears to be normal in care everywhere  · Heparin on hold  · Check HIT panel   · SCDs    Elevated troponin  Assessment & Plan  · Mildly elevated troponin at 0 63, down trending  · Suspect likely secondary to rhabdomyolysis  · Patient denies chest pain  · EKG unremarkable    Cerebrovascular accident (CVA) [facility])  Assessment & Plan  · History of CVA with right-sided weakness  · On statin at home  On hold due to [facility], can likely resume upon discharge  HTN (hypertension)  Assessment & Plan  · Maintained on bisoprolol 5 q d , amlodipine 10 mg q p m  · Hold losartan 50 due to LORENZA  Continue to hold on discharge per Renal recommendation  · Monitor per unit routine  · BP stable    History of aortic valve replacement with tissue graft  Assessment & Plan  · 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  · On suppressive antibiotics with doxycycline 100 mg q d  Will continue  VTE Pharmacologic Prophylaxis:   Pharmacologic: None secondary to anemia and thrombocytopenia  Mechanical VTE Prophylaxis in Place: Yes    Patient Centered Rounds: I have performed bedside rounds with nursing staff today  Education and Discussions with Family / Patient:  Jazmyn Ornelas left with this child    Time Spent for Care: 45 minutes  More than 50% of total time spent on counseling and coordination of care as described above  Current Length of Stay: 5 day(s)    Current Patient Status: Inpatient   Certification Statement: The patient will continue to require additional inpatient hospital stay due to Pending CT abdomen pelvis    Discharge Plan:  Likely placement with rehab recommended by  PT  Code Status: Level 1 - Full Code      Subjective:   Patient does state he feels like he is doing better  He is anxious to be released from the hospital   He is tolerating p o  intake  Does admit he is somewhat weakened state  Regular bowel movements  He denies dysuria  Objective:     Vitals:   Temp (24hrs), Av 8 °F (36 6 °C), Min:97 5 °F (36 4 °C), Max:98 2 °F (36 8 °C)    Temp:  [97 5 °F (36 4 °C)-98 2 °F (36 8 °C)] 98 2 °F (36 8 °C)  HR:  [64] 64  Resp:  [17-18] 18  BP: (122-128)/(57-61) 128/60  SpO2:  [95 %-96 %] 96 %  Body mass index is 26 08 kg/m²  Input and Output Summary (last 24 hours): Intake/Output Summary (Last 24 hours) at 2021 1234  Last data filed at 2021 1714  Gross per 24 hour   Intake --   Output 800 ml   Net -800 ml       Physical Exam:     Physical Exam  Constitutional:       Appearance: He is normal weight  Comments: Thin, somewhat frail-appearing   HENT:      Head: Normocephalic and atraumatic  Nose: Nose normal       Mouth/Throat:      Mouth: Mucous membranes are moist       Pharynx: Oropharynx is clear  Eyes:      Extraocular Movements: Extraocular movements intact        Conjunctiva/sclera: Conjunctivae normal    Cardiovascular: Rate and Rhythm: Normal rate and regular rhythm  Pulses: Normal pulses  Heart sounds: Normal heart sounds  Pulmonary:      Effort: Pulmonary effort is normal       Breath sounds: Normal breath sounds  Abdominal:      General: Abdomen is flat  Bowel sounds are normal  There is no distension  Palpations: Abdomen is soft  There is no mass  Musculoskeletal:         General: Normal range of motion  Cervical back: Normal range of motion and neck supple  Skin:     General: Skin is warm and dry  Capillary Refill: Capillary refill takes less than 2 seconds  Neurological:      General: No focal deficit present  Mental Status: He is alert  Mental status is at baseline  Psychiatric:         Mood and Affect: Mood normal          Behavior: Behavior normal          Thought Content: Thought content normal          Judgment: Judgment normal        Additional Data:     Labs:    Results from last 7 days   Lab Units 09/12/21  0505 09/08/21  0628   WBC Thousand/uL 16 66* 19 93*   HEMOGLOBIN g/dL 11 5* 12 0   HEMATOCRIT % 35 2* 36 7   PLATELETS Thousands/uL 109* 127*   BANDS PCT %  --  14*   NEUTROS PCT % 82*  --    LYMPHS PCT % 9*  --    LYMPHO PCT %  --  3*   MONOS PCT % 6  --    MONO PCT %  --  4   EOS PCT % 1 0     Results from last 7 days   Lab Units 09/12/21  0505 09/09/21  0425   SODIUM mmol/L 142 140   POTASSIUM mmol/L 3 2* 4 3   CHLORIDE mmol/L 108 107   CO2 mmol/L 24 18*   BUN mg/dL 82* 94*   CREATININE mg/dL 3 79* 3 80*   ANION GAP mmol/L 10 15*   CALCIUM mg/dL 7 1* 7 3*   ALBUMIN g/dL  --  2 2*   TOTAL BILIRUBIN mg/dL  --  0 42   ALK PHOS U/L  --  121*   ALT U/L  --  24   AST U/L  --  34   GLUCOSE RANDOM mg/dL 128 111                 Results from last 7 days   Lab Units 09/11/21  0427 09/10/21  0438 09/09/21  1911 09/08/21  1809   LACTIC ACID mmol/L  --   --   --  1 3   PROCALCITONIN ng/ml 111 97* 149 97* 220 04* 411 08*       * I Have Reviewed All Lab Data Listed Above    * Additional Pertinent Lab Tests Reviewed: [place] [facility] Admission Reviewed    Imaging:    Imaging Reports Reviewed Today Include:  CT head, CT C-spine, thoracic spine  Imaging Personally Reviewed by Myself Includes:  CXR    Results from last 7 days   Lab Units 09/10/21  1927 09/10/21  1912 09/07/21  2037   BLOOD CULTURE  No Growth at 24 hrs  No Growth at 24 hrs   --    URINE CULTURE   --   --  >100,000 cfu/ml Enterobacter cloacae complex*  >100,000 cfu/ml        Last 24 Hours Medication List:   Current Facility-Administered Medications   Medication Dose Route Frequency Provider Last Rate    acetaminophen  650 mg Oral Q6H PRN [facility], [place]      aluminum-magnesium hydroxide-simethicone  30 mL Oral Q6H PRN [facility], [place]      amLODIPine  10 mg Oral QPM OFELIA Saunders      bisoprolol  5 mg Oral Daily [facility], [place]      calcium carbonate  1,250 mg Oral Daily OFELIA Saunders      cefepime  1,000 mg Intravenous Q12H Minda Osler, MD 1,000 mg (09/12/21 0444)    cholecalciferol  1,000 Units Oral Daily OFELIA Saunders      docusate sodium  100 mg Oral BID [facility], [place]      famotidine  10 mg Oral Every Other Day Jasiel Weber MD      ondansetron  4 mg Intravenous Q6H PRN [facility], [place]      saccharomyces boulardii  250 mg Oral BID OFELIA Hudson      sodium bicarbonate  1,300 mg Oral BID after meals Ortiz Niño MD          Today, Patient Was Seen By: Ami Cárdenas PA-C    ** Please Note: Dictation voice to text software may have been used in the creation of this document   **

## 2021-09-12 NOTE — ASSESSMENT & PLAN NOTE
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

## 2021-09-12 NOTE — PLAN OF CARE
Problem: SAFETY ADULT  Goal: Patient will remain free of falls  Description: INTERVENTIONS:  - Educate patient/family on patient safety including physical limitations  - Instruct patient to call for assistance with activity   - Consult OT/PT to assist with strengthening/mobility   - Keep Call bell within reach  - Keep bed low and locked with side rails adjusted as appropriate  - Keep care items and personal belongings within reach  - Initiate and maintain comfort rounds  - Make Fall Risk Sign visible to staff  -- Apply yellow socks and bracelet for high fall risk patients  - Consider moving patient to room near nurses station  Outcome: Progressing  Goal: Maintain or return to baseline ADL function  Description: INTERVENTIONS:  -  Assess patient's ability to carry out ADLs; assess patient's baseline for ADL function and identify physical deficits which impact ability to perform ADLs (bathing, care of mouth/teeth, toileting, grooming, dressing, etc )  - Assess/evaluate cause of self-care deficits   - Assess range of motion  - Assess patient's mobility; develop plan if impaired  - Assess patient's need for assistive devices and provide as appropriate  - Encourage maximum independence but intervene and supervise when necessary  - Involve family in performance of ADLs  - Assess for home care needs following discharge   - Consider OT consult to assist with ADL evaluation and planning for discharge  - Provide patient education as appropriate  Outcome: Progressing  Goal: Maintains/Returns to pre admission functional level  Description: INTERVENTIONS:  - Perform BMAT or MOVE assessment daily    - Set and communicate daily mobility goal to care team and patient/family/caregiver     - Collaborate with rehabilitation services on mobility goals if consulted  - Out of bed for toileting  - Record patient progress and toleration of activity level   Outcome: Progressing     Problem: DISCHARGE PLANNING  Goal: Discharge to home or other facility with appropriate resources  Description: INTERVENTIONS:  - Identify barriers to discharge w/patient and caregiver  - Arrange for needed discharge resources and transportation as appropriate  - Identify discharge learning needs (meds, wound care, etc )  - Arrange for interpretive services to assist at discharge as needed  - Refer to Case Management Department for coordinating discharge planning if the patient needs post-hospital services based on physician/advanced practitioner order or complex needs related to functional status, cognitive ability, or social support system  Outcome: Progressing

## 2021-09-12 NOTE — ASSESSMENT & PLAN NOTE
· POA, appears that the patient's baseline creatinine is 3-3 5  · Creatinine 4 28 on admission  · Creatinine today 3 37  · Acute kidney injury most likely secondary to prerenal azotemia, rhabdo, and component of CKD progression,+ on ARB  · Nephrology consult, appreciate recommendations  · Continue to hold patient's losartan at this time  · Received IV fluids per Renal  · Diuretics remain on hold  · Sodium bicarbonate dose recently increased   · Trend BMP for renal recovery  · Patient follow-up with Nephrology at [facility]  Has a right upper extremity AV fistula in place which is ready for use

## 2021-09-12 NOTE — PHYSICAL THERAPY NOTE
Physical Therapy Progress Note     09/12/21 1410   PT Last Visit   PT Visit Date 09/12/21   Note Type   Note Type Treatment   Pain Assessment   Pain Assessment Tool 0-10   Pain Score 8   Pain Location/Orientation Location: Groin   Hospital Pain Intervention(s) Ambulation/increased activity;Repositioned   Restrictions/Precautions   Weight Bearing Precautions Per Order No   Other Precautions Chair Alarm; Bed Alarm; Fall Risk;Pain;Cognitive;Limb alert   General   Chart Reviewed Yes   Response to Previous Treatment Patient reporting fatigue but able to participate  Family/Caregiver Present No   Subjective   Subjective Willing to participate in therapy this PM    Bed Mobility   Supine to Sit 4  Minimal assistance   Additional items Assist x 2;HOB elevated; Bedrails;Leg ; Increased time required;Verbal cues;LE management   Sit to Supine 4  Minimal assistance   Additional items Assist x 1;Bedrails; Increased time required;Verbal cues;LE management   Transfers   Sit to Stand 4  Minimal assistance   Additional items Assist x 2;Bedrails; Increased time required;Verbal cues   Stand to Sit 4  Minimal assistance   Additional items Assist x 2;Bedrails; Increased time required;Verbal cues   Toilet transfer 4  Minimal assistance   Additional items Assist x 2;Armrests; Increased time required;Verbal cues;Standard toilet; Other  (grab bar use)   Ambulation/Elevation   Gait pattern Decreased foot clearance; Forward Flexion;Narrow SUBHASH; Improper Weight shift; Short stride; Excessively slow; Step to; Inconsistent cait; Shuffling;Decreased R stance;R Hemiparesis;R Foot drag;Ataxia   Gait Assistance 4  Minimal assist   Additional items Verbal cues; Tactile cues; Assist x 1;Other (Comment)  (with second present for safety)   Assistive Device Rolling walker   Distance 20' x 2 with seated toileting break in between   Balance   Static Sitting Fair +   Dynamic Sitting Fair -   Static Standing Poor +   Dynamic Standing Poor   Ambulatory Poor Endurance Deficit   Endurance Deficit Yes   Endurance Deficit Description fatigue/pain/weakness   Activity Tolerance   Activity Tolerance Patient limited by fatigue;Patient limited by pain   Medical Staff [address]    Nurse Made Aware Yes   Exercises   THR Supine;Sitting;10 reps;AAROM; Bilateral   Assessment   Prognosis Fair   Problem List Decreased strength;Decreased range of motion;Decreased endurance; Impaired balance;Decreased mobility; Decreased coordination;Decreased cognition; Impaired judgement;Decreased safety awareness; Impaired tone; Impaired sensation;Decreased skin integrity;Pain   Assessment Pt  supine in bed upon my arrival  Pt  reporting fatigue/pain, however agreeable to therapeutic intervention  Performance of HEP with cues provided for proper completion  Progressed with transfers requiring A of therapist with cues for hand placement/technique  Pt  required increased A while positioned seated at EOB to avoid LOB  Progressed with OOB mobility with use of RW and A of therapist with cues provided for LE sequencing  Pt  requested to use br  After completion of br trial, pt  returned to supine in bed with alarm active at end of treatment session  PT will continue to recommend d/c to rehab when medically stable for continued improvement of noted impairments  Barriers to Discharge Inaccessible home environment;Decreased caregiver support   Barriers to Discharge Comments Level of support at home  Goals   Patient Goals To get better  STG Expiration Date 09/22/21   PT Treatment Day 3   Plan   Treatment/Interventions Functional transfer training;LE strengthening/ROM; Endurance training; Therapeutic exercise;Gait training;Bed mobility;Spoke to nursing;Spoke to case management;OT   Progress Slow progress, decreased activity tolerance   PT Frequency Other (Comment)  (4-5x/wk)   Recommendation   PT Discharge Recommendation Post acute rehabilitation services   Equipment Recommended Harsha Walker Package Recommended Wheeled walker   Change/add to [employer]? No   PT - OK to Discharge Yes  (if d/c to rehab when medically stable  )   AM-PAC Basic Mobility Inpatient   Turning in Bed Without Bedrails 2   Lying on Back to Sitting on Edge of Flat Bed 2   Moving Bed to Chair 3   Standing Up From Chair 2   Walk in Room 3   Climb 3-5 Stairs 2   Basic Mobility Inpatient Raw Score 14   Basic Mobility Standardized Score 35 55     An AM-PAC Basic Mobility standardized score less than 42 9 suggests the patient may benefit from discharge to post-acute rehab services      Pillo Leos PTA

## 2021-09-12 NOTE — ASSESSMENT & PLAN NOTE
· Evolving since admission  As evidenced by leukocytosis, bandemia, with suspected source of infection UTI  · Cefepime   · Procalcitonin significantly elevated though down trending   · WBC downtrending  · On 9/10 pt spike fever, 102*F  Follow up repeat blood cultures  PCT improving

## 2021-09-12 NOTE — ASSESSMENT & PLAN NOTE
· History of CVA with right-sided weakness  · On statin at home  On hold due to [facility], can likely resume upon discharge

## 2021-09-12 NOTE — PROGRESS NOTES
Progress Note - Infectious Disease   Julia Hough 78 y o  male MRN: [id number]  Unit/Bed#: E2 -01 Encounter: [id number]      Impression/Plan:  1  Sepsis-evolving since admission  Patient with fever, leukocytosis  Suspect secondary to urinary tract infection  No other clear source appreciated  The patient has no respiratory symptoms and his chest x-ray was clear of any pneumonia  Consideration for the possibility of bacteremia  Thus far the blood cultures are negative  He has grown Enterobacter out of the urine which can have inducible beta lactamase leading to resistance to ceftriaxone  Temperatures come down since changing over to cefepime  White cell count has increased although the procalcitonin level is decreasing  -continue cefepime  -follow up blood cultures  -check CT the abdomen pelvis with oral contrast only  -recheck CBC with diff and BMP  -check procalcitonin level  -supportive care  -possibly to oral antibiotics the next 24-48 hours     2  Urinary tract infection-in a patient with a 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  Imaging of the lumbar and thoracic spine did show some evidence of hydronephrosis that apparently is chronic but also some left-sided more proximal hydronephrosis in and around the stent  -antibiotics as above  -check CT abdomen and pelvis  -urology follow-up  -additional workup as needed     3  Acute kidney injury-complicating chronic kidney disease  Suspected secondary to pre renal issues  Consideration for the possibility of myoglobinuria playing a role in the setting of rhabdomyolysis  The patient's renal function had been slowing improving and now is waxing waning     -recheck BMP  -dose adjusted antibiotics as needed  -volume management  -nephrology follow-up     4  Thrombocytopenia-possibly secondary to sepsis  Possibly medication effect  The heparin has been on hold    Platelet count is now increasing  -recheck CBC with diff  -treatment of sepsis as above  -check hit panel     5  Traumatic rhabdomyolysis-after the patient fell and could not get up  Initially with high CPK which is now normalized  -no additional ID workup for now    Discussed the above management plan the primary service    Antibiotics:  Cefepime 2    Subjective:  Patient has no fever, chills, sweats; no nausea, vomiting, diarrhea; no cough, shortness of breath; no pain  No new symptoms  He is feeling better overall  Objective:  Vitals:  Temp:  [97 5 °F (36 4 °C)-98 2 °F (36 8 °C)] 98 2 °F (36 8 °C)  HR:  [64] 64  Resp:  [17-18] 18  BP: (122-128)/(57-61) 128/60  SpO2:  [95 %-96 %] 96 %  Temp (24hrs), Av 8 °F (36 6 °C), Min:97 5 °F (36 4 °C), Max:98 2 °F (36 8 °C)  Current: Temperature: 98 2 °F (36 8 °C)    Physical Exam:   General Appearance:  Alert, interactive, nontoxic, no acute distress  Throat: Oropharynx moist without lesions  Lungs:   Clear to auscultation bilaterally; no wheezes, rhonchi or rales; respirations unlabored   Heart:  RRR; no murmur, rub or gallop   Abdomen:   Soft, non-tender, non-distended, positive bowel sounds  Extremities: No clubbing, cyanosis or edema   Skin: No new rashes or lesions  No draining wounds noted         Labs, Imaging, & Other studies:   All pertinent labs and imaging studies were personally reviewed  Results from last 7 days   Lab Units 09/12/21  0505 09/11/21  0427 09/10/21  0438   WBC Thousand/uL 16 66* 13 55* 12 37*   HEMOGLOBIN g/dL 11 5* 11 4* 11 2*   PLATELETS Thousands/uL 109* 74* 81*     Results from last 7 days   Lab Units 21  0505 09/11/21  0427 09/10/21  0438 09/09/21  0425 09/07/21  1439   SODIUM mmol/L 142 140 141 140 142   POTASSIUM mmol/L 3 2* 4 6 4 0 4 3 4 0   CHLORIDE mmol/L 108 108 108 107 106   CO2 mmol/L 24 16* 19* 18* 18*   BUN mg/dL 82* 88* 92* 94* 87*   CREATININE mg/dL 3 79* 3 37* 3 95* 3 80* 4 28*   EGFR ml/min/1 73sq m 15 17 14 15 13   CALCIUM mg/dL 7 1* 7 2* 7 3* 7 3* 8 2*   AST U/L  --   --   --  34 68* ALT U/L  --   --   --  24 39   ALK PHOS U/L  --   --   --  121* 100     Results from last 7 days   Lab Units 09/10/21  1927 09/10/21  1912 09/07/21  2037   BLOOD CULTURE  No Growth at 24 hrs   No Growth at 24 hrs   --    URINE CULTURE   --   --  >100,000 cfu/ml Enterobacter cloacae complex*  >100,000 cfu/ml      Results from last 7 days   Lab Units 09/11/21  0427 09/10/21  0438 09/09/21 1911 09/08/21  1809   PROCALCITONIN ng/ml 111 97* 149 97* 220 04* 411 08*

## 2021-09-12 NOTE — PLAN OF CARE
Problem: Potential for Falls  Goal: Patient will remain free of falls  Description: INTERVENTIONS:  - Educate patient/family on patient safety including physical limitations  - Instruct patient to call for assistance with activity   - Consult OT/PT to assist with strengthening/mobility   - Keep Call bell within reach  - Keep bed low and locked with side rails adjusted as appropriate  - Keep care items and personal belongings within reach  - Initiate and maintain comfort rounds  - Make Fall Risk Sign visible to staff  - Apply yellow socks and bracelet for high fall risk patients  - Consider moving patient to room near nurses station  Outcome: Progressing     Problem: Prexisting or High Potential for Compromised Skin Integrity  Goal: Skin integrity is maintained or improved  Description: INTERVENTIONS:  - Identify patients at risk for skin breakdown  - Assess and monitor skin integrity  - Assess and monitor nutrition and hydration status  - Monitor labs   - Assess for incontinence   - Turn and reposition patient  - Assist with mobility/ambulation  - Relieve pressure over bony prominences  - Avoid friction and shearing  - Provide appropriate hygiene as needed including keeping skin clean and dry  - Evaluate need for skin moisturizer/barrier cream  - Collaborate with interdisciplinary team   - Patient/family teaching  - Consider wound care consult   Outcome: Progressing     Problem: SAFETY ADULT  Goal: Patient will remain free of falls  Description: INTERVENTIONS:  - Educate patient/family on patient safety including physical limitations  - Instruct patient to call for assistance with activity   - Consult OT/PT to assist with strengthening/mobility   - Keep Call bell within reach  - Keep bed low and locked with side rails adjusted as appropriate  - Keep care items and personal belongings within reach  - Initiate and maintain comfort rounds  - Make Fall Risk Sign visible to staff  - Apply yellow socks and bracelet for high fall risk patients  - Consider moving patient to room near nurses station  Outcome: Progressing  Goal: Maintain or return to baseline ADL function  Description: INTERVENTIONS:  -  Assess patient's ability to carry out ADLs; assess patient's baseline for ADL function and identify physical deficits which impact ability to perform ADLs (bathing, care of mouth/teeth, toileting, grooming, dressing, etc )  - Assess/evaluate cause of self-care deficits   - Assess range of motion  - Assess patient's mobility; develop plan if impaired  - Assess patient's need for assistive devices and provide as appropriate  - Encourage maximum independence but intervene and supervise when necessary  - Involve family in performance of ADLs  - Assess for home care needs following discharge   - Consider OT consult to assist with ADL evaluation and planning for discharge  - Provide patient education as appropriate  Outcome: Progressing  Goal: Maintains/Returns to pre admission functional level  Description: INTERVENTIONS:  - Perform BMAT or MOVE assessment daily    - Set and communicate daily mobility goal to care team and patient/family/caregiver     - Collaborate with rehabilitation services on mobility goals if consulted  - Out of bed for toileting  - Record patient progress and toleration of activity level   Outcome: Progressing

## 2021-09-13 PROBLEM — N13.30 BILATERAL HYDRONEPHROSIS: Status: ACTIVE | Noted: 2021-09-13

## 2021-09-13 PROBLEM — N43.3 BILATERAL HYDROCELE: Status: ACTIVE | Noted: 2021-09-13

## 2021-09-13 PROBLEM — Z95.2 HISTORY OF AORTIC VALVE REPLACEMENT: Status: ACTIVE | Noted: 2021-09-13

## 2021-09-13 PROBLEM — T79.6XXA TRAUMATIC RHABDOMYOLYSIS (HCC): Status: RESOLVED | Noted: 2021-09-07 | Resolved: 2021-09-13

## 2021-09-13 LAB
ANION GAP SERPL CALCULATED.3IONS-SCNC: 14 MMOL/L (ref 4–13)
ATRIAL RATE: 55 BPM
BUN SERPL-MCNC: 71 MG/DL (ref 5–25)
CALCIUM SERPL-MCNC: 7.3 MG/DL (ref 8.3–10.1)
CHLORIDE SERPL-SCNC: 108 MMOL/L (ref 100–108)
CO2 SERPL-SCNC: 19 MMOL/L (ref 21–32)
CREAT SERPL-MCNC: 3.62 MG/DL (ref 0.6–1.3)
ERYTHROCYTE [DISTWIDTH] IN BLOOD BY AUTOMATED COUNT: 16.7 % (ref 11.6–15.1)
GFR SERPL CREATININE-BSD FRML MDRD: 16 ML/MIN/1.73SQ M
GLUCOSE SERPL-MCNC: 65 MG/DL (ref 65–140)
HCT VFR BLD AUTO: 34.5 % (ref 36.5–49.3)
HGB BLD-MCNC: 10.9 G/DL (ref 12–17)
MCH RBC QN AUTO: 28.8 PG (ref 26.8–34.3)
MCHC RBC AUTO-ENTMCNC: 31.6 G/DL (ref 31.4–37.4)
MCV RBC AUTO: 91 FL (ref 82–98)
P AXIS: 49 DEGREES
PF4 HEPARIN CMPLX AB SER-ACNC: 0.11 OD (ref 0–0.4)
PLATELET # BLD AUTO: 120 THOUSANDS/UL (ref 149–390)
PMV BLD AUTO: 14.2 FL (ref 8.9–12.7)
POTASSIUM SERPL-SCNC: 4.2 MMOL/L (ref 3.5–5.3)
PR INTERVAL: 270 MS
PROCALCITONIN SERPL-MCNC: 71.71 NG/ML
QRS AXIS: -56 DEGREES
QRSD INTERVAL: 152 MS
QT INTERVAL: 498 MS
QTC INTERVAL: 476 MS
RBC # BLD AUTO: 3.78 MILLION/UL (ref 3.88–5.62)
SODIUM SERPL-SCNC: 141 MMOL/L (ref 136–145)
T WAVE AXIS: 57 DEGREES
VENTRICULAR RATE: 55 BPM
WBC # BLD AUTO: 18.71 THOUSAND/UL (ref 4.31–10.16)

## 2021-09-13 PROCEDURE — 85027 COMPLETE CBC AUTOMATED: CPT | Performed by: PHYSICIAN ASSISTANT

## 2021-09-13 PROCEDURE — 99232 SBSQ HOSP IP/OBS MODERATE 35: CPT | Performed by: PHYSICIAN ASSISTANT

## 2021-09-13 PROCEDURE — 93010 ELECTROCARDIOGRAM REPORT: CPT | Performed by: INTERNAL MEDICINE

## 2021-09-13 PROCEDURE — 99232 SBSQ HOSP IP/OBS MODERATE 35: CPT | Performed by: INTERNAL MEDICINE

## 2021-09-13 PROCEDURE — 93005 ELECTROCARDIOGRAM TRACING: CPT

## 2021-09-13 PROCEDURE — 80048 BASIC METABOLIC PNL TOTAL CA: CPT | Performed by: PHYSICIAN ASSISTANT

## 2021-09-13 PROCEDURE — 84145 PROCALCITONIN (PCT): CPT | Performed by: INTERNAL MEDICINE

## 2021-09-13 RX ORDER — SODIUM BICARBONATE 650 MG/1
1300 TABLET ORAL
Qty: 80 TABLET | Refills: 0 | Status: SHIPPED | OUTPATIENT
Start: 2021-09-13 | End: 2021-10-04 | Stop reason: HOSPADM

## 2021-09-13 RX ADMIN — DOCUSATE SODIUM 100 MG: 100 CAPSULE ORAL at 17:25

## 2021-09-13 RX ADMIN — CALCIUM CARBONATE (ANTACID) CHEW TAB 500 MG 1250 MG: 500 CHEW TAB at 09:03

## 2021-09-13 RX ADMIN — DOCUSATE SODIUM 100 MG: 100 CAPSULE ORAL at 09:04

## 2021-09-13 RX ADMIN — Medication 250 MG: at 09:04

## 2021-09-13 RX ADMIN — Medication 1000 UNITS: at 09:05

## 2021-09-13 RX ADMIN — CEFEPIME HYDROCHLORIDE 1000 MG: 1 INJECTION, POWDER, FOR SOLUTION INTRAMUSCULAR; INTRAVENOUS at 04:24

## 2021-09-13 RX ADMIN — Medication 250 MG: at 17:25

## 2021-09-13 RX ADMIN — SODIUM BICARBONATE 650 MG TABLET 1300 MG: at 09:04

## 2021-09-13 RX ADMIN — CEFEPIME HYDROCHLORIDE 1000 MG: 1 INJECTION, POWDER, FOR SOLUTION INTRAMUSCULAR; INTRAVENOUS at 15:52

## 2021-09-13 RX ADMIN — SODIUM BICARBONATE 650 MG TABLET 1300 MG: at 17:25

## 2021-09-13 RX ADMIN — BISOPROLOL FUMARATE 5 MG: 5 TABLET ORAL at 09:03

## 2021-09-13 NOTE — PLAN OF CARE
Problem: SAFETY ADULT  Goal: Patient will remain free of falls  Description: INTERVENTIONS:  - Educate patient/family on patient safety including physical limitations  - Instruct patient to call for assistance with activity   - Consult OT/PT to assist with strengthening/mobility   - Keep Call bell within reach  - Keep bed low and locked with side rails adjusted as appropriate  - Keep care items and personal belongings within reach  - Initiate and maintain comfort rounds  - Make Fall Risk Sign visible to staff  - Apply yellow socks and bracelet for high fall risk patients  - Consider moving patient to room near nurses station  Outcome: Progressing  Goal: Maintain or return to baseline ADL function  Description: INTERVENTIONS:  - Educate patient/family on patient safety including physical limitations  - Instruct patient to call for assistance with activity   - Consult OT/PT to assist with strengthening/mobility   - Keep Call bell within reach  - Keep bed low and locked with side rails adjusted as appropriate  - Keep care items and personal belongings within reach  - Initiate and maintain comfort rounds  - Make Fall Risk Sign visible to staff  - Apply yellow socks and bracelet for high fall risk patients  - Consider moving patient to room near nurses station  Outcome: Progressing  Goal: Maintains/Returns to pre admission functional level  Description: INTERVENTIONS:  -  Assess patient's ability to carry out ADLs; assess patient's baseline for ADL function and identify physical deficits which impact ability to perform ADLs (bathing, care of mouth/teeth, toileting, grooming, dressing, etc )  - Assess/evaluate cause of self-care deficits   - Assess range of motion  - Assess patient's mobility; develop plan if impaired  - Assess patient's need for assistive devices and provide as appropriate  - Encourage maximum independence but intervene and supervise when necessary  - Involve family in performance of ADLs  - Assess for home care needs following discharge   - Consider OT consult to assist with ADL evaluation and planning for discharge  - Provide patient education as appropriate  Outcome: Progressing     Problem: MOBILITY - ADULT  Goal: Maintain or return to baseline ADL function  Description: INTERVENTIONS:  - Educate patient/family on patient safety including physical limitations  - Instruct patient to call for assistance with activity   - Consult OT/PT to assist with strengthening/mobility   - Keep Call bell within reach  - Keep bed low and locked with side rails adjusted as appropriate  - Keep care items and personal belongings within reach  - Initiate and maintain comfort rounds  - Make Fall Risk Sign visible to staff  - Apply yellow socks and bracelet for high fall risk patients  - Consider moving patient to room near nurses station  Outcome: Progressing  Goal: Maintains/Returns to pre admission functional level  Description: INTERVENTIONS:  -  Assess patient's ability to carry out ADLs; assess patient's baseline for ADL function and identify physical deficits which impact ability to perform ADLs (bathing, care of mouth/teeth, toileting, grooming, dressing, etc )  - Assess/evaluate cause of self-care deficits   - Assess range of motion  - Assess patient's mobility; develop plan if impaired  - Assess patient's need for assistive devices and provide as appropriate  - Encourage maximum independence but intervene and supervise when necessary  - Involve family in performance of ADLs  - Assess for home care needs following discharge   - Consider OT consult to assist with ADL evaluation and planning for discharge  - Provide patient education as appropriate  Outcome: Progressing     Problem: DISCHARGE PLANNING  Goal: Discharge to home or other facility with appropriate resources  Description: INTERVENTIONS:  - Identify barriers to discharge w/patient and caregiver  - Arrange for needed discharge resources and transportation as appropriate  - Identify discharge learning needs (meds, wound care, etc )  - Arrange for interpretive services to assist at discharge as needed  - Refer to Case Management Department for coordinating discharge planning if the patient needs post-hospital services based on physician/advanced practitioner order or complex needs related to functional status, cognitive ability, or social support system  Outcome: Progressing

## 2021-09-13 NOTE — ASSESSMENT & PLAN NOTE
· History of CVA with right-sided weakness  · On statin at home  On hold due to [facility], can resume upon discharge

## 2021-09-13 NOTE — ASSESSMENT & PLAN NOTE
· Maintained on bisoprolol 5 mg once daily, amlodipine 10 mg every evening   · Hold losartan 50 due to LORENZA   Continue to hold on discharge per Renal recommendations   · Monitor per unit routine

## 2021-09-13 NOTE — ASSESSMENT & PLAN NOTE
· Evolving since admission  As evidenced by leukocytosis, bandemia, with suspected source of infection UTI    · Currently on IV Cefepime , ID consulted   · blood cultures negative to date, afebrile  · Ongoing leukocytosis    · Follow up ID recs

## 2021-09-13 NOTE — PROGRESS NOTES
Progress Note - Infectious Disease   Felecia Pelayo 78 y o  male MRN: [id number]  Unit/Bed#: E2 -01 Encounter: [id number]    Impression/Plan:  1  Sepsis  Evolving since admission  Fever and leukocytosis  Suspect secondary to urinary tract infection  Urine culture grew Enterobacter  No other clear source appreciated  Patient denies respiratory symptoms and his chest x-ray showed no acute infectious cardiopulmonary findings  Blood cultures are negative after 24 hours  Fortunately he remains hemodynamically stable and nontoxic  This morning he is afebrile however his WBC count remains elevated   -antibiotic as below  -check CBC and BMP tomorrow  -follow up blood cultures  -monitor vitals  -supportive care    2  Enterobacter urinary tract infection  In patient with 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  There is some evidence of hydronephrosis on imaging but that is apparently chronic  He is currently receiving IV cefepime which he is tolerating without difficulty  I recommend continue this for now  Patient will require seven full days of antibiotic treatment  Will check an EKG now as patient may be able to be discharged on Levaquin   -continue IV cefepime  -anticipate seven days of antibiotic treatment through 09/16/2021  -consider discharging home on Levaquin to finish treatment if his EKG is stable  -check CBC and BMP tomorrow  -check EKG  -monitor vitals    3  Acute kidney injury  Complicating CKD  Suspect secondary to prerenal issues  Consideration for the possibility of myoglobinuria playing a role in the setting of rhabdomyolysis  Patient's creatinine has reduced to 3 62 today   -check creatinine tomorrow  -dose adjust antibiotics for renal function as needed  -avoid nephrotoxins    4  Thrombocytopenia  Possibly secondary to sepsis  Possibly medication effect  Patient heparin has been on hold    His platelet count has increased to 120 today   -check CBCD tomorrow  -ongoing sepsis/infection treatment as above  -management per SLIM    5  Traumatic rhabdomyolysis  After fall at home  Family found him on the floor  His initial CPK was elevated but it has normalized  Above plan was discussed in detail with patient at the bedside  Above plan was discussed in detail with [facility] attending, Dr Makenzie Aviles  Antibiotics:  Cefepime 3  Antibiotics 6    Subjective:  Patient reports he continues to feel better today  He remains eager for discharge home  He tells me he feels slightly tired and a bit weak  He has no fever, chills, sweats, shakes; no nausea, vomiting, abdominal pain, or diarrhea; no dysuria; no cough, shortness of breath, or chest pain  No new symptoms  Objective:  Vitals:  Temp:  [97 5 °F (36 4 °C)-98 5 °F (36 9 °C)] 97 5 °F (36 4 °C)  HR:  [64-68] 64  Resp:  [18-20] 18  BP: (130-150)/(61-68) 150/68  SpO2:  [95 %-97 %] 97 %  Temp (24hrs), Av 1 °F (36 7 °C), Min:97 5 °F (36 4 °C), Max:98 5 °F (36 9 °C)  Current: Temperature: 97 5 °F (36 4 °C)    Physical Exam:   General Appearance:  Alert, interactive, nontoxic, no acute distress  He appears comfortable sitting up in bed  Throat: Oropharynx moist without lesions  Lungs:   Clear to auscultation bilaterally; no wheezes, rhonchi or rales; respirations unlabored on room air  Heart:  RRR; no murmur, rub or gallop  Abdomen:   Soft, non-tender, non-distended, positive bowel sounds  Extremities: No clubbing or cyanosis, no edema  Skin: No new rashes, lesions, or draining wounds noted on exposed skin       Labs, Imaging, & Other studies:   All pertinent labs and imaging studies were personally reviewed  Results from last 7 days   Lab Units 21   WBC Thousand/uL 18 71* 16 66* 13 55*   HEMOGLOBIN g/dL 10 9* 11 5* 11 4*   PLATELETS Thousands/uL 120* 109* 74*     Results from last 7 days   Lab Units 21  1439   POTASSIUM mmol/L 4 2 4 3 4 0 CHLORIDE mmol/L 108 107 106   CO2 mmol/L 19* 18* 18*   BUN mg/dL 71* 94* 87*   CREATININE mg/dL 3 62* 3 80* 4 28*   EGFR ml/min/1 73sq m 16 15 13   CALCIUM mg/dL 7 3* 7 3* 8 2*   AST U/L  --  34 68*   ALT U/L  --  24 39   ALK PHOS U/L  --  121* 100     Results from last 7 days   Lab Units 09/10/21  1927 09/10/21  1912 09/07/21  2037   BLOOD CULTURE  No Growth at 48 hrs  No Growth at 48 hrs    --    URINE CULTURE   --   --  >100,000 cfu/ml Enterobacter cloacae complex*  >100,000 cfu/ml      Results from last 7 days   Lab Units 09/12/21  0505 09/11/21  0427 09/10/21  0438 09/09/21 1911 09/08/21  1809   PROCALCITONIN ng/ml 151 97* 111 97* 149 97* 220 04* 411 08*

## 2021-09-13 NOTE — ASSESSMENT & PLAN NOTE
Chronic bilateral hydronephrosis managed with chronic left stents exchanged q 6 months   Seen by Urology while here   No urologic surgical intervention indicated at this time   Continue outpatient follow up with urology

## 2021-09-13 NOTE — ASSESSMENT & PLAN NOTE
· LORENZA POA, appears that the patient's baseline creatinine is 3-3 5  · Creatinine 4 28 on admission  · Creatinine today 3 62  · Acute kidney injury most likely secondary to prerenal azotemia, rhabdo, and component of CKD progression,+ on ARB  · D/c losartan and will continue to hold on discharge   · BMP in 1 week upon discharge with Nephrology follow up with [facility] , Dr Lyn Point   · Nephrology consult, appreciate recommendations - okay for discharge from renal standpoint   · Diuretics remain on hold  · Sodium bicarbonate dose recently increased 1300 mg po bid   ·  Has a right upper extremity AV fistula in place which is ready for use   No indication for dialysis this admission

## 2021-09-13 NOTE — ASSESSMENT & PLAN NOTE
· Patient found down on ground in home after having falling 3 days prior and unable to get up  States that he is getting out of bed and felt weak, he lowered himself to the ground and was unable to get up after that  Denies any loss of consciousness, head strike  · Patient unable to access any food/water during that time or take any medications  · CK significantly elevated at 1566  Normalized with IV hydration  · Nephrology consulted, appreciate recommendations  · PT/OT consulted for safe discharge planning   Recommend rehab

## 2021-09-13 NOTE — ASSESSMENT & PLAN NOTE
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 and associated renal parenchymal volume loss  Double-J stent in the left upper pole of the horseshoe kidney with dilated left ureter  Trabeculated bladder with bladder diverticulum likely related to chronic urostasis  · Patient follows with Urology at [address] [place] [address] [address]   · Rocephin recently changed to Cefepime by ID  · Appreciate urology input  Chronic left ureteral stent with q 6 month exchanges  · Status post last stent exchange at [facility] on 8/5/2021    · No urologic surgical intervention indicated at this time  · Follow up ID recs on transition to oral abx

## 2021-09-14 ENCOUNTER — HOSPITAL ENCOUNTER (INPATIENT)
Facility: HOSPITAL | Age: 76
LOS: 20 days | Discharge: HOME HEALTH | DRG: 557 | End: 2021-10-04
Attending: STUDENT IN AN ORGANIZED HEALTH CARE EDUCATION/TRAINING PROGRAM | Admitting: STUDENT IN AN ORGANIZED HEALTH CARE EDUCATION/TRAINING PROGRAM
Payer: COMMERCIAL

## 2021-09-14 VITALS
BODY MASS INDEX: 26.1 KG/M2 | WEIGHT: 172.18 LBS | HEIGHT: 68 IN | SYSTOLIC BLOOD PRESSURE: 128 MMHG | OXYGEN SATURATION: 96 % | TEMPERATURE: 98 F | HEART RATE: 63 BPM | RESPIRATION RATE: 18 BRPM | DIASTOLIC BLOOD PRESSURE: 60 MMHG

## 2021-09-14 DIAGNOSIS — N18.4 CHRONIC KIDNEY DISEASE, STAGE IV (SEVERE) (HCC): ICD-10-CM

## 2021-09-14 DIAGNOSIS — M25.532 LEFT WRIST PAIN: ICD-10-CM

## 2021-09-14 DIAGNOSIS — R26.2 AMBULATORY DYSFUNCTION: ICD-10-CM

## 2021-09-14 DIAGNOSIS — E87.5 HYPERKALEMIA: ICD-10-CM

## 2021-09-14 DIAGNOSIS — N17.9 ACUTE KIDNEY INJURY SUPERIMPOSED ON CKD (HCC): ICD-10-CM

## 2021-09-14 DIAGNOSIS — E78.2 MIXED HYPERLIPIDEMIA: ICD-10-CM

## 2021-09-14 DIAGNOSIS — I10 ESSENTIAL HYPERTENSION: ICD-10-CM

## 2021-09-14 DIAGNOSIS — Q84.5 ENLARGED AND HYPERTROPHIC NAILS: ICD-10-CM

## 2021-09-14 DIAGNOSIS — N18.9 ACUTE KIDNEY INJURY SUPERIMPOSED ON CKD (HCC): ICD-10-CM

## 2021-09-14 DIAGNOSIS — K21.9 GASTROESOPHAGEAL REFLUX DISEASE WITHOUT ESOPHAGITIS: ICD-10-CM

## 2021-09-14 DIAGNOSIS — F09 COGNITIVE DYSFUNCTION: ICD-10-CM

## 2021-09-14 DIAGNOSIS — M62.82 NON-TRAUMATIC RHABDOMYOLYSIS: ICD-10-CM

## 2021-09-14 DIAGNOSIS — R31.9 HEMATURIA: ICD-10-CM

## 2021-09-14 DIAGNOSIS — I63.9 CEREBROVASCULAR ACCIDENT (CVA), UNSPECIFIED MECHANISM (HCC): ICD-10-CM

## 2021-09-14 DIAGNOSIS — Z95.2 HISTORY OF AORTIC VALVE REPLACEMENT: Primary | ICD-10-CM

## 2021-09-14 PROBLEM — D72.829 LEUKOCYTOSIS: Status: ACTIVE | Noted: 2021-09-14

## 2021-09-14 PROBLEM — E89.2 HISTORY OF PARATHYROIDECTOMY (HCC): Status: ACTIVE | Noted: 2021-09-14

## 2021-09-14 PROBLEM — E87.6 HYPOKALEMIA: Status: ACTIVE | Noted: 2021-09-14

## 2021-09-14 PROBLEM — D63.8 ANEMIA OF CHRONIC DISEASE: Status: ACTIVE | Noted: 2021-09-14

## 2021-09-14 PROBLEM — D69.6 THROMBOCYTOPENIA (HCC): Status: RESOLVED | Noted: 2021-09-09 | Resolved: 2021-09-14

## 2021-09-14 LAB
ALBUMIN SERPL BCP-MCNC: 2 G/DL (ref 3.5–5)
ALP SERPL-CCNC: 100 U/L (ref 46–116)
ALT SERPL W P-5'-P-CCNC: 32 U/L (ref 12–78)
ANION GAP SERPL CALCULATED.3IONS-SCNC: 12 MMOL/L (ref 4–13)
AST SERPL W P-5'-P-CCNC: 39 U/L (ref 5–45)
BASOPHILS # BLD AUTO: 0.02 THOUSANDS/ΜL (ref 0–0.1)
BASOPHILS NFR BLD AUTO: 0 % (ref 0–1)
BILIRUB SERPL-MCNC: 0.45 MG/DL (ref 0.2–1)
BUN SERPL-MCNC: 66 MG/DL (ref 5–25)
CALCIUM ALBUM COR SERPL-MCNC: 9 MG/DL (ref 8.3–10.1)
CALCIUM SERPL-MCNC: 7.4 MG/DL (ref 8.3–10.1)
CHLORIDE SERPL-SCNC: 111 MMOL/L (ref 100–108)
CO2 SERPL-SCNC: 21 MMOL/L (ref 21–32)
CREAT SERPL-MCNC: 3.35 MG/DL (ref 0.6–1.3)
EOSINOPHIL # BLD AUTO: 0.19 THOUSAND/ΜL (ref 0–0.61)
EOSINOPHIL NFR BLD AUTO: 2 % (ref 0–6)
ERYTHROCYTE [DISTWIDTH] IN BLOOD BY AUTOMATED COUNT: 16.4 % (ref 11.6–15.1)
GFR SERPL CREATININE-BSD FRML MDRD: 17 ML/MIN/1.73SQ M
GLUCOSE SERPL-MCNC: 117 MG/DL (ref 65–140)
HCT VFR BLD AUTO: 35.1 % (ref 36.5–49.3)
HGB BLD-MCNC: 11.4 G/DL (ref 12–17)
IMM GRANULOCYTES # BLD AUTO: 0.2 THOUSAND/UL (ref 0–0.2)
IMM GRANULOCYTES NFR BLD AUTO: 2 % (ref 0–2)
LYMPHOCYTES # BLD AUTO: 1.59 THOUSANDS/ΜL (ref 0.6–4.47)
LYMPHOCYTES NFR BLD AUTO: 13 % (ref 14–44)
MCH RBC QN AUTO: 29.2 PG (ref 26.8–34.3)
MCHC RBC AUTO-ENTMCNC: 32.5 G/DL (ref 31.4–37.4)
MCV RBC AUTO: 90 FL (ref 82–98)
MONOCYTES # BLD AUTO: 0.66 THOUSAND/ΜL (ref 0.17–1.22)
MONOCYTES NFR BLD AUTO: 5 % (ref 4–12)
NEUTROPHILS # BLD AUTO: 10.1 THOUSANDS/ΜL (ref 1.85–7.62)
NEUTS SEG NFR BLD AUTO: 78 % (ref 43–75)
NRBC BLD AUTO-RTO: 0 /100 WBCS
PLATELET # BLD AUTO: 170 THOUSANDS/UL (ref 149–390)
PMV BLD AUTO: 12 FL (ref 8.9–12.7)
POTASSIUM SERPL-SCNC: 3.2 MMOL/L (ref 3.5–5.3)
PROT SERPL-MCNC: 6.6 G/DL (ref 6.4–8.2)
RBC # BLD AUTO: 3.91 MILLION/UL (ref 3.88–5.62)
SARS-COV-2 RNA RESP QL NAA+PROBE: NEGATIVE
SODIUM SERPL-SCNC: 144 MMOL/L (ref 136–145)
WBC # BLD AUTO: 12.76 THOUSAND/UL (ref 4.31–10.16)

## 2021-09-14 PROCEDURE — 99239 HOSP IP/OBS DSCHRG MGMT >30: CPT | Performed by: PHYSICIAN ASSISTANT

## 2021-09-14 PROCEDURE — NC001 PR NO CHARGE

## 2021-09-14 PROCEDURE — U0005 INFEC AGEN DETEC AMPLI PROBE: HCPCS | Performed by: PHYSICIAN ASSISTANT

## 2021-09-14 PROCEDURE — U0003 INFECTIOUS AGENT DETECTION BY NUCLEIC ACID (DNA OR RNA); SEVERE ACUTE RESPIRATORY SYNDROME CORONAVIRUS 2 (SARS-COV-2) (CORONAVIRUS DISEASE [COVID-19]), AMPLIFIED PROBE TECHNIQUE, MAKING USE OF HIGH THROUGHPUT TECHNOLOGIES AS DESCRIBED BY CMS-2020-01-R: HCPCS | Performed by: PHYSICIAN ASSISTANT

## 2021-09-14 PROCEDURE — 99223 1ST HOSP IP/OBS HIGH 75: CPT | Performed by: STUDENT IN AN ORGANIZED HEALTH CARE EDUCATION/TRAINING PROGRAM

## 2021-09-14 PROCEDURE — 80053 COMPREHEN METABOLIC PANEL: CPT | Performed by: INTERNAL MEDICINE

## 2021-09-14 PROCEDURE — 99221 1ST HOSP IP/OBS SF/LOW 40: CPT | Performed by: NURSE PRACTITIONER

## 2021-09-14 PROCEDURE — 85025 COMPLETE CBC W/AUTO DIFF WBC: CPT | Performed by: INTERNAL MEDICINE

## 2021-09-14 RX ORDER — SODIUM BICARBONATE 650 MG/1
1300 TABLET ORAL
Status: DISCONTINUED | OUTPATIENT
Start: 2021-09-14 | End: 2021-10-04 | Stop reason: HOSPADM

## 2021-09-14 RX ORDER — LEVOFLOXACIN 250 MG/1
750 TABLET ORAL EVERY 24 HOURS
Status: DISCONTINUED | OUTPATIENT
Start: 2021-09-14 | End: 2021-09-14 | Stop reason: HOSPADM

## 2021-09-14 RX ORDER — DOXYCYCLINE HYCLATE 100 MG/1
100 CAPSULE ORAL DAILY
Refills: 0
Start: 2021-09-17 | End: 2021-10-17

## 2021-09-14 RX ORDER — LEVOFLOXACIN 500 MG/1
500 TABLET, FILM COATED ORAL EVERY 24 HOURS
Status: DISCONTINUED | OUTPATIENT
Start: 2021-09-16 | End: 2021-09-15

## 2021-09-14 RX ORDER — LEVOFLOXACIN 750 MG/1
750 TABLET ORAL EVERY 24 HOURS
Status: COMPLETED | OUTPATIENT
Start: 2021-09-14 | End: 2021-09-14

## 2021-09-14 RX ORDER — AMLODIPINE BESYLATE 10 MG/1
10 TABLET ORAL EVERY EVENING
Status: DISCONTINUED | OUTPATIENT
Start: 2021-09-14 | End: 2021-10-04 | Stop reason: HOSPADM

## 2021-09-14 RX ORDER — FAMOTIDINE 20 MG/1
10 TABLET, FILM COATED ORAL EVERY OTHER DAY
Status: DISCONTINUED | OUTPATIENT
Start: 2021-09-16 | End: 2021-10-04 | Stop reason: HOSPADM

## 2021-09-14 RX ORDER — HEPARIN SODIUM 5000 [USP'U]/ML
5000 INJECTION, SOLUTION INTRAVENOUS; SUBCUTANEOUS EVERY 8 HOURS SCHEDULED
Status: DISCONTINUED | OUTPATIENT
Start: 2021-09-14 | End: 2021-10-02

## 2021-09-14 RX ORDER — LEVOFLOXACIN 750 MG/1
750 TABLET ORAL ONCE
Qty: 1 TABLET | Refills: 0
Start: 2021-09-14 | End: 2021-10-04 | Stop reason: HOSPADM

## 2021-09-14 RX ORDER — POTASSIUM CHLORIDE 20 MEQ/1
20 TABLET, EXTENDED RELEASE ORAL ONCE
Status: COMPLETED | OUTPATIENT
Start: 2021-09-14 | End: 2021-09-14

## 2021-09-14 RX ORDER — ACETAMINOPHEN 325 MG/1
650 TABLET ORAL EVERY 6 HOURS PRN
Status: DISCONTINUED | OUTPATIENT
Start: 2021-09-14 | End: 2021-09-20

## 2021-09-14 RX ORDER — LEVOFLOXACIN 500 MG/1
500 TABLET, FILM COATED ORAL ONCE
Qty: 1 TABLET | Refills: 0
Start: 2021-09-16 | End: 2021-10-04 | Stop reason: HOSPADM

## 2021-09-14 RX ORDER — DOCUSATE SODIUM 100 MG/1
100 CAPSULE, LIQUID FILLED ORAL 2 TIMES DAILY
Status: DISCONTINUED | OUTPATIENT
Start: 2021-09-14 | End: 2021-09-16

## 2021-09-14 RX ORDER — LEVOFLOXACIN 750 MG/1
750 TABLET ORAL EVERY 24 HOURS
Qty: 1 TABLET | Refills: 0
Start: 2021-09-14 | End: 2021-09-14

## 2021-09-14 RX ORDER — ONDANSETRON 2 MG/ML
4 INJECTION INTRAMUSCULAR; INTRAVENOUS EVERY 6 HOURS PRN
Status: DISCONTINUED | OUTPATIENT
Start: 2021-09-14 | End: 2021-10-04 | Stop reason: HOSPADM

## 2021-09-14 RX ORDER — POLYETHYLENE GLYCOL 3350 17 G/17G
17 POWDER, FOR SOLUTION ORAL DAILY PRN
Status: DISCONTINUED | OUTPATIENT
Start: 2021-09-14 | End: 2021-10-04 | Stop reason: HOSPADM

## 2021-09-14 RX ORDER — CALCIUM CARBONATE 200(500)MG
500 TABLET,CHEWABLE ORAL DAILY
Status: DISCONTINUED | OUTPATIENT
Start: 2021-09-17 | End: 2021-10-04 | Stop reason: HOSPADM

## 2021-09-14 RX ORDER — SACCHAROMYCES BOULARDII 250 MG
250 CAPSULE ORAL 2 TIMES DAILY
Status: DISCONTINUED | OUTPATIENT
Start: 2021-09-14 | End: 2021-10-04 | Stop reason: HOSPADM

## 2021-09-14 RX ORDER — LEVOFLOXACIN 500 MG/1
500 TABLET, FILM COATED ORAL EVERY 24 HOURS
Qty: 1 TABLET | Refills: 0
Start: 2021-09-16 | End: 2021-09-14

## 2021-09-14 RX ORDER — LEVOFLOXACIN 250 MG/1
500 TABLET ORAL EVERY 24 HOURS
Status: DISCONTINUED | OUTPATIENT
Start: 2021-09-16 | End: 2021-09-14 | Stop reason: HOSPADM

## 2021-09-14 RX ORDER — MELATONIN
1000 DAILY
Status: DISCONTINUED | OUTPATIENT
Start: 2021-09-15 | End: 2021-10-04 | Stop reason: HOSPADM

## 2021-09-14 RX ORDER — BISOPROLOL FUMARATE 5 MG/1
5 TABLET ORAL DAILY
Status: DISCONTINUED | OUTPATIENT
Start: 2021-09-15 | End: 2021-09-30

## 2021-09-14 RX ADMIN — DOCUSATE SODIUM 100 MG: 100 CAPSULE ORAL at 08:32

## 2021-09-14 RX ADMIN — BISOPROLOL FUMARATE 5 MG: 5 TABLET ORAL at 08:32

## 2021-09-14 RX ADMIN — POTASSIUM CHLORIDE 20 MEQ: 1500 TABLET, EXTENDED RELEASE ORAL at 10:33

## 2021-09-14 RX ADMIN — Medication 1000 UNITS: at 08:32

## 2021-09-14 RX ADMIN — Medication 250 MG: at 17:42

## 2021-09-14 RX ADMIN — SODIUM BICARBONATE 1300 MG: 650 TABLET ORAL at 17:42

## 2021-09-14 RX ADMIN — CEFEPIME HYDROCHLORIDE 1000 MG: 1 INJECTION, POWDER, FOR SOLUTION INTRAMUSCULAR; INTRAVENOUS at 04:32

## 2021-09-14 RX ADMIN — ACETAMINOPHEN 650 MG: 325 TABLET ORAL at 22:14

## 2021-09-14 RX ADMIN — AMLODIPINE BESYLATE 10 MG: 10 TABLET ORAL at 17:42

## 2021-09-14 RX ADMIN — LEVOFLOXACIN 750 MG: 750 TABLET, FILM COATED ORAL at 17:43

## 2021-09-14 RX ADMIN — Medication 250 MG: at 08:32

## 2021-09-14 RX ADMIN — HEPARIN SODIUM 5000 UNITS: 5000 INJECTION INTRAVENOUS; SUBCUTANEOUS at 22:14

## 2021-09-14 RX ADMIN — SODIUM BICARBONATE 650 MG TABLET 1300 MG: at 08:32

## 2021-09-14 RX ADMIN — HEPARIN SODIUM 5000 UNITS: 5000 INJECTION INTRAVENOUS; SUBCUTANEOUS at 17:42

## 2021-09-14 RX ADMIN — FAMOTIDINE 10 MG: 20 TABLET ORAL at 05:31

## 2021-09-14 RX ADMIN — CALCIUM CARBONATE (ANTACID) CHEW TAB 500 MG 1250 MG: 500 CHEW TAB at 08:32

## 2021-09-14 RX ADMIN — DOCUSATE SODIUM 100 MG: 100 CAPSULE, LIQUID FILLED ORAL at 17:42

## 2021-09-14 NOTE — DISCHARGE SUMMARY
[address] [facility]  Discharge- Denise Mcadams 1945, 78 y o  male MRN: [id number]  Unit/Bed#: E2 -01 Encounter: [id number]  Primary Care Provider: Mendel Dess, MD   Date and time admitted to hospital: 9/7/2021  1:06 PM    * Acute kidney injury superimposed on CKD [facility])  Assessment & Plan  · LORENZA POA, appears that the patient's baseline creatinine is 3-3 5  · Creatinine 4 28 on admission  · Creatinine today 3 35  · Acute kidney injury most likely secondary to prerenal azotemia, rhabdo, and component of CKD progression,+ on ARB  · D/c losartan and will continue to hold on discharge   · BMP in 1 week upon discharge with Nephrology follow up with [facility] , Dr Sakshi Fortune   · Nephrology consulted here, appreciate recommendations - okay for discharge from renal standpoint   · Diuretics remain on hold  · Sodium bicarbonate dose recently increased 1300 mg po bid   ·  Has a right upper extremity AV fistula in place which is ready for use  No indication for dialysis this admission     Sepsis [facility])  Assessment & Plan  · Evolving since admission  As evidenced by leukocytosis, bandemia, with suspected source of infection UTI  · Currently on IV Cefepime , ID consulted , plan to transition to levaquin to complete abx course through 9/16/21   · Will need daily ECG at [facility] while on levaquin to monitor qTC  · blood cultures negative to date, afebrile  · Repeat CBC with diff in 1 week     UTI (urinary tract infection)  Assessment & Plan  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 and associated renal parenchymal volume loss  Double-J stent in the left upper pole of the horseshoe kidney with dilated left ureter  Trabeculated bladder with bladder diverticulum likely related to chronic urostasis  · Patient follows with Urology at [facility]   · Rocephin  changed to Cefepime by ID, will transition to oral levaquin today to complete abx course through 9/16/21 with daily ECG monitoring while on levaquin at rehab   · Appreciate urology input  Chronic left ureteral stent with q 6 month exchanges  · Status post last stent exchange at [facility] on 8/5/2021  · No urologic surgical intervention indicated at this time,outpatient follow up     Bilateral hydronephrosis  Assessment & Plan  Chronic bilateral hydronephrosis managed with chronic left stents exchanged q 6 months   Seen by Urology while here   No urologic surgical intervention indicated at this time   Continue outpatient follow up with urology     History of aortic valve replacement  Assessment & Plan  On doxycyline suppressive therapy, would continue after completion on levaquin until further discussion with primary cardiologist outpatient     Cerebrovascular accident (CVA) [facility])  Assessment & Plan  · History of CVA with right-sided weakness  · On statin at home  On hold due to [facility] while here, can resume upon discharge  HTN (hypertension)  Assessment & Plan  · Maintained on bisoprolol 5 mg once daily, amlodipine 10 mg every evening   · Hold losartan 50 due to LORENZA   Continue to hold on discharge per Renal recommendations         Thrombocytopenia (HCC)-resolved as of 9/14/2021  Assessment & Plan  · Platelet stable today   · Holding heparin   · HELEN panel 0 106    · Platelets stable today and normalized       Medical Problems     Resolved Problems  Date Reviewed: 9/14/2021        Resolved    Traumatic rhabdomyolysis ([facility] [address] ) 9/13/2021     Resolved by  Champ Rivera PA-C    Thrombocytopenia ([facility] [address] ) 9/14/2021     Resolved by  Champ Rivera PA-C              Discharging Physician / Practitioner: Champ Rivera PA-C  PCP: William Ovalle MD  Admission Date:   Admission Orders (From admission, onward)     Ordered        09/07/21 1701  Inpatient Admission Once                   Discharge Date: 09/14/21    Consultations During Hospital Stay:  · Infectious disease  · Urology  · Nephrology    Procedures Performed:   · none    Significant Findings / Test Results:   · CT abdomen pelvis:  FINDINGS:     ABDOMEN     LOWER CHEST:  No clinically significant abnormality identified in the visualized lower chest      LIVER/BILIARY TREE:  Unremarkable      GALLBLADDER:  No calcified gallstones  No pericholecystic inflammatory change      SPLEEN:  Unremarkable      PANCREAS:  Unremarkable      ADRENAL GLANDS:  Unremarkable      KIDNEYS/URETERS:  Again seen is a horseshoe kidney, with severe renal parenchymal thinning and severe bilateral hydroureteronephrosis  There is a ureteral stent on the left but not on the right  There is no evidence of ureteral stones      STOMACH AND BOWEL:  Unremarkable      APPENDIX:  No findings to suggest appendicitis      ABDOMINOPELVIC CAVITY:  No ascites  No pneumoperitoneum  No lymphadenopathy      VESSELS:  There is an IVC filter      PELVIS     REPRODUCTIVE ORGANS:  Unremarkable for patient's age      URINARY BLADDER:  Unremarkable      ABDOMINAL WALL/INGUINAL REGIONS:  Unremarkable      OSSEOUS STRUCTURES:  No acute fracture or destructive osseous lesion  Spinal degenerative changes are noted      IMPRESSION:     Again seen is a horseshoe kidney, with severe renal parenchymal thinning and severe bilateral hydroureteronephrosis  There is a ureteral stent on the left but not on the right  There is no evidence of ureteral stones    · CT cervical spine:  FINDINGS:     ALIGNMENT:  Normal alignment of the cervical spine  No subluxation      VERTEBRAL BODIES:  No fracture      DEGENERATIVE CHANGES:  Mild multilevel cervical degenerative changes are noted without critical central canal stenosis      PREVERTEBRAL AND PARASPINAL SOFT TISSUES:  Clips noted around the thyroid gland  However there is residual thyroid tissue    No discrete thyroid mass      Atherosclerotic calcifications noted      THORACIC INLET:  Normal      IMPRESSION:     No cervical spine fracture or traumatic malalignment  Mild spondylosis  · CT thoracic and lumbar spine:  FINDINGS:     ALIGNMENT:  Normal alignment of the thoracolumbar spine  No subluxation      VERTEBRAL BODIES: No fracture      DEGENERATIVE CHANGES:  Mild to moderate multilevel spondylosis  There is prominent bridging osteophytes at the thoracolumbar junction and lumbosacral junction with dense bony ankylosis of L3-S1 indicative of diffuse idiopathic skeletal hyperostosis      PARASPINAL SOFT TISSUES:  There is a horseshoe kidney  There is severe hydronephrosis with thinning of the left renal moiety  Double-J stent in the left renal collecting system noted  Dilated left ureter is partially imaged  There is loss of renal   parenchyma      An IVC filter is noted      Multichamber cardiac enlargement suspected with probable left atrial and left ventricular dilatation  Aortic valvuloplasty noted  Dense annular calcifications mitral valve suspected      Small hiatal hernia noted  Bladder wall trabeculation with a small diverticulum posteriorly on the left likely related to chronic urostasis      Atherosclerotic calcifications noted      IMPRESSION:        1  No acute fracture or subluxation  2   Mild to moderate multilevel spondylosis with evidence of diffuse idiopathic skeletal hyperostosis at the lumbosacral junction  3   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 and associated renal parenchymal volume loss  4   Double-J stent in the left upper pole of the horseshoe kidney with dilated left ureter  5   Trabeculated bladder with bladder diverticulum likely related to chronic urostasis    6   IVC filter    · CT head:  No acute intracranial abnormality, microangiopathic changes  · Chest x-ray:  No acute cardiopulmonary disease  · Blood cultures negative times 72 hours  · Urine culture growing greater than 100,000 CFU Enterobacter cloacae complex         Test Results Pending at Discharge (will require follow up): · Repeat BMP and CBC within 1 week     Outpatient Tests Requested:  · Urology  · Nephrology  · Primary care provider within 1 week for post hospital follow-up  · Cardiology    Complications:      Reason for Admission:  Acute kidney injury superimposed on chronic kidney disease, traumatic rhabdomyolysis, sepsis, urinary tract infection    Hospital Course: Scout Soto is a 78 y o  male patient who originally presented to the hospital on 9/7/2021 due to after being found on the ground in his home after falling 3 days prior to admission  Patient states that he was getting out of bed when he felt weak and subsequently lowered himself to the ground  He denies any loss of consciousness or head strike  He states he was unable to get himself up off the ground and could not call for help  As he lives alone he was not found until a family member found him who brought him to the emergency department for further evaluation  While in the emergency department patient was found to have traumatic rhabdo by lysis with significantly elevated CK as well as acute kidney injury  In regards to patient's rhabdomyolysis use treated with IV fluid hydration and statin was placed on hold with resolution  He can resume statin upon discharge  He was seen in consultation with Nephrology for acute kidney injury superimposed on stage 4 chronic kidney disease  A KI most likely secondary to pre renal azotemia plus some component of underlying rhabdo as well as failure to autoregulate with presence of ARB use prior to admission as well as some progression of chronic kidney disease  Patient was admitted with a creatinine of 4 28 and prior to discharge his creatinine is 3 35  His baseline appears to be around 3-3 5  His ARB will continue to be discontinued upon discharge    He received IV fluid hydration with improvement in renal function  Patient does have right upper extremity AV fistula in place which is ready to use  He did not require any dialysis during this admission  He will continue on sodium bicarb 1300 mg p o  B i d  He should have repeat BMP in 1 week to monitor renal function with close follow-up with his primary nephrologist with [facility], Dr Forman Her  Patient had developing sepsis over admission with concern for urinary tract infection  Patient's urine culture was growing greater than 100,000 CFU Enterobacter  His blood cultures remain negative to date  He was seen in consultation with Infectious Disease  He was originally on IV Rocephin which was switched IV cefepime by Infectious Disease  Upon discharge he will continue the antibiotic course through 09/16/2021 with oral Levaquin to complete 7 day antibiotic course  He will need daily EKG monitoring of QTC at rehab while on Levaquin  Patient should have a repeat BMP and CBC in 1 week  He was seen in consultation with Urology for chronic left ureteral stent with every 6 months exchanges with last stent being at [facility] on 08/05/2021  There is no urologic surgical intervention indicated during this admission  He will continue outpatient follow-up with Urology  Patient noted to have thrombocytopenia with stabilization of platelet counts prior to discharge  Hit panel was negative  He should have repeat CBC with platelets in 1 week    In regards to his blood pressure he can continue on bisoprolol 5 mg once daily as well as amlodipine 10 mg every evening  Losartan has been discontinued per Nephrology recommendations on discharge  Patient will be discharged to a rehab at [facility]  Of note, patient was noted to be on suppressive therapy for sometimes with doxycyline daily due to his aortic valve with history of endocarditis   I recommend that he continue suppressive therapy until he has further discussion with his primary Cardiologist outpatient  Please see above list of diagnoses and related plan for additional information  Condition at Discharge: stable    Discharge Day Visit / Exam:   Subjective:  Patient doing well today without any acute complaints  He had a bowel movement this morning  No fevers or chills  No chest pain or shortness of breath  No other acute complaints or events overnight  Vitals: Blood Pressure: 128/60 (09/14/21 0720)  Pulse: 63 (09/14/21 0720)  Temperature: 98 °F (36 7 °C) (09/14/21 0720)  Temp Source: Temporal (09/14/21 0720)  Respirations: 18 (09/14/21 0720)  Height: 5' 8" (172 7 cm) (09/07/21 1316)  Weight - Scale: 78 1 kg (172 lb 2 9 oz) (09/13/21 0545)  SpO2: 96 % (09/14/21 0720)  Exam:   Physical Exam  Vitals and nursing note reviewed  HENT:      Head: Normocephalic  Eyes:      Conjunctiva/sclera: Conjunctivae normal    Cardiovascular:      Rate and Rhythm: Normal rate and regular rhythm  Pulmonary:      Effort: Pulmonary effort is normal       Breath sounds: Normal breath sounds  Abdominal:      General: Bowel sounds are normal       Palpations: Abdomen is soft  Tenderness: There is no abdominal tenderness  There is no guarding or rebound  Skin:     General: Skin is warm  Neurological:      Mental Status: He is alert  Mental status is at baseline  Psychiatric:         Mood and Affect: Mood normal             Discussion with Family: Updated  (son) via phone  Discharge instructions/Information to patient and family:   See after visit summary for information provided to patient and family  Provisions for Follow-Up Care:  See after visit summary for information related to follow-up care and any pertinent home health orders  Disposition:   Acute Rehab at [address] [place] [address] Readmission: high risk for readmission given multiple comorbidities      Discharge Statement:  I spent 45 minutes discharging the patient  This time was spent on the day of discharge  I had direct contact with the patient on the day of discharge  Greater than 50% of the total time was spent examining patient, answering all patient questions, arranging and discussing plan of care with patient as well as directly providing post-discharge instructions  Additional time then spent on discharge activities  Discharge Medications:  See after visit summary for reconciled discharge medications provided to patient and/or family        **Please Note: This note may have been constructed using a voice recognition system**

## 2021-09-14 NOTE — ASSESSMENT & PLAN NOTE
· LORENZA POA, appears that the patient's baseline creatinine is 3-3 5  · Creatinine 4 28 on admission  · Creatinine today 3 35  · Acute kidney injury most likely secondary to prerenal azotemia, rhabdo, and component of CKD progression,+ on ARB  · D/c losartan and will continue to hold on discharge   · BMP in 1 week upon discharge with Nephrology follow up with [facility] , Dr Inocencia Meeks   · Nephrology consulted here, appreciate recommendations - okay for discharge from renal standpoint   · Diuretics remain on hold  · Sodium bicarbonate dose recently increased 1300 mg po bid   ·  Has a right upper extremity AV fistula in place which is ready for use   No indication for dialysis this admission

## 2021-09-14 NOTE — ASSESSMENT & PLAN NOTE
On doxycyline suppressive therapy, would continue after completion on levaquin until further discussion with primary cardiologist outpatient

## 2021-09-14 NOTE — ASSESSMENT & PLAN NOTE
· Maintained on bisoprolol 5 mg once daily, amlodipine 10 mg every evening   · Hold losartan 50 due to LORENZA   Continue to hold on discharge per Renal recommendations

## 2021-09-14 NOTE — DISCHARGE INSTRUCTIONS
transition to Levaquin 750 mg x 1 dose today with repeat dosing of 500 mg x1 on 09/16  No further antibiotics required thereafter  Patient will then have completed antibiotic course through 9/16    Would recommend monitoring daily EKG to assess for any progressing QTC changes while on levaquin

## 2021-09-14 NOTE — ASSESSMENT & PLAN NOTE
· Platelet stable today   · Holding heparin   · HELEN panel 0 106    · Platelets stable today and normalized

## 2021-09-14 NOTE — ASSESSMENT & PLAN NOTE
· Evolving since admission  As evidenced by leukocytosis, bandemia, with suspected source of infection UTI    · Currently on IV Cefepime , ID consulted , plan to transition to levaquin to complete abx course through 9/16/21   · Will need daily ECG at [facility] while on levaquin to monitor qTC  · blood cultures negative to date, afebrile  · Repeat CBC with diff in 1 week

## 2021-09-14 NOTE — ASSESSMENT & PLAN NOTE
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 and associated renal parenchymal volume loss  Double-J stent in the left upper pole of the horseshoe kidney with dilated left ureter  Trabeculated bladder with bladder diverticulum likely related to chronic urostasis  · Patient follows with Urology at [facility]   · Rocephin  changed to Cefepime by ID, will transition to oral levaquin today to complete abx course through 9/16/21 with daily ECG monitoring while on levaquin at rehab   · Appreciate urology input  Chronic left ureteral stent with q 6 month exchanges  · Status post last stent exchange at [facility] on 8/5/2021    · No urologic surgical intervention indicated at this time,outpatient follow up

## 2021-09-14 NOTE — ASSESSMENT & PLAN NOTE
· History of CVA with right-sided weakness  · On statin at home  On hold due to [facility] while here, can resume upon discharge

## 2021-09-14 NOTE — CASE MANAGEMENT
Pt approved for [facility] today  CM spoke with pt's son to see if he or any other family would want to provide transport  Family unable to do so; asked for wcv to be arranged  CM did advise them of cost and they are in agreement  Pt will be transported at [facility]/ [facility]      Report: [phone]

## 2021-09-15 LAB
BACTERIA BLD CULT: NORMAL
BACTERIA BLD CULT: NORMAL

## 2021-09-15 PROCEDURE — 99232 SBSQ HOSP IP/OBS MODERATE 35: CPT | Performed by: NURSE PRACTITIONER

## 2021-09-15 PROCEDURE — 97116 GAIT TRAINING THERAPY: CPT

## 2021-09-15 PROCEDURE — 97110 THERAPEUTIC EXERCISES: CPT

## 2021-09-15 PROCEDURE — 99233 SBSQ HOSP IP/OBS HIGH 50: CPT | Performed by: STUDENT IN AN ORGANIZED HEALTH CARE EDUCATION/TRAINING PROGRAM

## 2021-09-15 PROCEDURE — 97163 PT EVAL HIGH COMPLEX 45 MIN: CPT

## 2021-09-15 PROCEDURE — 97535 SELF CARE MNGMENT TRAINING: CPT

## 2021-09-15 PROCEDURE — 97530 THERAPEUTIC ACTIVITIES: CPT

## 2021-09-15 PROCEDURE — 97166 OT EVAL MOD COMPLEX 45 MIN: CPT

## 2021-09-15 PROCEDURE — 93005 ELECTROCARDIOGRAM TRACING: CPT

## 2021-09-15 RX ORDER — DOXYCYCLINE HYCLATE 100 MG/1
100 CAPSULE ORAL DAILY
Status: DISCONTINUED | OUTPATIENT
Start: 2021-09-16 | End: 2021-10-04 | Stop reason: HOSPADM

## 2021-09-15 RX ORDER — LEVOFLOXACIN 500 MG/1
500 TABLET, FILM COATED ORAL EVERY 24 HOURS
Status: DISCONTINUED | OUTPATIENT
Start: 2021-09-16 | End: 2021-09-16

## 2021-09-15 RX ADMIN — SODIUM BICARBONATE 1300 MG: 650 TABLET ORAL at 08:25

## 2021-09-15 RX ADMIN — HEPARIN SODIUM 5000 UNITS: 5000 INJECTION INTRAVENOUS; SUBCUTANEOUS at 21:20

## 2021-09-15 RX ADMIN — CHOLECALCIFEROL TAB 25 MCG (1000 UNIT) 1000 UNITS: 25 TAB at 08:25

## 2021-09-15 RX ADMIN — HEPARIN SODIUM 5000 UNITS: 5000 INJECTION INTRAVENOUS; SUBCUTANEOUS at 14:11

## 2021-09-15 RX ADMIN — Medication 250 MG: at 08:25

## 2021-09-15 RX ADMIN — BISOPROLOL FUMARATE 5 MG: 5 TABLET ORAL at 08:25

## 2021-09-15 RX ADMIN — Medication 250 MG: at 17:30

## 2021-09-15 RX ADMIN — SODIUM BICARBONATE 1300 MG: 650 TABLET ORAL at 17:30

## 2021-09-15 RX ADMIN — DOCUSATE SODIUM 100 MG: 100 CAPSULE, LIQUID FILLED ORAL at 17:30

## 2021-09-15 RX ADMIN — DOCUSATE SODIUM 100 MG: 100 CAPSULE, LIQUID FILLED ORAL at 08:25

## 2021-09-15 RX ADMIN — HEPARIN SODIUM 5000 UNITS: 5000 INJECTION INTRAVENOUS; SUBCUTANEOUS at 05:12

## 2021-09-16 LAB
25(OH)D3 SERPL-MCNC: 50.6 NG/ML (ref 30–100)
ANION GAP SERPL CALCULATED.3IONS-SCNC: 9 MMOL/L (ref 5–14)
ATRIAL RATE: 63 BPM
BASOPHILS # BLD AUTO: 0.1 THOUSANDS/ΜL (ref 0–0.1)
BASOPHILS NFR BLD AUTO: 1 % (ref 0–1)
BUN SERPL-MCNC: 52 MG/DL (ref 5–25)
CALCIUM SERPL-MCNC: 7.6 MG/DL (ref 8.4–10.2)
CHLORIDE SERPL-SCNC: 115 MMOL/L (ref 97–108)
CK SERPL-CCNC: 30 U/L (ref 55–170)
CO2 SERPL-SCNC: 18 MMOL/L (ref 22–30)
CREAT SERPL-MCNC: 3.08 MG/DL (ref 0.7–1.5)
EOSINOPHIL # BLD AUTO: 0.2 THOUSAND/ΜL (ref 0–0.4)
EOSINOPHIL NFR BLD AUTO: 2 % (ref 0–6)
ERYTHROCYTE [DISTWIDTH] IN BLOOD BY AUTOMATED COUNT: 17.4 %
FERRITIN SERPL-MCNC: 164 NG/ML (ref 8–388)
GFR SERPL CREATININE-BSD FRML MDRD: 19 ML/MIN/1.73SQ M
GLUCOSE P FAST SERPL-MCNC: 90 MG/DL (ref 70–99)
GLUCOSE SERPL-MCNC: 90 MG/DL (ref 70–99)
HBV CORE AB SER QL: NORMAL
HBV CORE IGM SER QL: NORMAL
HBV SURFACE AG SER QL: NORMAL
HCT VFR BLD AUTO: 33.8 % (ref 41–53)
HCV AB SER QL: NORMAL
HGB BLD-MCNC: 10.9 G/DL (ref 13.5–17.5)
IRON SATN MFR SERPL: 22 % (ref 20–50)
IRON SERPL-MCNC: 52 UG/DL (ref 65–175)
LYMPHOCYTES # BLD AUTO: 1.6 THOUSANDS/ΜL (ref 0.5–4)
LYMPHOCYTES NFR BLD AUTO: 17 % (ref 25–45)
MCH RBC QN AUTO: 29.5 PG (ref 26–34)
MCHC RBC AUTO-ENTMCNC: 32.3 G/DL (ref 31–36)
MCV RBC AUTO: 92 FL (ref 80–100)
MONOCYTES # BLD AUTO: 0.8 THOUSAND/ΜL (ref 0.2–0.9)
MONOCYTES NFR BLD AUTO: 8 % (ref 1–10)
NEUTROPHILS # BLD AUTO: 7.2 THOUSANDS/ΜL (ref 1.8–7.8)
NEUTS SEG NFR BLD AUTO: 74 % (ref 45–65)
P AXIS: 54 DEGREES
PLATELET # BLD AUTO: 238 THOUSANDS/UL (ref 150–450)
PMV BLD AUTO: 10.3 FL (ref 8.9–12.7)
POTASSIUM SERPL-SCNC: 4.1 MMOL/L (ref 3.6–5)
PR INTERVAL: 280 MS
QRS AXIS: -59 DEGREES
QRSD INTERVAL: 142 MS
QT INTERVAL: 484 MS
QTC INTERVAL: 495 MS
RBC # BLD AUTO: 3.7 MILLION/UL (ref 4.5–5.9)
SODIUM SERPL-SCNC: 142 MMOL/L (ref 137–147)
T WAVE AXIS: 76 DEGREES
TIBC SERPL-MCNC: 240 UG/DL (ref 250–450)
VENTRICULAR RATE: 63 BPM
WBC # BLD AUTO: 9.8 THOUSAND/UL (ref 4.5–11)

## 2021-09-16 PROCEDURE — 97535 SELF CARE MNGMENT TRAINING: CPT

## 2021-09-16 PROCEDURE — 93005 ELECTROCARDIOGRAM TRACING: CPT

## 2021-09-16 PROCEDURE — 97129 THER IVNTJ 1ST 15 MIN: CPT

## 2021-09-16 PROCEDURE — 97116 GAIT TRAINING THERAPY: CPT

## 2021-09-16 PROCEDURE — 80048 BASIC METABOLIC PNL TOTAL CA: CPT | Performed by: NURSE PRACTITIONER

## 2021-09-16 PROCEDURE — 86705 HEP B CORE ANTIBODY IGM: CPT | Performed by: NURSE PRACTITIONER

## 2021-09-16 PROCEDURE — 83540 ASSAY OF IRON: CPT | Performed by: NURSE PRACTITIONER

## 2021-09-16 PROCEDURE — 99233 SBSQ HOSP IP/OBS HIGH 50: CPT | Performed by: STUDENT IN AN ORGANIZED HEALTH CARE EDUCATION/TRAINING PROGRAM

## 2021-09-16 PROCEDURE — 82728 ASSAY OF FERRITIN: CPT | Performed by: NURSE PRACTITIONER

## 2021-09-16 PROCEDURE — 82550 ASSAY OF CK (CPK): CPT | Performed by: NURSE PRACTITIONER

## 2021-09-16 PROCEDURE — 83550 IRON BINDING TEST: CPT | Performed by: NURSE PRACTITIONER

## 2021-09-16 PROCEDURE — 99232 SBSQ HOSP IP/OBS MODERATE 35: CPT | Performed by: NURSE PRACTITIONER

## 2021-09-16 PROCEDURE — 93010 ELECTROCARDIOGRAM REPORT: CPT | Performed by: INTERNAL MEDICINE

## 2021-09-16 PROCEDURE — 86803 HEPATITIS C AB TEST: CPT | Performed by: NURSE PRACTITIONER

## 2021-09-16 PROCEDURE — 97110 THERAPEUTIC EXERCISES: CPT

## 2021-09-16 PROCEDURE — 85025 COMPLETE CBC W/AUTO DIFF WBC: CPT | Performed by: NURSE PRACTITIONER

## 2021-09-16 PROCEDURE — 82306 VITAMIN D 25 HYDROXY: CPT | Performed by: NURSE PRACTITIONER

## 2021-09-16 PROCEDURE — 97130 THER IVNTJ EA ADDL 15 MIN: CPT

## 2021-09-16 PROCEDURE — 97530 THERAPEUTIC ACTIVITIES: CPT

## 2021-09-16 PROCEDURE — 87340 HEPATITIS B SURFACE AG IA: CPT | Performed by: NURSE PRACTITIONER

## 2021-09-16 PROCEDURE — 86704 HEP B CORE ANTIBODY TOTAL: CPT | Performed by: NURSE PRACTITIONER

## 2021-09-16 RX ORDER — LEVOFLOXACIN 500 MG/1
500 TABLET, FILM COATED ORAL EVERY 24 HOURS
Status: COMPLETED | OUTPATIENT
Start: 2021-09-16 | End: 2021-09-16

## 2021-09-16 RX ORDER — LANOLIN ALCOHOL/MO/W.PET/CERES
3 CREAM (GRAM) TOPICAL
Status: DISCONTINUED | OUTPATIENT
Start: 2021-09-16 | End: 2021-10-04 | Stop reason: HOSPADM

## 2021-09-16 RX ORDER — PRAVASTATIN SODIUM 40 MG
40 TABLET ORAL
Status: DISCONTINUED | OUTPATIENT
Start: 2021-09-16 | End: 2021-10-04 | Stop reason: HOSPADM

## 2021-09-16 RX ADMIN — SODIUM BICARBONATE 1300 MG: 650 TABLET ORAL at 08:01

## 2021-09-16 RX ADMIN — HEPARIN SODIUM 5000 UNITS: 5000 INJECTION INTRAVENOUS; SUBCUTANEOUS at 14:42

## 2021-09-16 RX ADMIN — DOCUSATE SODIUM 100 MG: 100 CAPSULE, LIQUID FILLED ORAL at 08:00

## 2021-09-16 RX ADMIN — CHOLECALCIFEROL TAB 25 MCG (1000 UNIT) 1000 UNITS: 25 TAB at 08:00

## 2021-09-16 RX ADMIN — AMLODIPINE BESYLATE 10 MG: 10 TABLET ORAL at 16:51

## 2021-09-16 RX ADMIN — FAMOTIDINE 10 MG: 20 TABLET ORAL at 06:39

## 2021-09-16 RX ADMIN — MELATONIN TAB 3 MG 3 MG: 3 TAB at 21:54

## 2021-09-16 RX ADMIN — LEVOFLOXACIN 500 MG: 500 TABLET, FILM COATED ORAL at 20:23

## 2021-09-16 RX ADMIN — DOXYCYCLINE 100 MG: 100 CAPSULE ORAL at 08:00

## 2021-09-16 RX ADMIN — BISOPROLOL FUMARATE 5 MG: 5 TABLET ORAL at 08:01

## 2021-09-16 RX ADMIN — PRAVASTATIN SODIUM 40 MG: 40 TABLET ORAL at 16:51

## 2021-09-16 RX ADMIN — Medication 250 MG: at 16:51

## 2021-09-16 RX ADMIN — Medication 250 MG: at 08:00

## 2021-09-16 RX ADMIN — ACETAMINOPHEN 650 MG: 325 TABLET ORAL at 12:47

## 2021-09-16 RX ADMIN — HEPARIN SODIUM 5000 UNITS: 5000 INJECTION INTRAVENOUS; SUBCUTANEOUS at 06:38

## 2021-09-16 RX ADMIN — SODIUM BICARBONATE 1300 MG: 650 TABLET ORAL at 16:51

## 2021-09-16 RX ADMIN — DICLOFENAC SODIUM 2 G: 10 GEL TOPICAL at 08:02

## 2021-09-16 RX ADMIN — HEPARIN SODIUM 5000 UNITS: 5000 INJECTION INTRAVENOUS; SUBCUTANEOUS at 21:54

## 2021-09-17 PROCEDURE — 92523 SPEECH SOUND LANG COMPREHEN: CPT

## 2021-09-17 PROCEDURE — 97530 THERAPEUTIC ACTIVITIES: CPT

## 2021-09-17 PROCEDURE — 99232 SBSQ HOSP IP/OBS MODERATE 35: CPT | Performed by: NURSE PRACTITIONER

## 2021-09-17 PROCEDURE — 97116 GAIT TRAINING THERAPY: CPT

## 2021-09-17 PROCEDURE — 97110 THERAPEUTIC EXERCISES: CPT

## 2021-09-17 PROCEDURE — 97130 THER IVNTJ EA ADDL 15 MIN: CPT

## 2021-09-17 PROCEDURE — 97535 SELF CARE MNGMENT TRAINING: CPT

## 2021-09-17 PROCEDURE — 97129 THER IVNTJ 1ST 15 MIN: CPT

## 2021-09-17 PROCEDURE — 99233 SBSQ HOSP IP/OBS HIGH 50: CPT | Performed by: STUDENT IN AN ORGANIZED HEALTH CARE EDUCATION/TRAINING PROGRAM

## 2021-09-17 RX ADMIN — HEPARIN SODIUM 5000 UNITS: 5000 INJECTION INTRAVENOUS; SUBCUTANEOUS at 15:05

## 2021-09-17 RX ADMIN — SODIUM BICARBONATE 1300 MG: 650 TABLET ORAL at 09:04

## 2021-09-17 RX ADMIN — SODIUM BICARBONATE 1300 MG: 650 TABLET ORAL at 17:02

## 2021-09-17 RX ADMIN — ANTACID TABLETS 500 MG: 500 TABLET, CHEWABLE ORAL at 09:04

## 2021-09-17 RX ADMIN — MELATONIN TAB 3 MG 3 MG: 3 TAB at 22:54

## 2021-09-17 RX ADMIN — PRAVASTATIN SODIUM 40 MG: 40 TABLET ORAL at 17:02

## 2021-09-17 RX ADMIN — CHOLECALCIFEROL TAB 25 MCG (1000 UNIT) 1000 UNITS: 25 TAB at 09:04

## 2021-09-17 RX ADMIN — DOXYCYCLINE 100 MG: 100 CAPSULE ORAL at 09:04

## 2021-09-17 RX ADMIN — HEPARIN SODIUM 5000 UNITS: 5000 INJECTION INTRAVENOUS; SUBCUTANEOUS at 22:54

## 2021-09-17 RX ADMIN — Medication 250 MG: at 09:04

## 2021-09-17 RX ADMIN — BISOPROLOL FUMARATE 5 MG: 5 TABLET ORAL at 09:04

## 2021-09-17 RX ADMIN — Medication 250 MG: at 17:02

## 2021-09-17 RX ADMIN — AMLODIPINE BESYLATE 10 MG: 10 TABLET ORAL at 17:02

## 2021-09-17 RX ADMIN — HEPARIN SODIUM 5000 UNITS: 5000 INJECTION INTRAVENOUS; SUBCUTANEOUS at 05:48

## 2021-09-18 PROCEDURE — 97110 THERAPEUTIC EXERCISES: CPT

## 2021-09-18 PROCEDURE — 97116 GAIT TRAINING THERAPY: CPT

## 2021-09-18 PROCEDURE — 97130 THER IVNTJ EA ADDL 15 MIN: CPT

## 2021-09-18 PROCEDURE — 97530 THERAPEUTIC ACTIVITIES: CPT

## 2021-09-18 PROCEDURE — 97129 THER IVNTJ 1ST 15 MIN: CPT

## 2021-09-18 RX ADMIN — ANTACID TABLETS 500 MG: 500 TABLET, CHEWABLE ORAL at 09:01

## 2021-09-18 RX ADMIN — SODIUM BICARBONATE 1300 MG: 650 TABLET ORAL at 09:01

## 2021-09-18 RX ADMIN — CHOLECALCIFEROL TAB 25 MCG (1000 UNIT) 1000 UNITS: 25 TAB at 09:01

## 2021-09-18 RX ADMIN — AMLODIPINE BESYLATE 10 MG: 10 TABLET ORAL at 18:05

## 2021-09-18 RX ADMIN — Medication 250 MG: at 09:01

## 2021-09-18 RX ADMIN — DICLOFENAC SODIUM 2 G: 10 GEL TOPICAL at 21:29

## 2021-09-18 RX ADMIN — BISOPROLOL FUMARATE 5 MG: 5 TABLET ORAL at 09:01

## 2021-09-18 RX ADMIN — ACETAMINOPHEN 650 MG: 325 TABLET ORAL at 20:06

## 2021-09-18 RX ADMIN — HEPARIN SODIUM 5000 UNITS: 5000 INJECTION INTRAVENOUS; SUBCUTANEOUS at 15:01

## 2021-09-18 RX ADMIN — DOXYCYCLINE 100 MG: 100 CAPSULE ORAL at 09:01

## 2021-09-18 RX ADMIN — HEPARIN SODIUM 5000 UNITS: 5000 INJECTION INTRAVENOUS; SUBCUTANEOUS at 06:51

## 2021-09-18 RX ADMIN — MELATONIN TAB 3 MG 3 MG: 3 TAB at 21:28

## 2021-09-18 RX ADMIN — HEPARIN SODIUM 5000 UNITS: 5000 INJECTION INTRAVENOUS; SUBCUTANEOUS at 21:28

## 2021-09-18 RX ADMIN — Medication 250 MG: at 18:06

## 2021-09-18 RX ADMIN — FAMOTIDINE 10 MG: 20 TABLET ORAL at 06:51

## 2021-09-18 RX ADMIN — SODIUM BICARBONATE 1300 MG: 650 TABLET ORAL at 18:06

## 2021-09-18 RX ADMIN — PRAVASTATIN SODIUM 40 MG: 40 TABLET ORAL at 18:05

## 2021-09-19 PROCEDURE — 97116 GAIT TRAINING THERAPY: CPT

## 2021-09-19 PROCEDURE — 97129 THER IVNTJ 1ST 15 MIN: CPT

## 2021-09-19 PROCEDURE — 97110 THERAPEUTIC EXERCISES: CPT

## 2021-09-19 PROCEDURE — 97130 THER IVNTJ EA ADDL 15 MIN: CPT

## 2021-09-19 RX ADMIN — CHOLECALCIFEROL TAB 25 MCG (1000 UNIT) 1000 UNITS: 25 TAB at 08:21

## 2021-09-19 RX ADMIN — Medication 250 MG: at 08:22

## 2021-09-19 RX ADMIN — MELATONIN TAB 3 MG 3 MG: 3 TAB at 21:18

## 2021-09-19 RX ADMIN — HEPARIN SODIUM 5000 UNITS: 5000 INJECTION INTRAVENOUS; SUBCUTANEOUS at 05:55

## 2021-09-19 RX ADMIN — SODIUM BICARBONATE 1300 MG: 650 TABLET ORAL at 08:22

## 2021-09-19 RX ADMIN — HEPARIN SODIUM 5000 UNITS: 5000 INJECTION INTRAVENOUS; SUBCUTANEOUS at 21:18

## 2021-09-19 RX ADMIN — Medication 250 MG: at 17:59

## 2021-09-19 RX ADMIN — HEPARIN SODIUM 5000 UNITS: 5000 INJECTION INTRAVENOUS; SUBCUTANEOUS at 15:17

## 2021-09-19 RX ADMIN — ACETAMINOPHEN 650 MG: 325 TABLET ORAL at 05:56

## 2021-09-19 RX ADMIN — DOXYCYCLINE 100 MG: 100 CAPSULE ORAL at 08:21

## 2021-09-19 RX ADMIN — SODIUM BICARBONATE 1300 MG: 650 TABLET ORAL at 17:59

## 2021-09-19 RX ADMIN — PRAVASTATIN SODIUM 40 MG: 40 TABLET ORAL at 17:59

## 2021-09-19 RX ADMIN — ANTACID TABLETS 500 MG: 500 TABLET, CHEWABLE ORAL at 08:22

## 2021-09-19 RX ADMIN — BISOPROLOL FUMARATE 5 MG: 5 TABLET ORAL at 08:21

## 2021-09-19 RX ADMIN — DICLOFENAC SODIUM 2 G: 10 GEL TOPICAL at 18:00

## 2021-09-20 ENCOUNTER — APPOINTMENT (INPATIENT)
Dept: RADIOLOGY | Facility: HOSPITAL | Age: 76
DRG: 557 | End: 2021-09-20
Payer: COMMERCIAL

## 2021-09-20 PROBLEM — M25.832 ULNAR IMPACTION SYNDROME, LEFT: Status: ACTIVE | Noted: 2021-09-20

## 2021-09-20 PROBLEM — M25.531 RIGHT WRIST PAIN: Status: ACTIVE | Noted: 2021-09-20

## 2021-09-20 PROBLEM — M25.532 LEFT WRIST PAIN: Status: ACTIVE | Noted: 2021-09-20

## 2021-09-20 LAB
ANION GAP SERPL CALCULATED.3IONS-SCNC: 10 MMOL/L (ref 5–14)
BASOPHILS # BLD AUTO: 0.1 THOUSANDS/ΜL (ref 0–0.1)
BASOPHILS NFR BLD AUTO: 1 % (ref 0–1)
BUN SERPL-MCNC: 42 MG/DL (ref 5–25)
CALCIUM SERPL-MCNC: 8.2 MG/DL (ref 8.4–10.2)
CHLORIDE SERPL-SCNC: 111 MMOL/L (ref 97–108)
CO2 SERPL-SCNC: 24 MMOL/L (ref 22–30)
CREAT SERPL-MCNC: 3.23 MG/DL (ref 0.7–1.5)
EOSINOPHIL # BLD AUTO: 0 THOUSAND/ΜL (ref 0–0.4)
EOSINOPHIL NFR BLD AUTO: 0 % (ref 0–6)
ERYTHROCYTE [DISTWIDTH] IN BLOOD BY AUTOMATED COUNT: 17.1 %
GFR SERPL CREATININE-BSD FRML MDRD: 18 ML/MIN/1.73SQ M
GLUCOSE P FAST SERPL-MCNC: 126 MG/DL (ref 70–99)
GLUCOSE SERPL-MCNC: 126 MG/DL (ref 70–99)
HCT VFR BLD AUTO: 35.5 % (ref 41–53)
HGB BLD-MCNC: 11.8 G/DL (ref 13.5–17.5)
LYMPHOCYTES # BLD AUTO: 2.4 THOUSANDS/ΜL (ref 0.5–4)
LYMPHOCYTES NFR BLD AUTO: 20 % (ref 25–45)
MCH RBC QN AUTO: 29.8 PG (ref 26–34)
MCHC RBC AUTO-ENTMCNC: 33.1 G/DL (ref 31–36)
MCV RBC AUTO: 90 FL (ref 80–100)
MONOCYTES # BLD AUTO: 1 THOUSAND/ΜL (ref 0.2–0.9)
MONOCYTES NFR BLD AUTO: 8 % (ref 1–10)
NEUTROPHILS # BLD AUTO: 8.5 THOUSANDS/ΜL (ref 1.8–7.8)
NEUTS SEG NFR BLD AUTO: 70 % (ref 45–65)
PLATELET # BLD AUTO: 400 THOUSANDS/UL (ref 150–450)
PMV BLD AUTO: 9.8 FL (ref 8.9–12.7)
POTASSIUM SERPL-SCNC: 4.7 MMOL/L (ref 3.6–5)
RBC # BLD AUTO: 3.95 MILLION/UL (ref 4.5–5.9)
SODIUM SERPL-SCNC: 145 MMOL/L (ref 137–147)
WBC # BLD AUTO: 12.1 THOUSAND/UL (ref 4.5–11)

## 2021-09-20 PROCEDURE — 97535 SELF CARE MNGMENT TRAINING: CPT

## 2021-09-20 PROCEDURE — 99232 SBSQ HOSP IP/OBS MODERATE 35: CPT | Performed by: INTERNAL MEDICINE

## 2021-09-20 PROCEDURE — 99232 SBSQ HOSP IP/OBS MODERATE 35: CPT | Performed by: STUDENT IN AN ORGANIZED HEALTH CARE EDUCATION/TRAINING PROGRAM

## 2021-09-20 PROCEDURE — 80048 BASIC METABOLIC PNL TOTAL CA: CPT | Performed by: NURSE PRACTITIONER

## 2021-09-20 PROCEDURE — 97530 THERAPEUTIC ACTIVITIES: CPT

## 2021-09-20 PROCEDURE — 85025 COMPLETE CBC W/AUTO DIFF WBC: CPT | Performed by: NURSE PRACTITIONER

## 2021-09-20 PROCEDURE — 73110 X-RAY EXAM OF WRIST: CPT

## 2021-09-20 PROCEDURE — 73130 X-RAY EXAM OF HAND: CPT

## 2021-09-20 PROCEDURE — 97116 GAIT TRAINING THERAPY: CPT

## 2021-09-20 PROCEDURE — 97110 THERAPEUTIC EXERCISES: CPT

## 2021-09-20 RX ORDER — ACETAMINOPHEN 325 MG/1
650 TABLET ORAL EVERY 6 HOURS SCHEDULED
Status: DISCONTINUED | OUTPATIENT
Start: 2021-09-20 | End: 2021-10-04 | Stop reason: HOSPADM

## 2021-09-20 RX ORDER — METHYLPREDNISOLONE 4 MG/1
12 TABLET ORAL DAILY
Status: DISCONTINUED | OUTPATIENT
Start: 2021-09-23 | End: 2021-09-20 | Stop reason: CLARIF

## 2021-09-20 RX ORDER — METHYLPREDNISOLONE 4 MG/1
4 TABLET ORAL ONCE
Status: COMPLETED | OUTPATIENT
Start: 2021-09-25 | End: 2021-09-25

## 2021-09-20 RX ORDER — METHYLPREDNISOLONE 4 MG/1
16 TABLET ORAL ONCE
Status: DISCONTINUED | OUTPATIENT
Start: 2021-09-15 | End: 2021-09-20 | Stop reason: CLARIF

## 2021-09-20 RX ORDER — GABAPENTIN 100 MG/1
200 CAPSULE ORAL
Status: DISCONTINUED | OUTPATIENT
Start: 2021-09-20 | End: 2021-10-04 | Stop reason: HOSPADM

## 2021-09-20 RX ORDER — METHYLPREDNISOLONE 4 MG/1
8 TABLET ORAL DAILY
Status: DISCONTINUED | OUTPATIENT
Start: 2021-09-24 | End: 2021-09-20 | Stop reason: CLARIF

## 2021-09-20 RX ORDER — METHYLPREDNISOLONE 4 MG/1
4 TABLET ORAL DAILY
Status: DISCONTINUED | OUTPATIENT
Start: 2021-09-25 | End: 2021-09-20 | Stop reason: CLARIF

## 2021-09-20 RX ORDER — METHYLPREDNISOLONE 4 MG/1
24 TABLET ORAL ONCE
Status: COMPLETED | OUTPATIENT
Start: 2021-09-20 | End: 2021-09-20

## 2021-09-20 RX ORDER — METHYLPREDNISOLONE 4 MG/1
16 TABLET ORAL ONCE
Status: COMPLETED | OUTPATIENT
Start: 2021-09-22 | End: 2021-09-22

## 2021-09-20 RX ORDER — METHYLPREDNISOLONE 16 MG/1
16 TABLET ORAL DAILY
Status: DISCONTINUED | OUTPATIENT
Start: 2021-09-22 | End: 2021-09-20 | Stop reason: CLARIF

## 2021-09-20 RX ORDER — METHYLPREDNISOLONE 4 MG/1
20 TABLET ORAL ONCE
Status: COMPLETED | OUTPATIENT
Start: 2021-09-21 | End: 2021-09-21

## 2021-09-20 RX ORDER — METHYLPREDNISOLONE 4 MG/1
8 TABLET ORAL ONCE
Status: COMPLETED | OUTPATIENT
Start: 2021-09-24 | End: 2021-09-24

## 2021-09-20 RX ORDER — METHYLPREDNISOLONE 4 MG/1
12 TABLET ORAL ONCE
Status: COMPLETED | OUTPATIENT
Start: 2021-09-23 | End: 2021-09-23

## 2021-09-20 RX ADMIN — METHYLPREDNISOLONE 24 MG: 4 TABLET ORAL at 13:54

## 2021-09-20 RX ADMIN — SODIUM BICARBONATE 1300 MG: 650 TABLET ORAL at 17:40

## 2021-09-20 RX ADMIN — CHOLECALCIFEROL TAB 25 MCG (1000 UNIT) 1000 UNITS: 25 TAB at 08:21

## 2021-09-20 RX ADMIN — ACETAMINOPHEN 650 MG: 325 TABLET ORAL at 17:40

## 2021-09-20 RX ADMIN — HEPARIN SODIUM 5000 UNITS: 5000 INJECTION INTRAVENOUS; SUBCUTANEOUS at 13:55

## 2021-09-20 RX ADMIN — Medication 250 MG: at 08:21

## 2021-09-20 RX ADMIN — DICLOFENAC SODIUM 2 G: 10 GEL TOPICAL at 01:47

## 2021-09-20 RX ADMIN — FAMOTIDINE 10 MG: 20 TABLET ORAL at 06:20

## 2021-09-20 RX ADMIN — HEPARIN SODIUM 5000 UNITS: 5000 INJECTION INTRAVENOUS; SUBCUTANEOUS at 06:20

## 2021-09-20 RX ADMIN — AMLODIPINE BESYLATE 10 MG: 10 TABLET ORAL at 17:40

## 2021-09-20 RX ADMIN — PRAVASTATIN SODIUM 40 MG: 40 TABLET ORAL at 17:40

## 2021-09-20 RX ADMIN — SODIUM BICARBONATE 1300 MG: 650 TABLET ORAL at 08:21

## 2021-09-20 RX ADMIN — MELATONIN TAB 3 MG 3 MG: 3 TAB at 22:31

## 2021-09-20 RX ADMIN — ANTACID TABLETS 500 MG: 500 TABLET, CHEWABLE ORAL at 08:21

## 2021-09-20 RX ADMIN — DOXYCYCLINE 100 MG: 100 CAPSULE ORAL at 08:21

## 2021-09-20 RX ADMIN — BISOPROLOL FUMARATE 5 MG: 5 TABLET ORAL at 08:21

## 2021-09-20 RX ADMIN — GABAPENTIN 200 MG: 100 CAPSULE ORAL at 22:31

## 2021-09-20 RX ADMIN — HEPARIN SODIUM 5000 UNITS: 5000 INJECTION INTRAVENOUS; SUBCUTANEOUS at 22:30

## 2021-09-20 RX ADMIN — Medication 250 MG: at 17:40

## 2021-09-20 RX ADMIN — ACETAMINOPHEN 650 MG: 325 TABLET ORAL at 01:47

## 2021-09-20 RX ADMIN — ACETAMINOPHEN 650 MG: 325 TABLET ORAL at 13:54

## 2021-09-21 LAB
ATRIAL RATE: 62 BPM
P AXIS: 47 DEGREES
PR INTERVAL: 268 MS
QRS AXIS: -55 DEGREES
QRSD INTERVAL: 150 MS
QT INTERVAL: 510 MS
QTC INTERVAL: 517 MS
T WAVE AXIS: 65 DEGREES
VENTRICULAR RATE: 62 BPM

## 2021-09-21 PROCEDURE — 97112 NEUROMUSCULAR REEDUCATION: CPT

## 2021-09-21 PROCEDURE — 99232 SBSQ HOSP IP/OBS MODERATE 35: CPT | Performed by: STUDENT IN AN ORGANIZED HEALTH CARE EDUCATION/TRAINING PROGRAM

## 2021-09-21 PROCEDURE — 97110 THERAPEUTIC EXERCISES: CPT

## 2021-09-21 PROCEDURE — 97535 SELF CARE MNGMENT TRAINING: CPT

## 2021-09-21 PROCEDURE — 93010 ELECTROCARDIOGRAM REPORT: CPT | Performed by: INTERNAL MEDICINE

## 2021-09-21 PROCEDURE — 97129 THER IVNTJ 1ST 15 MIN: CPT

## 2021-09-21 PROCEDURE — 97530 THERAPEUTIC ACTIVITIES: CPT

## 2021-09-21 PROCEDURE — 97116 GAIT TRAINING THERAPY: CPT

## 2021-09-21 PROCEDURE — 99232 SBSQ HOSP IP/OBS MODERATE 35: CPT | Performed by: INTERNAL MEDICINE

## 2021-09-21 PROCEDURE — 97130 THER IVNTJ EA ADDL 15 MIN: CPT

## 2021-09-21 RX ADMIN — BISOPROLOL FUMARATE 5 MG: 5 TABLET ORAL at 08:00

## 2021-09-21 RX ADMIN — ANTACID TABLETS 500 MG: 500 TABLET, CHEWABLE ORAL at 08:00

## 2021-09-21 RX ADMIN — DOXYCYCLINE 100 MG: 100 CAPSULE ORAL at 08:00

## 2021-09-21 RX ADMIN — ACETAMINOPHEN 650 MG: 325 TABLET ORAL at 17:32

## 2021-09-21 RX ADMIN — HEPARIN SODIUM 5000 UNITS: 5000 INJECTION INTRAVENOUS; SUBCUTANEOUS at 21:13

## 2021-09-21 RX ADMIN — AMLODIPINE BESYLATE 10 MG: 10 TABLET ORAL at 17:33

## 2021-09-21 RX ADMIN — ACETAMINOPHEN 650 MG: 325 TABLET ORAL at 12:14

## 2021-09-21 RX ADMIN — METHYLPREDNISOLONE 20 MG: 4 TABLET ORAL at 08:00

## 2021-09-21 RX ADMIN — ACETAMINOPHEN 650 MG: 325 TABLET ORAL at 23:13

## 2021-09-21 RX ADMIN — GABAPENTIN 200 MG: 100 CAPSULE ORAL at 21:13

## 2021-09-21 RX ADMIN — HEPARIN SODIUM 5000 UNITS: 5000 INJECTION INTRAVENOUS; SUBCUTANEOUS at 06:31

## 2021-09-21 RX ADMIN — Medication 250 MG: at 17:33

## 2021-09-21 RX ADMIN — PRAVASTATIN SODIUM 40 MG: 40 TABLET ORAL at 17:32

## 2021-09-21 RX ADMIN — HEPARIN SODIUM 5000 UNITS: 5000 INJECTION INTRAVENOUS; SUBCUTANEOUS at 14:32

## 2021-09-21 RX ADMIN — MELATONIN TAB 3 MG 3 MG: 3 TAB at 21:13

## 2021-09-21 RX ADMIN — ACETAMINOPHEN 650 MG: 325 TABLET ORAL at 01:28

## 2021-09-21 RX ADMIN — Medication 250 MG: at 08:00

## 2021-09-21 RX ADMIN — CHOLECALCIFEROL TAB 25 MCG (1000 UNIT) 1000 UNITS: 25 TAB at 08:00

## 2021-09-21 RX ADMIN — ACETAMINOPHEN 650 MG: 325 TABLET ORAL at 06:31

## 2021-09-21 RX ADMIN — SODIUM BICARBONATE 1300 MG: 650 TABLET ORAL at 17:32

## 2021-09-21 RX ADMIN — SODIUM BICARBONATE 1300 MG: 650 TABLET ORAL at 08:00

## 2021-09-22 PROCEDURE — 97130 THER IVNTJ EA ADDL 15 MIN: CPT

## 2021-09-22 PROCEDURE — 97116 GAIT TRAINING THERAPY: CPT

## 2021-09-22 PROCEDURE — 97535 SELF CARE MNGMENT TRAINING: CPT

## 2021-09-22 PROCEDURE — 99231 SBSQ HOSP IP/OBS SF/LOW 25: CPT | Performed by: STUDENT IN AN ORGANIZED HEALTH CARE EDUCATION/TRAINING PROGRAM

## 2021-09-22 PROCEDURE — 97129 THER IVNTJ 1ST 15 MIN: CPT

## 2021-09-22 PROCEDURE — 97530 THERAPEUTIC ACTIVITIES: CPT

## 2021-09-22 PROCEDURE — 99232 SBSQ HOSP IP/OBS MODERATE 35: CPT | Performed by: INTERNAL MEDICINE

## 2021-09-22 RX ORDER — MAGNESIUM HYDROXIDE/ALUMINUM HYDROXICE/SIMETHICONE 120; 1200; 1200 MG/30ML; MG/30ML; MG/30ML
30 SUSPENSION ORAL EVERY 4 HOURS PRN
Status: DISCONTINUED | OUTPATIENT
Start: 2021-09-22 | End: 2021-10-01

## 2021-09-22 RX ORDER — CALCIUM CARBONATE 200(500)MG
500 TABLET,CHEWABLE ORAL DAILY PRN
Status: DISCONTINUED | OUTPATIENT
Start: 2021-09-22 | End: 2021-10-04 | Stop reason: HOSPADM

## 2021-09-22 RX ADMIN — METHYLPREDNISOLONE 16 MG: 4 TABLET ORAL at 08:26

## 2021-09-22 RX ADMIN — ANTACID TABLETS 500 MG: 500 TABLET, CHEWABLE ORAL at 08:25

## 2021-09-22 RX ADMIN — GABAPENTIN 200 MG: 100 CAPSULE ORAL at 22:16

## 2021-09-22 RX ADMIN — HEPARIN SODIUM 5000 UNITS: 5000 INJECTION INTRAVENOUS; SUBCUTANEOUS at 15:19

## 2021-09-22 RX ADMIN — HEPARIN SODIUM 5000 UNITS: 5000 INJECTION INTRAVENOUS; SUBCUTANEOUS at 22:17

## 2021-09-22 RX ADMIN — BISOPROLOL FUMARATE 5 MG: 5 TABLET ORAL at 08:25

## 2021-09-22 RX ADMIN — ACETAMINOPHEN 650 MG: 325 TABLET ORAL at 12:12

## 2021-09-22 RX ADMIN — SODIUM BICARBONATE 1300 MG: 650 TABLET ORAL at 08:25

## 2021-09-22 RX ADMIN — FAMOTIDINE 10 MG: 20 TABLET ORAL at 06:06

## 2021-09-22 RX ADMIN — CHOLECALCIFEROL TAB 25 MCG (1000 UNIT) 1000 UNITS: 25 TAB at 08:25

## 2021-09-22 RX ADMIN — MELATONIN TAB 3 MG 3 MG: 3 TAB at 22:16

## 2021-09-22 RX ADMIN — DOXYCYCLINE 100 MG: 100 CAPSULE ORAL at 08:25

## 2021-09-22 RX ADMIN — PRAVASTATIN SODIUM 40 MG: 40 TABLET ORAL at 17:28

## 2021-09-22 RX ADMIN — AMLODIPINE BESYLATE 10 MG: 10 TABLET ORAL at 17:28

## 2021-09-22 RX ADMIN — ACETAMINOPHEN 650 MG: 325 TABLET ORAL at 06:06

## 2021-09-22 RX ADMIN — HEPARIN SODIUM 5000 UNITS: 5000 INJECTION INTRAVENOUS; SUBCUTANEOUS at 06:09

## 2021-09-22 RX ADMIN — Medication 250 MG: at 08:25

## 2021-09-22 RX ADMIN — SODIUM BICARBONATE 1300 MG: 650 TABLET ORAL at 17:28

## 2021-09-22 RX ADMIN — Medication 250 MG: at 17:28

## 2021-09-23 PROBLEM — E87.5 HYPERKALEMIA: Status: ACTIVE | Noted: 2021-09-23

## 2021-09-23 LAB
ANION GAP SERPL CALCULATED.3IONS-SCNC: 7 MMOL/L (ref 5–14)
BASOPHILS # BLD AUTO: 0 THOUSANDS/ΜL (ref 0–0.1)
BASOPHILS NFR BLD AUTO: 1 % (ref 0–1)
BUN SERPL-MCNC: 83 MG/DL (ref 5–25)
CALCIUM SERPL-MCNC: 7.4 MG/DL (ref 8.4–10.2)
CHLORIDE SERPL-SCNC: 106 MMOL/L (ref 97–108)
CO2 SERPL-SCNC: 26 MMOL/L (ref 22–30)
CREAT SERPL-MCNC: 3.17 MG/DL (ref 0.7–1.5)
EOSINOPHIL # BLD AUTO: 0 THOUSAND/ΜL (ref 0–0.4)
EOSINOPHIL NFR BLD AUTO: 0 % (ref 0–6)
ERYTHROCYTE [DISTWIDTH] IN BLOOD BY AUTOMATED COUNT: 17.4 %
GFR SERPL CREATININE-BSD FRML MDRD: 18 ML/MIN/1.73SQ M
GLUCOSE P FAST SERPL-MCNC: 152 MG/DL (ref 70–99)
GLUCOSE SERPL-MCNC: 152 MG/DL (ref 70–99)
HCT VFR BLD AUTO: 32.2 % (ref 41–53)
HGB BLD-MCNC: 10.4 G/DL (ref 13.5–17.5)
LYMPHOCYTES # BLD AUTO: 1.2 THOUSANDS/ΜL (ref 0.5–4)
LYMPHOCYTES NFR BLD AUTO: 15 % (ref 25–45)
MCH RBC QN AUTO: 29.3 PG (ref 26–34)
MCHC RBC AUTO-ENTMCNC: 32.4 G/DL (ref 31–36)
MCV RBC AUTO: 91 FL (ref 80–100)
MONOCYTES # BLD AUTO: 0.6 THOUSAND/ΜL (ref 0.2–0.9)
MONOCYTES NFR BLD AUTO: 8 % (ref 1–10)
NEUTROPHILS # BLD AUTO: 6 THOUSANDS/ΜL (ref 1.8–7.8)
NEUTS SEG NFR BLD AUTO: 77 % (ref 45–65)
PLATELET # BLD AUTO: 310 THOUSANDS/UL (ref 150–450)
PMV BLD AUTO: 10.4 FL (ref 8.9–12.7)
POTASSIUM SERPL-SCNC: 5.6 MMOL/L (ref 3.6–5)
RBC # BLD AUTO: 3.55 MILLION/UL (ref 4.5–5.9)
SODIUM SERPL-SCNC: 139 MMOL/L (ref 137–147)
WBC # BLD AUTO: 7.9 THOUSAND/UL (ref 4.5–11)

## 2021-09-23 PROCEDURE — 80048 BASIC METABOLIC PNL TOTAL CA: CPT | Performed by: NURSE PRACTITIONER

## 2021-09-23 PROCEDURE — 99233 SBSQ HOSP IP/OBS HIGH 50: CPT | Performed by: STUDENT IN AN ORGANIZED HEALTH CARE EDUCATION/TRAINING PROGRAM

## 2021-09-23 PROCEDURE — 97130 THER IVNTJ EA ADDL 15 MIN: CPT

## 2021-09-23 PROCEDURE — 85025 COMPLETE CBC W/AUTO DIFF WBC: CPT | Performed by: NURSE PRACTITIONER

## 2021-09-23 PROCEDURE — 97129 THER IVNTJ 1ST 15 MIN: CPT

## 2021-09-23 PROCEDURE — 97535 SELF CARE MNGMENT TRAINING: CPT

## 2021-09-23 PROCEDURE — 99232 SBSQ HOSP IP/OBS MODERATE 35: CPT | Performed by: INTERNAL MEDICINE

## 2021-09-23 PROCEDURE — 97530 THERAPEUTIC ACTIVITIES: CPT

## 2021-09-23 PROCEDURE — 97110 THERAPEUTIC EXERCISES: CPT

## 2021-09-23 PROCEDURE — 97116 GAIT TRAINING THERAPY: CPT

## 2021-09-23 RX ADMIN — ACETAMINOPHEN 650 MG: 325 TABLET ORAL at 18:10

## 2021-09-23 RX ADMIN — METHYLPREDNISOLONE 12 MG: 4 TABLET ORAL at 08:34

## 2021-09-23 RX ADMIN — Medication 250 MG: at 08:34

## 2021-09-23 RX ADMIN — SODIUM BICARBONATE 1300 MG: 650 TABLET ORAL at 08:34

## 2021-09-23 RX ADMIN — ACETAMINOPHEN 650 MG: 325 TABLET ORAL at 00:11

## 2021-09-23 RX ADMIN — Medication 250 MG: at 18:10

## 2021-09-23 RX ADMIN — ACETAMINOPHEN 650 MG: 325 TABLET ORAL at 05:28

## 2021-09-23 RX ADMIN — HEPARIN SODIUM 5000 UNITS: 5000 INJECTION INTRAVENOUS; SUBCUTANEOUS at 22:23

## 2021-09-23 RX ADMIN — PRAVASTATIN SODIUM 40 MG: 40 TABLET ORAL at 18:10

## 2021-09-23 RX ADMIN — HEPARIN SODIUM 5000 UNITS: 5000 INJECTION INTRAVENOUS; SUBCUTANEOUS at 13:35

## 2021-09-23 RX ADMIN — GABAPENTIN 200 MG: 100 CAPSULE ORAL at 22:23

## 2021-09-23 RX ADMIN — BISOPROLOL FUMARATE 5 MG: 5 TABLET ORAL at 08:34

## 2021-09-23 RX ADMIN — SODIUM BICARBONATE 1300 MG: 650 TABLET ORAL at 18:11

## 2021-09-23 RX ADMIN — MELATONIN TAB 3 MG 3 MG: 3 TAB at 22:23

## 2021-09-23 RX ADMIN — HEPARIN SODIUM 5000 UNITS: 5000 INJECTION INTRAVENOUS; SUBCUTANEOUS at 05:28

## 2021-09-23 RX ADMIN — DOXYCYCLINE 100 MG: 100 CAPSULE ORAL at 08:34

## 2021-09-23 RX ADMIN — ANTACID TABLETS 500 MG: 500 TABLET, CHEWABLE ORAL at 08:34

## 2021-09-23 RX ADMIN — CHOLECALCIFEROL TAB 25 MCG (1000 UNIT) 1000 UNITS: 25 TAB at 08:34

## 2021-09-24 PROBLEM — E87.6 HYPOKALEMIA: Status: RESOLVED | Noted: 2021-09-14 | Resolved: 2021-09-24

## 2021-09-24 LAB
ANION GAP SERPL CALCULATED.3IONS-SCNC: 9 MMOL/L (ref 5–14)
BUN SERPL-MCNC: 89 MG/DL (ref 5–25)
CALCIUM SERPL-MCNC: 7.5 MG/DL (ref 8.4–10.2)
CHLORIDE SERPL-SCNC: 104 MMOL/L (ref 97–108)
CO2 SERPL-SCNC: 26 MMOL/L (ref 22–30)
CREAT SERPL-MCNC: 3.21 MG/DL (ref 0.7–1.5)
GFR SERPL CREATININE-BSD FRML MDRD: 18 ML/MIN/1.73SQ M
GLUCOSE P FAST SERPL-MCNC: 281 MG/DL (ref 70–99)
GLUCOSE SERPL-MCNC: 281 MG/DL (ref 70–99)
POTASSIUM SERPL-SCNC: 5.5 MMOL/L (ref 3.6–5)
SODIUM SERPL-SCNC: 139 MMOL/L (ref 137–147)

## 2021-09-24 PROCEDURE — 97129 THER IVNTJ 1ST 15 MIN: CPT

## 2021-09-24 PROCEDURE — 80048 BASIC METABOLIC PNL TOTAL CA: CPT | Performed by: NURSE PRACTITIONER

## 2021-09-24 PROCEDURE — 99233 SBSQ HOSP IP/OBS HIGH 50: CPT | Performed by: STUDENT IN AN ORGANIZED HEALTH CARE EDUCATION/TRAINING PROGRAM

## 2021-09-24 PROCEDURE — 97130 THER IVNTJ EA ADDL 15 MIN: CPT

## 2021-09-24 PROCEDURE — 97116 GAIT TRAINING THERAPY: CPT

## 2021-09-24 PROCEDURE — 99232 SBSQ HOSP IP/OBS MODERATE 35: CPT | Performed by: INTERNAL MEDICINE

## 2021-09-24 PROCEDURE — 97530 THERAPEUTIC ACTIVITIES: CPT

## 2021-09-24 RX ADMIN — Medication 250 MG: at 17:32

## 2021-09-24 RX ADMIN — SODIUM BICARBONATE 1300 MG: 650 TABLET ORAL at 08:35

## 2021-09-24 RX ADMIN — HEPARIN SODIUM 5000 UNITS: 5000 INJECTION INTRAVENOUS; SUBCUTANEOUS at 21:58

## 2021-09-24 RX ADMIN — BISOPROLOL FUMARATE 5 MG: 5 TABLET ORAL at 08:35

## 2021-09-24 RX ADMIN — MELATONIN TAB 3 MG 3 MG: 3 TAB at 21:58

## 2021-09-24 RX ADMIN — FAMOTIDINE 10 MG: 20 TABLET ORAL at 06:37

## 2021-09-24 RX ADMIN — ACETAMINOPHEN 650 MG: 325 TABLET ORAL at 17:32

## 2021-09-24 RX ADMIN — CHOLECALCIFEROL TAB 25 MCG (1000 UNIT) 1000 UNITS: 25 TAB at 08:36

## 2021-09-24 RX ADMIN — ANTACID TABLETS 500 MG: 500 TABLET, CHEWABLE ORAL at 08:35

## 2021-09-24 RX ADMIN — PRAVASTATIN SODIUM 40 MG: 40 TABLET ORAL at 17:32

## 2021-09-24 RX ADMIN — ACETAMINOPHEN 650 MG: 325 TABLET ORAL at 06:37

## 2021-09-24 RX ADMIN — ACETAMINOPHEN 650 MG: 325 TABLET ORAL at 12:10

## 2021-09-24 RX ADMIN — METHYLPREDNISOLONE 8 MG: 4 TABLET ORAL at 08:36

## 2021-09-24 RX ADMIN — HEPARIN SODIUM 5000 UNITS: 5000 INJECTION INTRAVENOUS; SUBCUTANEOUS at 06:37

## 2021-09-24 RX ADMIN — ACETAMINOPHEN 650 MG: 325 TABLET ORAL at 00:54

## 2021-09-24 RX ADMIN — SODIUM BICARBONATE 1300 MG: 650 TABLET ORAL at 17:32

## 2021-09-24 RX ADMIN — Medication 250 MG: at 08:36

## 2021-09-24 RX ADMIN — HEPARIN SODIUM 5000 UNITS: 5000 INJECTION INTRAVENOUS; SUBCUTANEOUS at 15:13

## 2021-09-24 RX ADMIN — AMLODIPINE BESYLATE 10 MG: 10 TABLET ORAL at 17:32

## 2021-09-24 RX ADMIN — GABAPENTIN 200 MG: 100 CAPSULE ORAL at 21:58

## 2021-09-24 RX ADMIN — DOXYCYCLINE 100 MG: 100 CAPSULE ORAL at 08:36

## 2021-09-25 PROCEDURE — 97530 THERAPEUTIC ACTIVITIES: CPT

## 2021-09-25 PROCEDURE — 97110 THERAPEUTIC EXERCISES: CPT

## 2021-09-25 PROCEDURE — 97535 SELF CARE MNGMENT TRAINING: CPT

## 2021-09-25 RX ADMIN — ACETAMINOPHEN 650 MG: 325 TABLET ORAL at 17:19

## 2021-09-25 RX ADMIN — METHYLPREDNISOLONE 4 MG: 4 TABLET ORAL at 08:02

## 2021-09-25 RX ADMIN — MELATONIN TAB 3 MG 3 MG: 3 TAB at 22:01

## 2021-09-25 RX ADMIN — CHOLECALCIFEROL TAB 25 MCG (1000 UNIT) 1000 UNITS: 25 TAB at 08:02

## 2021-09-25 RX ADMIN — HEPARIN SODIUM 5000 UNITS: 5000 INJECTION INTRAVENOUS; SUBCUTANEOUS at 05:51

## 2021-09-25 RX ADMIN — GABAPENTIN 200 MG: 100 CAPSULE ORAL at 22:01

## 2021-09-25 RX ADMIN — DOXYCYCLINE 100 MG: 100 CAPSULE ORAL at 08:02

## 2021-09-25 RX ADMIN — AMLODIPINE BESYLATE 10 MG: 10 TABLET ORAL at 17:19

## 2021-09-25 RX ADMIN — ACETAMINOPHEN 650 MG: 325 TABLET ORAL at 12:23

## 2021-09-25 RX ADMIN — ACETAMINOPHEN 650 MG: 325 TABLET ORAL at 05:51

## 2021-09-25 RX ADMIN — PRAVASTATIN SODIUM 40 MG: 40 TABLET ORAL at 17:19

## 2021-09-25 RX ADMIN — SODIUM BICARBONATE 1300 MG: 650 TABLET ORAL at 08:02

## 2021-09-25 RX ADMIN — HEPARIN SODIUM 5000 UNITS: 5000 INJECTION INTRAVENOUS; SUBCUTANEOUS at 22:01

## 2021-09-25 RX ADMIN — SODIUM BICARBONATE 1300 MG: 650 TABLET ORAL at 17:19

## 2021-09-25 RX ADMIN — Medication 250 MG: at 17:19

## 2021-09-25 RX ADMIN — BISOPROLOL FUMARATE 5 MG: 5 TABLET ORAL at 08:02

## 2021-09-25 RX ADMIN — Medication 250 MG: at 08:02

## 2021-09-25 RX ADMIN — HEPARIN SODIUM 5000 UNITS: 5000 INJECTION INTRAVENOUS; SUBCUTANEOUS at 13:32

## 2021-09-25 RX ADMIN — ANTACID TABLETS 500 MG: 500 TABLET, CHEWABLE ORAL at 08:02

## 2021-09-26 PROCEDURE — 88112 CYTOPATH CELL ENHANCE TECH: CPT | Performed by: PATHOLOGY

## 2021-09-26 PROCEDURE — 97130 THER IVNTJ EA ADDL 15 MIN: CPT

## 2021-09-26 PROCEDURE — 97129 THER IVNTJ 1ST 15 MIN: CPT

## 2021-09-26 PROCEDURE — 97112 NEUROMUSCULAR REEDUCATION: CPT

## 2021-09-26 PROCEDURE — 97530 THERAPEUTIC ACTIVITIES: CPT

## 2021-09-26 PROCEDURE — 97110 THERAPEUTIC EXERCISES: CPT

## 2021-09-26 RX ADMIN — SODIUM BICARBONATE 1300 MG: 650 TABLET ORAL at 09:06

## 2021-09-26 RX ADMIN — DOXYCYCLINE 100 MG: 100 CAPSULE ORAL at 09:06

## 2021-09-26 RX ADMIN — ACETAMINOPHEN 650 MG: 325 TABLET ORAL at 06:07

## 2021-09-26 RX ADMIN — Medication 250 MG: at 17:09

## 2021-09-26 RX ADMIN — PRAVASTATIN SODIUM 40 MG: 40 TABLET ORAL at 17:09

## 2021-09-26 RX ADMIN — FAMOTIDINE 10 MG: 20 TABLET ORAL at 06:07

## 2021-09-26 RX ADMIN — Medication 250 MG: at 09:06

## 2021-09-26 RX ADMIN — CHOLECALCIFEROL TAB 25 MCG (1000 UNIT) 1000 UNITS: 25 TAB at 09:06

## 2021-09-26 RX ADMIN — GABAPENTIN 200 MG: 100 CAPSULE ORAL at 21:23

## 2021-09-26 RX ADMIN — HEPARIN SODIUM 5000 UNITS: 5000 INJECTION INTRAVENOUS; SUBCUTANEOUS at 06:07

## 2021-09-26 RX ADMIN — ACETAMINOPHEN 650 MG: 325 TABLET ORAL at 17:09

## 2021-09-26 RX ADMIN — ACETAMINOPHEN 650 MG: 325 TABLET ORAL at 12:07

## 2021-09-26 RX ADMIN — BISOPROLOL FUMARATE 5 MG: 5 TABLET ORAL at 09:06

## 2021-09-26 RX ADMIN — HEPARIN SODIUM 5000 UNITS: 5000 INJECTION INTRAVENOUS; SUBCUTANEOUS at 21:23

## 2021-09-26 RX ADMIN — ANTACID TABLETS 500 MG: 500 TABLET, CHEWABLE ORAL at 09:06

## 2021-09-26 RX ADMIN — HEPARIN SODIUM 5000 UNITS: 5000 INJECTION INTRAVENOUS; SUBCUTANEOUS at 14:32

## 2021-09-26 RX ADMIN — AMLODIPINE BESYLATE 10 MG: 10 TABLET ORAL at 17:09

## 2021-09-26 RX ADMIN — SODIUM BICARBONATE 1300 MG: 650 TABLET ORAL at 17:09

## 2021-09-26 RX ADMIN — MELATONIN TAB 3 MG 3 MG: 3 TAB at 21:23

## 2021-09-27 PROBLEM — R31.0 GROSS HEMATURIA: Status: ACTIVE | Noted: 2021-09-27

## 2021-09-27 PROBLEM — E44.0 MODERATE PROTEIN-CALORIE MALNUTRITION (HCC): Status: ACTIVE | Noted: 2021-09-27

## 2021-09-27 LAB
ANION GAP SERPL CALCULATED.3IONS-SCNC: 9 MMOL/L (ref 5–14)
BUN SERPL-MCNC: 88 MG/DL (ref 5–25)
CALCIUM SERPL-MCNC: 7.8 MG/DL (ref 8.4–10.2)
CHLORIDE SERPL-SCNC: 103 MMOL/L (ref 97–108)
CO2 SERPL-SCNC: 28 MMOL/L (ref 22–30)
CREAT SERPL-MCNC: 3.3 MG/DL (ref 0.7–1.5)
EOSINOPHIL # BLD AUTO: 0.37 THOUSAND/UL (ref 0–0.4)
EOSINOPHIL NFR BLD MANUAL: 5 % (ref 0–6)
ERYTHROCYTE [DISTWIDTH] IN BLOOD BY AUTOMATED COUNT: 17.1 %
EST. AVERAGE GLUCOSE BLD GHB EST-MCNC: 120 MG/DL
GFR SERPL CREATININE-BSD FRML MDRD: 17 ML/MIN/1.73SQ M
GLUCOSE P FAST SERPL-MCNC: 111 MG/DL (ref 70–99)
GLUCOSE SERPL-MCNC: 111 MG/DL (ref 70–99)
HBA1C MFR BLD: 5.8 %
HCT VFR BLD AUTO: 35.9 % (ref 41–53)
HGB BLD-MCNC: 11.6 G/DL (ref 13.5–17.5)
LYMPHOCYTES # BLD AUTO: 2.52 THOUSAND/UL (ref 0.5–4)
LYMPHOCYTES # BLD AUTO: 34 % (ref 25–45)
MCH RBC QN AUTO: 29.2 PG (ref 26–34)
MCHC RBC AUTO-ENTMCNC: 32.4 G/DL (ref 31–36)
MCV RBC AUTO: 90 FL (ref 80–100)
MONOCYTES # BLD AUTO: 0.52 THOUSAND/UL (ref 0.2–0.9)
MONOCYTES NFR BLD AUTO: 7 % (ref 1–10)
MYELOCYTES NFR BLD MANUAL: 1 % (ref 0–1)
NEUTS BAND NFR BLD MANUAL: 1 % (ref 0–8)
NEUTS SEG # BLD: 3.92 THOUSAND/UL (ref 1.8–7.8)
NEUTS SEG NFR BLD AUTO: 52 %
OVALOCYTES BLD QL SMEAR: PRESENT
PLATELET # BLD AUTO: 328 THOUSANDS/UL (ref 150–450)
PLATELET BLD QL SMEAR: ADEQUATE
PMV BLD AUTO: 10.1 FL (ref 8.9–12.7)
POTASSIUM SERPL-SCNC: 5.5 MMOL/L (ref 3.6–5)
RBC # BLD AUTO: 3.97 MILLION/UL (ref 4.5–5.9)
RBC MORPH BLD: PRESENT
SODIUM SERPL-SCNC: 140 MMOL/L (ref 137–147)
TOTAL CELLS COUNTED SPEC: 100
WBC # BLD AUTO: 7.4 THOUSAND/UL (ref 4.5–11)

## 2021-09-27 PROCEDURE — 99233 SBSQ HOSP IP/OBS HIGH 50: CPT | Performed by: STUDENT IN AN ORGANIZED HEALTH CARE EDUCATION/TRAINING PROGRAM

## 2021-09-27 PROCEDURE — 80048 BASIC METABOLIC PNL TOTAL CA: CPT | Performed by: NURSE PRACTITIONER

## 2021-09-27 PROCEDURE — 97535 SELF CARE MNGMENT TRAINING: CPT

## 2021-09-27 PROCEDURE — 97530 THERAPEUTIC ACTIVITIES: CPT

## 2021-09-27 PROCEDURE — 99232 SBSQ HOSP IP/OBS MODERATE 35: CPT | Performed by: INTERNAL MEDICINE

## 2021-09-27 PROCEDURE — 83036 HEMOGLOBIN GLYCOSYLATED A1C: CPT | Performed by: INTERNAL MEDICINE

## 2021-09-27 PROCEDURE — 97116 GAIT TRAINING THERAPY: CPT

## 2021-09-27 PROCEDURE — 85027 COMPLETE CBC AUTOMATED: CPT | Performed by: NURSE PRACTITIONER

## 2021-09-27 PROCEDURE — 85007 BL SMEAR W/DIFF WBC COUNT: CPT | Performed by: NURSE PRACTITIONER

## 2021-09-27 RX ADMIN — SODIUM BICARBONATE 1300 MG: 650 TABLET ORAL at 17:03

## 2021-09-27 RX ADMIN — HEPARIN SODIUM 5000 UNITS: 5000 INJECTION INTRAVENOUS; SUBCUTANEOUS at 05:26

## 2021-09-27 RX ADMIN — Medication 250 MG: at 07:57

## 2021-09-27 RX ADMIN — HEPARIN SODIUM 5000 UNITS: 5000 INJECTION INTRAVENOUS; SUBCUTANEOUS at 21:16

## 2021-09-27 RX ADMIN — Medication 250 MG: at 17:03

## 2021-09-27 RX ADMIN — SODIUM BICARBONATE 1300 MG: 650 TABLET ORAL at 07:57

## 2021-09-27 RX ADMIN — BISOPROLOL FUMARATE 5 MG: 5 TABLET ORAL at 07:56

## 2021-09-27 RX ADMIN — PRAVASTATIN SODIUM 40 MG: 40 TABLET ORAL at 17:02

## 2021-09-27 RX ADMIN — ANTACID TABLETS 500 MG: 500 TABLET, CHEWABLE ORAL at 07:57

## 2021-09-27 RX ADMIN — GABAPENTIN 200 MG: 100 CAPSULE ORAL at 21:16

## 2021-09-27 RX ADMIN — DOXYCYCLINE 100 MG: 100 CAPSULE ORAL at 07:56

## 2021-09-27 RX ADMIN — ACETAMINOPHEN 650 MG: 325 TABLET ORAL at 11:53

## 2021-09-27 RX ADMIN — CHOLECALCIFEROL TAB 25 MCG (1000 UNIT) 1000 UNITS: 25 TAB at 07:57

## 2021-09-27 RX ADMIN — MELATONIN TAB 3 MG 3 MG: 3 TAB at 21:16

## 2021-09-27 RX ADMIN — ACETAMINOPHEN 650 MG: 325 TABLET ORAL at 17:02

## 2021-09-27 RX ADMIN — ACETAMINOPHEN 650 MG: 325 TABLET ORAL at 05:26

## 2021-09-27 RX ADMIN — AMLODIPINE BESYLATE 10 MG: 10 TABLET ORAL at 17:03

## 2021-09-27 RX ADMIN — HEPARIN SODIUM 5000 UNITS: 5000 INJECTION INTRAVENOUS; SUBCUTANEOUS at 13:16

## 2021-09-28 PROCEDURE — 99233 SBSQ HOSP IP/OBS HIGH 50: CPT | Performed by: STUDENT IN AN ORGANIZED HEALTH CARE EDUCATION/TRAINING PROGRAM

## 2021-09-28 PROCEDURE — 97530 THERAPEUTIC ACTIVITIES: CPT

## 2021-09-28 PROCEDURE — 97116 GAIT TRAINING THERAPY: CPT

## 2021-09-28 PROCEDURE — 97112 NEUROMUSCULAR REEDUCATION: CPT

## 2021-09-28 PROCEDURE — 97130 THER IVNTJ EA ADDL 15 MIN: CPT

## 2021-09-28 PROCEDURE — 97129 THER IVNTJ 1ST 15 MIN: CPT

## 2021-09-28 PROCEDURE — 99232 SBSQ HOSP IP/OBS MODERATE 35: CPT | Performed by: INTERNAL MEDICINE

## 2021-09-28 PROCEDURE — 97535 SELF CARE MNGMENT TRAINING: CPT

## 2021-09-28 PROCEDURE — 0HBRXZZ EXCISION OF TOE NAIL, EXTERNAL APPROACH: ICD-10-PCS | Performed by: PODIATRIST

## 2021-09-28 PROCEDURE — 97110 THERAPEUTIC EXERCISES: CPT

## 2021-09-28 RX ADMIN — GABAPENTIN 200 MG: 100 CAPSULE ORAL at 22:34

## 2021-09-28 RX ADMIN — HEPARIN SODIUM 5000 UNITS: 5000 INJECTION INTRAVENOUS; SUBCUTANEOUS at 06:04

## 2021-09-28 RX ADMIN — ACETAMINOPHEN 650 MG: 325 TABLET ORAL at 06:04

## 2021-09-28 RX ADMIN — FAMOTIDINE 10 MG: 20 TABLET ORAL at 06:04

## 2021-09-28 RX ADMIN — SODIUM BICARBONATE 1300 MG: 650 TABLET ORAL at 08:26

## 2021-09-28 RX ADMIN — Medication 250 MG: at 08:26

## 2021-09-28 RX ADMIN — ACETAMINOPHEN 650 MG: 325 TABLET ORAL at 17:24

## 2021-09-28 RX ADMIN — MELATONIN TAB 3 MG 3 MG: 3 TAB at 22:34

## 2021-09-28 RX ADMIN — PRAVASTATIN SODIUM 40 MG: 40 TABLET ORAL at 17:25

## 2021-09-28 RX ADMIN — HEPARIN SODIUM 5000 UNITS: 5000 INJECTION INTRAVENOUS; SUBCUTANEOUS at 14:19

## 2021-09-28 RX ADMIN — SODIUM BICARBONATE 1300 MG: 650 TABLET ORAL at 17:26

## 2021-09-28 RX ADMIN — HEPARIN SODIUM 5000 UNITS: 5000 INJECTION INTRAVENOUS; SUBCUTANEOUS at 22:34

## 2021-09-28 RX ADMIN — ANTACID TABLETS 500 MG: 500 TABLET, CHEWABLE ORAL at 08:26

## 2021-09-28 RX ADMIN — DOXYCYCLINE 100 MG: 100 CAPSULE ORAL at 08:26

## 2021-09-28 RX ADMIN — CHOLECALCIFEROL TAB 25 MCG (1000 UNIT) 1000 UNITS: 25 TAB at 08:26

## 2021-09-28 RX ADMIN — Medication 250 MG: at 17:24

## 2021-09-28 RX ADMIN — ACETAMINOPHEN 650 MG: 325 TABLET ORAL at 12:09

## 2021-09-28 RX ADMIN — BISOPROLOL FUMARATE 5 MG: 5 TABLET ORAL at 08:26

## 2021-09-28 RX ADMIN — AMLODIPINE BESYLATE 10 MG: 10 TABLET ORAL at 17:25

## 2021-09-29 PROCEDURE — 97110 THERAPEUTIC EXERCISES: CPT

## 2021-09-29 PROCEDURE — 99232 SBSQ HOSP IP/OBS MODERATE 35: CPT | Performed by: INTERNAL MEDICINE

## 2021-09-29 PROCEDURE — 97130 THER IVNTJ EA ADDL 15 MIN: CPT

## 2021-09-29 PROCEDURE — 99233 SBSQ HOSP IP/OBS HIGH 50: CPT | Performed by: STUDENT IN AN ORGANIZED HEALTH CARE EDUCATION/TRAINING PROGRAM

## 2021-09-29 PROCEDURE — 97116 GAIT TRAINING THERAPY: CPT

## 2021-09-29 PROCEDURE — 97129 THER IVNTJ 1ST 15 MIN: CPT

## 2021-09-29 PROCEDURE — 97530 THERAPEUTIC ACTIVITIES: CPT

## 2021-09-29 PROCEDURE — 97112 NEUROMUSCULAR REEDUCATION: CPT

## 2021-09-29 RX ADMIN — ANTACID TABLETS 500 MG: 500 TABLET, CHEWABLE ORAL at 08:17

## 2021-09-29 RX ADMIN — Medication 250 MG: at 08:24

## 2021-09-29 RX ADMIN — ACETAMINOPHEN 650 MG: 325 TABLET ORAL at 17:24

## 2021-09-29 RX ADMIN — HEPARIN SODIUM 5000 UNITS: 5000 INJECTION INTRAVENOUS; SUBCUTANEOUS at 06:29

## 2021-09-29 RX ADMIN — HEPARIN SODIUM 5000 UNITS: 5000 INJECTION INTRAVENOUS; SUBCUTANEOUS at 13:54

## 2021-09-29 RX ADMIN — GABAPENTIN 200 MG: 100 CAPSULE ORAL at 22:22

## 2021-09-29 RX ADMIN — ACETAMINOPHEN 650 MG: 325 TABLET ORAL at 06:28

## 2021-09-29 RX ADMIN — ACETAMINOPHEN 650 MG: 325 TABLET ORAL at 00:39

## 2021-09-29 RX ADMIN — DOXYCYCLINE 100 MG: 100 CAPSULE ORAL at 08:17

## 2021-09-29 RX ADMIN — CHOLECALCIFEROL TAB 25 MCG (1000 UNIT) 1000 UNITS: 25 TAB at 08:18

## 2021-09-29 RX ADMIN — SODIUM BICARBONATE 1300 MG: 650 TABLET ORAL at 17:25

## 2021-09-29 RX ADMIN — PRAVASTATIN SODIUM 40 MG: 40 TABLET ORAL at 17:24

## 2021-09-29 RX ADMIN — AMLODIPINE BESYLATE 10 MG: 10 TABLET ORAL at 17:25

## 2021-09-29 RX ADMIN — SODIUM BICARBONATE 1300 MG: 650 TABLET ORAL at 08:17

## 2021-09-29 RX ADMIN — Medication 250 MG: at 17:24

## 2021-09-29 RX ADMIN — MELATONIN TAB 3 MG 3 MG: 3 TAB at 22:22

## 2021-09-29 RX ADMIN — BISOPROLOL FUMARATE 5 MG: 5 TABLET ORAL at 08:20

## 2021-09-29 RX ADMIN — HEPARIN SODIUM 5000 UNITS: 5000 INJECTION INTRAVENOUS; SUBCUTANEOUS at 22:22

## 2021-09-29 RX ADMIN — ACETAMINOPHEN 650 MG: 325 TABLET ORAL at 11:42

## 2021-09-30 ENCOUNTER — APPOINTMENT (INPATIENT)
Dept: ULTRASOUND IMAGING | Facility: HOSPITAL | Age: 76
DRG: 557 | End: 2021-09-30
Payer: COMMERCIAL

## 2021-09-30 PROBLEM — D72.829 LEUKOCYTOSIS: Status: RESOLVED | Noted: 2021-09-14 | Resolved: 2021-09-30

## 2021-09-30 LAB
ANION GAP SERPL CALCULATED.3IONS-SCNC: 8 MMOL/L (ref 5–14)
BUN SERPL-MCNC: 86 MG/DL (ref 5–25)
CALCIUM SERPL-MCNC: 7.7 MG/DL (ref 8.4–10.2)
CHLORIDE SERPL-SCNC: 106 MMOL/L (ref 97–108)
CO2 SERPL-SCNC: 26 MMOL/L (ref 22–30)
CREAT SERPL-MCNC: 3.85 MG/DL (ref 0.7–1.5)
ERYTHROCYTE [DISTWIDTH] IN BLOOD BY AUTOMATED COUNT: 17 %
GFR SERPL CREATININE-BSD FRML MDRD: 14 ML/MIN/1.73SQ M
GLUCOSE P FAST SERPL-MCNC: 97 MG/DL (ref 70–99)
GLUCOSE SERPL-MCNC: 143 MG/DL (ref 65–140)
GLUCOSE SERPL-MCNC: 194 MG/DL (ref 65–140)
GLUCOSE SERPL-MCNC: 97 MG/DL (ref 70–99)
HCT VFR BLD AUTO: 31.3 % (ref 41–53)
HGB BLD-MCNC: 10.2 G/DL (ref 13.5–17.5)
MCH RBC QN AUTO: 29.3 PG (ref 26–34)
MCHC RBC AUTO-ENTMCNC: 32.6 G/DL (ref 31–36)
MCV RBC AUTO: 90 FL (ref 80–100)
PLATELET # BLD AUTO: 245 THOUSANDS/UL (ref 150–450)
PMV BLD AUTO: 10.4 FL (ref 8.9–12.7)
POTASSIUM SERPL-SCNC: 5.8 MMOL/L (ref 3.6–5)
RBC # BLD AUTO: 3.49 MILLION/UL (ref 4.5–5.9)
SODIUM SERPL-SCNC: 140 MMOL/L (ref 137–147)
WBC # BLD AUTO: 6 THOUSAND/UL (ref 4.5–11)

## 2021-09-30 PROCEDURE — 97130 THER IVNTJ EA ADDL 15 MIN: CPT

## 2021-09-30 PROCEDURE — 85027 COMPLETE CBC AUTOMATED: CPT | Performed by: NURSE PRACTITIONER

## 2021-09-30 PROCEDURE — 97116 GAIT TRAINING THERAPY: CPT

## 2021-09-30 PROCEDURE — 99232 SBSQ HOSP IP/OBS MODERATE 35: CPT | Performed by: INTERNAL MEDICINE

## 2021-09-30 PROCEDURE — 97535 SELF CARE MNGMENT TRAINING: CPT

## 2021-09-30 PROCEDURE — 76770 US EXAM ABDO BACK WALL COMP: CPT

## 2021-09-30 PROCEDURE — 82948 REAGENT STRIP/BLOOD GLUCOSE: CPT

## 2021-09-30 PROCEDURE — 97530 THERAPEUTIC ACTIVITIES: CPT

## 2021-09-30 PROCEDURE — 80048 BASIC METABOLIC PNL TOTAL CA: CPT | Performed by: NURSE PRACTITIONER

## 2021-09-30 PROCEDURE — 99233 SBSQ HOSP IP/OBS HIGH 50: CPT | Performed by: STUDENT IN AN ORGANIZED HEALTH CARE EDUCATION/TRAINING PROGRAM

## 2021-09-30 PROCEDURE — 97110 THERAPEUTIC EXERCISES: CPT

## 2021-09-30 PROCEDURE — 97129 THER IVNTJ 1ST 15 MIN: CPT

## 2021-09-30 RX ORDER — BISOPROLOL FUMARATE 5 MG/1
5 TABLET ORAL DAILY
Status: DISCONTINUED | OUTPATIENT
Start: 2021-10-01 | End: 2021-10-04 | Stop reason: HOSPADM

## 2021-09-30 RX ORDER — SODIUM CHLORIDE 9 MG/ML
100 INJECTION, SOLUTION INTRAVENOUS CONTINUOUS
Status: DISPENSED | OUTPATIENT
Start: 2021-09-30 | End: 2021-09-30

## 2021-09-30 RX ORDER — SODIUM POLYSTYRENE SULFONATE 4.1 MEQ/G
15 POWDER, FOR SUSPENSION ORAL; RECTAL ONCE
Status: DISCONTINUED | OUTPATIENT
Start: 2021-09-30 | End: 2021-09-30

## 2021-09-30 RX ORDER — DEXTROSE MONOHYDRATE 25 G/50ML
25 INJECTION, SOLUTION INTRAVENOUS ONCE
Status: COMPLETED | OUTPATIENT
Start: 2021-09-30 | End: 2021-09-30

## 2021-09-30 RX ADMIN — HEPARIN SODIUM 5000 UNITS: 5000 INJECTION INTRAVENOUS; SUBCUTANEOUS at 21:55

## 2021-09-30 RX ADMIN — HEPARIN SODIUM 5000 UNITS: 5000 INJECTION INTRAVENOUS; SUBCUTANEOUS at 06:03

## 2021-09-30 RX ADMIN — MELATONIN TAB 3 MG 3 MG: 3 TAB at 21:55

## 2021-09-30 RX ADMIN — SODIUM BICARBONATE 1300 MG: 650 TABLET ORAL at 17:39

## 2021-09-30 RX ADMIN — DEXTROSE MONOHYDRATE 25 ML: 25 INJECTION, SOLUTION INTRAVENOUS at 11:49

## 2021-09-30 RX ADMIN — ACETAMINOPHEN 650 MG: 325 TABLET ORAL at 17:39

## 2021-09-30 RX ADMIN — GABAPENTIN 200 MG: 100 CAPSULE ORAL at 21:55

## 2021-09-30 RX ADMIN — DOXYCYCLINE 100 MG: 100 CAPSULE ORAL at 09:05

## 2021-09-30 RX ADMIN — ANTACID TABLETS 500 MG: 500 TABLET, CHEWABLE ORAL at 09:05

## 2021-09-30 RX ADMIN — CHOLECALCIFEROL TAB 25 MCG (1000 UNIT) 1000 UNITS: 25 TAB at 09:05

## 2021-09-30 RX ADMIN — INSULIN HUMAN 10 UNITS: 100 INJECTION, SOLUTION PARENTERAL at 11:47

## 2021-09-30 RX ADMIN — HEPARIN SODIUM 5000 UNITS: 5000 INJECTION INTRAVENOUS; SUBCUTANEOUS at 14:38

## 2021-09-30 RX ADMIN — AMLODIPINE BESYLATE 10 MG: 10 TABLET ORAL at 17:39

## 2021-09-30 RX ADMIN — BISOPROLOL FUMARATE 5 MG: 5 TABLET ORAL at 09:05

## 2021-09-30 RX ADMIN — PRAVASTATIN SODIUM 40 MG: 40 TABLET ORAL at 17:39

## 2021-09-30 RX ADMIN — ACETAMINOPHEN 650 MG: 325 TABLET ORAL at 06:04

## 2021-09-30 RX ADMIN — Medication 250 MG: at 09:05

## 2021-09-30 RX ADMIN — SODIUM CHLORIDE 100 ML/HR: 0.9 INJECTION, SOLUTION INTRAVENOUS at 11:56

## 2021-09-30 RX ADMIN — ACETAMINOPHEN 650 MG: 325 TABLET ORAL at 00:08

## 2021-09-30 RX ADMIN — SODIUM BICARBONATE 1300 MG: 650 TABLET ORAL at 09:05

## 2021-09-30 RX ADMIN — Medication 250 MG: at 17:39

## 2021-09-30 RX ADMIN — FAMOTIDINE 10 MG: 20 TABLET ORAL at 06:03

## 2021-10-01 PROBLEM — A41.9 SEPSIS (HCC): Status: RESOLVED | Noted: 2021-09-08 | Resolved: 2021-10-01

## 2021-10-01 LAB
ANION GAP SERPL CALCULATED.3IONS-SCNC: 10 MMOL/L (ref 5–14)
BUN SERPL-MCNC: 80 MG/DL (ref 5–25)
CALCIUM SERPL-MCNC: 7.8 MG/DL (ref 8.4–10.2)
CHLORIDE SERPL-SCNC: 109 MMOL/L (ref 97–108)
CO2 SERPL-SCNC: 23 MMOL/L (ref 22–30)
CREAT SERPL-MCNC: 3.65 MG/DL (ref 0.7–1.5)
GFR SERPL CREATININE-BSD FRML MDRD: 15 ML/MIN/1.73SQ M
GLUCOSE SERPL-MCNC: 111 MG/DL (ref 70–99)
PHOSPHATE SERPL-MCNC: 4.8 MG/DL (ref 2.5–4.8)
POTASSIUM SERPL-SCNC: 5.3 MMOL/L (ref 3.6–5)
SODIUM SERPL-SCNC: 142 MMOL/L (ref 137–147)

## 2021-10-01 PROCEDURE — 97535 SELF CARE MNGMENT TRAINING: CPT

## 2021-10-01 PROCEDURE — 99232 SBSQ HOSP IP/OBS MODERATE 35: CPT | Performed by: INTERNAL MEDICINE

## 2021-10-01 PROCEDURE — 84100 ASSAY OF PHOSPHORUS: CPT | Performed by: INTERNAL MEDICINE

## 2021-10-01 PROCEDURE — 97130 THER IVNTJ EA ADDL 15 MIN: CPT

## 2021-10-01 PROCEDURE — 97112 NEUROMUSCULAR REEDUCATION: CPT

## 2021-10-01 PROCEDURE — 99233 SBSQ HOSP IP/OBS HIGH 50: CPT | Performed by: STUDENT IN AN ORGANIZED HEALTH CARE EDUCATION/TRAINING PROGRAM

## 2021-10-01 PROCEDURE — 97530 THERAPEUTIC ACTIVITIES: CPT

## 2021-10-01 PROCEDURE — 97129 THER IVNTJ 1ST 15 MIN: CPT

## 2021-10-01 PROCEDURE — 80048 BASIC METABOLIC PNL TOTAL CA: CPT | Performed by: NURSE PRACTITIONER

## 2021-10-01 PROCEDURE — 97110 THERAPEUTIC EXERCISES: CPT

## 2021-10-01 PROCEDURE — 99223 1ST HOSP IP/OBS HIGH 75: CPT | Performed by: INTERNAL MEDICINE

## 2021-10-01 RX ADMIN — HEPARIN SODIUM 5000 UNITS: 5000 INJECTION INTRAVENOUS; SUBCUTANEOUS at 22:01

## 2021-10-01 RX ADMIN — GABAPENTIN 200 MG: 100 CAPSULE ORAL at 22:01

## 2021-10-01 RX ADMIN — Medication 250 MG: at 17:04

## 2021-10-01 RX ADMIN — HEPARIN SODIUM 5000 UNITS: 5000 INJECTION INTRAVENOUS; SUBCUTANEOUS at 05:46

## 2021-10-01 RX ADMIN — SODIUM BICARBONATE 1300 MG: 650 TABLET ORAL at 17:04

## 2021-10-01 RX ADMIN — HEPARIN SODIUM 5000 UNITS: 5000 INJECTION INTRAVENOUS; SUBCUTANEOUS at 13:48

## 2021-10-01 RX ADMIN — BISOPROLOL FUMARATE 5 MG: 5 TABLET ORAL at 08:43

## 2021-10-01 RX ADMIN — SODIUM BICARBONATE 1300 MG: 650 TABLET ORAL at 08:43

## 2021-10-01 RX ADMIN — ACETAMINOPHEN 650 MG: 325 TABLET ORAL at 05:46

## 2021-10-01 RX ADMIN — ANTACID TABLETS 500 MG: 500 TABLET, CHEWABLE ORAL at 08:42

## 2021-10-01 RX ADMIN — DOXYCYCLINE 100 MG: 100 CAPSULE ORAL at 08:43

## 2021-10-01 RX ADMIN — ACETAMINOPHEN 650 MG: 325 TABLET ORAL at 11:51

## 2021-10-01 RX ADMIN — PRAVASTATIN SODIUM 40 MG: 40 TABLET ORAL at 17:04

## 2021-10-01 RX ADMIN — CHOLECALCIFEROL TAB 25 MCG (1000 UNIT) 1000 UNITS: 25 TAB at 08:43

## 2021-10-01 RX ADMIN — ACETAMINOPHEN 650 MG: 325 TABLET ORAL at 17:04

## 2021-10-01 RX ADMIN — MELATONIN TAB 3 MG 3 MG: 3 TAB at 22:01

## 2021-10-01 RX ADMIN — Medication 250 MG: at 08:43

## 2021-10-02 LAB
ANION GAP SERPL CALCULATED.3IONS-SCNC: 7 MMOL/L (ref 5–14)
BUN SERPL-MCNC: 74 MG/DL (ref 5–25)
CALCIUM SERPL-MCNC: 7.7 MG/DL (ref 8.4–10.2)
CHLORIDE SERPL-SCNC: 109 MMOL/L (ref 97–108)
CO2 SERPL-SCNC: 23 MMOL/L (ref 22–30)
CREAT SERPL-MCNC: 3.51 MG/DL (ref 0.7–1.5)
GFR SERPL CREATININE-BSD FRML MDRD: 16 ML/MIN/1.73SQ M
GLUCOSE SERPL-MCNC: 95 MG/DL (ref 65–140)
GLUCOSE SERPL-MCNC: 98 MG/DL (ref 70–99)
POTASSIUM SERPL-SCNC: 5.5 MMOL/L (ref 3.6–5)
SODIUM SERPL-SCNC: 139 MMOL/L (ref 137–147)

## 2021-10-02 PROCEDURE — 97129 THER IVNTJ 1ST 15 MIN: CPT

## 2021-10-02 PROCEDURE — 82948 REAGENT STRIP/BLOOD GLUCOSE: CPT

## 2021-10-02 PROCEDURE — 99232 SBSQ HOSP IP/OBS MODERATE 35: CPT | Performed by: NURSE PRACTITIONER

## 2021-10-02 PROCEDURE — 80048 BASIC METABOLIC PNL TOTAL CA: CPT | Performed by: NURSE PRACTITIONER

## 2021-10-02 PROCEDURE — 97130 THER IVNTJ EA ADDL 15 MIN: CPT

## 2021-10-02 RX ADMIN — PRAVASTATIN SODIUM 40 MG: 40 TABLET ORAL at 17:01

## 2021-10-02 RX ADMIN — ACETAMINOPHEN 650 MG: 325 TABLET ORAL at 06:17

## 2021-10-02 RX ADMIN — AMLODIPINE BESYLATE 10 MG: 10 TABLET ORAL at 17:02

## 2021-10-02 RX ADMIN — DOXYCYCLINE 100 MG: 100 CAPSULE ORAL at 08:20

## 2021-10-02 RX ADMIN — BISOPROLOL FUMARATE 5 MG: 5 TABLET ORAL at 08:19

## 2021-10-02 RX ADMIN — ACETAMINOPHEN 650 MG: 325 TABLET ORAL at 17:01

## 2021-10-02 RX ADMIN — HEPARIN SODIUM 5000 UNITS: 5000 INJECTION INTRAVENOUS; SUBCUTANEOUS at 13:33

## 2021-10-02 RX ADMIN — Medication 250 MG: at 17:01

## 2021-10-02 RX ADMIN — SODIUM BICARBONATE 1300 MG: 650 TABLET ORAL at 17:01

## 2021-10-02 RX ADMIN — ANTACID TABLETS 500 MG: 500 TABLET, CHEWABLE ORAL at 08:20

## 2021-10-02 RX ADMIN — MELATONIN TAB 3 MG 3 MG: 3 TAB at 21:42

## 2021-10-02 RX ADMIN — HEPARIN SODIUM 5000 UNITS: 5000 INJECTION INTRAVENOUS; SUBCUTANEOUS at 06:20

## 2021-10-02 RX ADMIN — CHOLECALCIFEROL TAB 25 MCG (1000 UNIT) 1000 UNITS: 25 TAB at 08:20

## 2021-10-02 RX ADMIN — Medication 250 MG: at 08:20

## 2021-10-02 RX ADMIN — FAMOTIDINE 10 MG: 20 TABLET ORAL at 06:18

## 2021-10-02 RX ADMIN — ACETAMINOPHEN 650 MG: 325 TABLET ORAL at 12:00

## 2021-10-02 RX ADMIN — SODIUM BICARBONATE 1300 MG: 650 TABLET ORAL at 08:19

## 2021-10-02 RX ADMIN — GABAPENTIN 200 MG: 100 CAPSULE ORAL at 21:42

## 2021-10-03 LAB
ANION GAP SERPL CALCULATED.3IONS-SCNC: 9 MMOL/L (ref 5–14)
BUN SERPL-MCNC: 75 MG/DL (ref 5–25)
CALCIUM SERPL-MCNC: 8 MG/DL (ref 8.4–10.2)
CHLORIDE SERPL-SCNC: 110 MMOL/L (ref 97–108)
CO2 SERPL-SCNC: 21 MMOL/L (ref 22–30)
CREAT SERPL-MCNC: 3.51 MG/DL (ref 0.7–1.5)
GFR SERPL CREATININE-BSD FRML MDRD: 16 ML/MIN/1.73SQ M
GLUCOSE SERPL-MCNC: 92 MG/DL (ref 70–99)
POTASSIUM SERPL-SCNC: 5.1 MMOL/L (ref 3.6–5)
SODIUM SERPL-SCNC: 140 MMOL/L (ref 137–147)

## 2021-10-03 PROCEDURE — 80048 BASIC METABOLIC PNL TOTAL CA: CPT | Performed by: NURSE PRACTITIONER

## 2021-10-03 PROCEDURE — 97535 SELF CARE MNGMENT TRAINING: CPT

## 2021-10-03 PROCEDURE — 97530 THERAPEUTIC ACTIVITIES: CPT

## 2021-10-03 PROCEDURE — 99232 SBSQ HOSP IP/OBS MODERATE 35: CPT | Performed by: NURSE PRACTITIONER

## 2021-10-03 RX ADMIN — PRAVASTATIN SODIUM 40 MG: 40 TABLET ORAL at 17:02

## 2021-10-03 RX ADMIN — Medication 250 MG: at 17:03

## 2021-10-03 RX ADMIN — CHOLECALCIFEROL TAB 25 MCG (1000 UNIT) 1000 UNITS: 25 TAB at 08:15

## 2021-10-03 RX ADMIN — ANTACID TABLETS 500 MG: 500 TABLET, CHEWABLE ORAL at 08:15

## 2021-10-03 RX ADMIN — SODIUM BICARBONATE 1300 MG: 650 TABLET ORAL at 08:15

## 2021-10-03 RX ADMIN — MELATONIN TAB 3 MG 3 MG: 3 TAB at 21:26

## 2021-10-03 RX ADMIN — DOXYCYCLINE 100 MG: 100 CAPSULE ORAL at 08:15

## 2021-10-03 RX ADMIN — ACETAMINOPHEN 650 MG: 325 TABLET ORAL at 05:50

## 2021-10-03 RX ADMIN — Medication 250 MG: at 08:15

## 2021-10-03 RX ADMIN — SODIUM BICARBONATE 1300 MG: 650 TABLET ORAL at 17:02

## 2021-10-03 RX ADMIN — ACETAMINOPHEN 650 MG: 325 TABLET ORAL at 17:03

## 2021-10-03 RX ADMIN — BISOPROLOL FUMARATE 5 MG: 5 TABLET ORAL at 08:15

## 2021-10-03 RX ADMIN — ACETAMINOPHEN 650 MG: 325 TABLET ORAL at 12:29

## 2021-10-03 RX ADMIN — AMLODIPINE BESYLATE 10 MG: 10 TABLET ORAL at 17:02

## 2021-10-03 RX ADMIN — GABAPENTIN 200 MG: 100 CAPSULE ORAL at 21:26

## 2021-10-04 VITALS
OXYGEN SATURATION: 95 % | TEMPERATURE: 97 F | RESPIRATION RATE: 20 BRPM | WEIGHT: 160.8 LBS | BODY MASS INDEX: 24.37 KG/M2 | SYSTOLIC BLOOD PRESSURE: 158 MMHG | DIASTOLIC BLOOD PRESSURE: 71 MMHG | HEIGHT: 68 IN | HEART RATE: 73 BPM

## 2021-10-04 LAB
ANION GAP SERPL CALCULATED.3IONS-SCNC: 11 MMOL/L (ref 5–14)
BASOPHILS # BLD AUTO: 0 THOUSANDS/ΜL (ref 0–0.1)
BASOPHILS NFR BLD AUTO: 1 % (ref 0–1)
BUN SERPL-MCNC: 73 MG/DL (ref 5–25)
CALCIUM SERPL-MCNC: 8.3 MG/DL (ref 8.4–10.2)
CHLORIDE SERPL-SCNC: 111 MMOL/L (ref 97–108)
CO2 SERPL-SCNC: 21 MMOL/L (ref 22–30)
CREAT SERPL-MCNC: 3.5 MG/DL (ref 0.7–1.5)
EOSINOPHIL # BLD AUTO: 0.2 THOUSAND/ΜL (ref 0–0.4)
EOSINOPHIL NFR BLD AUTO: 2 % (ref 0–6)
ERYTHROCYTE [DISTWIDTH] IN BLOOD BY AUTOMATED COUNT: 17.2 %
GFR SERPL CREATININE-BSD FRML MDRD: 16 ML/MIN/1.73SQ M
GLUCOSE SERPL-MCNC: 90 MG/DL (ref 70–99)
HCT VFR BLD AUTO: 32 % (ref 41–53)
HGB BLD-MCNC: 10.4 G/DL (ref 13.5–17.5)
LYMPHOCYTES # BLD AUTO: 1.5 THOUSANDS/ΜL (ref 0.5–4)
LYMPHOCYTES NFR BLD AUTO: 19 % (ref 25–45)
MCH RBC QN AUTO: 29.6 PG (ref 26–34)
MCHC RBC AUTO-ENTMCNC: 32.6 G/DL (ref 31–36)
MCV RBC AUTO: 91 FL (ref 80–100)
MONOCYTES # BLD AUTO: 0.7 THOUSAND/ΜL (ref 0.2–0.9)
MONOCYTES NFR BLD AUTO: 9 % (ref 1–10)
NEUTROPHILS # BLD AUTO: 5.2 THOUSANDS/ΜL (ref 1.8–7.8)
NEUTS SEG NFR BLD AUTO: 69 % (ref 45–65)
PLATELET # BLD AUTO: 205 THOUSANDS/UL (ref 150–450)
PMV BLD AUTO: 10.5 FL (ref 8.9–12.7)
POTASSIUM SERPL-SCNC: 5.2 MMOL/L (ref 3.6–5)
RBC # BLD AUTO: 3.52 MILLION/UL (ref 4.5–5.9)
SODIUM SERPL-SCNC: 143 MMOL/L (ref 137–147)
WBC # BLD AUTO: 7.6 THOUSAND/UL (ref 4.5–11)

## 2021-10-04 PROCEDURE — 99232 SBSQ HOSP IP/OBS MODERATE 35: CPT | Performed by: INTERNAL MEDICINE

## 2021-10-04 PROCEDURE — G0008 ADMIN INFLUENZA VIRUS VAC: HCPCS | Performed by: STUDENT IN AN ORGANIZED HEALTH CARE EDUCATION/TRAINING PROGRAM

## 2021-10-04 PROCEDURE — 99239 HOSP IP/OBS DSCHRG MGMT >30: CPT | Performed by: STUDENT IN AN ORGANIZED HEALTH CARE EDUCATION/TRAINING PROGRAM

## 2021-10-04 PROCEDURE — 90662 IIV NO PRSV INCREASED AG IM: CPT | Performed by: STUDENT IN AN ORGANIZED HEALTH CARE EDUCATION/TRAINING PROGRAM

## 2021-10-04 PROCEDURE — 85025 COMPLETE CBC W/AUTO DIFF WBC: CPT | Performed by: NURSE PRACTITIONER

## 2021-10-04 PROCEDURE — 80048 BASIC METABOLIC PNL TOTAL CA: CPT | Performed by: NURSE PRACTITIONER

## 2021-10-04 PROCEDURE — 99232 SBSQ HOSP IP/OBS MODERATE 35: CPT | Performed by: NURSE PRACTITIONER

## 2021-10-04 RX ORDER — ACETAMINOPHEN 325 MG/1
650 TABLET ORAL EVERY 6 HOURS PRN
Refills: 0
Start: 2021-10-04

## 2021-10-04 RX ORDER — SODIUM BICARBONATE 650 MG/1
1300 TABLET ORAL
Qty: 120 TABLET | Refills: 0 | Status: SHIPPED | OUTPATIENT
Start: 2021-10-04

## 2021-10-04 RX ORDER — FAMOTIDINE 10 MG
10 TABLET ORAL EVERY OTHER DAY
Qty: 30 TABLET | Refills: 0 | Status: SHIPPED | OUTPATIENT
Start: 2021-10-04

## 2021-10-04 RX ADMIN — DOXYCYCLINE 100 MG: 100 CAPSULE ORAL at 08:52

## 2021-10-04 RX ADMIN — FAMOTIDINE 10 MG: 20 TABLET ORAL at 08:51

## 2021-10-04 RX ADMIN — SODIUM BICARBONATE 1300 MG: 650 TABLET ORAL at 08:52

## 2021-10-04 RX ADMIN — Medication 250 MG: at 08:52

## 2021-10-04 RX ADMIN — BISOPROLOL FUMARATE 5 MG: 5 TABLET ORAL at 08:51

## 2021-10-04 RX ADMIN — ANTACID TABLETS 500 MG: 500 TABLET, CHEWABLE ORAL at 08:51

## 2021-10-04 RX ADMIN — INFLUENZA A VIRUS A/VICTORIA/2570/2019 IVR-215 (H1N1) ANTIGEN (FORMALDEHYDE INACTIVATED), INFLUENZA A VIRUS A/TASMANIA/503/2020 IVR-221 (H3N2) ANTIGEN (FORMALDEHYDE INACTIVATED), INFLUENZA B VIRUS B/PHUKET/3073/2013 ANTIGEN (FORMALDEHYDE INACTIVATED), AND INFLUENZA B VIRUS B/WASHINGTON/02/2019 ANTIGEN (FORMALDEHYDE INACTIVATED) 0.7 ML: 60; 60; 60; 60 INJECTION, SUSPENSION INTRAMUSCULAR at 11:32

## 2021-10-04 RX ADMIN — ACETAMINOPHEN 650 MG: 325 TABLET ORAL at 06:47

## 2021-10-04 RX ADMIN — CHOLECALCIFEROL TAB 25 MCG (1000 UNIT) 1000 UNITS: 25 TAB at 08:51

## 2021-10-04 RX ADMIN — ACETAMINOPHEN 650 MG: 325 TABLET ORAL at 11:32

## 2021-10-14 ENCOUNTER — LAB REQUISITION (OUTPATIENT)
Dept: LAB | Facility: HOSPITAL | Age: 76
End: 2021-10-14
Payer: COMMERCIAL

## 2021-10-14 DIAGNOSIS — M62.82 RHABDOMYOLYSIS: ICD-10-CM

## 2021-10-14 LAB
ANION GAP SERPL CALCULATED.3IONS-SCNC: 12 MMOL/L (ref 4–13)
BUN SERPL-MCNC: 63 MG/DL (ref 5–25)
CALCIUM SERPL-MCNC: 9.1 MG/DL (ref 8.3–10.1)
CHLORIDE SERPL-SCNC: 109 MMOL/L (ref 100–108)
CO2 SERPL-SCNC: 21 MMOL/L (ref 21–32)
CREAT SERPL-MCNC: 3.7 MG/DL (ref 0.6–1.3)
GFR SERPL CREATININE-BSD FRML MDRD: 15 ML/MIN/1.73SQ M
GLUCOSE SERPL-MCNC: 104 MG/DL (ref 65–140)
POTASSIUM SERPL-SCNC: 4.8 MMOL/L (ref 3.5–5.3)
SODIUM SERPL-SCNC: 142 MMOL/L (ref 136–145)

## 2021-10-14 PROCEDURE — 80048 BASIC METABOLIC PNL TOTAL CA: CPT | Performed by: INTERNAL MEDICINE

## 2021-12-21 DIAGNOSIS — I10 ESSENTIAL HYPERTENSION: ICD-10-CM

## 2021-12-22 RX ORDER — BISOPROLOL FUMARATE 5 MG/1
TABLET ORAL
Qty: 90 TABLET | Refills: 3 | Status: SHIPPED | OUTPATIENT
Start: 2021-12-22 | End: 2022-03-18

## 2022-01-04 NOTE — QUICK NOTE
Patient is going to be discharged today to acute rehab  Discussed with primary service and EKG notable for improved QTC  At this time agree with transition to Levaquin 750 mg x 1 today with repeat dosing of 500 mg x1 on 09/16  No further antibiotics required thereafter  Patient will then have completed antibiotic course through 9/16  Would recommend monitoring daily EKG to assess for any progressing QTC changes  Additional care otherwise as per primary  Please contact ID consult service if any additional questions  No

## 2022-01-31 ENCOUNTER — HOSPITAL ENCOUNTER (OUTPATIENT)
Dept: NON INVASIVE DIAGNOSTICS | Facility: HOSPITAL | Age: 77
Discharge: HOME | End: 2022-01-31
Attending: INTERNAL MEDICINE
Payer: COMMERCIAL

## 2022-01-31 VITALS
HEIGHT: 68 IN | SYSTOLIC BLOOD PRESSURE: 158 MMHG | WEIGHT: 160 LBS | DIASTOLIC BLOOD PRESSURE: 71 MMHG | HEART RATE: 78 BPM | BODY MASS INDEX: 24.25 KG/M2

## 2022-01-31 DIAGNOSIS — Z95.4 HISTORY OF AORTIC VALVE REPLACEMENT WITH TISSUE GRAFT: ICD-10-CM

## 2022-01-31 LAB
AORTIC ROOT: 2.3 CM
AORTIC VALVE MEAN VELOCITY: 26.1 M/S
APICAL FOUR CHAMBER EJECTION FRACTION: 48 %
ASCENDING AORTA: 3.9 CM (ref 1.96–2.94)
AV AREA BY CONTINUOUS VTI: 1.4 CM2
AV AREA PEAK VELOCITY: 1.4 CM2
AV LVOT MEAN GRADIENT: 6 MMHG
AV LVOT PEAK GRADIENT: 10 MMHG
AV MEAN GRADIENT: 30 MMHG
AV PEAK GRADIENT: 50 MMHG
AV REGURGITATION PRESSURE HALF TIME: 365 MS
AV VALVE AREA: 1.36 CM2
AV VELOCITY RATIO: 0.46
DOP CALC AO PEAK VEL: 3.53 M/S
DOP CALC AO VTI: 87.02 CM
DOP CALC LVOT AREA: 3.14 CM2
DOP CALC LVOT DIAMETER: 2 CM
DOP CALC LVOT PEAK VEL VTI: 37.7 CM
DOP CALC LVOT PEAK VEL: 1.61 M/S
DOP CALC LVOT STROKE INDEX: 62.4 ML/M2
DOP CALC LVOT STROKE VOLUME: 118.38 CM3
E WAVE DECELERATION TIME: 206 MS
FRACTIONAL SHORTENING: 34 % (ref 28–44)
INTERVENTRICULAR SEPTUM IN DIASTOLE (PARASTERNAL SHORT AXIS VIEW): 0.7 CM (ref 0.52–0.97)
LAAS-AP2: 20.5 CM2
LAAS-AP4: 27.8 CM2
LEFT ATRIUM SIZE: 5 CM
LEFT INTERNAL DIMENSION IN SYSTOLE: 3.8 CM (ref 2.1–4)
LEFT VENTRICULAR INTERNAL DIMENSION IN DIASTOLE: 5.8 CM (ref 4.27–6.36)
LEFT VENTRICULAR POSTERIOR WALL IN END DIASTOLE: 0.7 CM (ref 0.51–0.96)
LEFT VENTRICULAR STROKE VOLUME: 104 ML
MITRAL REGURGITATION PEAK VELOCITY: 1.84 M/S
MITRAL VALVE MEAN INFLOW VELOCITY: 1.14 M/S
MITRAL VALVE REGURGITANT PEAK GRADIENT: 14 MMHG
MV E'TISSUE VEL-LAT: 9 CM/S
MV E'TISSUE VEL-SEP: 6 CM/S
MV PEAK A VEL: 1.55 M/S
MV PEAK E VEL: 137 CM/S
MV STENOSIS PRESSURE HALF TIME: 0 MS
RIGHT ATRIAL 2D VOLUME: 56 ML
RIGHT ATRIUM AREA SYSTOLE A4C: 19.2 CM2
RIGHT VENTRICLE ID DIMENSION: 4.9 CM
SL CV AV DECELERATION TIME RETROGRADE: 1260 MS
SL CV AV PEAK GRADIENT RETROGRADE: 43 MMHG
SL CV DOP CALC MV VTI RETROGRADE: 46.8 CM
SL CV LEFT ATRIUM LENGTH A2C: 6.4 CM
SL CV LV EF: 60
SL CV MV MEAN GRADIENT RETROGRADE: 6 MMHG
SL CV PED ECHO LEFT VENTRICLE DIASTOLIC VOLUME (MOD BIPLANE) 2D: 164 ML
SL CV PED ECHO LEFT VENTRICLE SYSTOLIC VOLUME (MOD BIPLANE) 2D: 60 ML
TR MAX PG: 38 MMHG
TRICUSPID VALVE PEAK REGURGITATION VELOCITY: 3.06 M/S
Z-SCORE OF ASCENDING AORTA: 5.9
Z-SCORE OF INTERVENTRICULAR SEPTUM IN END DIASTOLE: -0.39
Z-SCORE OF LEFT VENTRICULAR DIMENSION IN END SYSTOLE: 1.07
Z-SCORE OF LEFT VENTRICULAR POSTERIOR WALL IN END DIASTOLE: -0.28

## 2022-01-31 PROCEDURE — 93306 TTE W/DOPPLER COMPLETE: CPT

## 2022-01-31 PROCEDURE — 93306 TTE W/DOPPLER COMPLETE: CPT | Performed by: INTERNAL MEDICINE

## 2022-03-17 DIAGNOSIS — I10 ESSENTIAL HYPERTENSION: ICD-10-CM

## 2022-03-18 RX ORDER — BISOPROLOL FUMARATE 5 MG/1
TABLET ORAL
Qty: 90 TABLET | Refills: 3 | Status: SHIPPED | OUTPATIENT
Start: 2022-03-18

## 2022-09-24 ENCOUNTER — APPOINTMENT (OUTPATIENT)
Dept: RADIOLOGY | Facility: HOSPITAL | Age: 77
DRG: 682 | End: 2022-09-24
Payer: COMMERCIAL

## 2022-09-24 ENCOUNTER — HOSPITAL ENCOUNTER (INPATIENT)
Facility: HOSPITAL | Age: 77
LOS: 13 days | Discharge: HOSPICE | DRG: 682 | End: 2022-10-07
Attending: EMERGENCY MEDICINE | Admitting: INTERNAL MEDICINE
Payer: COMMERCIAL

## 2022-09-24 DIAGNOSIS — N18.4 CHRONIC KIDNEY DISEASE, STAGE IV (SEVERE) (HCC): ICD-10-CM

## 2022-09-24 DIAGNOSIS — D63.8 ANEMIA OF CHRONIC DISEASE: ICD-10-CM

## 2022-09-24 DIAGNOSIS — Z01.89 ENCOUNTER FOR ASSESSMENT OF DECISION-MAKING CAPACITY: ICD-10-CM

## 2022-09-24 DIAGNOSIS — N18.9 ACUTE KIDNEY INJURY SUPERIMPOSED ON CHRONIC KIDNEY DISEASE (HCC): ICD-10-CM

## 2022-09-24 DIAGNOSIS — I50.9 CHF (CONGESTIVE HEART FAILURE) (HCC): Primary | ICD-10-CM

## 2022-09-24 DIAGNOSIS — N17.9 ACUTE KIDNEY INJURY SUPERIMPOSED ON CHRONIC KIDNEY DISEASE (HCC): ICD-10-CM

## 2022-09-24 DIAGNOSIS — E87.5 HYPERKALEMIA: ICD-10-CM

## 2022-09-24 DIAGNOSIS — Q63.1 HORSESHOE KIDNEY: ICD-10-CM

## 2022-09-24 PROBLEM — I50.31 ACUTE DIASTOLIC HEART FAILURE (HCC): Status: ACTIVE | Noted: 2022-09-24

## 2022-09-24 PROBLEM — J96.01 ACUTE RESPIRATORY FAILURE WITH HYPOXIA (HCC): Status: ACTIVE | Noted: 2022-09-24

## 2022-09-24 LAB
2HR DELTA HS TROPONIN: -2 NG/L
4HR DELTA HS TROPONIN: -3 NG/L
ALBUMIN SERPL BCP-MCNC: 3.4 G/DL (ref 3.5–5)
ALP SERPL-CCNC: 77 U/L (ref 46–116)
ALT SERPL W P-5'-P-CCNC: 19 U/L (ref 12–78)
ANION GAP SERPL CALCULATED.3IONS-SCNC: 17 MMOL/L (ref 4–13)
APTT PPP: 32 SECONDS (ref 23–37)
AST SERPL W P-5'-P-CCNC: 38 U/L (ref 5–45)
BACTERIA UR QL AUTO: ABNORMAL /HPF
BASOPHILS # BLD AUTO: 0.05 THOUSANDS/ΜL (ref 0–0.1)
BASOPHILS NFR BLD AUTO: 1 % (ref 0–1)
BILIRUB SERPL-MCNC: 1.33 MG/DL (ref 0.2–1)
BILIRUB UR QL STRIP: NEGATIVE
BUN SERPL-MCNC: 73 MG/DL (ref 5–25)
CALCIUM ALBUM COR SERPL-MCNC: 9.4 MG/DL (ref 8.3–10.1)
CALCIUM SERPL-MCNC: 8.9 MG/DL (ref 8.3–10.1)
CARDIAC TROPONIN I PNL SERPL HS: 44 NG/L
CARDIAC TROPONIN I PNL SERPL HS: 45 NG/L
CARDIAC TROPONIN I PNL SERPL HS: 47 NG/L
CHLORIDE SERPL-SCNC: 110 MMOL/L (ref 96–108)
CLARITY UR: ABNORMAL
CO2 SERPL-SCNC: 15 MMOL/L (ref 21–32)
COLOR UR: ABNORMAL
CREAT SERPL-MCNC: 4.2 MG/DL (ref 0.6–1.3)
CREAT UR-MCNC: 50.1 MG/DL
EOSINOPHIL # BLD AUTO: 0.26 THOUSAND/ΜL (ref 0–0.61)
EOSINOPHIL NFR BLD AUTO: 4 % (ref 0–6)
ERYTHROCYTE [DISTWIDTH] IN BLOOD BY AUTOMATED COUNT: 20.9 % (ref 11.6–15.1)
GFR SERPL CREATININE-BSD FRML MDRD: 12 ML/MIN/1.73SQ M
GLUCOSE SERPL-MCNC: 141 MG/DL (ref 65–140)
GLUCOSE UR STRIP-MCNC: NEGATIVE MG/DL
HCT VFR BLD AUTO: 31.2 % (ref 36.5–49.3)
HGB BLD-MCNC: 9.5 G/DL (ref 12–17)
HGB UR QL STRIP.AUTO: ABNORMAL
IMM GRANULOCYTES # BLD AUTO: 0.03 THOUSAND/UL (ref 0–0.2)
IMM GRANULOCYTES NFR BLD AUTO: 0 % (ref 0–2)
INR PPP: 1.29 (ref 0.84–1.19)
KETONES UR STRIP-MCNC: NEGATIVE MG/DL
LEUKOCYTE ESTERASE UR QL STRIP: ABNORMAL
LYMPHOCYTES # BLD AUTO: 0.83 THOUSANDS/ΜL (ref 0.6–4.47)
LYMPHOCYTES NFR BLD AUTO: 12 % (ref 14–44)
MCH RBC QN AUTO: 28.3 PG (ref 26.8–34.3)
MCHC RBC AUTO-ENTMCNC: 30.4 G/DL (ref 31.4–37.4)
MCV RBC AUTO: 93 FL (ref 82–98)
MONOCYTES # BLD AUTO: 0.47 THOUSAND/ΜL (ref 0.17–1.22)
MONOCYTES NFR BLD AUTO: 7 % (ref 4–12)
NEUTROPHILS # BLD AUTO: 5.38 THOUSANDS/ΜL (ref 1.85–7.62)
NEUTS SEG NFR BLD AUTO: 76 % (ref 43–75)
NITRITE UR QL STRIP: NEGATIVE
NON-SQ EPI CELLS URNS QL MICRO: ABNORMAL /HPF
NRBC BLD AUTO-RTO: 0 /100 WBCS
NT-PROBNP SERPL-MCNC: ABNORMAL PG/ML
PH UR STRIP.AUTO: 6.5 [PH]
PLATELET # BLD AUTO: 178 THOUSANDS/UL (ref 149–390)
PMV BLD AUTO: 11.2 FL (ref 8.9–12.7)
POTASSIUM SERPL-SCNC: 6.1 MMOL/L (ref 3.5–5.3)
PROT SERPL-MCNC: 9.3 G/DL (ref 6.4–8.4)
PROT UR STRIP-MCNC: ABNORMAL MG/DL
PROTHROMBIN TIME: 16 SECONDS (ref 11.6–14.5)
RBC # BLD AUTO: 3.36 MILLION/UL (ref 3.88–5.62)
RBC #/AREA URNS AUTO: ABNORMAL /HPF
SODIUM 24H UR-SCNC: 82 MOL/L
SODIUM SERPL-SCNC: 142 MMOL/L (ref 135–147)
SP GR UR STRIP.AUTO: 1.01 (ref 1–1.03)
UROBILINOGEN UR QL STRIP.AUTO: 0.2 E.U./DL
WBC # BLD AUTO: 7.02 THOUSAND/UL (ref 4.31–10.16)
WBC #/AREA URNS AUTO: ABNORMAL /HPF

## 2022-09-24 PROCEDURE — 80053 COMPREHEN METABOLIC PANEL: CPT | Performed by: EMERGENCY MEDICINE

## 2022-09-24 PROCEDURE — 99285 EMERGENCY DEPT VISIT HI MDM: CPT

## 2022-09-24 PROCEDURE — 93005 ELECTROCARDIOGRAM TRACING: CPT

## 2022-09-24 PROCEDURE — 85610 PROTHROMBIN TIME: CPT | Performed by: EMERGENCY MEDICINE

## 2022-09-24 PROCEDURE — 82570 ASSAY OF URINE CREATININE: CPT | Performed by: STUDENT IN AN ORGANIZED HEALTH CARE EDUCATION/TRAINING PROGRAM

## 2022-09-24 PROCEDURE — 84484 ASSAY OF TROPONIN QUANT: CPT | Performed by: EMERGENCY MEDICINE

## 2022-09-24 PROCEDURE — 84300 ASSAY OF URINE SODIUM: CPT | Performed by: STUDENT IN AN ORGANIZED HEALTH CARE EDUCATION/TRAINING PROGRAM

## 2022-09-24 PROCEDURE — 83935 ASSAY OF URINE OSMOLALITY: CPT | Performed by: STUDENT IN AN ORGANIZED HEALTH CARE EDUCATION/TRAINING PROGRAM

## 2022-09-24 PROCEDURE — 71045 X-RAY EXAM CHEST 1 VIEW: CPT

## 2022-09-24 PROCEDURE — 96375 TX/PRO/DX INJ NEW DRUG ADDON: CPT

## 2022-09-24 PROCEDURE — 99285 EMERGENCY DEPT VISIT HI MDM: CPT | Performed by: EMERGENCY MEDICINE

## 2022-09-24 PROCEDURE — 85025 COMPLETE CBC W/AUTO DIFF WBC: CPT | Performed by: EMERGENCY MEDICINE

## 2022-09-24 PROCEDURE — 96374 THER/PROPH/DIAG INJ IV PUSH: CPT

## 2022-09-24 PROCEDURE — 99223 1ST HOSP IP/OBS HIGH 75: CPT | Performed by: STUDENT IN AN ORGANIZED HEALTH CARE EDUCATION/TRAINING PROGRAM

## 2022-09-24 PROCEDURE — 83880 ASSAY OF NATRIURETIC PEPTIDE: CPT | Performed by: EMERGENCY MEDICINE

## 2022-09-24 PROCEDURE — 36415 COLL VENOUS BLD VENIPUNCTURE: CPT | Performed by: EMERGENCY MEDICINE

## 2022-09-24 PROCEDURE — 81001 URINALYSIS AUTO W/SCOPE: CPT | Performed by: STUDENT IN AN ORGANIZED HEALTH CARE EDUCATION/TRAINING PROGRAM

## 2022-09-24 PROCEDURE — 85730 THROMBOPLASTIN TIME PARTIAL: CPT | Performed by: EMERGENCY MEDICINE

## 2022-09-24 RX ORDER — SODIUM BICARBONATE 650 MG/1
1300 TABLET ORAL
Status: DISCONTINUED | OUTPATIENT
Start: 2022-09-24 | End: 2022-09-29

## 2022-09-24 RX ORDER — HEPARIN SODIUM 5000 [USP'U]/ML
5000 INJECTION, SOLUTION INTRAVENOUS; SUBCUTANEOUS EVERY 8 HOURS SCHEDULED
Status: DISCONTINUED | OUTPATIENT
Start: 2022-09-24 | End: 2022-10-07 | Stop reason: HOSPADM

## 2022-09-24 RX ORDER — FUROSEMIDE 10 MG/ML
40 INJECTION INTRAMUSCULAR; INTRAVENOUS ONCE
Status: COMPLETED | OUTPATIENT
Start: 2022-09-24 | End: 2022-09-24

## 2022-09-24 RX ORDER — ONDANSETRON 2 MG/ML
4 INJECTION INTRAMUSCULAR; INTRAVENOUS EVERY 6 HOURS PRN
Status: DISCONTINUED | OUTPATIENT
Start: 2022-09-24 | End: 2022-10-07 | Stop reason: HOSPADM

## 2022-09-24 RX ORDER — ACETAMINOPHEN 325 MG/1
650 TABLET ORAL EVERY 6 HOURS PRN
Status: DISCONTINUED | OUTPATIENT
Start: 2022-09-24 | End: 2022-10-07 | Stop reason: HOSPADM

## 2022-09-24 RX ORDER — ATORVASTATIN CALCIUM 40 MG/1
40 TABLET, FILM COATED ORAL
Refills: 5 | Status: DISCONTINUED | OUTPATIENT
Start: 2022-09-24 | End: 2022-10-07 | Stop reason: HOSPADM

## 2022-09-24 RX ORDER — DEXTROSE MONOHYDRATE 25 G/50ML
50 INJECTION, SOLUTION INTRAVENOUS ONCE
Status: COMPLETED | OUTPATIENT
Start: 2022-09-24 | End: 2022-09-24

## 2022-09-24 RX ORDER — BISOPROLOL FUMARATE 5 MG/1
5 TABLET, FILM COATED ORAL DAILY
Status: DISCONTINUED | OUTPATIENT
Start: 2022-09-25 | End: 2022-10-07 | Stop reason: HOSPADM

## 2022-09-24 RX ORDER — FUROSEMIDE 10 MG/ML
40 INJECTION INTRAMUSCULAR; INTRAVENOUS
Status: DISCONTINUED | OUTPATIENT
Start: 2022-09-25 | End: 2022-09-26

## 2022-09-24 RX ORDER — AMLODIPINE BESYLATE 10 MG/1
10 TABLET ORAL EVERY EVENING
Status: DISCONTINUED | OUTPATIENT
Start: 2022-09-24 | End: 2022-09-25

## 2022-09-24 RX ADMIN — INSULIN HUMAN 10 UNITS: 100 INJECTION, SOLUTION PARENTERAL at 17:36

## 2022-09-24 RX ADMIN — ATORVASTATIN CALCIUM 40 MG: 40 TABLET, FILM COATED ORAL at 19:28

## 2022-09-24 RX ADMIN — FUROSEMIDE 40 MG: 10 INJECTION, SOLUTION INTRAVENOUS at 17:36

## 2022-09-24 RX ADMIN — AMLODIPINE BESYLATE 10 MG: 10 TABLET ORAL at 19:28

## 2022-09-24 RX ADMIN — SODIUM BICARBONATE 25 MEQ: 84 INJECTION INTRAVENOUS at 17:36

## 2022-09-24 RX ADMIN — SODIUM BICARBONATE 1300 MG: 650 TABLET ORAL at 19:28

## 2022-09-24 RX ADMIN — DEXTROSE MONOHYDRATE 50 ML: 25 INJECTION, SOLUTION INTRAVENOUS at 17:36

## 2022-09-24 RX ADMIN — HEPARIN SODIUM 5000 UNITS: 5000 INJECTION INTRAVENOUS; SUBCUTANEOUS at 21:21

## 2022-09-24 NOTE — ED PROVIDER NOTES
History  Chief Complaint   Patient presents with    Respiratory Distress     EMS reports patient coming from home  EMS reports from son, patient has been in bed for several days, not wanting to eat/drink  Not taking medications  Difficulty breathing this morning  77y M biba from home w/ multiple vague symptoms  Reportedly had visiting nurse today and was noted to have sats in the 80s  EMS called, upon their arrival reported sat 84% RA, HR 92, /60  Pt states he's had breathing trouble for about a month, gradually worsening  Denies fever, sweats  +cold "all the time"  No runny nose/congestion, no sore throat, no significant cough  Reports constant, diffuse chest pain/pressure w/ no radiation  Pain constant for the last 3-4 weeks  Assoc w/ sob/lenz  +anorexia, denies n/v/d, +darker urine  Hx of CVA w/ residual right sided weakness  Denies hx of MI, no pacemaker, +hx of CHF  Denies hx of COPD/Emphysema  Pt states he lives alone and family checks on him "every few days" but states no one helps him and "they don't care about me"  Said family visited today and called ambulance  Family reportedly told EMS that pt hasn't been taking his medications        History provided by:  Patient, medical records and relative   used: No    Malaise - 7 years or greater  Severity:  Severe  Onset quality:  Gradual  Timing:  Constant  Progression:  Worsening  Chronicity:  New  Context: not increased activity and not stress    Relieved by:  Nothing  Worsened by:  Nothing  Ineffective treatments:  None tried  Associated symptoms: anorexia, chest pain and shortness of breath    Associated symptoms: no abdominal pain, no aphasia, no ataxia, no cough, no diarrhea, no dysuria, no numbness in extremities, no fever, no foul-smelling urine, no headaches, no lethargy, no loss of consciousness, no nausea, no syncope, no urgency and no vomiting    Risk factors: congestive heart failure and coronary artery disease        Prior to Admission Medications   Prescriptions Last Dose Informant Patient Reported? Taking? Cholecalciferol (VITAMIN D3) 2000 units capsule Past Week at Unknown time Self Yes Yes   Sig: every 24 hours   acetaminophen (TYLENOL) 325 mg tablet Past Week at Unknown time  No Yes   Sig: Take 2 tablets (650 mg total) by mouth every 6 (six) hours as needed for mild pain   amLODIPine (NORVASC) 10 mg tablet Past Week at Unknown time Self No Yes   Sig: TAKE 1 TABLET (10 MG TOTAL) BY MOUTH EVERY EVENING   bisoprolol (ZEBETA) 5 mg tablet Past Week at Unknown time  No Yes   Sig: TAKE 1 TABLET BY ORAL ROUTE EVERY DAY   famotidine (PEPCID) 10 mg tablet Not Taking at Unknown time  No No   Sig: Take 1 tablet (10 mg total) by mouth every other day   Patient not taking: Reported on 9/24/2022   rosuvastatin (CRESTOR) 20 MG tablet Past Week at Unknown time Self No Yes   Sig: TAKE 1 TABLET (20 MG TOTAL) BY MOUTH EVERY OTHER DAY   sodium bicarbonate 650 mg tablet Past Week at Unknown time  No Yes   Sig: Take 2 tablets (1,300 mg total) by mouth 2 (two) times daily after meals      Facility-Administered Medications: None       Past Medical History:   Diagnosis Date    Coronary artery disease     Renal disorder        Past Surgical History:   Procedure Laterality Date    AORTIC VALVE REPLACEMENT  2005    Tissue valve    BLADDER SURGERY      23 yrs ago   CORONARY ARTERY BYPASS GRAFT  2005    x1    RENAL ARTERY STENT  11/2005       Family History   Problem Relation Age of Onset    Diabetes Mother     Hypertension Mother     Dementia Mother     Other Father         fall gangrene    Peripheral vascular disease Sister      I have reviewed and agree with the history as documented      E-Cigarette/Vaping    E-Cigarette Use Never User      E-Cigarette/Vaping Substances    Nicotine No     THC No     CBD No     Flavoring No     Other No     Unknown No      Social History     Tobacco Use    Smoking status: Former Smoker     Packs/day: 1 00     Types: Cigarettes     Quit date: 1993     Years since quittin 7    Smokeless tobacco: Never Used   Vaping Use    Vaping Use: Never used   Substance Use Topics    Alcohol use: Not Currently    Drug use: Never       Review of Systems   Constitutional: Negative for fever  Respiratory: Positive for shortness of breath  Negative for cough  Cardiovascular: Positive for chest pain  Negative for syncope  Gastrointestinal: Positive for anorexia  Negative for abdominal pain, diarrhea, nausea and vomiting  Genitourinary: Negative for dysuria and urgency  Neurological: Negative for loss of consciousness and headaches  All other systems reviewed and are negative  Physical Exam  Physical Exam  Vitals and nursing note reviewed  Constitutional:       General: He is not in acute distress  Appearance: Normal appearance  He is not ill-appearing or diaphoretic  HENT:      Nose: Nose normal       Mouth/Throat:      Mouth: Mucous membranes are dry  Eyes:      Conjunctiva/sclera: Conjunctivae normal    Cardiovascular:      Rate and Rhythm: Normal rate and regular rhythm  Heart sounds: Murmur heard  Pulmonary:      Effort: Tachypnea present  No accessory muscle usage or respiratory distress  Breath sounds: Decreased air movement present  Examination of the right-lower field reveals rales  Examination of the left-lower field reveals rales  Rales present  Abdominal:      Palpations: Abdomen is soft  Tenderness: There is no abdominal tenderness  Musculoskeletal:         General: No tenderness  Cervical back: Normal range of motion  Right lower leg: Edema (trace) present  Left lower leg: Edema (trace) present  Skin:     General: Skin is warm  Capillary Refill: Capillary refill takes 2 to 3 seconds  Neurological:      Mental Status: He is alert and oriented to person, place, and time  Mental status is at baseline  Psychiatric:         Mood and Affect: Mood normal          Vital Signs  ED Triage Vitals   Temperature Pulse Respirations Blood Pressure SpO2   09/24/22 1831 09/24/22 1528 09/24/22 1528 09/24/22 1528 09/24/22 1528   97 8 °F (36 6 °C) 83 18 170/67 99 %      Temp Source Heart Rate Source Patient Position - Orthostatic VS BP Location FiO2 (%)   09/24/22 1831 09/24/22 1528 09/24/22 1528 09/24/22 1528 --   Oral Monitor Lying Left arm       Pain Score       09/24/22 1726       No Pain           Vitals:    09/24/22 1528 09/24/22 1726   BP: 170/67 108/80   Pulse: 83 71   Patient Position - Orthostatic VS: Lying Lying         Visual Acuity      ED Medications  Medications   amLODIPine (NORVASC) tablet 10 mg (has no administration in time range)   bisoprolol (ZEBETA) tablet 5 mg (has no administration in time range)   atorvastatin (LIPITOR) tablet 40 mg (has no administration in time range)   sodium bicarbonate tablet 1,300 mg (has no administration in time range)   acetaminophen (TYLENOL) tablet 650 mg (has no administration in time range)   ondansetron (ZOFRAN) injection 4 mg (has no administration in time range)   heparin (porcine) subcutaneous injection 5,000 Units (has no administration in time range)   furosemide (LASIX) injection 40 mg (has no administration in time range)   furosemide (LASIX) injection 40 mg (40 mg Intravenous Given 9/24/22 1736)   dextrose 50 % IV solution 50 mL (50 mL Intravenous Given 9/24/22 1736)   sodium bicarbonate 8 4 % injection 25 mEq (25 mEq Intravenous Given 9/24/22 1736)   insulin regular (HumuLIN R,NovoLIN R) injection 10 Units (10 Units Intravenous Given 9/24/22 1736)       Diagnostic Studies  Results Reviewed     Procedure Component Value Units Date/Time    Urinalysis with microscopic [[id number]]     Lab Status: No result Specimen: Urine     Sodium, urine, random [[id number]]     Lab Status: No result Specimen: Urine     Creatinine, urine, random [[id number]]     Lab Status: No result Specimen: Urine     Osmolality, urine [[id number]]     Lab Status: No result Specimen: Urine     HS Troponin I 2hr [[id number]] Collected: 09/24/22 1819    Lab Status:  In process Specimen: Blood from Arm, Left Updated: 09/24/22 1832    HS Troponin I 4hr [[id number]]     Lab Status: No result Specimen: Blood     Comprehensive metabolic panel [[id number]]  (Abnormal) Collected: 09/24/22 1614    Lab Status: Final result Specimen: Blood from Arm, Left Updated: 09/24/22 1647     Sodium 142 mmol/L      Potassium 6 1 mmol/L      Chloride 110 mmol/L      CO2 15 mmol/L      ANION GAP 17 mmol/L      BUN 73 mg/dL      Creatinine 4 20 mg/dL      Glucose 141 mg/dL      Calcium 8 9 mg/dL      Corrected Calcium 9 4 mg/dL      AST 38 U/L      ALT 19 U/L      Alkaline Phosphatase 77 U/L      Total Protein 9 3 g/dL      Albumin 3 4 g/dL      Total Bilirubin 1 33 mg/dL      eGFR 12 ml/min/1 73sq m     Narrative:      [place] guidelines for Chronic Kidney Disease (CKD):     Stage 1 with normal or high GFR (GFR > 90 mL/min/1 73 square meters)    Stage 2 Mild CKD (GFR = 60-89 mL/min/1 73 square meters)    Stage 3A Moderate CKD (GFR = 45-59 mL/min/1 73 square meters)    Stage 3B Moderate CKD (GFR = 30-44 mL/min/1 73 square meters)    Stage 4 Severe CKD (GFR = 15-29 mL/min/1 73 square meters)    Stage 5 End Stage CKD (GFR <15 mL/min/1 73 square meters)  Note: GFR calculation is accurate only with a steady state creatinine    NT-BNP PRO [[id number]]  (Abnormal) Collected: 09/24/22 1614    Lab Status: Final result Specimen: Blood from Arm, Left Updated: 09/24/22 1647     NT-proBNP 23,365 pg/mL     HS Troponin 0hr (reflex protocol) [[id number]]  (Normal) Collected: 09/24/22 1614    Lab Status: Final result Specimen: Blood from Arm, Left Updated: 09/24/22 1644     hs TnI 0hr 47 ng/L     Protime-INR [[id number]]  (Abnormal) Collected: 09/24/22 1614    Lab Status: Final result Specimen: Blood from Arm, Left Updated: 09/24/22 1639     Protime 16 0 seconds      INR 1 29    APTT [[id number]]  (Normal) Collected: 09/24/22 1614    Lab Status: Final result Specimen: Blood from Arm, Left Updated: 09/24/22 1639     PTT 32 seconds     CBC and differential [[id number]]  (Abnormal) Collected: 09/24/22 1614    Lab Status: Final result Specimen: Blood from Arm, Left Updated: 09/24/22 1622     WBC 7 02 Thousand/uL      RBC 3 36 Million/uL      Hemoglobin 9 5 g/dL      Hematocrit 31 2 %      MCV 93 fL      MCH 28 3 pg      MCHC 30 4 g/dL      RDW 20 9 %      MPV 11 2 fL      Platelets 399 Thousands/uL      nRBC 0 /100 WBCs      Neutrophils Relative 76 %      Immat GRANS % 0 %      Lymphocytes Relative 12 %      Monocytes Relative 7 %      Eosinophils Relative 4 %      Basophils Relative 1 %      Neutrophils Absolute 5 38 Thousands/µL      Immature Grans Absolute 0 03 Thousand/uL      Lymphocytes Absolute 0 83 Thousands/µL      Monocytes Absolute 0 47 Thousand/µL      Eosinophils Absolute 0 26 Thousand/µL      Basophils Absolute 0 05 Thousands/µL                  XR chest 1 view portable   ED Interpretation by Marilyn Pineda DO (09/24 1726)   See below      Final Result by Daniele Miranda MD (09/24 1654)      Moderate right effusion and right base opacity which could be due to atelectasis and/or pneumonia  Trace left effusion  Moderate pulmonary venous congestion                    Workstation performed: [id number]          kidney and bladder    (Results Pending)              Procedures  ECG 12 Lead Documentation Only    Date/Time: 9/24/2022 3:35 PM  Performed by: Marilyn Pineda DO  Authorized by: Marilyn Pineda DO     Indications / Diagnosis:  Dyspnea  ECG reviewed by me, the ED Provider: yes    Patient location:  ED  Previous ECG:     Previous ECG:  Compared to current    Similarity:  Changes noted  Interpretation:     Interpretation: abnormal    Rate:     ECG rate:  80    ECG rate assessment: normal    Rhythm: Rhythm: sinus rhythm    Ectopy:     Ectopy: none    QRS:     QRS axis:  Left    QRS intervals: Wide  ST segments:     ST segments:  Abnormal    Depression:  V5 and V6  T waves:     T waves: inverted      Inverted:  V5 and V6             ED Course  ED Course as of 09/24/22 1854   Sat Sep 24, 2022   1716 hs TnI 0hr: 47   1721 Hemoglobin(!): 9 5  10 4 eleven months ago   1722 Creatinine(!): 4 20  3 7 eleven months ago   1722 Potassium(!): 6 1  No acute EKG changes noted  Will give meds for hyperkalemia (insulin, dextrose, bicarb)  Will also be given dose of diuretic for pts volume overload  Will require admission   1726 D/w pt, pts son and grandson admission, etc  Agreeable  Will contact [facility]                               SBIRT 20yo+    Flowsheet Row Most Recent Value   SBIRT (23 yo +)    In order to provide better care to our patients, we are screening all of our patients for alcohol and drug use  Would it be okay to ask you these screening questions?  No Filed at: 09/24/2022 1531                    MDM  Number of Diagnoses or Management Options  Acute kidney injury superimposed on chronic kidney disease ([address] ): new and requires workup  CHF (congestive heart failure) ([address] ): new and requires workup  Hyperkalemia: new and requires workup     Amount and/or Complexity of Data Reviewed  Clinical lab tests: reviewed and ordered  Tests in the radiology section of CPT®: reviewed and ordered  Tests in the medicine section of CPT®: ordered and reviewed  Decide to obtain previous medical records or to obtain history from someone other than the patient: yes  Obtain history from someone other than the patient: yes  Independent visualization of images, tracings, or specimens: yes        Disposition  Final diagnoses:   CHF (congestive heart failure) ([address] )   Acute kidney injury superimposed on chronic kidney disease ([address] )   Hyperkalemia     Time reflects when diagnosis was documented in both MDM as applicable and the Disposition within this note     Time User Action Codes Description Comment    9/24/2022  5:32 PM Francis KHAN Add [I50 9] CHF (congestive heart failure) ([facility] )     9/24/2022  5:32 PM Dede Ko Add [N17 9,  N18 9] Acute kidney injury superimposed on chronic kidney disease ([facility] )     9/24/2022  5:32 PM Francis KHAN Add [E87 5] Hyperkalemia       ED Disposition     ED Disposition   Admit    Condition   Stable    Date/Time   Sat Sep 24, 2022  5:31 PM    Comment   Case was discussed with Dr Deeanna Kussmaul and the patient's admission status was agreed to be Admission Status: inpatient status to the service of Dr Deeanna Kussmaul              Follow-up Information    None         Current Discharge Medication List      CONTINUE these medications which have NOT CHANGED    Details   acetaminophen (TYLENOL) 325 mg tablet Take 2 tablets (650 mg total) by mouth every 6 (six) hours as needed for mild pain  Refills: 0    Associated Diagnoses: Left wrist pain      amLODIPine (NORVASC) 10 mg tablet TAKE 1 TABLET (10 MG TOTAL) BY MOUTH EVERY EVENING  Qty: 30 tablet, Refills: 5    Associated Diagnoses: Essential hypertension      bisoprolol (ZEBETA) 5 mg tablet TAKE 1 TABLET BY ORAL ROUTE EVERY DAY  Qty: 90 tablet, Refills: 3    Associated Diagnoses: Essential hypertension      Cholecalciferol (VITAMIN D3) 2000 units capsule every 24 hours      rosuvastatin (CRESTOR) 20 MG tablet TAKE 1 TABLET (20 MG TOTAL) BY MOUTH EVERY OTHER DAY  Qty: 30 tablet, Refills: 5    Associated Diagnoses: Mixed hyperlipidemia      sodium bicarbonate 650 mg tablet Take 2 tablets (1,300 mg total) by mouth 2 (two) times daily after meals  Qty: 120 tablet, Refills: 0    Associated Diagnoses: Chronic kidney disease, stage IV (severe) ([facility])      famotidine (PEPCID) 10 mg tablet Take 1 tablet (10 mg total) by mouth every other day  Qty: 30 tablet, Refills: 0    Associated Diagnoses: Gastroesophageal reflux disease without esophagitis             No discharge procedures on file     PDMP Review     None          ED Provider  Electronically Signed by           Fabiano Iraheta DO  09/24/22 1311

## 2022-09-24 NOTE — ASSESSMENT & PLAN NOTE
Lab Results   Component Value Date    EGFR 12 09/26/2022    EGFR 12 09/25/2022    EGFR 12 09/24/2022    CREATININE 4 44 (H) 09/26/2022    CREATININE 4 30 (H) 09/25/2022    CREATININE 4 20 (H) 09/24/2022     72-year-old female presented with increasing shortness of breath found to have worsening renal failure, x-ray imaging showing moderate right-sided pleural effusion and vascular congestion  · History of CKD stage 4/5, 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 not on dialysis currently  · Follows with [facility]  · Creatinine baseline previously was around 3  · With AG metabolic acidosis  · Will consult nephrology to evaluate patient if would need dialysis  · Continue lasix 40mg IV BID  · Urinary retention protocol  · Patient with right arm AV fistula placed years ago  · Dialysis consent obtained however holding off at this time  · CT abdomen pelvis with evidence of hydroureter bilaterally and with a left ureteral stent in place however unchanged from previous  · Will hold off on neurology consultation at this time

## 2022-09-24 NOTE — ASSESSMENT & PLAN NOTE
Lab Results   Component Value Date    EGFR 12 09/24/2022    EGFR 15 10/14/2021    EGFR 16 (L) 10/04/2021    CREATININE 4 20 (H) 09/24/2022    CREATININE 3 70 (H) 10/14/2021    CREATININE 3 50 (H) 10/04/2021     78year-old female presented with increasing shortness of breath found to have worsening renal failure, x-ray imaging showing moderate right-sided pleural effusion and vascular congestion  History of CKD stage 4/5, 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 not on dialysis currently  Follows with [facility]  Creatinine baseline previously was around 3, prior labs have shown that has increased to 3 9  With AG metabolic acidosis  Will consult nephrology to evaluate patient if would need dialysis  Treat with IV Lasix x1, monitor renal functions, resume sodium bicarb po  Continue lasix 40mg IV BID  Check PVR, urine studies and renal US

## 2022-09-24 NOTE — H&P
[facility]  H&P- Maggy Finders 1945, 78 y o  male MRN: [id number]  Unit/Bed#: FARIBA POOL Encounter: [id number]  Primary Care Provider: Delroy Gonzalez MD   Date and time admitted to hospital: 9/24/2022  3:21 PM    * Acute kidney injury superimposed on CKD [facility])  Assessment & Plan  Lab Results   Component Value Date    EGFR 12 09/24/2022    EGFR 15 10/14/2021    EGFR 16 (L) 10/04/2021    CREATININE 4 20 (H) 09/24/2022    CREATININE 3 70 (H) 10/14/2021    CREATININE 3 50 (H) 10/04/2021     78year-old female presented with increasing shortness of breath found to have worsening renal failure, x-ray imaging showing moderate right-sided pleural effusion and vascular congestion  History of CKD stage 4/5, 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 not on dialysis currently  Follows with [facility]  Creatinine baseline previously was around 3, prior labs have shown that has increased to 3 9  With AG metabolic acidosis  Will consult nephrology to evaluate patient if would need dialysis  Treat with IV Lasix x1, monitor renal functions, resume sodium bicarb po  Continue lasix 40mg IV BID  Check PVR, urine studies and CT abd/pel wo contrast to evaluate stent    Acute respiratory failure with hypoxia ([address] )  Assessment & Plan  Currently requiring 2 L NC on admission  Wean oxygen as able  Treatment as above    Acute diastolic heart failure (HCC)  Assessment & Plan  Wt Readings from Last 3 Encounters:   01/31/22 72 6 kg (160 lb)   09/14/21 72 9 kg (160 lb 12 8 oz)   09/13/21 78 1 kg (172 lb 2 9 oz)     Prior 2D echo showing preserved EF back in January 2022  ProBNP greater than 20,000 on admission  Imaging consistent with vascular congestion and right-sided pleural effusion  Suspect likely in the setting of LORENZA on CKD stage 5  Will consult Nephrology for evaluation  Continue Lasix        Hyperkalemia  Assessment & Plan  Suspect component of hemolysis, treated with insulin, sodium bicarb, and Lasix  Monitor BMP    Horseshoe kidney  Assessment & Plan  History of horseshoe kidney, with chronic hydronephrosis with left urethral stent in place  Will check renal ultrasound evaluate stent placement  Patient does follow with Urology at [address] (hyperlipidemia)  Assessment & Plan  Continue statin    HTN (hypertension)  Assessment & Plan  Continue bisoprolol and amlodipine  Monitor blood pressures    VTE Prophylaxis: Heparin  / sequential compression device   Code Status: FC  POLST: There is no POLST form on file for this patient (pre-hospital)  Discussion with family: patient    Anticipated Length of Stay:  Patient will be admitted on an Inpatient basis with an anticipated length of stay of 2 midnights  Justification for Hospital Stay: IV diuresis, nephrology evaluation for dialysis    Total Time for Visit, including Counseling / Coordination of Care: 45 minutes  Greater than 50% of this total time spent on direct patient counseling and coordination of care  Chief Complaint:   SOB    History of Present Illness: Mya Lambert is a 78 y o  male who presents with weakness, increasing shortness of breath  Past medical history of CVA, CKD stage 4/5, hypertension, horseshoe kidney with chronic hydronephrosis status post urethral stent and hyperlipidemia  Evaluated the ED, x-ray imaging showing right-sided pleural effusion, vascular congestion suggestive of heart failure  Labs showing worsening renal failure, with history of CKD stage 4  Patient does have a right upper extremity brachiocephalic AV fistula which patient has not used yet  He denies any dysuria, fevers or chills  Endorses increasing weakness, anorexia, lethargy, consistent with uremia  Review of Systems:    Review of Systems   Constitutional: Negative for activity change, appetite change, chills and fever  HENT: Negative for congestion, sinus pressure and sore throat  Eyes: Negative for pain and discharge     Respiratory: Positive for shortness of breath  Negative for cough and wheezing  Cardiovascular: Negative for chest pain, palpitations and leg swelling  Gastrointestinal: Negative for abdominal distention, abdominal pain, blood in stool, diarrhea, nausea and vomiting  Endocrine: Negative for cold intolerance, heat intolerance, polydipsia, polyphagia and polyuria  Genitourinary: Negative for dysuria, frequency, hematuria and urgency  Musculoskeletal: Negative for arthralgias and back pain  Skin: Negative for color change and pallor  Neurological: Negative for seizures, syncope and weakness  Psychiatric/Behavioral: Negative for agitation and confusion  Past Medical and Surgical History:     Past Medical History:   Diagnosis Date    Coronary artery disease     Renal disorder        Past Surgical History:   Procedure Laterality Date    AORTIC VALVE REPLACEMENT  2005    Tissue valve    BLADDER SURGERY      23 yrs ago   CORONARY ARTERY BYPASS GRAFT  2005    x1    RENAL ARTERY STENT  11/2005       Meds/Allergies:    Prior to Admission medications    Medication Sig Start Date End Date Taking?  Authorizing Provider   acetaminophen (TYLENOL) 325 mg tablet Take 2 tablets (650 mg total) by mouth every 6 (six) hours as needed for mild pain 10/4/21  Yes Ag Macdonald DO   amLODIPine (NORVASC) 10 mg tablet TAKE 1 TABLET (10 MG TOTAL) BY MOUTH EVERY EVENING 6/16/20  Yes Rafael Harris MD   bisoprolol (ZEBETA) 5 mg tablet TAKE 1 TABLET BY ORAL ROUTE EVERY DAY 3/18/22  Yes Rafael Harris MD   Cholecalciferol (VITAMIN D3) 2000 units capsule every 24 hours   Yes Historical Provider, MD   rosuvastatin (CRESTOR) 20 MG tablet TAKE 1 TABLET (20 MG TOTAL) BY MOUTH EVERY OTHER DAY 7/22/20  Yes Rafael Harris MD   sodium bicarbonate 650 mg tablet Take 2 tablets (1,300 mg total) by mouth 2 (two) times daily after meals 10/4/21  Yes Ag Macdonald DO   famotidine (PEPCID) 10 mg tablet Take 1 tablet (10 mg total) by mouth every other day  Patient not taking: Reported on 2022 10/4/21   Juan Diego Bah DO     I have reviewed home medications with patient personally  Allergies: Allergies   Allergen Reactions    Latex     Nitrofurantoin Other (See Comments)     neck pain    Other Itching       Social History:     Marital Status:    Occupation: Retired  Patient Pre-hospital Living Situation: home  Patient Pre-hospital Level of Mobility: ambulatory  Patient Pre-hospital Diet Restrictions: none  Substance Use History:   Social History     Substance and Sexual Activity   Alcohol Use Not Currently     Social History     Tobacco Use   Smoking Status Former Smoker    Packs/day: 1     Types: Cigarettes    Quit date: 1993    Years since quittin 7   Smokeless Tobacco Never Used     Social History     Substance and Sexual Activity   Drug Use Never       Family History:    Family History   Problem Relation Age of Onset    Diabetes Mother     Hypertension Mother     Dementia Mother     Other Father         fall gangrene    Peripheral vascular disease Sister        Physical Exam:     Vitals:   Blood Pressure: 108/80 (22 1726)  Pulse: 71 (22 172)  Temperature: 97 8 °F (36 6 °C) (22)  Temp Source: Oral (22)  Respirations: 16 (22 172)  SpO2: 95 % (22)    Physical Exam  Vitals and nursing note reviewed  Constitutional:       Appearance: He is normal weight  HENT:      Head: Normocephalic and atraumatic  Eyes:      General: No scleral icterus  Conjunctiva/sclera: Conjunctivae normal    Cardiovascular:      Rate and Rhythm: Normal rate and regular rhythm  Heart sounds: Normal heart sounds  Pulmonary:      Effort: Respiratory distress (Mild) present  Breath sounds: Rales present  No wheezing or rhonchi  Abdominal:      General: Bowel sounds are normal  There is no distension  Palpations: Abdomen is soft     Musculoskeletal:         General: No swelling  Right lower leg: Edema present  Left lower leg: No edema  Skin:     General: Skin is warm and dry  Neurological:      General: No focal deficit present  Mental Status: He is alert  Mental status is at baseline  Psychiatric:         Mood and Affect: Mood normal             Additional Data:     Lab Results: I have personally reviewed pertinent reports  Results from last 7 days   Lab Units 09/24/22  1614   WBC Thousand/uL 7 02   HEMOGLOBIN g/dL 9 5*   HEMATOCRIT % 31 2*   PLATELETS Thousands/uL 178   NEUTROS PCT % 76*   LYMPHS PCT % 12*   MONOS PCT % 7   EOS PCT % 4     Results from last 7 days   Lab Units 09/24/22  1614   SODIUM mmol/L 142   POTASSIUM mmol/L 6 1*   CHLORIDE mmol/L 110*   CO2 mmol/L 15*   BUN mg/dL 73*   CREATININE mg/dL 4 20*   ANION GAP mmol/L 17*   CALCIUM mg/dL 8 9   ALBUMIN g/dL 3 4*   TOTAL BILIRUBIN mg/dL 1 33*   ALK PHOS U/L 77   ALT U/L 19   AST U/L 38   GLUCOSE RANDOM mg/dL 141*     Results from last 7 days   Lab Units 09/24/22  1614   INR  1 29*                   Imaging: I have personally reviewed pertinent reports  XR chest 1 view portable   ED Interpretation by Daisy Mullen DO (09/24 1726)   See below      Final Result by Angel Lucero MD (09/24 1654)      Moderate right effusion and right base opacity which could be due to atelectasis and/or pneumonia  Trace left effusion  Moderate pulmonary venous congestion  Workstation performed: [id number]         US kidney and bladder    (Results Pending)       EKG, Pathology, and Other Studies Reviewed on Admission:   · EKG: NSR, HR 65    Allscripts / Epic Records Reviewed: Yes     ** Please Note: This note has been constructed using a voice recognition system   **

## 2022-09-24 NOTE — ASSESSMENT & PLAN NOTE
Wt Readings from Last 3 Encounters:   01/31/22 72 6 kg (160 lb)   09/14/21 72 9 kg (160 lb 12 8 oz)   09/13/21 78 1 kg (172 lb 2 9 oz)     Prior 2D echo showing preserved EF back in January 2022  ProBNP greater than 20,000 on admission  Imaging consistent with vascular congestion and right-sided pleural effusion  Suspect likely in the setting of LORENZA on CKD stage 5  Will consult Nephrology for evaluation  Continue Lasix

## 2022-09-24 NOTE — ASSESSMENT & PLAN NOTE
History of horseshoe kidney, with chronic hydronephrosis with left urethral stent in place  Will check renal ultrasound evaluate stent placement  Patient does follow with Urology at [facility]

## 2022-09-25 ENCOUNTER — APPOINTMENT (INPATIENT)
Dept: CT IMAGING | Facility: HOSPITAL | Age: 77
DRG: 682 | End: 2022-09-25
Payer: COMMERCIAL

## 2022-09-25 LAB
ANION GAP SERPL CALCULATED.3IONS-SCNC: 12 MMOL/L (ref 4–13)
ATRIAL RATE: 66 BPM
ATRIAL RATE: 97 BPM
BASOPHILS # BLD AUTO: 0.04 THOUSANDS/ΜL (ref 0–0.1)
BASOPHILS NFR BLD AUTO: 1 % (ref 0–1)
BUN SERPL-MCNC: 69 MG/DL (ref 5–25)
CALCIUM SERPL-MCNC: 8.5 MG/DL (ref 8.3–10.1)
CHLORIDE SERPL-SCNC: 113 MMOL/L (ref 96–108)
CO2 SERPL-SCNC: 20 MMOL/L (ref 21–32)
CREAT SERPL-MCNC: 4.3 MG/DL (ref 0.6–1.3)
EOSINOPHIL # BLD AUTO: 0.34 THOUSAND/ΜL (ref 0–0.61)
EOSINOPHIL NFR BLD AUTO: 6 % (ref 0–6)
ERYTHROCYTE [DISTWIDTH] IN BLOOD BY AUTOMATED COUNT: 21.1 % (ref 11.6–15.1)
GFR SERPL CREATININE-BSD FRML MDRD: 12 ML/MIN/1.73SQ M
GLUCOSE SERPL-MCNC: 103 MG/DL (ref 65–140)
HCT VFR BLD AUTO: 29.2 % (ref 36.5–49.3)
HGB BLD-MCNC: 8.6 G/DL (ref 12–17)
IMM GRANULOCYTES # BLD AUTO: 0.03 THOUSAND/UL (ref 0–0.2)
IMM GRANULOCYTES NFR BLD AUTO: 1 % (ref 0–2)
LYMPHOCYTES # BLD AUTO: 0.77 THOUSANDS/ΜL (ref 0.6–4.47)
LYMPHOCYTES NFR BLD AUTO: 13 % (ref 14–44)
MCH RBC QN AUTO: 27.7 PG (ref 26.8–34.3)
MCHC RBC AUTO-ENTMCNC: 29.5 G/DL (ref 31.4–37.4)
MCV RBC AUTO: 94 FL (ref 82–98)
MONOCYTES # BLD AUTO: 0.54 THOUSAND/ΜL (ref 0.17–1.22)
MONOCYTES NFR BLD AUTO: 9 % (ref 4–12)
NEUTROPHILS # BLD AUTO: 4.37 THOUSANDS/ΜL (ref 1.85–7.62)
NEUTS SEG NFR BLD AUTO: 70 % (ref 43–75)
NRBC BLD AUTO-RTO: 0 /100 WBCS
OSMOLALITY UR: 335 MMOL/KG
P AXIS: 61 DEGREES
PLATELET # BLD AUTO: 180 THOUSANDS/UL (ref 149–390)
PMV BLD AUTO: 12.3 FL (ref 8.9–12.7)
POTASSIUM SERPL-SCNC: 4.9 MMOL/L (ref 3.5–5.3)
PR INTERVAL: 336 MS
QRS AXIS: -44 DEGREES
QRS AXIS: -45 DEGREES
QRSD INTERVAL: 126 MS
QRSD INTERVAL: 128 MS
QT INTERVAL: 388 MS
QT INTERVAL: 410 MS
QTC INTERVAL: 429 MS
QTC INTERVAL: 447 MS
RBC # BLD AUTO: 3.1 MILLION/UL (ref 3.88–5.62)
SODIUM SERPL-SCNC: 145 MMOL/L (ref 135–147)
T WAVE AXIS: 55 DEGREES
T WAVE AXIS: 80 DEGREES
VENTRICULAR RATE: 66 BPM
VENTRICULAR RATE: 80 BPM
WBC # BLD AUTO: 6.09 THOUSAND/UL (ref 4.31–10.16)

## 2022-09-25 PROCEDURE — G1004 CDSM NDSC: HCPCS

## 2022-09-25 PROCEDURE — 93010 ELECTROCARDIOGRAM REPORT: CPT | Performed by: INTERNAL MEDICINE

## 2022-09-25 PROCEDURE — 85025 COMPLETE CBC W/AUTO DIFF WBC: CPT | Performed by: STUDENT IN AN ORGANIZED HEALTH CARE EDUCATION/TRAINING PROGRAM

## 2022-09-25 PROCEDURE — 80048 BASIC METABOLIC PNL TOTAL CA: CPT | Performed by: STUDENT IN AN ORGANIZED HEALTH CARE EDUCATION/TRAINING PROGRAM

## 2022-09-25 PROCEDURE — 99232 SBSQ HOSP IP/OBS MODERATE 35: CPT | Performed by: INTERNAL MEDICINE

## 2022-09-25 PROCEDURE — 74176 CT ABD & PELVIS W/O CONTRAST: CPT

## 2022-09-25 PROCEDURE — 99222 1ST HOSP IP/OBS MODERATE 55: CPT | Performed by: INTERNAL MEDICINE

## 2022-09-25 RX ORDER — AMLODIPINE BESYLATE 10 MG/1
10 TABLET ORAL EVERY EVENING
Status: DISCONTINUED | OUTPATIENT
Start: 2022-09-25 | End: 2022-10-07 | Stop reason: HOSPADM

## 2022-09-25 RX ORDER — DOXYCYCLINE HYCLATE 100 MG/1
100 CAPSULE ORAL
Status: DISCONTINUED | OUTPATIENT
Start: 2022-09-25 | End: 2022-10-07 | Stop reason: HOSPADM

## 2022-09-25 RX ORDER — IRON POLYSACCHARIDE COMPLEX 150 MG
150 CAPSULE ORAL DAILY
Status: DISCONTINUED | OUTPATIENT
Start: 2022-09-25 | End: 2022-10-07 | Stop reason: HOSPADM

## 2022-09-25 RX ADMIN — SODIUM BICARBONATE 1300 MG: 650 TABLET ORAL at 18:26

## 2022-09-25 RX ADMIN — AMLODIPINE BESYLATE 10 MG: 10 TABLET ORAL at 18:26

## 2022-09-25 RX ADMIN — POLYSACCHARIDE-IRON COMPLEX 150 MG: 150 CAPSULE ORAL at 14:26

## 2022-09-25 RX ADMIN — FUROSEMIDE 40 MG: 10 INJECTION, SOLUTION INTRAVENOUS at 08:52

## 2022-09-25 RX ADMIN — SODIUM BICARBONATE 1300 MG: 650 TABLET ORAL at 08:52

## 2022-09-25 RX ADMIN — ATORVASTATIN CALCIUM 40 MG: 40 TABLET, FILM COATED ORAL at 18:26

## 2022-09-25 RX ADMIN — HEPARIN SODIUM 5000 UNITS: 5000 INJECTION INTRAVENOUS; SUBCUTANEOUS at 21:08

## 2022-09-25 RX ADMIN — BISOPROLOL FUMARATE 5 MG: 5 TABLET ORAL at 08:52

## 2022-09-25 RX ADMIN — HEPARIN SODIUM 5000 UNITS: 5000 INJECTION INTRAVENOUS; SUBCUTANEOUS at 14:26

## 2022-09-25 RX ADMIN — DOXYCYCLINE 100 MG: 100 CAPSULE ORAL at 18:26

## 2022-09-25 RX ADMIN — HEPARIN SODIUM 5000 UNITS: 5000 INJECTION INTRAVENOUS; SUBCUTANEOUS at 05:10

## 2022-09-25 RX ADMIN — FUROSEMIDE 40 MG: 10 INJECTION, SOLUTION INTRAVENOUS at 18:26

## 2022-09-25 NOTE — PROGRESS NOTES
Progress Note - Sourav Dill 78 y o  male MRN: [id number]    Unit/Bed#: E4 -01 Encounter: [id number]    Assessment/Plan:    LORENZA/CKD4  patient with worsening chronic renal disease creatinine now 4 3 not improving with diuresis, patient will accept dialysis if indicated, continue to monitor with Nephrology    Acute resp failure hypoxic failure now requiring 2 to 4 liters of oxygen, pulmonary venous congestion noted continue diuresis, today's weight 163 pounds    Acute diastolic HF diuresing with Lasix continue metoprolol    Hyperkalemia  corrected potassium today 4 9    Dyslipidemia  continue statin for LDL control    Anemia  continue p o  Iron, chronic anemia associated with chronic kidney disease      Subjective:   Stills feels short of breath, no nausea vomiting no fevers chills appetite is stable      Objective:     Vitals: Blood pressure 142/57, pulse 64, temperature 99 2 °F (37 3 °C), temperature source Temporal, resp  rate 17, weight 74 2 kg (163 lb 9 3 oz), SpO2 96 %  ,Body mass index is 24 87 kg/m²        Results from last 7 days   Lab Units 09/25/22  0526 09/24/22  1614   WBC Thousand/uL 6 09 7 02   HEMOGLOBIN g/dL 8 6* 9 5*   HEMATOCRIT % 29 2* 31 2*   PLATELETS Thousands/uL 180 178   INR   --  1 29*     Results from last 7 days   Lab Units 09/25/22  0526 09/24/22  1614   POTASSIUM mmol/L 4 9 6 1*   CHLORIDE mmol/L 113* 110*   CO2 mmol/L 20* 15*   BUN mg/dL 69* 73*   CREATININE mg/dL 4 30* 4 20*   CALCIUM mg/dL 8 5 8 9   ALK PHOS U/L  --  77   ALT U/L  --  19   AST U/L  --  38       Scheduled Meds:  Current Facility-Administered Medications   Medication Dose Route Frequency Provider Last Rate    acetaminophen  650 mg Oral Q6H PRN Gayle Matute MD      amLODIPine  10 mg Oral QPM Giulia Damian, PAHaydenC      atorvastatin  40 mg Oral Daily With Rasta Mcfarland MD      bisoprolol  5 mg Oral Daily Gayle Matute MD      furosemide  40 mg Intravenous BID (diuretic) Gayle Matute MD     Vladimir Pert heparin (porcine)  5,000 Units Subcutaneous [facility] Carl Canela MD      iron polysaccharides  150 mg Oral Daily [address]      ondansetron  4 mg Intravenous Q6H PRN Rivka Jose MD      permethrin   Topical [facility]      sodium bicarbonate  1,300 mg Oral BID after meals Rivka Jose MD         Continuous Infusions:         Physical exam:  General appearance:  Alert no distress interaction appropriate  Head/Eyes:  Nonicteric pale sluggish  Neck:  Supple  Lungs:  Decreased BS bilateral bibasilar crackles  Heart: normal S1 S2 regular  Abdomen: Soft nontender with bowel sounds  Extremities:  Trace edema  Skin: no rash    Invasive Devices  Report    Peripheral Intravenous Line  Duration           Peripheral IV 09/24/22 Left Forearm <1 day          Line  Duration           Hemodialysis AV Fistula 09/11/21 Right Upper arm 379 days                  VTE Pharmacologic Prophylaxis:  Heparin        Counseling / Coordination of Care  Total floor / unit time spent today 30  minutes  Greater than 50% of total time was spent with the patient and / or family counseling and / or coordination of care    A description of the counseling / coordination of care:   Discussed with renal

## 2022-09-25 NOTE — CASE MANAGEMENT
Case Management Assessment & Discharge Planning Note    Patient name Jn Red  Location [place] [address] /E4 [address]-* MRN [id number]  : 1945 Date 2022       Current Admission Date: 2022  Current Admission Diagnosis:Acute kidney injury superimposed on CKD [facility])   Patient Active Problem List    Diagnosis Date Noted    Acute diastolic heart failure ([facility] ) 2022    Acute respiratory failure with hypoxia ([place] [address] ) 2022    Moderate protein-calorie malnutrition ([place] [address] ) 2021    Gross hematuria 2021    Hyperkalemia 2021    Ulnar impaction syndrome, left 2021    Right wrist pain 2021    Left wrist pain 2021    Anemia of chronic disease 2021    History of parathyroidectomy ([place] [address] ) 2021    Hypokalemia 2021    Bilateral hydronephrosis 2021    History of aortic valve replacement 2021    Horseshoe kidney 2021    Non-traumatic rhabdomyolysis 2021    Acute kidney injury superimposed on CKD ([place] [address] ) 2021    Elevated troponin 2021    UTI (urinary tract infection) 2021    Prosthetic aortic valve malfunction 2019    Near syncope 2018    Hx of CABG 2018    Chronic kidney disease, stage IV (severe) ([place] [address] ) 2018    Chronic coronary artery disease 10/03/2016    Endocarditis 10/03/2016    Cerebrovascular accident (CVA) ([place] [address] ) 10/03/2016    HTN (hypertension) 10/15/2015    HLD (hyperlipidemia) 10/15/2015      LOS (days): 1  Geometric Mean LOS (GMLOS) (days): 3 80  Days to GMLOS:3     OBJECTIVE:    Risk of Unplanned Readmission Score: 18 42         Current admission status: Inpatient       Preferred Pharmacy:   [facility], [address] [place] [address] [address] [address]  Phone: [phone] Fax: [phone]    PATIENT/FAMILY REPORTS NO PREFERRED PHARMACY  No address on file      Primary Care Provider: Lashonda Sargent MD    Primary Insurance: [employer]  Secondary Insurance:     ASSESSMENT:  Active Health Care Proxies    There are no active Health Care Proxies on file  Readmission Root Cause  30 Day Readmission: No    Patient Information  Admitted from[de-identified] Home  Mental Status: Alert  During Assessment patient was accompanied by: Not accompanied during assessment  Assessment information provided by[de-identified] Patient  Primary Caregiver: Self  Support Systems: ChildrenArtem [address] of Residence: [address] [place] [address] do you live in?: [address] entry access options   Select all that apply : Elevator  Type of Current Residence: Apartment  Floor Level: 6  Upon entering residence, is there a bedroom on the main floor (no further steps)?: Yes  Upon entering residence, is there a bathroom on the main floor (no further steps)?: Yes  In the last 12 months, was there a time when you were not able to pay the mortgage or rent on time?: No  In the last 12 months, how many places have you lived?: 1  In the last 12 months, was there a time when you did not have a steady place to sleep or slept in a shelter (including now)?: No  Homeless/housing insecurity resource given?: N/A  Living Arrangements: Lives Alone  Is patient a ?: No    Activities of Daily Living Prior to Admission  Functional Status: Independent  Completes ADLs independently?: Yes  Ambulates independently?: Yes  Does patient use assisted devices?: Yes  Assisted Devices (DME) used: [employer], Wheelchair, Straight Cane  Does patient currently own DME?: Yes  What DME does the patient currently own?: Straight [place], Wheelchair  Does patient have a history of Outpatient Therapy (PT/OT)?: No  Does the patient have a history of Short-Term Rehab?: No  Does patient have a history of HHC?: No  Does patient currently have [facility]?: No    Patient Information Continued  Income Source: SSI/SSD  Does patient have prescription coverage?: Yes  Within the past 12 months, you worried that your food would run out before you got the money to buy more : Never true  Within the past 12 months, the food you bought just didn't last and you didn't have money to get more : Never true  Food insecurity resource given?: N/A  Does patient receive dialysis treatments?: No    Means of Transportation  Means of Transport to Appts[de-identified] Family transport  In the past 12 months, has lack of transportation kept you from medical appointments or from getting medications?: No  In the past 12 months, has lack of transportation kept you from meetings, work, or from getting things needed for daily living?: No  Was application for public transport provided?: N/A        DISCHARGE DETAILS:    Discharge planning discussed with[de-identified] Patient     Freedom of Choice: Yes  Comments - Freedom of Choice: Pt prefers to discharge home without services  Pt is aware that he will likely require Dialysis, however; Pt states he will continue to deny the Tx  Pt states his preference to avoid dialysis - stating that he will likely die without the dialysis  At this time  Pt is not accepting dialysis under dire circumstances  CM informed Pt that a "goals of care" discussion may follow his refusal for dialysis  Pt nodded and verbalised understanding  Pt states : "I don't want to die, but (shrugs) if that's the way it goes   " CM asked Pt to share the root of his feelings- Pt states he has had friends who take Dialysis, they have shared unpleasant experiences, and overall -as per Pts observations-the quality of his friends' lives has been less than what he is willing to live with  Pts feelings and beliefs were validated by CM  Outcome of this hospitalisation is unclear from this CMs perspoective, however CM is hearing that Pts choice/decision has been made  DCP still evolving       CM contacted family/caregiver?: No- see comments (declined)  Were Treatment Team discharge recommendations reviewed with patient/caregiver?: Yes  Did patient/caregiver verbalize understanding of patient care needs?: Yes  Were patient/caregiver advised of the risks associated with not following Treatment Team discharge recommendations?: Yes    Contacts  Patient Contacts: Paulina Kwok [phone];  [facility]) [phone]  Relationship to Patient[de-identified] Family  Contact Method: Phone  Phone Number: Paulina Kwok [phone];  [facility]) [phone]  Reason/Outcome: Continuity of Care, Emergency Contact, Discharge Planning

## 2022-09-25 NOTE — CONSULTS
Consultation - Nephrology   Henny Coyle 78 y o  male MRN: [id number]  Unit/Bed#: E4 -01 Encounter: [id number]    ASSESSMENT:  1  Elevated Creatinine vs Progression of CKD- trend creatinine   - UA: small blood, 2-4 rbc, trace protein, large leuks, innumerable bacteria  - mild uremic symptoms including fatigue, SOB, MEHTA, weakness, decreased appetite   - seems symptoms have been ongoing for about 2-3 months  - consent obtained for dialysis by me and placed in the chart  - patient would like to hold off on dialysis if able  2  Chronic Kidney Disease stage IV- Baseline creatinine is 3 5-4  Follows with Dr Delfino Rothman with [address] nephrology  Last seen 7/15/22  Office note reviewed  3  Access- right arm AVF with good bruit and thrill ready to use  - placed "years ago" per patient  4  Anemia- hgb below goal, monitor  - oral iron supplements  - may need ALEX eventually  5  Hypertension- BP acceptable  - continue home medications with hold parameters  6  Volume- lasix 40mg IV BID started by primary team  - monitor  - CXR 9/24/22: Moderate pulmonary venous congestion  7  Metabolic Acidosis- improving with restarting bicarbonate tablets (1300mg BID)  8  Hyperkalemia- improved with medical management  9  [facility]- continue outpatient urology follow up for left ureteral stent exchanges  - CT without contrast pending    PLAN:  Hold parameters on amlodipine  Iron supplement  Continue medications  monitor urine output  Trend creatinine and electroyte    HISTORY OF PRESENT ILLNESS:  Requesting Physician: Laura Watts DO  Reason for Consult: LORENZA/CKD    Henny Coyle is a 78y o  year old male who was admitted to [facility] 28  after presenting with multiple vague complaints including: shortness of breath, weakness, inability to get out of bed due to severe fatigue, decreased appetite    A renal consultation is requested today for assistance in the management of an elevated creatinine in the setting of advanced chronic kidney disease  The patient states he has had a fistula in place for many years  He states that he was briefly talked to about dialysis  We discussed dialysis today including risks and benefits  Patient states he does not want to start dialysis right now  I explained that if his kidney numbers do not improve, he may need to start this week as this may be the cause of his symptoms  He understands and signed consent for dialysis if needed  He denies nausea, vomiting, diarrhea, LE edema worse than normal   He denies changes in urine amount but states it has been dark for a long time  He does not weigh himself at home  He has not taken any medications for the past 3 days due to being unable to get out of bed with significant fatigue  PAST MEDICAL HISTORY:  Past Medical History:   Diagnosis Date    Coronary artery disease     Renal disorder        PAST SURGICAL HISTORY:  Past Surgical History:   Procedure Laterality Date    AORTIC VALVE REPLACEMENT      Tissue valve    BLADDER SURGERY      23 yrs ago      CORONARY ARTERY BYPASS GRAFT  2005    x1    RENAL ARTERY STENT  2005       ALLERGIES:  Allergies   Allergen Reactions    Latex     Nitrofurantoin Other (See Comments)     neck pain    Other Itching       SOCIAL HISTORY:  Social History     Substance and Sexual Activity   Alcohol Use Not Currently     Social History     Substance and Sexual Activity   Drug Use Never     Social History     Tobacco Use   Smoking Status Former Smoker    Packs/day: 1 00    Types: Cigarettes    Quit date: 1993    Years since quittin 7   Smokeless Tobacco Never Used       FAMILY HISTORY:  Family History   Problem Relation Age of Onset    Diabetes Mother     Hypertension Mother     Dementia Mother     Other Father         fall gangrene    Peripheral vascular disease Sister        MEDICATIONS:    Current Facility-Administered Medications:     acetaminophen (TYLENOL) tablet 650 mg, 650 mg, Oral, Q6H PRN, Mauricio Rogel MD    amLODIPine (NORVASC) tablet 10 mg, 10 mg, Oral, QPM, Mauricio Rogel MD, 10 mg at 09/24/22 1928    atorvastatin (LIPITOR) tablet 40 mg, 40 mg, Oral, Daily With Nalini Powell MD, 40 mg at 09/24/22 1928    bisoprolol (ZEBETA) tablet 5 mg, 5 mg, Oral, Daily, Mauricio Rogel MD, 5 mg at 09/25/22 0852    furosemide (LASIX) injection 40 mg, 40 mg, Intravenous, BID (diuretic), Mauricio Rogel MD, 40 mg at 09/25/22 0852    heparin (porcine) subcutaneous injection 5,000 Units, 5,000 Units, Subcutaneous, Q8H [facility], Mauricio Rogel MD, 5,000 Units at 09/25/22 0510    ondansetron (ZOFRAN) injection 4 mg, 4 mg, Intravenous, Q6H PRN, Mauricio Rogel MD    permethrin (NIX) 1 % topical liquid, , Topical, Once, Abi Concepcion PA-C    sodium bicarbonate tablet 1,300 mg, 1,300 mg, Oral, BID after meals, Mauricio Rogel MD, 1,300 mg at 09/25/22 9351    REVIEW OF SYSTEMS:  A complete 10 point review of systems was performed and found to be negative unless otherwise noted in the history of present illness  General: No fevers, chills, + decreased urine output  Cardiovascular:  No chest pain, No leg edema  Respiratory: No cough, sputum production,  + shortness of breath  Gastrointestinal:  No nausea/vomiting,  + diarrhea,  No abdominal pain  Genitourinary: No hematuria  No foamy urine  No dysuria   + dark urine      PHYSICAL EXAM:  Current Weight: Weight - Scale: 74 2 kg (163 lb 9 3 oz)  First Weight: Weight - Scale: 75 6 kg (166 lb 10 7 oz)  Vitals:    09/24/22 2319 09/25/22 0302 09/25/22 0600 09/25/22 0700   BP: 142/60 148/66  142/57   BP Location: Left arm Left arm  Left arm   Pulse: 66 65  64   Resp: 17 17  17   Temp: 98 °F (36 7 °C) 98 5 °F (36 9 °C)  99 2 °F (37 3 °C)   TempSrc: Temporal Temporal  Temporal   SpO2: 97% 96%  96%   Weight:   74 2 kg (163 lb 9 3 oz)        Intake/Output Summary (Last 24 hours) at 9/25/2022 0948  Last data filed at 9/25/2022 0852  Gross per 24 hour   Intake 360 ml   Output 800 ml   Net -440 ml     General: NAD  Skin: no rash  Eyes: anicteric  ENMT: mm moist  Neck: no masses  Respiratory: CTAB  CVS: RRR  Extremities: trace RLE edema at baseline, no LLE edema  GI: soft nt nd  Neuro: alert awake  Psych: mood and affect appropriate    Lab Results:   Results from last 7 days   Lab Units 09/25/22  0526 09/24/22  1614   WBC Thousand/uL 6 09 7 02   HEMOGLOBIN g/dL 8 6* 9 5*   HEMATOCRIT % 29 2* 31 2*   PLATELETS Thousands/uL 180 178   POTASSIUM mmol/L 4 9 6 1*   CHLORIDE mmol/L 113* 110*   CO2 mmol/L 20* 15*   BUN mg/dL 69* 73*   CREATININE mg/dL 4 30* 4 20*   CALCIUM mg/dL 8 5 8 9   ALK PHOS U/L  --  77   ALT U/L  --  19   AST U/L  --  38     I have personally reviewed the blood work as stated above and in my note  I have personally reviewed H&P and ER notes

## 2022-09-26 ENCOUNTER — APPOINTMENT (OUTPATIENT)
Dept: ULTRASOUND IMAGING | Facility: HOSPITAL | Age: 77
DRG: 682 | End: 2022-09-26
Payer: COMMERCIAL

## 2022-09-26 PROBLEM — E87.5 HYPERKALEMIA: Status: RESOLVED | Noted: 2021-09-23 | Resolved: 2022-09-26

## 2022-09-26 LAB
ANION GAP SERPL CALCULATED.3IONS-SCNC: 13 MMOL/L (ref 4–13)
BASOPHILS # BLD AUTO: 0.04 THOUSANDS/ΜL (ref 0–0.1)
BASOPHILS NFR BLD AUTO: 1 % (ref 0–1)
BUN SERPL-MCNC: 77 MG/DL (ref 5–25)
CALCIUM SERPL-MCNC: 8.4 MG/DL (ref 8.3–10.1)
CHLORIDE SERPL-SCNC: 110 MMOL/L (ref 96–108)
CO2 SERPL-SCNC: 21 MMOL/L (ref 21–32)
CREAT SERPL-MCNC: 4.44 MG/DL (ref 0.6–1.3)
EOSINOPHIL # BLD AUTO: 0.32 THOUSAND/ΜL (ref 0–0.61)
EOSINOPHIL NFR BLD AUTO: 6 % (ref 0–6)
ERYTHROCYTE [DISTWIDTH] IN BLOOD BY AUTOMATED COUNT: 21.1 % (ref 11.6–15.1)
GFR SERPL CREATININE-BSD FRML MDRD: 12 ML/MIN/1.73SQ M
GLUCOSE SERPL-MCNC: 120 MG/DL (ref 65–140)
HCT VFR BLD AUTO: 28.6 % (ref 36.5–49.3)
HGB BLD-MCNC: 8.6 G/DL (ref 12–17)
IMM GRANULOCYTES # BLD AUTO: 0.02 THOUSAND/UL (ref 0–0.2)
IMM GRANULOCYTES NFR BLD AUTO: 0 % (ref 0–2)
LYMPHOCYTES # BLD AUTO: 0.81 THOUSANDS/ΜL (ref 0.6–4.47)
LYMPHOCYTES NFR BLD AUTO: 14 % (ref 14–44)
MCH RBC QN AUTO: 28.3 PG (ref 26.8–34.3)
MCHC RBC AUTO-ENTMCNC: 30.1 G/DL (ref 31.4–37.4)
MCV RBC AUTO: 94 FL (ref 82–98)
MONOCYTES # BLD AUTO: 0.52 THOUSAND/ΜL (ref 0.17–1.22)
MONOCYTES NFR BLD AUTO: 9 % (ref 4–12)
NEUTROPHILS # BLD AUTO: 3.92 THOUSANDS/ΜL (ref 1.85–7.62)
NEUTS SEG NFR BLD AUTO: 70 % (ref 43–75)
NRBC BLD AUTO-RTO: 0 /100 WBCS
PLATELET # BLD AUTO: 156 THOUSANDS/UL (ref 149–390)
PMV BLD AUTO: 12.1 FL (ref 8.9–12.7)
POTASSIUM SERPL-SCNC: 4.6 MMOL/L (ref 3.5–5.3)
RBC # BLD AUTO: 3.04 MILLION/UL (ref 3.88–5.62)
SODIUM SERPL-SCNC: 144 MMOL/L (ref 135–147)
WBC # BLD AUTO: 5.63 THOUSAND/UL (ref 4.31–10.16)

## 2022-09-26 PROCEDURE — 99232 SBSQ HOSP IP/OBS MODERATE 35: CPT | Performed by: PHYSICIAN ASSISTANT

## 2022-09-26 PROCEDURE — 99233 SBSQ HOSP IP/OBS HIGH 50: CPT | Performed by: INTERNAL MEDICINE

## 2022-09-26 PROCEDURE — 85025 COMPLETE CBC W/AUTO DIFF WBC: CPT | Performed by: INTERNAL MEDICINE

## 2022-09-26 PROCEDURE — 80048 BASIC METABOLIC PNL TOTAL CA: CPT | Performed by: INTERNAL MEDICINE

## 2022-09-26 RX ORDER — FUROSEMIDE 10 MG/ML
60 INJECTION INTRAMUSCULAR; INTRAVENOUS
Status: DISCONTINUED | OUTPATIENT
Start: 2022-09-26 | End: 2022-09-27

## 2022-09-26 RX ADMIN — ATORVASTATIN CALCIUM 40 MG: 40 TABLET, FILM COATED ORAL at 16:48

## 2022-09-26 RX ADMIN — AMLODIPINE BESYLATE 10 MG: 10 TABLET ORAL at 17:48

## 2022-09-26 RX ADMIN — HEPARIN SODIUM 5000 UNITS: 5000 INJECTION INTRAVENOUS; SUBCUTANEOUS at 13:24

## 2022-09-26 RX ADMIN — SODIUM BICARBONATE 1300 MG: 650 TABLET ORAL at 08:20

## 2022-09-26 RX ADMIN — FUROSEMIDE 40 MG: 10 INJECTION, SOLUTION INTRAVENOUS at 08:20

## 2022-09-26 RX ADMIN — ONDANSETRON 4 MG: 2 INJECTION INTRAMUSCULAR; INTRAVENOUS at 08:20

## 2022-09-26 RX ADMIN — HEPARIN SODIUM 5000 UNITS: 5000 INJECTION INTRAVENOUS; SUBCUTANEOUS at 21:30

## 2022-09-26 RX ADMIN — POLYSACCHARIDE-IRON COMPLEX 150 MG: 150 CAPSULE ORAL at 08:21

## 2022-09-26 RX ADMIN — HEPARIN SODIUM 5000 UNITS: 5000 INJECTION INTRAVENOUS; SUBCUTANEOUS at 05:09

## 2022-09-26 RX ADMIN — FUROSEMIDE 60 MG: 10 INJECTION, SOLUTION INTRAVENOUS at 17:48

## 2022-09-26 RX ADMIN — BISOPROLOL FUMARATE 5 MG: 5 TABLET ORAL at 08:20

## 2022-09-26 RX ADMIN — DOXYCYCLINE 100 MG: 100 CAPSULE ORAL at 08:20

## 2022-09-26 RX ADMIN — SODIUM BICARBONATE 1300 MG: 650 TABLET ORAL at 16:49
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
## 2022-09-26 NOTE — PROGRESS NOTES
[address] [facility]  Progress Note - Maggy Finders 1945, 78 y o  male MRN: [id number]  Unit/Bed#: E4 -01 Encounter: [id number]  Primary Care Provider: Delroy Gonzalez MD   Date and time admitted to hospital: 9/24/2022  3:21 PM    * Acute kidney injury superimposed on CKD 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 not on dialysis currently  · Follows with [facility]  · Creatinine baseline previously was around 3  · With AG metabolic acidosis  · Will consult nephrology to evaluate patient if would need dialysis  · Continue lasix 40mg IV BID  · Urinary retention protocol  · Patient with right arm AV fistula placed years ago  · Dialysis consent obtained however holding off at this time  · CT abdomen pelvis with evidence of hydroureter bilaterally and with a left ureteral stent in place however unchanged from previous  · Will hold off on neurology consultation at this time     Acute respiratory failure with hypoxia [facility])  Assessment & Plan  Currently requiring 3 L NC on admission  Wean oxygen as able  Treatment as above    Acute diastolic heart failure (HCC)  Assessment & Plan  Wt Readings from Last 3 Encounters:   09/26/22 74 5 kg (164 lb 3 9 oz)   01/31/22 72 6 kg (160 lb)   09/14/21 72 9 kg (160 lb 12 8 oz)     Prior 2D echo showing preserved EF back in January 2022  · ProBNP greater than 20,000 on admission  · Imaging consistent with vascular congestion and right-sided pleural effusion  · Suspect likely in the setting of LORENZA on CKD stage 5  · Lasix 40 mg b i d  Ordered, will continue  · Nephrology consulted, possibility for need for dialysis     Anemia of chronic disease  Assessment & Plan  Hemoglobin 8 6 and stable  · No acute signs of bleeding at this time  · CBC in a m  Horseshoe kidney  Assessment & Plan  History of horseshoe kidney, with chronic hydronephrosis with left urethral stent in place  · Will check renal ultrasound evaluate stent placement  · Patient does follow with Urology at [address] (hyperlipidemia)  Assessment & Plan  Continue statin    HTN (hypertension)  Assessment & Plan  Continue bisoprolol and amlodipine  · Monitor blood pressures    Hyperkalemia-resolved as of 2022  Assessment & Plan  Suspect component of hemolysis, treated with insulin, sodium bicarb, and Lasix  Monitor BMP  Resolved        VTE Pharmacologic Prophylaxis:   Moderate Risk (Score 3-4) - Pharmacological DVT Prophylaxis Ordered: heparin  Patient Centered Rounds: I performed bedside rounds with nursing staff today  Discussions with Specialists or Other Care Team Provider:  None    Education and Discussions with Family / Patient: Patient declined call to   Time Spent for Care: 20 minutes  More than 50% of total time spent on counseling and coordination of care as described above  Current Length of Stay: 2 day(s)  Current Patient Status: Inpatient   Certification Statement: The patient will continue to require additional inpatient hospital stay due to LORENZA  Discharge Plan: Anticipate discharge in 48 hrs to discharge location to be determined pending rehab evaluations  Code Status: Level 1 - Full Code    Subjective:   Patient is seen resting in bed  He vomited this morning, states that it was due to the taste of the coffee  He denies any nausea prior to the event, denies any chest pain, worsening shortness of breath, diarrhea, constipation      Objective:     Vitals:   Temp (24hrs), Av 2 °F (37 3 °C), Min:98 4 °F (36 9 °C), Max:99 8 °F (37 7 °C)    Temp:  [98 4 °F (36 9 °C)-99 8 °F (37 7 °C)] 99 8 °F (37 7 °C)  HR:  [62-77] 77  Resp:  [18] 18  BP: (134-143)/(50-62) 143/54  SpO2:  [95 %-97 %] 97 %  Body mass index is 24 97 kg/m²  Input and Output Summary (last 24 hours): Intake/Output Summary (Last 24 hours) at 9/26/2022 1038  Last data filed at 9/25/2022 2300  Gross per 24 hour   Intake 480 ml   Output 1000 ml   Net -520 ml       Physical Exam:   Physical Exam  Vitals and nursing note reviewed  Constitutional:       General: He is not in acute distress  Appearance: Normal appearance  He is not ill-appearing, toxic-appearing or diaphoretic  HENT:      Head: Normocephalic and atraumatic  Cardiovascular:      Rate and Rhythm: Normal rate and regular rhythm  Heart sounds: No murmur heard  No friction rub  No gallop  Comments: Right AV fistula in place, bruit/thrill noted  Pulmonary:      Effort: Pulmonary effort is normal  No respiratory distress  Breath sounds: Normal breath sounds  No wheezing, rhonchi or rales  Comments: Decreased breath sounds at bases  Abdominal:      General: Abdomen is flat  Bowel sounds are normal  There is no distension  Palpations: Abdomen is soft  Tenderness: There is no abdominal tenderness  Musculoskeletal:      Right lower leg: Edema present  Left lower leg: Edema present  Skin:     General: Skin is warm and dry  Coloration: Skin is not jaundiced or pale  Neurological:      General: No focal deficit present  Mental Status: He is alert  Mental status is at baseline            Additional Data:     Labs:  Results from last 7 days   Lab Units 09/26/22  0517   WBC Thousand/uL 5 63   HEMOGLOBIN g/dL 8 6*   HEMATOCRIT % 28 6*   PLATELETS Thousands/uL 156   NEUTROS PCT % 70   LYMPHS PCT % 14   MONOS PCT % 9   EOS PCT % 6     Results from last 7 days   Lab Units 09/26/22  0517 09/25/22  0526 09/24/22  1614   SODIUM mmol/L 144   < > 142 POTASSIUM mmol/L 4 6   < > 6 1*   CHLORIDE mmol/L 110*   < > 110*   CO2 mmol/L 21   < > 15*   BUN mg/dL 77*   < > 73*   CREATININE mg/dL 4 44*   < > 4 20*   ANION GAP mmol/L 13   < > 17*   CALCIUM mg/dL 8 4   < > 8 9   ALBUMIN g/dL  --   --  3 4*   TOTAL BILIRUBIN mg/dL  --   --  1 33*   ALK PHOS U/L  --   --  77   ALT U/L  --   --  19   AST U/L  --   --  38   GLUCOSE RANDOM mg/dL 120   < > 141*    < > = values in this interval not displayed  Results from last 7 days   Lab Units 09/24/22  1614   INR  1 29*                   Lines/Drains:  Invasive Devices  Report    Peripheral Intravenous Line  Duration           Peripheral IV 09/24/22 Left Forearm 1 day          Line  Duration           Hemodialysis AV Fistula 09/11/21 Right Upper arm 380 days                      Imaging: Reviewed radiology reports from this admission including: abdominal/pelvic CT    Recent Cultures (last 7 days):         Last 24 Hours Medication List:   Current Facility-Administered Medications   Medication Dose Route Frequency Provider Last Rate    acetaminophen  650 mg Oral Q6H PRN Mayela Corral MD      amLODIPine  10 mg Oral QPM [place], [place]      atorvastatin  40 mg Oral Daily With Roberto Crouch MD      bisoprolol  5 mg Oral Daily Mayela Corral MD      doxycycline hyclate  100 mg Oral Q24H [facility],       furosemide  40 mg Intravenous BID (diuretic) Mayela Corral MD      heparin (porcine)  5,000 Units Subcutaneous [facility] Vira Norbert Flood MD      iron polysaccharides  150 mg Oral Daily [place], [place]      ondansetron  4 mg Intravenous Q6H PRN Mayela Corral MD      permethrin   Topical Once [facility], [facility]      sodium bicarbonate  1,300 mg Oral BID after meals Mayela Corral MD          Today, Patient Was Seen By: Kelsie Natarajan PA-C    **Please Note: This note may have been constructed using a voice recognition system  **

## 2022-09-26 NOTE — UTILIZATION REVIEW
Initial Clinical Review    Admission: Date/Time/Statement:   Admission Orders (From admission, onward)     Ordered        09/24/22 1752  INPATIENT ADMISSION  Once                      Orders Placed This Encounter   Procedures    INPATIENT ADMISSION     Standing Status:   Standing     Number of Occurrences:   1     Order Specific Question:   Level of Care     Answer:   Med Surg [16]     Order Specific Question:   Bed request comments     Answer:   Telemetry     Order Specific Question:   Estimated length of stay     Answer:   More than 2 Midnights     Order Specific Question:   Certification     Answer:   I certify that inpatient services are medically necessary for this patient for a duration of greater than two midnights  See H&P and MD Progress Notes for additional information about the patient's course of treatment  ED Arrival Information     Expected   -    Arrival   9/24/2022 15:21    Acuity   Urgent            Means of arrival   Ambulance    Escorted by   [place] ([facility])    Service   Hospitalist    Admission type   Urgent            Arrival complaint   medical problem            Chief Complaint   Patient presents with    Respiratory Distress     EMS reports patient coming from home  EMS reports from son, patient has been in bed for several days, not wanting to eat/drink  Not taking medications  Difficulty breathing this morning  Initial Presentation: 78 y o  male presents to the ED via EMS from home at request of VNA nurse with c/o sats in 80s, gradually worsening breathing, cold all the time, constant, diffuse chest pain/pressure, dark urine, anorexia, med noncompliance  PMH:CVA w/ residual R side weakness, CHF,  CKD stage 4/5, hypertension, horseshoe kidney with chronic hydronephrosis status post urethral stent and hyperlipidemia, has RUE AV fistula, unused  In the ED imaging shows R side pleural effusion, vascular congestion suggestive of heart failure    Labs show worsening renal failure  On exam he has mild respiratory distress, rales, RLE edema, alert  He was treated with IV Lasix, IV D 50, IV Sodium bicarb, insulin  Labs show elevated BUN/Cr, anion gap, K, proBNP  He is admitted to INPATIENT status with LORENZA on CKD - IV Lasix x BID, Na bicarb PO, monitor renal function,check urine studies, PVR, CTAP  Acute hypoxic respiratory failure - 2L NC  Acute dCHF - Nephrololgy consult  Hyperkalemia - trend BMP  Horseshoe kidney - US to eval L ureteral stent placement     Date: 9/25   Day 2:   Worsening CKD, creat 4/3 not improving w/ diuresis  Will accept dialysis if needed  Continue IV Lasix, Metoprolol, corrected K is 4 9, on exam still SOB  , no N/V  Had decreased breath sounds, trace pedal edema  9/25 Nephrology Consult -  advanced chronic kidney disease stage IV, noncompliance, hypertension, chronic anemia with a horseshoe kidney who presented for shortness of breath and generalized weakness along with decreased appetite  He has a right arm AV fistula, place many years ago  Xray shows  vascular pulmonary congestion and has renee bicarb level with elevated creat, K and proBNP  Could be r/t uremia and likely disease progression  Agreeable to dialysis if needed  Hemodynamics are stable  Imaging new mod R pl effusion, hydronephrosis at upper pole L renal and L ureteral stent is stable  Persistent bilateral hydroureter although the right side is diminished  No longer shows hydronephrosis  May need to consider Urology consult  Continue diuresis       ED Triage Vitals   Temperature Pulse Respirations Blood Pressure SpO2   09/24/22 1831 09/24/22 1528 09/24/22 1528 09/24/22 1528 09/24/22 1528   97 8 °F (36 6 °C) 83 18 170/67 99 %      Temp Source Heart Rate Source Patient Position - Orthostatic VS BP Location FiO2 (%)   09/24/22 1831 09/24/22 1528 09/24/22 1528 09/24/22 1528 --   Oral Monitor Lying Left arm       Pain Score       09/24/22 1726       No Pain          Wt Readings from Last 1 Encounters:   09/26/22 74 5 kg (164 lb 3 9 oz)     Additional Vital Signs:   09/25/22 2300 98 4 °F (36 9 °C) 66 18 134/62 89 95 % -- -- -- -- Lying   09/25/22 1500 99 4 °F (37 4 °C) 62 18 140/50 -- 97 % -- 4 L/min -- Nasal cannula Lying   09/25/22 1100 -- 72 18 140/60 -- -- -- -- -- -- Lying   09/25/22 0700 99 2 °F (37 3 °C) 64 17 142/57 -- 96 % -- 4 L/min -- Nasal cannula Lying   09/25/22 0302 98 5 °F (36 9 °C) 65 17 148/66 109 96 % 36 -- 4 L/min Nasal cannula Lying   09/24/22 2319 98 °F (36 7 °C) 66 17 142/60 97 97 % -- -- -- Nasal cannula Lying   09/24/22 1904 99 5 °F (37 5 °C) 63 17 139/66 88 92 % 36 -- 4 L/min Nasal cannula Sitting   09/24/22 1831 97 8 °F (36 6 °C) -- -- -- -- -- -- -- -- -- --   09/24/22 1726 -- 71 16 108/80 -- 95 % -- 2 L/min -- Nasal cannula Lying   09/24/22 1532 -- -- -- -- -- -- -- -- -- Non-rebreather mask --     Pertinent Labs/Diagnostic Test Results:   CT abdomen pelvis wo contrast   Final Result by Yadira Frye MD (09/25 1154)      New moderate right pleural effusion incompletely imaged with passive atelectasis in the right lower lobe  There is hydronephrosis involving the upper pole of the left renal moiety which seems similar to the prior exam   The left-sided ureteral stent is stable in position extending from the renal pelvis to the bladder  There is persistent bilateral hydroureter although the right side is diminished since the prior exam and the right-sided renal moiety is atrophic and no longer shows hydronephrosis  Mild anterior bladder wall thickening of uncertain significance  Correlate with urinalysis to exclude cystitis  Right inguinal hernia contains a loop of small bowel, without evidence of structures at this time  XR chest 1 view portable      Moderate right effusion and right base opacity which could be due to atelectasis and/or pneumonia  Trace left effusion  Moderate pulmonary venous congestion  Results from last 7 days   Lab Units 09/26/22  0517 09/25/22  0526 09/24/22  1614   WBC Thousand/uL 5 63 6 09 7 02   HEMOGLOBIN g/dL 8 6* 8 6* 9 5*   HEMATOCRIT % 28 6* 29 2* 31 2*   PLATELETS Thousands/uL 156 180 178   NEUTROS ABS Thousands/µL 3 92 4 37 5 38         Results from last 7 days   Lab Units 09/26/22  0517 09/25/22  0526 09/24/22  1614   SODIUM mmol/L 144 145 142   POTASSIUM mmol/L 4 6 4 9 6 1*   CHLORIDE mmol/L 110* 113* 110*   CO2 mmol/L 21 20* 15*   ANION GAP mmol/L 13 12 17*   BUN mg/dL 77* 69* 73*   CREATININE mg/dL 4 44* 4 30* 4 20*   EGFR ml/min/1 73sq m 12 12 12   CALCIUM mg/dL 8 4 8 5 8 9     Results from last 7 days   Lab Units 09/24/22  1614   AST U/L 38   ALT U/L 19   ALK PHOS U/L 77   TOTAL PROTEIN g/dL 9 3*   ALBUMIN g/dL 3 4*   TOTAL BILIRUBIN mg/dL 1 33*         Results from last 7 days   Lab Units 09/26/22  0517 09/25/22  0526 09/24/22  1614   GLUCOSE RANDOM mg/dL 120 103 141*         Results from last 7 days   Lab Units 09/24/22  2041 09/24/22  1819 09/24/22  1614   HS TNI 0HR ng/L  --   --  47   HS TNI 2HR ng/L  --  45  --    HSTNI D2 ng/L  --  -2  --    HS TNI 4HR ng/L 44  --   --    HSTNI D4 ng/L -3  --   --          Results from last 7 days   Lab Units 09/24/22  1614   PROTIME seconds 16 0*   INR  1 29*   PTT seconds 32     Results from last 7 days   Lab Units 09/24/22  1614   NT-PRO BNP pg/mL 23,365*     Results from last 7 days   Lab Units 09/24/22 2027   OSMO UR mmol/     Results from last 7 days   Lab Units 09/24/22 2027   CLARITY UA  Turbid   COLOR UA  Light Yellow   SPEC GRAV UA  1 010   PH UA  6 5   GLUCOSE UA mg/dl Negative   KETONES UA mg/dl Negative   BLOOD UA  Small*   PROTEIN UA mg/dl Trace*   NITRITE UA  Negative   BILIRUBIN UA  Negative   UROBILINOGEN UA E U /dl 0 2   LEUKOCYTES UA  Large*   WBC UA /hpf Innumerable*   RBC UA /hpf 2-4   BACTERIA UA /hpf Innumerable*   EPITHELIAL CELLS WET PREP /hpf Occasional   SODIUM UR  82   CREATININE UR mg/dL 50 1 ED Treatment:   Medication Administration from 09/24/2022 1521 to 09/24/2022 1849       Date/Time Order Dose Route Action     09/24/2022 1736 furosemide (LASIX) injection 40 mg 40 mg Intravenous Given     09/24/2022 1736 dextrose 50 % IV solution 50 mL 50 mL Intravenous Given     09/24/2022 1736 sodium bicarbonate 8 4 % injection 25 mEq 25 mEq Intravenous Given     09/24/2022 1736 insulin regular (HumuLIN R,NovoLIN R) injection 10 Units 10 Units Intravenous Given        Past Medical History:   Diagnosis Date    Coronary artery disease     Renal disorder      Present on Admission:   Acute kidney injury superimposed on CKD ([place] [address] )   HTN (hypertension)   HLD (hyperlipidemia)   Hyperkalemia      Admitting Diagnosis: Hyperkalemia [E87 5]  CHF (congestive heart failure) ([place] [address] ) [I50 9]  Known medical problems [Z78 9]  Acute kidney injury superimposed on chronic kidney disease ([place] [address] ) [N17 9, N18 9]  Age/Sex: 78 y o  male  Admission Orders:  Scheduled Medications:  amLODIPine, 10 mg, Oral, QPM  atorvastatin, 40 mg, Oral, Daily With Dinner  bisoprolol, 5 mg, Oral, Daily  doxycycline hyclate, 100 mg, Oral, Q24H [facility]  furosemide, 40 mg, Intravenous, BID (diuretic)  heparin (porcine), 5,000 Units, Subcutaneous, Q8H [address]  iron polysaccharides, 150 mg, Oral, Daily  permethrin, , Topical, Once  sodium bicarbonate, 1,300 mg, Oral, BID after meals      Continuous IV Infusions:     PRN Meds:  acetaminophen, 650 mg, Oral, Q6H PRN  ondansetron, 4 mg, Intravenous, Q6H PRN - x 1 9/26    Condom cath, measure PVR  Insert hurley  Daily wt  Fluid restriction  IP CONSULT TO NEPHROLOGY    Network Utilization Review Department  ATTENTION: Please call with any questions or concerns to [phone] and carefully listen to the prompts so that you are directed to the right person   All voicemails are confidential   Harlene Pair all requests for admission clinical reviews, approved or denied determinations and any other requests to dedicated fax number below belonging to the campus where the patient is receiving treatment   List of dedicated fax numbers for the Facilities:  [address] [place] DENIALS (Administrative/Medical Necessity) [phone]   [address]  (Maternity/NICU/Pediatrics) [phone]   [address]  [address] [place] [address] [id number] [place] [address] [place] [address] [place] [address] [phone]

## 2022-09-26 NOTE — ASSESSMENT & PLAN NOTE
History of horseshoe kidney, with chronic hydronephrosis with left urethral stent in place  · Will check renal ultrasound evaluate stent placement  · Patient does follow with Urology at [facility]

## 2022-09-26 NOTE — PROGRESS NOTES
NEPHROLOGY PROGRESS NOTE   Timbo Cox 78 y o  male MRN: [id number]  Unit/Bed#: E4 -01 Encounter: [id number]  Reason for Consult: LORENZA on CKD IV/V    79-year-old male with history of CKD stage 4/5, noncompliance, hypertension, horseshoe kidney, chronic hydronephrosis with left ureteral stent, who presents with reports of shortness of breath, weakness, and decreased appetite  He currently follows with [facility]  Nephrology is consulted for acute kidney injury  ASSESSMENT/PLAN:  LORENZA on CKD IV/V:  Concern for progression of underlying disease versus volume overload versus due to bilateral hydroureter   -baseline creatinine previously around 3 5-4 0   -creatinine increased to 4 4   -follows with [facility] specialist   -currently on Lasix 40 mg IV b i d     Would continue today and reassess in a m  For further IV diuretics  -UA with small blood, 2-4 RBCs, trace protein, large leuks, innumerable bacteria  -CT shows severely atrophic right kidney, hydronephrosis primarily involving the upper pole collecting system, stent present, possible right ureteral stricture  -consent obtained from dialysis and in chart if needed  Wishing to hold off on dialysis as long as possible  -recommend avoiding nephrotoxins, hypotension, IV contrast   -strict I/O  History of horseshoe kidney:  With chronic hydronephrosis and left ureteral stent   -checking repeat renal ultrasound   -follows outpatient with Urology at [facility], consider Urology consult  Curb that discussion held with Urology and recommend awaiting repeat renal ultrasound result prior to urology consultation  Access:  Right upper extremity AVF  + bruit/thrill  Reports being placed several years ago  Metabolic acidosis:  -currently on oral bicarbonate tablets 1300 mg 2 times per day  Will continue to monitor and trend      Hypertension:  Currently stable and acceptable for age   -home medications:  Amlodipine 10 mg every evening, bisoprolol 5 mg daily  -currently maintained on:  Amlodipine 10 mg daily, bisoprolol 5 mg daily, Lasix 40 mg IV b i d  -recommend avoiding hypotension or high fluctuations in blood pressure   -recommend holding parameters antihypertensive for systolic blood pressure less than 130  Acute diastolic CHF/volume status:  Presents with reports of shortness of breath   -presented with elevated BNP  -chest x-ray showed moderate right-sided pleural effusion and vascular congestion  -currently on Lasix 40 mg IV b i d  -reports baseline weight around 160 lb  Today 164 lb   -continue daily weights, fluid restriction, strict I/O  Anemia:  -hemoglobin below goal   Currently 8 6   -continues on oral iron supplements   -eventually would benefit from ALEX once started on hemodialysis  -continue to monitor and transfuse as needed for hemoglobin less than 7 0  Other:  Hyperlipidemia  Disposition:  Requiring additional stay due to medical needs  SUBJECTIVE:  The patient is resting in his bed  He denies chest discomfort or shortness of breath  He reports feeling much better and rested  He states he was nauseous and vomited after drinking his coffee this morning due to the bad taste  He denies diarrhea  He reports urinating a lot more than usual   He remains on supplemental oxygen, not on O2 at baseline  He reports his normal weight is approximately 160 lb      OBJECTIVE:  Current Weight: Weight - Scale: 74 5 kg (164 lb 3 9 oz)  Vitals:    09/25/22 1500 09/25/22 2300 09/26/22 0600 09/26/22 0700   BP: 140/50 134/62  143/54   BP Location: Left arm Left arm  Left arm   Pulse: 62 66  77   Resp: 18 18     Temp: 99 4 °F (37 4 °C) 98 4 °F (36 9 °C)  99 8 °F (37 7 °C)   TempSrc: Temporal Temporal  Temporal   SpO2: 97% 95%  97%   Weight:   74 5 kg (164 lb 3 9 oz)        Intake/Output Summary (Last 24 hours) at 9/26/2022 1420  Last data filed at 9/26/2022 1145  Gross per 24 hour   Intake 420 ml   Output 1800 ml   Net -1380 ml     General: NAD  Skin: warm, dry, intact, no rash  HEENT: Moist mucous membranes, sclera anicteric, normocephalic, atraumatic  Neck: No apparent JVD appreciated  Chest: lung sounds clear B/L, on O2  CVS:Regular rate and rhythm, no murmer   Abdomen: Soft, round, non-tender, +BS  Extremities: B/L LE edema present  Neuro: alert and oriented  Psych: appropriate mood and affect     Medications:    Current Facility-Administered Medications:     acetaminophen (TYLENOL) tablet 650 mg, 650 mg, Oral, Q6H PRN, Rivka Jose MD    amLODIPine (NORVASC) tablet 10 mg, 10 mg, Oral, QPM, [facility], [facility], 10 mg at 09/25/22 1826    atorvastatin (LIPITOR) tablet 40 mg, 40 mg, Oral, Daily With Bill Ambrocio MD, 40 mg at 09/25/22 1826    bisoprolol (ZEBETA) tablet 5 mg, 5 mg, Oral, Daily, Rivka Jose MD, 5 mg at 09/26/22 0820    doxycycline hyclate (VIBRAMYCIN) capsule 100 mg, 100 mg, Oral, Q24H [facility], Miranda Lopez DO, 100 mg at 09/26/22 0820    furosemide (LASIX) injection 40 mg, 40 mg, Intravenous, BID (diuretic), Rivka Jose MD, 40 mg at 09/26/22 0820    heparin (porcine) subcutaneous injection 5,000 Units, 5,000 Units, Subcutaneous, Q8H [facility], Rivka Jose MD, 5,000 Units at 09/26/22 1324    iron polysaccharides (FERREX) capsule 150 mg, 150 mg, Oral, Daily, [facility], PA-C, 150 mg at 09/26/22 6374    ondansetron (ZOFRAN) injection 4 mg, 4 mg, Intravenous, Q6H PRN, Rivka Jose MD, 4 mg at 09/26/22 0820    permethrin (NIX) 1 % topical liquid, , Topical, Once, Ailyn Hebert, PA-C    sodium bicarbonate tablet 1,300 mg, 1,300 mg, Oral, BID after meals, Rivka Jose MD, 1,300 mg at 09/26/22 0820    Laboratory Results:  Results from last 7 days   Lab Units 09/26/22  0517 09/25/22  0526 09/24/22  1614   WBC Thousand/uL 5 63 6 09 7 02   HEMOGLOBIN g/dL 8 6* 8 6* 9 5*   HEMATOCRIT % 28 6* 29 2* 31 2*   PLATELETS Thousands/uL 156 180 178   SODIUM mmol/L 144 145 142   POTASSIUM mmol/L 4 6 4 9 6 1*   CHLORIDE mmol/L 110* 113* 110*   CO2 mmol/L 21 20* 15*   BUN mg/dL 77* 69* 73*   CREATININE mg/dL 4 44* 4 30* 4 20*   CALCIUM mg/dL 8 4 8 5 8 9   ALK PHOS U/L  --   --  77   ALT U/L  --   --  19   AST U/L  --   --  38

## 2022-09-26 NOTE — ASSESSMENT & PLAN NOTE
Wt Readings from Last 3 Encounters:   09/26/22 74 5 kg (164 lb 3 9 oz)   01/31/22 72 6 kg (160 lb)   09/14/21 72 9 kg (160 lb 12 8 oz)     Prior 2D echo showing preserved EF back in January 2022  · ProBNP greater than 20,000 on admission  · Imaging consistent with vascular congestion and right-sided pleural effusion  · Suspect likely in the setting of LORENZA on CKD stage 5  · Lasix 40 mg b i d   Ordered, will continue  · Nephrology consulted, possibility for need for dialysis

## 2022-09-27 ENCOUNTER — APPOINTMENT (OUTPATIENT)
Dept: ULTRASOUND IMAGING | Facility: HOSPITAL | Age: 77
DRG: 682 | End: 2022-09-27
Payer: COMMERCIAL

## 2022-09-27 LAB
ANION GAP SERPL CALCULATED.3IONS-SCNC: 12 MMOL/L (ref 4–13)
BUN SERPL-MCNC: 79 MG/DL (ref 5–25)
CALCIUM SERPL-MCNC: 8.3 MG/DL (ref 8.3–10.1)
CHLORIDE SERPL-SCNC: 107 MMOL/L (ref 96–108)
CO2 SERPL-SCNC: 23 MMOL/L (ref 21–32)
CREAT SERPL-MCNC: 4.69 MG/DL (ref 0.6–1.3)
ERYTHROCYTE [DISTWIDTH] IN BLOOD BY AUTOMATED COUNT: 21.2 % (ref 11.6–15.1)
GFR SERPL CREATININE-BSD FRML MDRD: 11 ML/MIN/1.73SQ M
GLUCOSE SERPL-MCNC: 91 MG/DL (ref 65–140)
HCT VFR BLD AUTO: 31.3 % (ref 36.5–49.3)
HGB BLD-MCNC: 9.2 G/DL (ref 12–17)
MCH RBC QN AUTO: 27.6 PG (ref 26.8–34.3)
MCHC RBC AUTO-ENTMCNC: 29.4 G/DL (ref 31.4–37.4)
MCV RBC AUTO: 94 FL (ref 82–98)
PLATELET # BLD AUTO: 171 THOUSANDS/UL (ref 149–390)
PMV BLD AUTO: 13 FL (ref 8.9–12.7)
POTASSIUM SERPL-SCNC: 4.9 MMOL/L (ref 3.5–5.3)
RBC # BLD AUTO: 3.33 MILLION/UL (ref 3.88–5.62)
SODIUM SERPL-SCNC: 142 MMOL/L (ref 135–147)
WBC # BLD AUTO: 6.46 THOUSAND/UL (ref 4.31–10.16)

## 2022-09-27 PROCEDURE — 85027 COMPLETE CBC AUTOMATED: CPT | Performed by: PHYSICIAN ASSISTANT

## 2022-09-27 PROCEDURE — 76770 US EXAM ABDO BACK WALL COMP: CPT

## 2022-09-27 PROCEDURE — 99232 SBSQ HOSP IP/OBS MODERATE 35: CPT | Performed by: INTERNAL MEDICINE

## 2022-09-27 PROCEDURE — 99232 SBSQ HOSP IP/OBS MODERATE 35: CPT

## 2022-09-27 PROCEDURE — 80048 BASIC METABOLIC PNL TOTAL CA: CPT | Performed by: PHYSICIAN ASSISTANT

## 2022-09-27 RX ORDER — FUROSEMIDE 10 MG/ML
60 INJECTION INTRAMUSCULAR; INTRAVENOUS ONCE
Status: COMPLETED | OUTPATIENT
Start: 2022-09-27 | End: 2022-09-27

## 2022-09-27 RX ADMIN — AMLODIPINE BESYLATE 10 MG: 10 TABLET ORAL at 17:36

## 2022-09-27 RX ADMIN — SODIUM BICARBONATE 1300 MG: 650 TABLET ORAL at 08:54

## 2022-09-27 RX ADMIN — SODIUM BICARBONATE 1300 MG: 650 TABLET ORAL at 16:46

## 2022-09-27 RX ADMIN — HEPARIN SODIUM 5000 UNITS: 5000 INJECTION INTRAVENOUS; SUBCUTANEOUS at 21:06

## 2022-09-27 RX ADMIN — BISOPROLOL FUMARATE 5 MG: 5 TABLET ORAL at 08:54

## 2022-09-27 RX ADMIN — DOXYCYCLINE 100 MG: 100 CAPSULE ORAL at 08:54

## 2022-09-27 RX ADMIN — ATORVASTATIN CALCIUM 40 MG: 40 TABLET, FILM COATED ORAL at 16:46

## 2022-09-27 RX ADMIN — HEPARIN SODIUM 5000 UNITS: 5000 INJECTION INTRAVENOUS; SUBCUTANEOUS at 14:00

## 2022-09-27 RX ADMIN — HEPARIN SODIUM 5000 UNITS: 5000 INJECTION INTRAVENOUS; SUBCUTANEOUS at 05:46

## 2022-09-27 RX ADMIN — POLYSACCHARIDE-IRON COMPLEX 150 MG: 150 CAPSULE ORAL at 08:54

## 2022-09-27 RX ADMIN — FUROSEMIDE 60 MG: 10 INJECTION, SOLUTION INTRAVENOUS at 16:46

## 2022-09-27 NOTE — PROGRESS NOTES
[facility]  Progress Note - Anne Odor 1945, 78 y o  male MRN: [id number]  Unit/Bed#: E4 -01 Encounter: [id number]  Primary Care Provider: Rupesh Ca MD   Date and time admitted to hospital: 9/24/2022  3:21 PM    * Acute kidney injury superimposed on CKD [facility])  Assessment & Plan  Lab Results   Component Value Date    EGFR 11 09/27/2022    EGFR 12 09/26/2022    EGFR 12 09/25/2022    CREATININE 4 69 (H) 09/27/2022    CREATININE 4 44 (H) 09/26/2022    CREATININE 4 30 (H) 09/25/2022     78year-old male presented with increasing shortness of breath found to have worsening renal failure, x-ray imaging showing moderate right-sided pleural effusion and vascular congestion  · History of CKD stage 4/5, 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  · Creatinine baseline previously was around 3  · With AG metabolic acidosis, continuing to improve on sodium bicarb supplementation  · Nephrology consulted  · Continue lasix 40mg IV BID  · Urinary retention protocol  · Dialysis consent obtained however holding off at this time  · CT abdomen pelvis with evidence of hydroureter bilaterally and with a left ureteral stent in place however unchanged from previous  · Consider Urology consult due to increasing creatinine after results of repeat renal ultrasound    Acute diastolic heart failure ([address] )  Assessment & Plan  Wt Readings from Last 3 Encounters:   09/27/22 74 4 kg (164 lb 1 6 oz)   01/31/22 72 6 kg (160 lb)   09/14/21 72 9 kg (160 lb 12 8 oz)     Prior 2D echo showing preserved EF back in January 2022  · ProBNP greater than 20,000 on admission  · Imaging consistent with vascular congestion and right-sided pleural effusion  · Suspect likely in the setting of LORENZA on CKD stage 5  · Continue Lasix 40 mg b i d   · Nephrology consulted patient would like to hold off on dialysis as long as possible    Horseshoe kidney  Assessment & Plan  History of horseshoe kidney, with chronic hydronephrosis with left urethral stent in place  · CT abdominal pelvis showing hydronephrosis involving the upper pole of the left renal moiety unchanged from prior exam   The left-sided ureteral stent is stable in position extending from the renal pelvis to the bladder  · Repeat renal ultrasound pending, consider urology consult with results  · Patient does follow with Urology at [facility]    Anemia of chronic disease  Assessment & Plan  Hemoglobin 9 2 and stable  · No acute signs of bleeding at this time  · Iron panel pending     Acute respiratory failure with hypoxia ([address] )  Assessment & Plan  · Required 3 L NC on admission, now on 1 L NC  · Wean oxygen as able    HTN (hypertension)  Assessment & Plan  Continue bisoprolol and amlodipine  · Monitor blood pressures  · -140    HLD (hyperlipidemia)  Assessment & Plan  · Continue statin    Hyperkalemia-resolved as of 9/26/2022  Assessment & Plan  Suspect component of hemolysis, treated with insulin, sodium bicarb, and Lasix  Monitor BMP  Resolved      VTE Pharmacologic Prophylaxis:   High Risk (Score >/= 5) - Pharmacological DVT Prophylaxis Ordered: heparin  Sequential Compression Devices Ordered  Patient Centered Rounds: I performed bedside rounds with nursing staff today  Discussions with Specialists or Other Care Team Provider: none    Education and Discussions with Family / Patient: Patient declined call to   Patient declined calls states that he updates family as needed    Time Spent for Care: 30 minutes  More than 50% of total time spent on counseling and coordination of care as described above      Current Length of Stay: 3 day(s)  Current Patient Status: Inpatient   Certification Statement: The patient will continue to require additional inpatient hospital stay due to acute kidney injury superimposed on chronic kidney disease requiring inpatient management for improvement  Discharge Plan: Anticipate discharge in 48 hrs to home with home services  Code Status: Level 1 - Full Code    Subjective:   Patient seen Shira Nearing in bed at today  He states he is feeling well today and is only experiencing some fatigue  He denies nausea, vomiting, confusion, SOB, abdominal pain, chest pain  Objective:     Vitals:   Temp (24hrs), Av 7 °F (37 1 °C), Min:98 4 °F (36 9 °C), Max:99 1 °F (37 3 °C)    Temp:  [98 4 °F (36 9 °C)-99 1 °F (37 3 °C)] 98 5 °F (36 9 °C)  HR:  [55-63] 56  Resp:  [18] 18  BP: (121-134)/(44-58) 121/53  SpO2:  [94 %-99 %] 95 %  Body mass index is 24 95 kg/m²  Input and Output Summary (last 24 hours): Intake/Output Summary (Last 24 hours) at 2022 1247  Last data filed at 2022 0905  Gross per 24 hour   Intake 24 ml   Output 900 ml   Net -876 ml       Physical Exam:   Physical Exam  Vitals and nursing note reviewed  Constitutional:       Appearance: Normal appearance  He is ill-appearing (chronically)  HENT:      Head: Normocephalic and atraumatic  Eyes:      General: No scleral icterus  Cardiovascular:      Rate and Rhythm: Normal rate and regular rhythm  Pulmonary:      Effort: Pulmonary effort is normal       Comments: Diminished breath sounds at bases  Abdominal:      General: Abdomen is flat  Bowel sounds are normal       Palpations: Abdomen is soft  Tenderness: There is no abdominal tenderness  Musculoskeletal:      Right lower leg: Edema present  Left lower leg: Edema present  Skin:     General: Skin is warm and dry  Neurological:      Mental Status: He is alert  Mental status is at baseline     Psychiatric:         Mood and Affect: Mood normal          Behavior: Behavior normal           Additional Data:     Labs:  Results from last 7 days   Lab Units 22  0524 22  0517   WBC Thousand/uL 6 46 5 63   HEMOGLOBIN g/dL 9 2* 8 6*   HEMATOCRIT % 31 3* 28 6*   PLATELETS Thousands/uL 171 156   NEUTROS PCT %  --  70   LYMPHS PCT %  -- 14   MONOS PCT %  --  9   EOS PCT %  --  6     Results from last 7 days   Lab Units 09/27/22  0524 09/25/22  0526 09/24/22  1614   SODIUM mmol/L 142   < > 142   POTASSIUM mmol/L 4 9   < > 6 1*   CHLORIDE mmol/L 107   < > 110*   CO2 mmol/L 23   < > 15*   BUN mg/dL 79*   < > 73*   CREATININE mg/dL 4 69*   < > 4 20*   ANION GAP mmol/L 12   < > 17*   CALCIUM mg/dL 8 3   < > 8 9   ALBUMIN g/dL  --   --  3 4*   TOTAL BILIRUBIN mg/dL  --   --  1 33*   ALK PHOS U/L  --   --  77   ALT U/L  --   --  19   AST U/L  --   --  38   GLUCOSE RANDOM mg/dL 91   < > 141*    < > = values in this interval not displayed  Results from last 7 days   Lab Units 09/24/22  1614   INR  1 29*                   Lines/Drains:  Invasive Devices  Report    Peripheral Intravenous Line  Duration           Peripheral IV 09/26/22 Dorsal (posterior); Left Forearm <1 day          Line  Duration           Hemodialysis AV Fistula 09/11/21 Right Upper arm 381 days                      Imaging: Reviewed radiology reports from this admission including: chest xray and abdominal/pelvic CT    Recent Cultures (last 7 days):         Last 24 Hours Medication List:   Current Facility-Administered Medications   Medication Dose Route Frequency Provider Last Rate    acetaminophen  650 mg Oral Q6H PRN Hayley Bolton MD      amLODIPine  10 mg Oral QPM [place], [place]      atorvastatin  40 mg Oral Daily With Ceci Phan MD      bisoprolol  5 mg Oral Daily Hayley Bolton MD      doxycycline hyclate  100 mg Oral Q24H [facility],       furosemide  60 mg Intravenous BID (diuretic) Ron De Anda MD      heparin (porcine)  5,000 Units Subcutaneous [facility] Carl Pierce MD      iron polysaccharides  150 mg Oral Daily [place], [place]      ondansetron  4 mg Intravenous Q6H PRN Hayley Bolton MD      permethrin   Topical Once Bessie Skylar, PA-C      sodium bicarbonate  1,300 mg Oral BID after meals Hayley Bolton MD Today, Patient Was Seen By: Nathalie Asher PA-C    **Please Note: This note may have been constructed using a voice recognition system  **

## 2022-09-27 NOTE — PLAN OF CARE
Problem: Potential for Falls  Goal: Patient will remain free of falls  Description: INTERVENTIONS:  - Educate patient/family on patient safety including physical limitations  - Instruct patient to call for assistance with activity   - Consult OT/PT to assist with strengthening/mobility   - Keep Call bell within reach  - Keep bed low and locked with side rails adjusted as appropriate  - Keep care items and personal belongings within reach  - Initiate and maintain comfort rounds  - Make Fall Risk Sign visible to staff  - Offer Toileting every 2 Hours, in advance of need  - Initiate/Maintain bed alarm  - Obtain necessary fall risk management equipment:   - Apply yellow socks and bracelet for high fall risk patients  - Consider moving patient to room near nurses station  Outcome: Progressing     Problem: MOBILITY - ADULT  Goal: Maintain or return to baseline ADL function  Description: INTERVENTIONS:  -  Assess patient's ability to carry out ADLs; assess patient's baseline for ADL function and identify physical deficits which impact ability to perform ADLs (bathing, care of mouth/teeth, toileting, grooming, dressing, etc )  - Assess/evaluate cause of self-care deficits   - Assess range of motion  - Assess patient's mobility; develop plan if impaired  - Assess patient's need for assistive devices and provide as appropriate  - Encourage maximum independence but intervene and supervise when necessary  - Involve family in performance of ADLs  - Assess for home care needs following discharge   - Consider OT consult to assist with ADL evaluation and planning for discharge  - Provide patient education as appropriate  Outcome: Progressing     Problem: PAIN - ADULT  Goal: Verbalizes/displays adequate comfort level or baseline comfort level  Description: Interventions:  - Encourage patient to monitor pain and request assistance  - Assess pain using appropriate pain scale  - Administer analgesics based on type and severity of pain and evaluate response  - Implement non-pharmacological measures as appropriate and evaluate response  - Consider cultural and social influences on pain and pain management  - Notify physician/advanced practitioner if interventions unsuccessful or patient reports new pain  Outcome: Progressing     Problem: SAFETY ADULT  Goal: Patient will remain free of falls  Description: INTERVENTIONS:  - Educate patient/family on patient safety including physical limitations  - Instruct patient to call for assistance with activity   - Consult OT/PT to assist with strengthening/mobility   - Keep Call bell within reach  - Keep bed low and locked with side rails adjusted as appropriate  - Keep care items and personal belongings within reach  - Initiate and maintain comfort rounds  - Make Fall Risk Sign visible to staff  - Offer Toileting every 2 Hours, in advance of need  - Initiate/Maintain bed alarm  - Obtain necessary fall risk management equipment:   - Apply yellow socks and bracelet for high fall risk patients  - Consider moving patient to room near nurses station  Outcome: Progressing  Goal: Maintain or return to baseline ADL function  Description: INTERVENTIONS:  -  Assess patient's ability to carry out ADLs; assess patient's baseline for ADL function and identify physical deficits which impact ability to perform ADLs (bathing, care of mouth/teeth, toileting, grooming, dressing, etc )  - Assess/evaluate cause of self-care deficits   - Assess range of motion  - Assess patient's mobility; develop plan if impaired  - Assess patient's need for assistive devices and provide as appropriate  - Encourage maximum independence but intervene and supervise when necessary  - Involve family in performance of ADLs  - Assess for home care needs following discharge   - Consider OT consult to assist with ADL evaluation and planning for discharge  - Provide patient education as appropriate  Outcome: Progressing     Problem: RESPIRATORY - ADULT  Goal: Achieves optimal ventilation and oxygenation  Description: INTERVENTIONS:  - Assess for changes in respiratory status  - Assess for changes in mentation and behavior  - Position to facilitate oxygenation and minimize respiratory effort  - Oxygen administered by appropriate delivery if ordered  - Initiate smoking cessation education as indicated  - Encourage broncho-pulmonary hygiene including cough, deep breathe, Incentive Spirometry  - Assess the need for suctioning and aspirate as needed  - Assess and instruct to report SOB or any respiratory difficulty  - Respiratory Therapy support as indicated  Outcome: Progressing

## 2022-09-27 NOTE — ASSESSMENT & PLAN NOTE
History of horseshoe kidney, with chronic hydronephrosis with left urethral stent in place  · CT abdominal pelvis showing hydronephrosis involving the upper pole of the left renal moiety unchanged from prior exam   The left-sided ureteral stent is stable in position extending from the renal pelvis to the bladder    · Repeat renal ultrasound pending, consider urology consult with results  · Patient does follow with Urology at [facility]

## 2022-09-27 NOTE — PLAN OF CARE
Problem: Potential for Falls  Goal: Patient will remain free of falls  Description: INTERVENTIONS:  - Educate patient/family on patient safety including physical limitations  - Instruct patient to call for assistance with activity   - Consult OT/PT to assist with strengthening/mobility   - Keep Call bell within reach  - Keep bed low and locked with side rails adjusted as appropriate  - Keep care items and personal belongings within reach  - Initiate and maintain comfort rounds  - Make Fall Risk Sign visible to staff  - Offer Toileting every 2 Hours, in advance of need  - Initiate/Maintain bed/chair alarm  - Obtain necessary fall risk management equipment: alarms, walker  - Apply yellow socks and bracelet for high fall risk patients  - Consider moving patient to room near nurses station  Outcome: Progressing     Problem: MOBILITY - ADULT  Goal: Maintain or return to baseline ADL function  Description: INTERVENTIONS:  -  Assess patient's ability to carry out ADLs; assess patient's baseline for ADL function and identify physical deficits which impact ability to perform ADLs (bathing, care of mouth/teeth, toileting, grooming, dressing, etc )  - Assess/evaluate cause of self-care deficits   - Assess range of motion  - Assess patient's mobility; develop plan if impaired  - Assess patient's need for assistive devices and provide as appropriate  - Encourage maximum independence but intervene and supervise when necessary  - Involve family in performance of ADLs  - Assess for home care needs following discharge   - Consider OT consult to assist with ADL evaluation and planning for discharge  - Provide patient education as appropriate  Outcome: Progressing  Goal: Maintains/Returns to pre admission functional level  Description: INTERVENTIONS:  - Perform BMAT or MOVE assessment daily    - Set and communicate daily mobility goal to care team and patient/family/caregiver     - Collaborate with rehabilitation services on mobility goals if consulted  - Perform Range of Motion 3 times a day  - Encourage patient to reposition every 2 hours    - Ambulate patient 3 times a day  - Out of bed to chair for meals  - Out of bed for toileting  - Record patient progress and toleration of activity level   Outcome: Progressing     Problem: PAIN - ADULT  Goal: Verbalizes/displays adequate comfort level or baseline comfort level  Description: Interventions:  - Encourage patient to monitor pain and request assistance  - Assess pain using appropriate pain scale  - Administer analgesics based on type and severity of pain and evaluate response  - Implement non-pharmacological measures as appropriate and evaluate response  - Consider cultural and social influences on pain and pain management  - Notify physician/advanced practitioner if interventions unsuccessful or patient reports new pain  Outcome: Progressing     Problem: INFECTION - ADULT  Goal: Absence or prevention of progression during hospitalization  Description: INTERVENTIONS:  - Assess and monitor for signs and symptoms of infection  - Monitor lab/diagnostic results  - Monitor all insertion sites, i e  indwelling lines, tubes, and drains  - Monitor endotracheal if appropriate and nasal secretions for changes in amount and color  - [place] appropriate cooling/warming therapies per order  - Administer medications as ordered  - Instruct and encourage patient and family to use good hand hygiene technique  - Identify and instruct in appropriate isolation precautions for identified infection/condition  Outcome: Progressing     Problem: DISCHARGE PLANNING  Goal: Discharge to home or other facility with appropriate resources  Description: INTERVENTIONS:  - Identify barriers to discharge w/patient and caregiver  - Arrange for needed discharge resources and transportation as appropriate  - Identify discharge learning needs (meds, wound care, etc )  - Arrange for interpretive services to assist at discharge as needed  - Refer to Case Management Department for coordinating discharge planning if the patient needs post-hospital services based on physician/advanced practitioner order or complex needs related to functional status, cognitive ability, or social support system  Outcome: Progressing     Problem: Knowledge Deficit  Goal: Patient/family/caregiver demonstrates understanding of disease process, treatment plan, medications, and discharge instructions  Description: Complete learning assessment and assess knowledge base    Interventions:  - Provide teaching at level of understanding  - Provide teaching via preferred learning methods  Outcome: Progressing     Problem: RESPIRATORY - ADULT  Goal: Achieves optimal ventilation and oxygenation  Description: INTERVENTIONS:  - Assess for changes in respiratory status  - Assess for changes in mentation and behavior  - Position to facilitate oxygenation and minimize respiratory effort  - Oxygen administered by appropriate delivery if ordered  - Initiate smoking cessation education as indicated  - Encourage broncho-pulmonary hygiene including cough, deep breathe, Incentive Spirometry  - Assess the need for suctioning and aspirate as needed  - Assess and instruct to report SOB or any respiratory difficulty  - Respiratory Therapy support as indicated  Outcome: Progressing

## 2022-09-27 NOTE — ASSESSMENT & PLAN NOTE
Wt Readings from Last 3 Encounters:   09/27/22 74 4 kg (164 lb 1 6 oz)   01/31/22 72 6 kg (160 lb)   09/14/21 72 9 kg (160 lb 12 8 oz)     Prior 2D echo showing preserved EF back in January 2022  · ProBNP greater than 20,000 on admission  · Imaging consistent with vascular congestion and right-sided pleural effusion  · Suspect likely in the setting of LORENZA on CKD stage 5  · Continue Lasix 40 mg b i d   · Nephrology consulted patient would like to hold off on dialysis as long as possible

## 2022-09-27 NOTE — ASSESSMENT & PLAN NOTE
Suspect component of hemolysis, treated with insulin, sodium bicarb, and Lasix  Monitor BMP  Resolved

## 2022-09-27 NOTE — PROGRESS NOTES
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, baseline creatinine 3 5 to 4 0, follows with [facility]  -creatinine 4 2 on admission now slowly increasing up to 4 69 today  -closely monitor for uremic symptoms  He does have mild uremic symptoms including poor appetite, generalized weakness  Nausea is better today   -discussed at length with patient regarding need to consider dialysis support this admission if creatinine continued to worsen along with worsening uremic symptoms  He would like to avoid starting dialysis as much as possible  -LORENZA suspect in the setting of cardiorenal versus overall progression of CKD  Also obstructive uropathy component     Access, currently has upper extremity AV fistula with bruit and thrill present     Horseshoe kidney with left renal moiety upper pole hydronephrosis similar to prior, status post left ureteral stent, persistent bilateral hydroureter although right-sided diminished, right-sided renal moiety atrophic and no hydronephrosis   -given increasing creatinine, consider urology evaluation     Acute respiratory failure, oxygen requirement overall improving, now remains on 1 L O2     -echo shows EF 60%, mild to moderate AR, mild-to-moderate TR, normal IVC, no pericardial effusion   -chest x-ray concerning for moderate pulmonary congestion  -weight seems to be overall stable, good urine output over last 24 hours  Will give Lasix 60 mg IV one dose today  Reassess tomorrow for further need for diuresis      Metabolic acidosis, bicarb level slowly improving    Continue to monitor, currently on sodium bicarb supplements, continue same     Anemia in CKD, hemoglobin overall stable, transfuse p r n  for hemoglobin less than seven  Consider iron studies     Discussed above plan in detail with primary team    SUBJECTIVE:  Patient seen and examined at bedside  Feels tired, nausea, vomiting much better today  Has fair appetite      OBJECTIVE:  Current Weight: Weight - Scale: 74 4 kg (164 lb 1 6 oz)  Vitals:    09/27/22 1446   BP:    Pulse:    Resp:    Temp:    SpO2: 94%       Intake/Output Summary (Last 24 hours) at 9/27/2022 1619  Last data filed at 9/27/2022 1401  Gross per 24 hour   Intake 480 ml   Output 900 ml   Net -420 ml     Wt Readings from Last 3 Encounters:   09/27/22 74 4 kg (164 lb 1 6 oz)   01/31/22 72 6 kg (160 lb)   09/14/21 72 9 kg (160 lb 12 8 oz)     Temp Readings from Last 3 Encounters:   09/27/22 98 9 °F (37 2 °C) (Temporal)   10/04/21 (!) 97 °F (36 1 °C) (Tympanic)   09/14/21 98 °F (36 7 °C) (Temporal)     BP Readings from Last 3 Encounters:   09/27/22 123/66   01/31/22 158/71   10/04/21 158/71     Pulse Readings from Last 3 Encounters:   09/27/22 63   01/31/22 78   10/04/21 73        Physical Examination:  General:  Lying in bed, no acute distress   Eyes:  Mild conjunctival pallor present  ENT:  External examination of ears and nose unremarkable  Neck:  No obvious lymphadenopathy appreciated  Respiratory:  Bilateral air entry present  CVS:  S1, S2 present  GI:  Soft nondistended  CNS:  Active, alert, oriented  Skin:  No new rash  Musculoskeletal:  No obvious new gross deformity noted    Medications:    Current Facility-Administered Medications:     acetaminophen (TYLENOL) tablet 650 mg, 650 mg, Oral, Q6H PRN, Lis Sargent MD    amLODIPine (NORVASC) tablet 10 mg, 10 mg, Oral, QPM, [facility], 10 mg at 09/26/22 1748    atorvastatin (LIPITOR) tablet 40 mg, 40 mg, Oral, Daily With Nupur Goetz MD, 40 mg at 09/26/22 1648    bisoprolol (ZEBETA) tablet 5 mg, 5 mg, Oral, Daily, Con Suárez MD, 5 mg at 09/27/22 0854    doxycycline hyclate (VIBRAMYCIN) capsule 100 mg, 100 mg, Oral, Q24H [address], Zaynab Avery DO, 100 mg at 09/27/22 0854    furosemide (LASIX) injection 60 mg, 60 mg, Intravenous, BID (diuretic), Ramana Anderson MD, 60 mg at 09/26/22 1748    heparin (porcine) subcutaneous injection 5,000 Units, 5,000 Units, Subcutaneous, Q8H [address], Con Suárez MD, 5,000 Units at 09/27/22 1400    iron polysaccharides (FERREX) capsule 150 mg, 150 mg, Oral, Daily, [facility], NICK, 150 mg at 09/27/22 0854    ondansetron (ZOFRAN) injection 4 mg, 4 mg, Intravenous, Q6H PRN, Con Suárez MD, 4 mg at 09/26/22 0820    permethrin (NIX) 1 % topical liquid, , Topical, Once, Fabricio Enrique PA-C    sodium bicarbonate tablet 1,300 mg, 1,300 mg, Oral, BID after meals, Con Suárez MD, 1,300 mg at 09/27/22 0854    Laboratory Results:  Results from last 7 days   Lab Units 09/27/22  0524 09/26/22  0517 09/25/22  0526 09/24/22  1614   WBC Thousand/uL 6 46 5 63 6 09 7 02   HEMOGLOBIN g/dL 9 2* 8 6* 8 6* 9 5*   HEMATOCRIT % 31 3* 28 6* 29 2* 31 2*   PLATELETS Thousands/uL 171 156 180 178   SODIUM mmol/L 142 144 145 142   POTASSIUM mmol/L 4 9 4 6 4 9 6 1*   CHLORIDE mmol/L 107 110* 113* 110*   CO2 mmol/L 23 21 20* 15*   BUN mg/dL 79* 77* 69* 73*   CREATININE mg/dL 4 69* 4 44* 4 30* 4 20*   CALCIUM mg/dL 8 3 8 4 8 5 8 9       CT abdomen pelvis wo contrast   Final Result by Yadira Frye MD (09/25 1154)      New moderate right pleural effusion incompletely imaged with passive atelectasis in the right lower lobe  There is hydronephrosis involving the upper pole of the left renal moiety which seems similar to the prior exam   The left-sided ureteral stent is stable in position extending from the renal pelvis to the bladder        There is persistent bilateral hydroureter although the right side is diminished since the prior exam and the right-sided renal moiety is atrophic and no longer shows hydronephrosis  Mild anterior bladder wall thickening of uncertain significance  Correlate with urinalysis to exclude cystitis  Right inguinal hernia contains a loop of small bowel, without evidence of structures at this time  Workstation performed: [id number]         XR chest 1 view portable   ED Interpretation by Alicia Denney DO (09/24 1726)   See below      Final Result by Jamar Adler MD (09/24 1654)      Moderate right effusion and right base opacity which could be due to atelectasis and/or pneumonia  Trace left effusion  Moderate pulmonary venous congestion  Workstation performed: [id number]         US kidney and bladder    (Results Pending)       Portions of the record may have been created with voice recognition software  Occasional wrong word or "sound a like" substitutions may have occurred due to the inherent limitations of voice recognition software  Read the chart carefully and recognize, using context, where substitutions have occurred

## 2022-09-28 LAB
ANION GAP SERPL CALCULATED.3IONS-SCNC: 12 MMOL/L (ref 4–13)
BASOPHILS # BLD AUTO: 0.04 THOUSANDS/ΜL (ref 0–0.1)
BASOPHILS NFR BLD AUTO: 1 % (ref 0–1)
BUN SERPL-MCNC: 85 MG/DL (ref 5–25)
CALCIUM SERPL-MCNC: 8.2 MG/DL (ref 8.3–10.1)
CHLORIDE SERPL-SCNC: 107 MMOL/L (ref 96–108)
CO2 SERPL-SCNC: 23 MMOL/L (ref 21–32)
CREAT SERPL-MCNC: 5.22 MG/DL (ref 0.6–1.3)
EOSINOPHIL # BLD AUTO: 0.2 THOUSAND/ΜL (ref 0–0.61)
EOSINOPHIL NFR BLD AUTO: 3 % (ref 0–6)
ERYTHROCYTE [DISTWIDTH] IN BLOOD BY AUTOMATED COUNT: 21.1 % (ref 11.6–15.1)
FERRITIN SERPL-MCNC: 89 NG/ML (ref 8–388)
GFR SERPL CREATININE-BSD FRML MDRD: 9 ML/MIN/1.73SQ M
GLUCOSE SERPL-MCNC: 125 MG/DL (ref 65–140)
HCT VFR BLD AUTO: 27.9 % (ref 36.5–49.3)
HGB BLD-MCNC: 8.6 G/DL (ref 12–17)
IMM GRANULOCYTES # BLD AUTO: 0.01 THOUSAND/UL (ref 0–0.2)
IMM GRANULOCYTES NFR BLD AUTO: 0 % (ref 0–2)
IRON SATN MFR SERPL: 10 % (ref 20–50)
IRON SERPL-MCNC: 31 UG/DL (ref 65–175)
LYMPHOCYTES # BLD AUTO: 0.83 THOUSANDS/ΜL (ref 0.6–4.47)
LYMPHOCYTES NFR BLD AUTO: 14 % (ref 14–44)
MCH RBC QN AUTO: 29.1 PG (ref 26.8–34.3)
MCHC RBC AUTO-ENTMCNC: 30.8 G/DL (ref 31.4–37.4)
MCV RBC AUTO: 94 FL (ref 82–98)
MONOCYTES # BLD AUTO: 0.5 THOUSAND/ΜL (ref 0.17–1.22)
MONOCYTES NFR BLD AUTO: 9 % (ref 4–12)
NEUTROPHILS # BLD AUTO: 4.26 THOUSANDS/ΜL (ref 1.85–7.62)
NEUTS SEG NFR BLD AUTO: 73 % (ref 43–75)
NRBC BLD AUTO-RTO: 0 /100 WBCS
PLATELET # BLD AUTO: 133 THOUSANDS/UL (ref 149–390)
PMV BLD AUTO: 13.1 FL (ref 8.9–12.7)
POTASSIUM SERPL-SCNC: 4.6 MMOL/L (ref 3.5–5.3)
RBC # BLD AUTO: 2.96 MILLION/UL (ref 3.88–5.62)
SODIUM SERPL-SCNC: 142 MMOL/L (ref 135–147)
TIBC SERPL-MCNC: 326 UG/DL (ref 250–450)
WBC # BLD AUTO: 5.84 THOUSAND/UL (ref 4.31–10.16)

## 2022-09-28 PROCEDURE — 82728 ASSAY OF FERRITIN: CPT

## 2022-09-28 PROCEDURE — 80048 BASIC METABOLIC PNL TOTAL CA: CPT

## 2022-09-28 PROCEDURE — 83550 IRON BINDING TEST: CPT

## 2022-09-28 PROCEDURE — 99232 SBSQ HOSP IP/OBS MODERATE 35: CPT | Performed by: INTERNAL MEDICINE

## 2022-09-28 PROCEDURE — 99232 SBSQ HOSP IP/OBS MODERATE 35: CPT | Performed by: FAMILY MEDICINE

## 2022-09-28 PROCEDURE — 85025 COMPLETE CBC W/AUTO DIFF WBC: CPT

## 2022-09-28 PROCEDURE — 83540 ASSAY OF IRON: CPT

## 2022-09-28 RX ADMIN — ATORVASTATIN CALCIUM 40 MG: 40 TABLET, FILM COATED ORAL at 17:32

## 2022-09-28 RX ADMIN — SODIUM BICARBONATE 1300 MG: 650 TABLET ORAL at 08:00

## 2022-09-28 RX ADMIN — HEPARIN SODIUM 5000 UNITS: 5000 INJECTION INTRAVENOUS; SUBCUTANEOUS at 21:14

## 2022-09-28 RX ADMIN — DOXYCYCLINE 100 MG: 100 CAPSULE ORAL at 08:00

## 2022-09-28 RX ADMIN — BISOPROLOL FUMARATE 5 MG: 5 TABLET ORAL at 08:00

## 2022-09-28 RX ADMIN — AMLODIPINE BESYLATE 10 MG: 10 TABLET ORAL at 17:32

## 2022-09-28 RX ADMIN — HEPARIN SODIUM 5000 UNITS: 5000 INJECTION INTRAVENOUS; SUBCUTANEOUS at 14:34

## 2022-09-28 RX ADMIN — IRON SUCROSE 200 MG: 20 INJECTION, SOLUTION INTRAVENOUS at 17:34

## 2022-09-28 RX ADMIN — POLYSACCHARIDE-IRON COMPLEX 150 MG: 150 CAPSULE ORAL at 08:00

## 2022-09-28 RX ADMIN — HEPARIN SODIUM 5000 UNITS: 5000 INJECTION INTRAVENOUS; SUBCUTANEOUS at 05:35

## 2022-09-28 RX ADMIN — SODIUM BICARBONATE 1300 MG: 650 TABLET ORAL at 17:32

## 2022-09-28 NOTE — PROGRESS NOTES
[facility]  Progress Note - Kevan Ribeiro 1945, 78 y o  male MRN: [id number]  Unit/Bed#: E4 -01 Encounter: [id number]  Primary Care Provider: Makayla Doherty MD   Date and time admitted to hospital: 9/24/2022  3:21 PM    * Acute kidney injury superimposed on CKD [facility])  Assessment & Plan  Lab Results   Component Value Date    EGFR 9 09/28/2022    EGFR 11 09/27/2022    EGFR 12 09/26/2022    CREATININE 5 22 (H) 09/28/2022    CREATININE 4 69 (H) 09/27/2022    CREATININE 4 44 (H) 09/26/2022     68-year-old male presented with increasing shortness of breath found to have worsening renal failure, x-ray imaging showing moderate right-sided pleural effusion and vascular congestion  · History of CKD stage 4/5, 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 not on dialysis currently  · Follows with [facility]  · Creatinine baseline previously was around 3  · With AG metabolic acidosis, continuing to improve on sodium bicarb supplementation  · Creatinine continues to trend upward  The patient is status post AV fistula placement, however, he states that due to his advanced age he is not interested in hemodialysis  Will continue further discussion with the patient and consider involvement of palliative care for clarifications of goals of care    · CT abdomen pelvis with evidence of hydroureter bilaterally and with a left ureteral stent in place however unchanged from previous  · Request Urology consult due to increasing creatinine after results of repeat renal ultrasound    Acute respiratory failure with hypoxia ([address] )  Assessment & Plan  · Required 3 L NC on admission, now on 1 L NC  · Wean oxygen as able    Acute diastolic heart failure [facility])  Assessment & Plan  Wt Readings from Last 3 Encounters:   09/28/22 74 4 kg (164 lb 0 4 oz)   01/31/22 72 6 kg (160 lb)   09/14/21 72 9 kg (160 lb 12 8 oz)     Prior 2D echo showing preserved EF back in January 2022  · ProBNP greater than 20,000 on admission  · Imaging consistent with vascular congestion and right-sided pleural effusion  · Suspect likely in the setting of LORENZA on CKD stage 5  · Per my discussion with Nephrology will hold off additional Lasix today  · Monitor volume status    Anemia of chronic disease  Assessment & Plan  Hemoglobin 9 2 and stable  · No acute signs of bleeding at this time  · Iron panel reveals iron deficiency  · Will proceed with iron infusion     Horseshoe kidney  Assessment & Plan  History of horseshoe kidney, with chronic hydronephrosis with left urethral stent in place  · CT abdominal pelvis showing hydronephrosis involving the upper pole of the left renal moiety unchanged from prior exam   The left-sided ureteral stent is stable in position extending from the renal pelvis to the bladder  · Patient does follow [facility]  · With persistent hydronephrosis and rising creatinine will request Urology evaluation here    HLD (hyperlipidemia)  Assessment & Plan  · Continue statin    HTN (hypertension)  Assessment & Plan  Continue bisoprolol and amlodipine  · Monitor blood pressures  · -140          VTE Pharmacologic Prophylaxis:   heparin    Patient Centered Rounds: I performed bedside rounds with nursing staff today  Discussions with Specialists or Other Care Team Provider:  Nephrology    Education and Discussions with Family / Patient: Patient   Time Spent for Care: 30 minutes  More than 50% of total time spent on counseling and coordination of care as described above  Current Length of Stay: 4 day(s)  Current Patient Status: Inpatient   Certification Statement: The patient will continue to require additional inpatient hospital stay due to close monitoring, onoing goals of care discussion   Discharge Plan: TBD, home, pending clinical course     Code Status: Level 1 - Full Code    Subjective:   Patient seen and examined  He reports feeling okay    He continues to complain of lower extremity edema  Otherwise no complaints  Objective:     Vitals:   Temp (24hrs), Av 8 °F (37 1 °C), Min:97 5 °F (36 4 °C), Max:99 6 °F (37 6 °C)    Temp:  [97 5 °F (36 4 °C)-99 6 °F (37 6 °C)] 97 5 °F (36 4 °C)  HR:  [59-62] 60  Resp:  [18] 18  BP: (130-140)/(47-64) 130/55  SpO2:  [92 %-94 %] 92 %  Body mass index is 24 94 kg/m²  Input and Output Summary (last 24 hours): Intake/Output Summary (Last 24 hours) at 2022 1516  Last data filed at 2022 0829  Gross per 24 hour   Intake --   Output 1800 ml   Net -1800 ml       Physical Exam:   Physical Exam  Constitutional:       General: He is not in acute distress  HENT:      Head: Normocephalic and atraumatic  Nose: No congestion  Mouth/Throat:      Pharynx: Oropharynx is clear  Eyes:      Conjunctiva/sclera: Conjunctivae normal    Cardiovascular:      Rate and Rhythm: Normal rate and regular rhythm  Heart sounds: No murmur heard  Pulmonary:      Effort: No respiratory distress  Breath sounds: No wheezing or rales  Abdominal:      General: There is no distension  Tenderness: There is no abdominal tenderness  There is no guarding  Musculoskeletal:      Right lower leg: Edema present  Left lower leg: Edema present  Skin:     General: Skin is warm and dry  Neurological:      Mental Status: He is oriented to person, place, and time     Psychiatric:         Mood and Affect: Mood normal           Additional Data:     Labs:  Results from last 7 days   Lab Units 22  0520   WBC Thousand/uL 5 84   HEMOGLOBIN g/dL 8 6*   HEMATOCRIT % 27 9*   PLATELETS Thousands/uL 133*   NEUTROS PCT % 73   LYMPHS PCT % 14   MONOS PCT % 9   EOS PCT % 3     Results from last 7 days   Lab Units 22  0520 22  0526 22  1614   SODIUM mmol/L 142   < > 142   POTASSIUM mmol/L 4 6   < > 6 1*   CHLORIDE mmol/L 107   < > 110*   CO2 mmol/L 23   < > 15*   BUN mg/dL 85*   < > 73*   CREATININE mg/dL 5 22*   < > 4 20*   ANION GAP mmol/L 12   < > 17*   CALCIUM mg/dL 8 2*   < > 8 9   ALBUMIN g/dL  --   --  3 4*   TOTAL BILIRUBIN mg/dL  --   --  1 33*   ALK PHOS U/L  --   --  77   ALT U/L  --   --  19   AST U/L  --   --  38   GLUCOSE RANDOM mg/dL 125   < > 141*    < > = values in this interval not displayed  Results from last 7 days   Lab Units 09/24/22  1614   INR  1 29*                   Lines/Drains:  Invasive Devices  Report    Peripheral Intravenous Line  Duration           Peripheral IV 09/26/22 Dorsal (posterior); Left Forearm 1 day          Line  Duration           Hemodialysis AV Fistula 09/11/21 Right Upper arm 382 days                      Imaging: Reviewed radiology reports from this admission including: ultrasound(s) and Personally reviewed the following imaging: ultrasound(s)    Recent Cultures (last 7 days):         Last 24 Hours Medication List:   Current Facility-Administered Medications   Medication Dose Route Frequency Provider Last Rate    acetaminophen  650 mg Oral Q6H PRN Chayo Hirsch MD      amLODIPine  10 mg Oral QPM NICK Weaver      atorvastatin  40 mg Oral Daily With Julia Lazaro MD      bisoprolol  5 mg Oral Daily Chayo Hirsch MD      doxycycline hyclate  100 mg Oral Q24H [facility],       heparin (porcine)  5,000 Units Subcutaneous Q8H [facility] Chayo Hirsch MD      iron polysaccharides  150 mg Oral Daily [place], [place]      iron sucrose  200 mg Intravenous Once Brionna García MD      ondansetron  4 mg Intravenous Q6H PRN Chayo Hirsch MD      permethrin   Topical Once Ailyn Hebert PA-C      sodium bicarbonate  1,300 mg Oral BID after meals Chayo Hirsch MD          Today, Patient Was Seen By: Brionna García    **Please Note: This note may have been constructed using a voice recognition system  **

## 2022-09-28 NOTE — PLAN OF CARE
Problem: Potential for Falls  Goal: Patient will remain free of falls  Description: INTERVENTIONS:  - Educate patient/family on patient safety including physical limitations  - Instruct patient to call for assistance with activity   - Consult OT/PT to assist with strengthening/mobility   - Keep Call bell within reach  - Keep bed low and locked with side rails adjusted as appropriate  - Keep care items and personal belongings within reach  - Initiate and maintain comfort rounds  - Make Fall Risk Sign visible to staff  - Offer Toileting every 2 Hours, in advance of need  - Initiate/Maintain bed alarm  - Obtain necessary fall risk management equipment:   - Apply yellow socks and bracelet for high fall risk patients  - Consider moving patient to room near nurses station  Outcome: Progressing     Problem: PAIN - ADULT  Goal: Verbalizes/displays adequate comfort level or baseline comfort level  Description: Interventions:  - Encourage patient to monitor pain and request assistance  - Assess pain using appropriate pain scale  - Administer analgesics based on type and severity of pain and evaluate response  - Implement non-pharmacological measures as appropriate and evaluate response  - Consider cultural and social influences on pain and pain management  - Notify physician/advanced practitioner if interventions unsuccessful or patient reports new pain  Outcome: Progressing     Problem: INFECTION - ADULT  Goal: Absence or prevention of progression during hospitalization  Description: INTERVENTIONS:  - Assess and monitor for signs and symptoms of infection  - Monitor lab/diagnostic results  - Monitor all insertion sites, i e  indwelling lines, tubes, and drains  - Monitor endotracheal if appropriate and nasal secretions for changes in amount and color  - [place] appropriate cooling/warming therapies per order  - Administer medications as ordered  - Instruct and encourage patient and family to use good hand hygiene technique  - Identify and instruct in appropriate isolation precautions for identified infection/condition  Outcome: Progressing     Problem: SAFETY ADULT  Goal: Patient will remain free of falls  Description: INTERVENTIONS:  - Educate patient/family on patient safety including physical limitations  - Instruct patient to call for assistance with activity   - Consult OT/PT to assist with strengthening/mobility   - Keep Call bell within reach  - Keep bed low and locked with side rails adjusted as appropriate  - Keep care items and personal belongings within reach  - Initiate and maintain comfort rounds  - Make Fall Risk Sign visible to staff  - Offer Toileting every 2 Hours, in advance of need  - Initiate/Maintain bed alarm  - Obtain necessary fall risk management equipment:   - Apply yellow socks and bracelet for high fall risk patients  - Consider moving patient to room near nurses station  Outcome: Progressing     Problem: RESPIRATORY - ADULT  Goal: Achieves optimal ventilation and oxygenation  Description: INTERVENTIONS:  - Assess for changes in respiratory status  - Assess for changes in mentation and behavior  - Position to facilitate oxygenation and minimize respiratory effort  - Oxygen administered by appropriate delivery if ordered  - Initiate smoking cessation education as indicated  - Encourage broncho-pulmonary hygiene including cough, deep breathe, Incentive Spirometry  - Assess the need for suctioning and aspirate as needed  - Assess and instruct to report SOB or any respiratory difficulty  - Respiratory Therapy support as indicated  Outcome: Progressing

## 2022-09-28 NOTE — ASSESSMENT & PLAN NOTE
Hemoglobin 9 2 and stable  · No acute signs of bleeding at this time  · Iron panel reveals iron deficiency  · Will proceed with iron infusion

## 2022-09-28 NOTE — PLAN OF CARE
Problem: Potential for Falls  Goal: Patient will remain free of falls  Description: INTERVENTIONS:  - Educate patient/family on patient safety including physical limitations  - Instruct patient to call for assistance with activity   - Consult OT/PT to assist with strengthening/mobility   - Keep Call bell within reach  - Keep bed low and locked with side rails adjusted as appropriate  - Keep care items and personal belongings within reach  - Initiate and maintain comfort rounds  - Make Fall Risk Sign visible to staff  - Offer Toileting every 2 Hours, in advance of need  - Initiate/Maintain bed alarm  - Obtain necessary fall risk management equipment: bed alarm  - Apply yellow socks and bracelet for high fall risk patients  - Consider moving patient to room near nurses station  Outcome: Progressing     Problem: MOBILITY - ADULT  Goal: Maintain or return to baseline ADL function  Description: INTERVENTIONS:  -  Assess patient's ability to carry out ADLs; assess patient's baseline for ADL function and identify physical deficits which impact ability to perform ADLs (bathing, care of mouth/teeth, toileting, grooming, dressing, etc )  - Assess/evaluate cause of self-care deficits   - Assess range of motion  - Assess patient's mobility; develop plan if impaired  - Assess patient's need for assistive devices and provide as appropriate  - Encourage maximum independence but intervene and supervise when necessary  - Involve family in performance of ADLs  - Assess for home care needs following discharge   - Consider OT consult to assist with ADL evaluation and planning for discharge  - Provide patient education as appropriate  Outcome: Progressing  Goal: Maintains/Returns to pre admission functional level  Description: INTERVENTIONS:  - Perform BMAT or MOVE assessment daily    - Set and communicate daily mobility goal to care team and patient/family/caregiver     - Collaborate with rehabilitation services on mobility goals if consulted  - Stand patient 3 times a day  - Ambulate patient 3 times a day  - Out of bed to chair 3 times a day   - Out of bed for meals 3 times a day  - Out of bed for toileting  - Record patient progress and toleration of activity level   Outcome: Progressing     Problem: PAIN - ADULT  Goal: Verbalizes/displays adequate comfort level or baseline comfort level  Description: Interventions:  - Encourage patient to monitor pain and request assistance  - Assess pain using appropriate pain scale  - Administer analgesics based on type and severity of pain and evaluate response  - Implement non-pharmacological measures as appropriate and evaluate response  - Consider cultural and social influences on pain and pain management  - Notify physician/advanced practitioner if interventions unsuccessful or patient reports new pain  Outcome: Progressing     Problem: INFECTION - ADULT  Goal: Absence or prevention of progression during hospitalization  Description: INTERVENTIONS:  - Assess and monitor for signs and symptoms of infection  - Monitor lab/diagnostic results  - Monitor all insertion sites, i e  indwelling lines, tubes, and drains  - Monitor endotracheal if appropriate and nasal secretions for changes in amount and color  - [place] appropriate cooling/warming therapies per order  - Administer medications as ordered  - Instruct and encourage patient and family to use good hand hygiene technique  - Identify and instruct in appropriate isolation precautions for identified infection/condition  Outcome: Progressing     Problem: SAFETY ADULT  Goal: Patient will remain free of falls  Description: INTERVENTIONS:  - Educate patient/family on patient safety including physical limitations  - Instruct patient to call for assistance with activity   - Consult OT/PT to assist with strengthening/mobility   - Keep Call bell within reach  - Keep bed low and locked with side rails adjusted as appropriate  - Keep care items and personal belongings within reach  - Initiate and maintain comfort rounds  - Make Fall Risk Sign visible to staff  - Offer Toileting every 2 Hours, in advance of need  - Initiate/Maintain bed alarm  - Obtain necessary fall risk management equipment: bed alarm  - Apply yellow socks and bracelet for high fall risk patients  - Consider moving patient to room near nurses station  Outcome: Progressing  Goal: Maintain or return to baseline ADL function  Description: INTERVENTIONS:  -  Assess patient's ability to carry out ADLs; assess patient's baseline for ADL function and identify physical deficits which impact ability to perform ADLs (bathing, care of mouth/teeth, toileting, grooming, dressing, etc )  - Assess/evaluate cause of self-care deficits   - Assess range of motion  - Assess patient's mobility; develop plan if impaired  - Assess patient's need for assistive devices and provide as appropriate  - Encourage maximum independence but intervene and supervise when necessary  - Involve family in performance of ADLs  - Assess for home care needs following discharge   - Consider OT consult to assist with ADL evaluation and planning for discharge  - Provide patient education as appropriate  Outcome: Progressing  Goal: Maintains/Returns to pre admission functional level  Description: INTERVENTIONS:  - Perform BMAT or MOVE assessment daily    - Set and communicate daily mobility goal to care team and patient/family/caregiver     - Collaborate with rehabilitation services on mobility goals if consulted  - Stand patient 3 times a day  - Ambulate patient 3 times a day  - Out of bed to chair 3 times a day   - Out of bed for meals 3 times a day  - Out of bed for toileting  - Record patient progress and toleration of activity level   Outcome: Progressing     Problem: DISCHARGE PLANNING  Goal: Discharge to home or other facility with appropriate resources  Description: INTERVENTIONS:  - Identify barriers to discharge w/patient and caregiver  - Arrange for needed discharge resources and transportation as appropriate  - Identify discharge learning needs (meds, wound care, etc )  - Arrange for interpretive services to assist at discharge as needed  - Refer to Case Management Department for coordinating discharge planning if the patient needs post-hospital services based on physician/advanced practitioner order or complex needs related to functional status, cognitive ability, or social support system  Outcome: Progressing     Problem: Knowledge Deficit  Goal: Patient/family/caregiver demonstrates understanding of disease process, treatment plan, medications, and discharge instructions  Description: Complete learning assessment and assess knowledge base    Interventions:  - Provide teaching at level of understanding  - Provide teaching via preferred learning methods  Outcome: Progressing     Problem: RESPIRATORY - ADULT  Goal: Achieves optimal ventilation and oxygenation  Description: INTERVENTIONS:  - Assess for changes in respiratory status  - Assess for changes in mentation and behavior  - Position to facilitate oxygenation and minimize respiratory effort  - Oxygen administered by appropriate delivery if ordered  - Initiate smoking cessation education as indicated  - Encourage broncho-pulmonary hygiene including cough, deep breathe, Incentive Spirometry  - Assess the need for suctioning and aspirate as needed  - Assess and instruct to report SOB or any respiratory difficulty  - Respiratory Therapy support as indicated  Outcome: Progressing

## 2022-09-28 NOTE — ASSESSMENT & PLAN NOTE
Wt Readings from Last 3 Encounters:   09/28/22 74 4 kg (164 lb 0 4 oz)   01/31/22 72 6 kg (160 lb)   09/14/21 72 9 kg (160 lb 12 8 oz)     Prior 2D echo showing preserved EF back in January 2022  · ProBNP greater than 20,000 on admission  · Imaging consistent with vascular congestion and right-sided pleural effusion  · Suspect likely in the setting of LORENZA on CKD stage 5  · Per my discussion with Nephrology will hold off additional Lasix today  · Monitor volume status

## 2022-09-28 NOTE — ASSESSMENT & PLAN NOTE
History of horseshoe kidney, with chronic hydronephrosis with left urethral stent in place  · CT abdominal pelvis showing hydronephrosis involving the upper pole of the left renal moiety unchanged from prior exam   The left-sided ureteral stent is stable in position extending from the renal pelvis to the bladder    · Patient does follow with Urology at [facility]  · With persistent hydronephrosis and rising creatinine will request Urology evaluation here

## 2022-09-28 NOTE — PROGRESS NOTES
NEPHROLOGY PROGRESS NOTE   Melva Sanchez 78 y o  male MRN: [id number]  Unit/Bed#: E4 -01 Encounter: [id number]  Reason for Consult: LORENZA    ASSESSMENT AND PLAN:  44-year-old male with significant medical issues of CKD stage 4 to stage five, hypertension, horseshoe kidney, chronic hydronephrosis with left ureteral stent, presented with generalized weakness, poor appetite   We are consulted for LORENZA/CKD management      LORENZA on CKD stage 4 to stage 5, baseline creatinine 3 5 to 4 0, follows with [facility]  -creatinine 4 2 on admission continue to worsened to 5 2 today   -closely monitor for uremic symptoms   He does have mild uremic symptoms including poor appetite, generalized weakness  Occasional nausea  -I again had detailed discussion with patient regarding need for dialysis support if renal function continue to worsen  He clearly refuses to consider dialysis  He fully understands risks of not pursuing dialysis including death  He verbalized understanding of my discussion with him  I offered him to discuss with any family members although he refuses  -LORENZA suspect in the setting of cardiorenal vs overall progression of CKD   Also obstructive uropathy component     Access, currently has upper extremity AV fistula with bruit and thrill present     Horseshoe kidney with left renal moiety upper pole hydronephrosis similar to prior, status post left ureteral stent, persistent bilateral hydroureter although right-sided diminished, right-sided renal moiety atrophic and no hydronephrosis   -given increasing creatinine, consider urology evaluation     Acute respiratory failure, oxygen requirement overall improving, now remains on 1 L O2     -echo shows EF 60%, mild to moderate AR, mild-to-moderate TR, normal IVC, no pericardial effusion   -chest x-ray concerning for moderate pulmonary congestion  -monitor off diuretics today    Status post Lasix yesterday       Metabolic acidosis, bicarb level slowly improving   Continue to monitor, currently on sodium bicarb supplements, continue same     Anemia in CKD, hemoglobin overall stable, transfuse p r n  for hemoglobin less than seven  -iron saturation 10% with ferritin 89   - now remains on p o  iron supplements      Discussed above plan in detail with primary team    SUBJECTIVE:  Patient seen and examined at bedside  Patient feels tired, has fair appetite  Did have episode of nausea      OBJECTIVE:  Current Weight: Weight - Scale: 74 4 kg (164 lb 0 4 oz)  Vitals:    09/28/22 0724   BP: 130/55   Pulse: 60   Resp: 18   Temp: 97 5 °F (36 4 °C)   SpO2: 92%       Intake/Output Summary (Last 24 hours) at 9/28/2022 1503  Last data filed at 9/28/2022 0829  Gross per 24 hour   Intake --   Output 1800 ml   Net -1800 ml     Wt Readings from Last 3 Encounters:   09/28/22 74 4 kg (164 lb 0 4 oz)   01/31/22 72 6 kg (160 lb)   09/14/21 72 9 kg (160 lb 12 8 oz)     Temp Readings from Last 3 Encounters:   09/28/22 97 5 °F (36 4 °C) (Temporal)   10/04/21 (!) 97 °F (36 1 °C) (Tympanic)   09/14/21 98 °F (36 7 °C) (Temporal)     BP Readings from Last 3 Encounters:   09/28/22 130/55   01/31/22 158/71   10/04/21 158/71     Pulse Readings from Last 3 Encounters:   09/28/22 60   01/31/22 78   10/04/21 73        Physical Examination:  General:  Lying in bed, no acute distress   Eyes:  Mild conjunctival pallor present  ENT:  External examination of ears and nose unremarkable  Neck:  No obvious lymphadenopathy appreciated  Respiratory:  Bilateral air entry present  CVS:  S1, S2 present  GI:  Soft, nondistended  CNS:  Active alert oriented x3  Skin:  No new rash  Musculoskeletal:  No obvious new gross deformity noted    Medications:    Current Facility-Administered Medications:     acetaminophen (TYLENOL) tablet 650 mg, 650 mg, Oral, Q6H PRN, Joss Lorenz MD    amLODIPine (NORVASC) tablet 10 mg, 10 mg, Oral, QPM, [facility], PA-C, 10 mg at 09/27/22 2716    atorvastatin (LIPITOR) tablet 40 mg, 40 mg, Oral, Daily With Dejah Disla MD, 40 mg at 09/27/22 1646    bisoprolol (ZEBETA) tablet 5 mg, 5 mg, Oral, Daily, Venecia Clemente MD, 5 mg at 09/28/22 0800    doxycycline hyclate (VIBRAMYCIN) capsule 100 mg, 100 mg, Oral, Q24H [address], Macho Sotelo DO, 100 mg at 09/28/22 0800    heparin (porcine) subcutaneous injection 5,000 Units, 5,000 Units, Subcutaneous, Q8H [address], Venecia Clemente MD, 5,000 Units at 09/28/22 1434    iron polysaccharides (FERREX) capsule 150 mg, 150 mg, Oral, Daily, [facility], NICK, 150 mg at 09/28/22 0800    ondansetron (ZOFRAN) injection 4 mg, 4 mg, Intravenous, Q6H PRN, Venecia Clemente MD, 4 mg at 09/26/22 0820    permethrin (NIX) 1 % topical liquid, , Topical, Once, Henrietta NICK Lu    sodium bicarbonate tablet 1,300 mg, 1,300 mg, Oral, BID after meals, Venecia Clemente MD, 1,300 mg at 09/28/22 0800    Laboratory Results:  Results from last 7 days   Lab Units 09/28/22  0520 09/27/22  0524 09/26/22  0517 09/25/22  0526 09/24/22  1614   WBC Thousand/uL 5 84 6 46 5 63 6 09 7 02   HEMOGLOBIN g/dL 8 6* 9 2* 8 6* 8 6* 9 5*   HEMATOCRIT % 27 9* 31 3* 28 6* 29 2* 31 2*   PLATELETS Thousands/uL 133* 171 156 180 178   SODIUM mmol/L 142 142 144 145 142   POTASSIUM mmol/L 4 6 4 9 4 6 4 9 6 1*   CHLORIDE mmol/L 107 107 110* 113* 110*   CO2 mmol/L 23 23 21 20* 15*   BUN mg/dL 85* 79* 77* 69* 73*   CREATININE mg/dL 5 22* 4 69* 4 44* 4 30* 4 20*   CALCIUM mg/dL 8 2* 8 3 8 4 8 5 8 9        kidney and bladder   Final Result by Shahana Kovacs MD (09/28 4361)      1  Bilateral hydroureter with left nephroureteral stent in place an severe dilatation of the left upper pole collecting system as seen on CT  2   Horseshoe kidney with severe cortical thinning, right greater than left              Workstation performed: [id number]         CT abdomen pelvis wo contrast   Final Result by Cayden Nagy MD (09/25 9876)      New moderate right pleural effusion incompletely imaged with passive atelectasis in the right lower lobe  There is hydronephrosis involving the upper pole of the left renal moiety which seems similar to the prior exam   The left-sided ureteral stent is stable in position extending from the renal pelvis to the bladder  There is persistent bilateral hydroureter although the right side is diminished since the prior exam and the right-sided renal moiety is atrophic and no longer shows hydronephrosis  Mild anterior bladder wall thickening of uncertain significance  Correlate with urinalysis to exclude cystitis  Right inguinal hernia contains a loop of small bowel, without evidence of structures at this time  Workstation performed: [id number]         XR chest 1 view portable   ED Interpretation by Karol Nugent DO (09/24 1726)   See below      Final Result by Nick Perez MD (09/24 1654)      Moderate right effusion and right base opacity which could be due to atelectasis and/or pneumonia  Trace left effusion  Moderate pulmonary venous congestion  Workstation performed: [id number]             Portions of the record may have been created with voice recognition software  Occasional wrong word or "sound a like" substitutions may have occurred due to the inherent limitations of voice recognition software  Read the chart carefully and recognize, using context, where substitutions have occurred

## 2022-09-28 NOTE — UTILIZATION REVIEW
Continued Stay Review    Date: 9/28/22                          Current Patient Class: Inpatient  Current Level of Care: Med Surg    HPI:77 y o  male initially admitted on 9/24     Assessment/Plan: Creatinine continues to trend upward  The patient is status post AV fistula placement, however, he states that due to his advanced age he is not interested in hemodialysis  Will continue further discussion with the patient and consider involvement of palliative care for clarifications of goals of care  Pt continues on O2 1L NC  Wean as tolerated  Will proceed with iron infusion  Will consult Urology for persistent hydronephrosis and rising creatinine         Vital Signs:     Date/Time Temp Pulse Resp BP MAP (mmHg) SpO2 Calculated FIO2 (%) - Nasal Cannula Nasal Cannula O2 Flow Rate (L/min) O2 Device   09/28/22 0724 97 5 °F (36 4 °C) 60 18 130/55 82 92 % 24 1 L/min Nasal cannula   09/28/22 0721 99 2 °F (37 3 °C) 59 18 140/47 Abnormal  81 94 % -- -- Nasal cannula   09/27/22 2311 99 6 °F (37 6 °C) 62 18 133/61 80 93 % -- -- None (Room air)   09/27/22 1735 -- -- -- 137/64 95 -- -- -- --   09/27/22 1446 -- -- -- -- -- 94 % 24 1 L/min Nasal cannula   09/27/22 1442 98 9 °F (37 2 °C) 63 18 123/66 102 84 % Abnormal  -- -- None (Room air)   09/27/22 0905 -- -- -- -- -- 95 % 24 1 L/min Nasal cannula   09/27/22 0714 98 5 °F (36 9 °C) 56 18 121/53 81 95 % -- -- --   09/26/22 2256 98 4 °F (36 9 °C) 63 18 133/44 Abnormal  84 94 % -- -- Nasal cannula   09/26/22 1747 -- 60 -- 134/58 92 95 % 32 3 L/min Nasal cannula   09/26/22 1521 99 1 °F (37 3 °C) 55 18 131/52 66 99 % 32 3 L/min Nasal cannula   09/26/22 0700 99 8 °F (37 7 °C) 77 -- 143/54 77 97 % 36 4 L/min Nasal cannula         Pertinent Labs/Diagnostic Results:     9/27 US kidney and bladder:  IMPRESSION:     1   Bilateral hydroureter with left nephroureteral stent in place an severe dilatation of the left upper pole collecting system as seen on CT      2   Horseshoe kidney with severe cortical thinning, right greater than left        Results from last 7 days   Lab Units 09/28/22  0520 09/27/22  0524 09/26/22  0517 09/25/22  0526 09/24/22  1614   WBC Thousand/uL 5 84 6 46 5 63 6 09 7 02   HEMOGLOBIN g/dL 8 6* 9 2* 8 6* 8 6* 9 5*   HEMATOCRIT % 27 9* 31 3* 28 6* 29 2* 31 2*   PLATELETS Thousands/uL 133* 171 156 180 178   NEUTROS ABS Thousands/µL 4 26  --  3 92 4 37 5 38         Results from last 7 days   Lab Units 09/28/22  0520 09/27/22  0524 09/26/22  0517 09/25/22  0526 09/24/22  1614   SODIUM mmol/L 142 142 144 145 142   POTASSIUM mmol/L 4 6 4 9 4 6 4 9 6 1*   CHLORIDE mmol/L 107 107 110* 113* 110*   CO2 mmol/L 23 23 21 20* 15*   ANION GAP mmol/L 12 12 13 12 17*   BUN mg/dL 85* 79* 77* 69* 73*   CREATININE mg/dL 5 22* 4 69* 4 44* 4 30* 4 20*   EGFR ml/min/1 73sq m 9 11 12 12 12   CALCIUM mg/dL 8 2* 8 3 8 4 8 5 8 9     Results from last 7 days   Lab Units 09/24/22  1614   AST U/L 38   ALT U/L 19   ALK PHOS U/L 77   TOTAL PROTEIN g/dL 9 3*   ALBUMIN g/dL 3 4*   TOTAL BILIRUBIN mg/dL 1 33*         Results from last 7 days   Lab Units 09/28/22  0520 09/27/22  0524 09/26/22  0517 09/25/22  0526 09/24/22  1614   GLUCOSE RANDOM mg/dL 125 91 120 103 141*           Results from last 7 days   Lab Units 09/24/22  2041 09/24/22  1819 09/24/22  1614   HS TNI 0HR ng/L  --   --  47   HS TNI 2HR ng/L  --  45  --    HSTNI D2 ng/L  --  -2  --    HS TNI 4HR ng/L 44  --   --    HSTNI D4 ng/L -3  --   --          Results from last 7 days   Lab Units 09/24/22  1614   PROTIME seconds 16 0*   INR  1 29*   PTT seconds 32           Results from last 7 days   Lab Units 09/24/22  1614   NT-PRO BNP pg/mL 23,365*           Results from last 7 days   Lab Units 09/24/22 2027   OSMO UR mmol/     Results from last 7 days   Lab Units 09/24/22 2027   CLARITY UA  Turbid   COLOR UA  Light Yellow   SPEC GRAV UA  1 010   PH UA  6 5   GLUCOSE UA mg/dl Negative   KETONES UA mg/dl Negative   BLOOD UA  Small*   PROTEIN UA mg/dl Trace*   NITRITE UA  Negative   BILIRUBIN UA  Negative   UROBILINOGEN UA E U /dl 0 2   LEUKOCYTES UA  Large*   WBC UA /hpf Innumerable*   RBC UA /hpf 2-4   BACTERIA UA /hpf Innumerable*   EPITHELIAL CELLS WET PREP /hpf Occasional   SODIUM UR  82   CREATININE UR mg/dL 50 1           Medications:   Scheduled Medications:  amLODIPine, 10 mg, Oral, QPM  atorvastatin, 40 mg, Oral, Daily With Dinner  bisoprolol, 5 mg, Oral, Daily  doxycycline hyclate, 100 mg, Oral, Q24H GHADA  heparin (porcine), 5,000 Units, Subcutaneous, Q8H GHADA  iron polysaccharides, 150 mg, Oral, Daily  permethrin, , Topical, Once  sodium bicarbonate, 1,300 mg, Oral, BID after meals      Continuous IV Infusions:     PRN Meds:  acetaminophen, 650 mg, Oral, Q6H PRN  ondansetron, 4 mg, Intravenous, Q6H PRN        Discharge Plan: TBD    Network Utilization Review Department  ATTENTION: Please call with any questions or concerns to [phone] and carefully listen to the prompts so that you are directed to the right person  All voicemails are confidential   Shahana Murrieta all requests for admission clinical reviews, approved or denied determinations and any other requests to dedicated fax number below belonging to the campus where the patient is receiving treatment   List of dedicated fax numbers for the Facilities:  [address] DENIALS (Administrative/Medical Necessity) [phone]   [address] (Maternity/NICU/Pediatrics) [phone]   [address]  [phone]   Cal Gomes [address] [facility] [place] [address] [address] [facility] [address] [place] [address] [facility] [address] [place] [address] [phone]

## 2022-09-28 NOTE — PLAN OF CARE
Problem: Potential for Falls  Goal: Patient will remain free of falls  Description: INTERVENTIONS:  - Educate patient/family on patient safety including physical limitations  - Instruct patient to call for assistance with activity   - Consult OT/PT to assist with strengthening/mobility   - Keep Call bell within reach  - Keep bed low and locked with side rails adjusted as appropriate  - Keep care items and personal belongings within reach  - Initiate and maintain comfort rounds  - Make Fall Risk Sign visible to staff  - Offer Toileting every 2 Hours, in advance of need  - Initiate/Maintain bed alarm  - Apply yellow socks and bracelet for high fall risk patients  - Consider moving patient to room near nurses station  Outcome: Progressing     Problem: MOBILITY - ADULT  Goal: Maintain or return to baseline ADL function  Description: INTERVENTIONS:  -  Assess patient's ability to carry out ADLs; assess patient's baseline for ADL function and identify physical deficits which impact ability to perform ADLs (bathing, care of mouth/teeth, toileting, grooming, dressing, etc )  - Assess/evaluate cause of self-care deficits   - Assess range of motion  - Assess patient's mobility; develop plan if impaired  - Assess patient's need for assistive devices and provide as appropriate  - Encourage maximum independence but intervene and supervise when necessary  - Involve family in performance of ADLs  - Assess for home care needs following discharge   - Consider OT consult to assist with ADL evaluation and planning for discharge  - Provide patient education as appropriate  Outcome: Progressing  Goal: Maintains/Returns to pre admission functional level  Description: INTERVENTIONS:  - Perform BMAT or MOVE assessment daily    - Set and communicate daily mobility goal to care team and patient/family/caregiver  - Collaborate with rehabilitation services on mobility goals if consulted  - Perform Range of Motion 4 times a day    - Reposition patient every 4 hours    - Dangle patient 4 times a day  - Stand patient 4 times a day  - Ambulate patient 4 times a day  - Out of bed to chair 4 times a day   - Out of bed for meals 4 times a day  - Out of bed for toileting  - Record patient progress and toleration of activity level   Outcome: Progressing     Problem: PAIN - ADULT  Goal: Verbalizes/displays adequate comfort level or baseline comfort level  Description: Interventions:  - Encourage patient to monitor pain and request assistance  - Assess pain using appropriate pain scale  - Administer analgesics based on type and severity of pain and evaluate response  - Implement non-pharmacological measures as appropriate and evaluate response  - Consider cultural and social influences on pain and pain management  - Notify physician/advanced practitioner if interventions unsuccessful or patient reports new pain  Outcome: Progressing     Problem: INFECTION - ADULT  Goal: Absence or prevention of progression during hospitalization  Description: INTERVENTIONS:  - Assess and monitor for signs and symptoms of infection  - Monitor lab/diagnostic results  - Monitor all insertion sites, i e  indwelling lines, tubes, and drains  - Monitor endotracheal if appropriate and nasal secretions for changes in amount and color  - [place] appropriate cooling/warming therapies per order  - Administer medications as ordered  - Instruct and encourage patient and family to use good hand hygiene technique  - Identify and instruct in appropriate isolation precautions for identified infection/condition  Outcome: Progressing     Problem: SAFETY ADULT  Goal: Patient will remain free of falls  Description: INTERVENTIONS:  - Educate patient/family on patient safety including physical limitations  - Instruct patient to call for assistance with activity   - Consult OT/PT to assist with strengthening/mobility   - Keep Call bell within reach  - Keep bed low and locked with side rails adjusted as appropriate  - Keep care items and personal belongings within reach  - Initiate and maintain comfort rounds  - Make Fall Risk Sign visible to staff  - Offer Toileting every 2 Hours, in advance of need  - Initiate/Maintain bed alarm  - Apply yellow socks and bracelet for high fall risk patients  - Consider moving patient to room near nurses station  Outcome: Progressing  Goal: Maintain or return to baseline ADL function  Description: INTERVENTIONS:  -  Assess patient's ability to carry out ADLs; assess patient's baseline for ADL function and identify physical deficits which impact ability to perform ADLs (bathing, care of mouth/teeth, toileting, grooming, dressing, etc )  - Assess/evaluate cause of self-care deficits   - Assess range of motion  - Assess patient's mobility; develop plan if impaired  - Assess patient's need for assistive devices and provide as appropriate  - Encourage maximum independence but intervene and supervise when necessary  - Involve family in performance of ADLs  - Assess for home care needs following discharge   - Consider OT consult to assist with ADL evaluation and planning for discharge  - Provide patient education as appropriate  Outcome: Progressing  Goal: Maintains/Returns to pre admission functional level  Description: INTERVENTIONS:  - Perform BMAT or MOVE assessment daily    - Set and communicate daily mobility goal to care team and patient/family/caregiver  - Collaborate with rehabilitation services on mobility goals if consulted  - Perform Range of Motion 4 times a day  - Reposition patient every 4 hours    - Dangle patient 4 times a day  - Stand patient 4 times a day  - Ambulate patient 4 times a day  - Out of bed to chair 4 times a day   - Out of bed for meals 4 times a day  - Out of bed for toileting  - Record patient progress and toleration of activity level   Outcome: Progressing     Problem: DISCHARGE PLANNING  Goal: Discharge to home or other facility with appropriate resources  Description: INTERVENTIONS:  - Identify barriers to discharge w/patient and caregiver  - Arrange for needed discharge resources and transportation as appropriate  - Identify discharge learning needs (meds, wound care, etc )  - Arrange for interpretive services to assist at discharge as needed  - Refer to Case Management Department for coordinating discharge planning if the patient needs post-hospital services based on physician/advanced practitioner order or complex needs related to functional status, cognitive ability, or social support system  Outcome: Progressing     Problem: Knowledge Deficit  Goal: Patient/family/caregiver demonstrates understanding of disease process, treatment plan, medications, and discharge instructions  Description: Complete learning assessment and assess knowledge base    Interventions:  - Provide teaching at level of understanding  - Provide teaching via preferred learning methods  Outcome: Progressing     Problem: RESPIRATORY - ADULT  Goal: Achieves optimal ventilation and oxygenation  Description: INTERVENTIONS:  - Assess for changes in respiratory status  - Assess for changes in mentation and behavior  - Position to facilitate oxygenation and minimize respiratory effort  - Oxygen administered by appropriate delivery if ordered  - Initiate smoking cessation education as indicated  - Encourage broncho-pulmonary hygiene including cough, deep breathe, Incentive Spirometry  - Assess the need for suctioning and aspirate as needed  - Assess and instruct to report SOB or any respiratory difficulty  - Respiratory Therapy support as indicated  Outcome: Progressing

## 2022-09-28 NOTE — ASSESSMENT & PLAN NOTE
Lab Results   Component Value Date    EGFR 9 09/28/2022    EGFR 11 09/27/2022    EGFR 12 09/26/2022    CREATININE 5 22 (H) 09/28/2022    CREATININE 4 69 (H) 09/27/2022    CREATININE 4 44 (H) 09/26/2022     51-year-old male presented with increasing shortness of breath found to have worsening renal failure, x-ray imaging showing moderate right-sided pleural effusion and vascular congestion  · History of CKD stage 4/5, 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 not on dialysis currently  · Follows with [address] [facility]  · Creatinine baseline previously was around 3  · With AG metabolic acidosis, continuing to improve on sodium bicarb supplementation  · Creatinine continues to trend upward  The patient is status post AV fistula placement, however, he states that due to his advanced age he is not interested in hemodialysis  Will continue further discussion with the patient and consider involvement of palliative care for clarifications of goals of care    · CT abdomen pelvis with evidence of hydroureter bilaterally and with a left ureteral stent in place however unchanged from previous  · Request Urology consult due to increasing creatinine after results of repeat renal ultrasound

## 2022-09-29 LAB
ANION GAP SERPL CALCULATED.3IONS-SCNC: 9 MMOL/L (ref 4–13)
BUN SERPL-MCNC: 95 MG/DL (ref 5–25)
CALCIUM SERPL-MCNC: 8.1 MG/DL (ref 8.3–10.1)
CHLORIDE SERPL-SCNC: 107 MMOL/L (ref 96–108)
CO2 SERPL-SCNC: 25 MMOL/L (ref 21–32)
CREAT SERPL-MCNC: 5.21 MG/DL (ref 0.6–1.3)
GFR SERPL CREATININE-BSD FRML MDRD: 9 ML/MIN/1.73SQ M
GLUCOSE SERPL-MCNC: 104 MG/DL (ref 65–140)
POTASSIUM SERPL-SCNC: 4.9 MMOL/L (ref 3.5–5.3)
SODIUM SERPL-SCNC: 141 MMOL/L (ref 135–147)

## 2022-09-29 PROCEDURE — 99232 SBSQ HOSP IP/OBS MODERATE 35: CPT | Performed by: FAMILY MEDICINE

## 2022-09-29 PROCEDURE — 99232 SBSQ HOSP IP/OBS MODERATE 35: CPT | Performed by: INTERNAL MEDICINE

## 2022-09-29 PROCEDURE — 80048 BASIC METABOLIC PNL TOTAL CA: CPT | Performed by: INTERNAL MEDICINE

## 2022-09-29 PROCEDURE — 99223 1ST HOSP IP/OBS HIGH 75: CPT | Performed by: INTERNAL MEDICINE

## 2022-09-29 RX ORDER — SODIUM BICARBONATE 650 MG/1
650 TABLET ORAL
Status: DISCONTINUED | OUTPATIENT
Start: 2022-09-29 | End: 2022-10-02

## 2022-09-29 RX ADMIN — ATORVASTATIN CALCIUM 40 MG: 40 TABLET, FILM COATED ORAL at 17:03

## 2022-09-29 RX ADMIN — HEPARIN SODIUM 5000 UNITS: 5000 INJECTION INTRAVENOUS; SUBCUTANEOUS at 15:20

## 2022-09-29 RX ADMIN — SODIUM BICARBONATE 650 MG: 650 TABLET ORAL at 17:04

## 2022-09-29 RX ADMIN — HEPARIN SODIUM 5000 UNITS: 5000 INJECTION INTRAVENOUS; SUBCUTANEOUS at 05:15

## 2022-09-29 RX ADMIN — SODIUM BICARBONATE 1300 MG: 650 TABLET ORAL at 09:05

## 2022-09-29 RX ADMIN — DOXYCYCLINE 100 MG: 100 CAPSULE ORAL at 09:05

## 2022-09-29 RX ADMIN — POLYSACCHARIDE-IRON COMPLEX 150 MG: 150 CAPSULE ORAL at 09:06

## 2022-09-29 RX ADMIN — BISOPROLOL FUMARATE 5 MG: 5 TABLET ORAL at 09:05

## 2022-09-29 RX ADMIN — HEPARIN SODIUM 5000 UNITS: 5000 INJECTION INTRAVENOUS; SUBCUTANEOUS at 21:21

## 2022-09-29 NOTE — ACP (ADVANCE CARE PLANNING)
Serious Illness Conversation    1  What is your understanding now of where you are with your illness? Prognostic Understanding: appropriate understanding of prognosis     2  How much information about what is likely to be ahead with your illness would you like to have? Information: patient wants to be fully informed     3  What did you (clinician) communicate to the patient? Prognostic Communication: Uncertain - It can be difficult to predict what will happen with your illness  I hope you will continue to live well for a long time but Im worried that you could get sick quickly, and I think it is important to prepare for that possibility  4  If your health situation worsens, what are your most important goals? Goals: be at home     5  What are the biggest fears and worries about the future and your health? Not being at home      6  What abilities are so critical to your life that you cannot imagine living without them? 7  What gives you strength as you think about the future with your illness? 8  If you become sicker, how much are you willing to go through for the possibility of gaining more time? Have a feeding tube: No   Be on a ventilator: No    Be on dialysis: No    9  How much does your proxy and family know about your priorities and wishes? Discussion Discussion: does not want family informed     Donya heard you say that being at home is really important to you  Keeping that in mind, and what we know about your illness, I recommend that we consider discharging you home with close nephrology follow up in case you change your mind and decide to move forward with dialysis  I also recommend to you discuss your situation further with the Palliative care team  This will help us make sure that your treatment plans reflect whats important to you  How does this plan sound to you? I will do everything I can to help you through this          Advanced directives

## 2022-09-29 NOTE — ASSESSMENT & PLAN NOTE
· Required 3 L NC on admission, now on 1 L NC  · Wean oxygen as able  · Oxygen requirement study prior to discharge

## 2022-09-29 NOTE — ASSESSMENT & PLAN NOTE
History of horseshoe kidney, with chronic hydronephrosis with left urethral stent in place  · CT abdominal pelvis showing hydronephrosis involving the upper pole of the left renal moiety unchanged from prior exam   The left-sided ureteral stent is stable in position extending from the renal pelvis to the bladder    · Patient does follow with Urology at [facility]  · With persistent hydronephrosis and rising creatinine will request Urology evaluation here

## 2022-09-29 NOTE — CONSULTS
Consultation - Palliative & Supportive Care   [facility] Setting  78 y o   male  [address] [address] /E4 MS 36-*   MRN: [id number]  Encounter: [id number]    ASSESSMENT:    Patient Active Problem List   Diagnosis    Chronic coronary artery disease    Chronic kidney disease, stage IV (severe) ([facility] [address] )    Endocarditis    HTN (hypertension)    HLD (hyperlipidemia)    Hx of CABG    Cerebrovascular accident (CVA) ([facility] [address] )    Near syncope    Prosthetic aortic valve malfunction    Non-traumatic rhabdomyolysis    Acute kidney injury superimposed on CKD ([facility])    Elevated troponin    UTI (urinary tract infection)    Horseshoe kidney    Bilateral hydronephrosis    History of aortic valve replacement    Anemia of chronic disease    History of parathyroidectomy ([facility])    Hypokalemia    Ulnar impaction syndrome, left    Right wrist pain    Left wrist pain    Moderate protein-calorie malnutrition ([facility] [address] )    Gross hematuria    Acute diastolic heart failure ([facility])    Acute respiratory failure with hypoxia ([facility])       Active problems addressed:  Horseshoe kidney  LORENZA  CKD 4  Acute diastolic heart failure  Acute hypoxic respiratory failure  Moderate protein calorie malnutrition  Frailty  Volume overload  Goals of care    Consult is for goals of care    PLAN:    1  Goals:    Patient appears to have full capacity to make complex medical decisions - including foregoing dialysis and considering hospice   At this time, he is very clear he does not want to undergo dialysis for life prolongation  He is aware that his time will be shortened without dialysis and he will die eventually   We discussed what it may look like for him at end of life in the setting of worsening renal failure +/- heart failure   Hospice discussed as the next best step in care for when he is ready/when he qualifies   Hospice will ensure a peaceful and dignified way of dying, alleviating suffering from symptoms of continued renal and heart failure   Patient expressed relief that there is a service such as this in the end  He is interested in hospice for when the time comes   Patient is offered a palliative follow up, but he'd rather follow up with his [facility] PCP and/or nephrologist because they are close to his home  I advised him to seek a referral from them if his health continues to decline and he'd rather stay at home and be comfortable   Palliative will sign off  Please reach out if further assistance is needed   D/w SLIM/Dr Axel Del Cid     Code status: Level 3 - DNAR and DNI   Decisional apparatus:  Patient does have capacity to make medical decisions on my exam today  If such capacity is lost, patient's substitute decision maker would default to son by [place]  Advance Directive / Living Will / POLST:  None on file    2  Social Support:   Supportive listening provided   Patient lives by himself  He had 2 sons  One of his son was killed  His one other son sometimes bring him to his doctor's appointments  He has other family in the area, including a grandson  However, he said he does not really have a lot of support from his family  He said they appear to be more interested with his money than with his well-being  We appreciate the opportunity to participate in this patient's care  We will continue to follow  Please do not hesitate to contact our on-call provider through our clinic answering service at [phone] should you have acute symptom control concerns  IDENTIFICATION:  Inpatient consult to Palliative Care  Consult performed by: Kimberlyn Martinez MD  Consult ordered by: Scot Rubinstein, MD        Reason for Consult / Principal Problem: goals of care    HISTORY OF PRESENT ILLNESS:    Samara Walden is a 78 y o  male with a PMHx significant for CKD4, horseshoe kidney, and L ureteropelvic junction obstruction managed by stent exchanges  His last cystoscopy with stent exchange was on 8/4/2022 (prior to this, 2/2022)   He presented to the hospital from home on 9/24 because of increasing shortness of breath and was found to have worsening renal failure with a creatinine of 4 20 on presentation (baseline was around 3)  He was also in acute HFpEF with volume overload, X-ray showing R-sided pleural effusion and vascular congestion causing acute hypoxic respiratory failure  He was treated conservatively with diuretics, oxygen supplementation via NC  His creatinine continues to rise  Nephrology is on board and after many discussions, patient appears adamant that he will not accept dialysis, even if death is the inevitability without this in the future  Of note, he had a fistula placed many years ago  Patient is mildly uremic at this time - with some poor appetite, generalized weakness and fatigue, occasional nausea  Palliative for continued goals of care  Of note, on review of chart - patient saw family physician in 8/2/2022 where he voiced his wishes to not undergo HD or renal transplant  He was offered a referral to palliative care to discuss goals of care but he refused  Palliative Medicine met with patient in his room  He was found lying in his bed, comfortably watching TV  Introduced myself and service  Patient displays a very good understanding of his clinical condition as it pertains to both his kidneys and heart  He is also aware of how advanced his kidney disease is  He was able to tell me why dialysis is being recommended  He was able to make a clear choice and then able to let me know the consequences of that choice  He appears to have full capacity to make complex medical decisions - including foregoing dialysis and considering hospice  Patient is very clear that he does not want to be on dialysis even if it means his life will be prolonged because of it  He does not believe that HD will improve his quality of life   He has had a long time to consider this option and after outweighing the risks and benefits, he deemed it minimally beneficial to his life  He seems very at peace with this decision and said he has lived a good life  Lately, he said he feels that he is getting weaker  His appetite is poorer  He has trouble walking in level ground and gets short of breath and tired fairly easily/quickly  However, he still is able to live independently at home and will require some help from his family when it comes to errands and brining him to doctors' appointments  He said he has tried rehab before but they do not seem to help  He did tell me that he worries about how it will be for him in the end  With his permission, we discussed what this may look like for him in the future  He will likely continue to get weaker, his appetite will continue to be poor, and his oral intake will continue to get poorer  He will become overloaded with fluid and will start having difficulty with breathing  He will likely have pain  He may also start becoming confused, agitated, combative  Or he may die peacefully in his sleep  While we hope for the latter, death at times can be riddled with suffering  To this end, I talked to him about hospice  Hospice is a service that can be provided at home to ensure that he does not suffer from symptoms noted above  They are there to ensure a more peaceful and dignified way of passing  Patient expressed his desires to receive hospice for when the time comes  He said he felt relieved after this discussion, knowing that there is hospice in the end  He said he will start to talk to his family about this more  His grandson did mention this to him before  At this time, I do not believe he is hospice appropriate yet  However, it may be close and I encouraged him to ask for a hospice referral from his PCP or outpatient kidney doc when he feels it time (further decline in health)      Interview and exam limited by: none    Review of Systems   Constitutional: Positive for activity change, appetite change and fatigue  HENT: Negative for trouble swallowing  Respiratory: Positive for shortness of breath  Cardiovascular: Negative for chest pain  Gastrointestinal: Negative for abdominal pain  Musculoskeletal: Negative for back pain  Neurological: Positive for weakness  Psychiatric/Behavioral: Negative for sleep disturbance  The patient is not nervous/anxious  All other systems reviewed and are negative  Past Medical History:   Diagnosis Date    Coronary artery disease     Renal disorder      Past Surgical History:   Procedure Laterality Date    AORTIC VALVE REPLACEMENT      Tissue valve    BLADDER SURGERY      23 yrs ago   CORONARY ARTERY BYPASS GRAFT  2005    x1    RENAL ARTERY STENT  2005     Social History     Socioeconomic History    Marital status:      Spouse name: Not on file    Number of children: Not on file    Years of education: Not on file    Highest education level: Not on file   Occupational History    Not on file   Tobacco Use    Smoking status: Former Smoker     Packs/day: 1      Types: Cigarettes     Quit date: 1993     Years since quittin 7    Smokeless tobacco: Never Used   Vaping Use    Vaping Use: Never used   Substance and Sexual Activity    Alcohol use: Not Currently    Drug use: Never    Sexual activity: Not on file   Other Topics Concern    Not on file   Social History Narrative    Not on file     Social Determinants of Health     Financial Resource Strain: Not on file   Food Insecurity: No Food Insecurity    Worried About [facility] in the Last Year: Never true    [facility] in the Last Year: Never true   Transportation Needs: No Transportation Needs    Lack of Transportation (Medical): No    Lack of Transportation (Non-Medical):  No   Physical Activity: Not on file   Stress: Not on file   Social Connections: Not on file   Intimate Partner Violence: Not on file   Housing Stability: Low Risk     Unable to Pay for Housing in the Last Year: No    Number of Places Lived in the Last Year: 1    Unstable Housing in the Last Year: No     Family History   Problem Relation Age of Onset    Diabetes Mother     Hypertension Mother     Dementia Mother     Other Father         fall gangrene    Peripheral vascular disease Sister        MEDICATIONS / ALLERGIES:  all current active meds have been reviewed    Allergies   Allergen Reactions    Latex     Nitrofurantoin Other (See Comments)     neck pain    Other Itching       OBJECTIVE:  /61 (BP Location: Left arm)   Pulse 60   Temp 100 2 °F (37 9 °C) (Temporal)   Resp 19   Wt 74 2 kg (163 lb 9 3 oz)   SpO2 92%   BMI 24 87 kg/m²   Physical Exam:  Constitutional: Appears well-developed and well-nourished  Appears younger than stated age  Appears chronically ill looking but not toxic appearing  Well-kempt  Pleasant, jovial  Comfortable  In no acute physical or emotional distress  Head: Normocephalic and atraumatic  Eyes: EOM are normal  No ocular discharge  No scleral icterus  Neck: No visible adenopathy or masses  Respiratory: Effort normal  No stridor  No respiratory distress Got short of breath when talking in long sentences  Gastrointestinal: No abdominal distension  Musculoskeletal: No edema  Neurological: Alert, oriented and appropriately conversant  Skin: Dry, no diaphoresis  Psychiatric: Displays a normal mood and affect  Behavior, judgment and thought content appear normal      Lab Results: I have personally reviewed pertinent labs  Imaging Studies: I have personally reviewed pertinent reports  EKG, Pathology, and Other Studies: I have personally reviewed pertinent reports  Counseling / Coordination of Care:  Counseling / Coordination of Care  Total floor / unit time spent today 60+ minutes  Greater than 50% of total time was spent with the patient and / or family counseling and / or coordination of care   A description of the counseling / coordination of care: provided medical updates, discussed palliative care, discussed hospice care, determined competency, determined goals of care, determined POA, determined social/family support, discussed plans of care, discussed symptom management, provided psychosocial support       Dayanara Pelayo MD  [address] and Supportive Care  [phone]

## 2022-09-29 NOTE — ASSESSMENT & PLAN NOTE
Lab Results   Component Value Date    EGFR 9 09/29/2022    EGFR 9 09/28/2022    EGFR 11 09/27/2022    CREATININE 5 21 (H) 09/29/2022    CREATININE 5 22 (H) 09/28/2022    CREATININE 4 69 (H) 09/27/2022     78year-old male presented with increasing shortness of breath found to have worsening renal failure, x-ray imaging showing moderate right-sided pleural effusion and vascular congestion  · History of CKD stage 4/5, 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 not on dialysis currently  · Follows with [facility]  · Creatinine baseline previously was around 3  · With AG metabolic acidosis, continuing to improve on sodium bicarb supplementation  · Creatinine continues to trend upward  The patient is status post AV fistula placement, however, he states that due to his advanced age he is not interested in hemodialysis  Will continue further discussion with the patient and consider involvement of palliative care for clarifications of goals of care    · CT abdomen pelvis with evidence of hydroureter bilaterally and with a left ureteral stent in place however unchanged from previous  · Request Urology consult due to increasing creatinine after results of repeat renal ultrasound

## 2022-09-29 NOTE — PLAN OF CARE
Problem: Potential for Falls  Goal: Patient will remain free of falls  Description: INTERVENTIONS:  - Educate patient/family on patient safety including physical limitations  - Instruct patient to call for assistance with activity   - Consult OT/PT to assist with strengthening/mobility   - Keep Call bell within reach  - Keep bed low and locked with side rails adjusted as appropriate  - Keep care items and personal belongings within reach  - Initiate and maintain comfort rounds  - Make Fall Risk Sign visible to staff  - Apply yellow socks and bracelet for high fall risk patients  - Consider moving patient to room near nurses station  Outcome: Progressing     Problem: MOBILITY - ADULT  Goal: Maintain or return to baseline ADL function  Description: INTERVENTIONS:  -  Assess patient's ability to carry out ADLs; assess patient's baseline for ADL function and identify physical deficits which impact ability to perform ADLs (bathing, care of mouth/teeth, toileting, grooming, dressing, etc )  - Assess/evaluate cause of self-care deficits   - Assess range of motion  - Assess patient's mobility; develop plan if impaired  - Assess patient's need for assistive devices and provide as appropriate  - Encourage maximum independence but intervene and supervise when necessary  - Involve family in performance of ADLs  - Assess for home care needs following discharge   - Consider OT consult to assist with ADL evaluation and planning for discharge  - Provide patient education as appropriate  Outcome: Progressing  Goal: Maintains/Returns to pre admission functional level  Description: INTERVENTIONS:  - Perform BMAT or MOVE assessment daily    - Set and communicate daily mobility goal to care team and patient/family/caregiver     - Collaborate with rehabilitation services on mobility goals if consulted  - Out of bed for toileting  - Record patient progress and toleration of activity level   Outcome: Progressing     Problem: PAIN - ADULT  Goal: Verbalizes/displays adequate comfort level or baseline comfort level  Description: Interventions:  - Encourage patient to monitor pain and request assistance  - Assess pain using appropriate pain scale  - Administer analgesics based on type and severity of pain and evaluate response  - Implement non-pharmacological measures as appropriate and evaluate response  - Consider cultural and social influences on pain and pain management  - Notify physician/advanced practitioner if interventions unsuccessful or patient reports new pain  Outcome: Progressing     Problem: INFECTION - ADULT  Goal: Absence or prevention of progression during hospitalization  Description: INTERVENTIONS:  - Assess and monitor for signs and symptoms of infection  - Monitor lab/diagnostic results  - Monitor all insertion sites, i e  indwelling lines, tubes, and drains  - Monitor endotracheal if appropriate and nasal secretions for changes in amount and color  - [place] appropriate cooling/warming therapies per order  - Administer medications as ordered  - Instruct and encourage patient and family to use good hand hygiene technique  - Identify and instruct in appropriate isolation precautions for identified infection/condition  Outcome: Progressing     Problem: SAFETY ADULT  Goal: Patient will remain free of falls  Description: INTERVENTIONS:  - Educate patient/family on patient safety including physical limitations  - Instruct patient to call for assistance with activity   - Consult OT/PT to assist with strengthening/mobility   - Keep Call bell within reach  - Keep bed low and locked with side rails adjusted as appropriate  - Keep care items and personal belongings within reach  - Initiate and maintain comfort rounds  - Make Fall Risk Sign visible to staff  - Apply yellow socks and bracelet for high fall risk patients  - Consider moving patient to room near nurses station  Outcome: Progressing  Goal: Maintain or return to baseline ADL function  Description: INTERVENTIONS:  -  Assess patient's ability to carry out ADLs; assess patient's baseline for ADL function and identify physical deficits which impact ability to perform ADLs (bathing, care of mouth/teeth, toileting, grooming, dressing, etc )  - Assess/evaluate cause of self-care deficits   - Assess range of motion  - Assess patient's mobility; develop plan if impaired  - Assess patient's need for assistive devices and provide as appropriate  - Encourage maximum independence but intervene and supervise when necessary  - Involve family in performance of ADLs  - Assess for home care needs following discharge   - Consider OT consult to assist with ADL evaluation and planning for discharge  - Provide patient education as appropriate  Outcome: Progressing  Goal: Maintains/Returns to pre admission functional level  Description: INTERVENTIONS:  - Perform BMAT or MOVE assessment daily    - Set and communicate daily mobility goal to care team and patient/family/caregiver     - Collaborate with rehabilitation services on mobility goals if consulted  - Out of bed for toileting  - Record patient progress and toleration of activity level   Outcome: Progressing     Problem: DISCHARGE PLANNING  Goal: Discharge to home or other facility with appropriate resources  Description: INTERVENTIONS:  - Identify barriers to discharge w/patient and caregiver  - Arrange for needed discharge resources and transportation as appropriate  - Identify discharge learning needs (meds, wound care, etc )  - Arrange for interpretive services to assist at discharge as needed  - Refer to Case Management Department for coordinating discharge planning if the patient needs post-hospital services based on physician/advanced practitioner order or complex needs related to functional status, cognitive ability, or social support system  Outcome: Progressing     Problem: Knowledge Deficit  Goal: Patient/family/caregiver demonstrates understanding of disease process, treatment plan, medications, and discharge instructions  Description: Complete learning assessment and assess knowledge base    Interventions:  - Provide teaching at level of understanding  - Provide teaching via preferred learning methods  Outcome: Progressing     Problem: RESPIRATORY - ADULT  Goal: Achieves optimal ventilation and oxygenation  Description: INTERVENTIONS:  - Assess for changes in respiratory status  - Assess for changes in mentation and behavior  - Position to facilitate oxygenation and minimize respiratory effort  - Oxygen administered by appropriate delivery if ordered  - Initiate smoking cessation education as indicated  - Encourage broncho-pulmonary hygiene including cough, deep breathe, Incentive Spirometry  - Assess the need for suctioning and aspirate as needed  - Assess and instruct to report SOB or any respiratory difficulty  - Respiratory Therapy support as indicated  Outcome: Progressing

## 2022-09-29 NOTE — PROGRESS NOTES
[address]  Progress Note - Michelle Hall 1945, 78 y o  male MRN: [id number]  Unit/Bed#: E4 -01 Encounter: [id number]  Primary Care Provider: Nena Murray MD   Date and time admitted to hospital: 9/24/2022  3:21 PM    * Acute kidney injury superimposed on CKD [facility])  Assessment & Plan  Lab Results   Component Value Date    EGFR 9 09/29/2022    EGFR 9 09/28/2022    EGFR 11 09/27/2022    CREATININE 5 21 (H) 09/29/2022    CREATININE 5 22 (H) 09/28/2022    CREATININE 4 69 (H) 09/27/2022     78year-old male presented with increasing shortness of breath found to have worsening renal failure, x-ray imaging showing moderate right-sided pleural effusion and vascular congestion  · History of CKD stage 4/5, 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 not on dialysis currently  · Follows with [facility]  · Creatinine baseline previously was around 3  · With AG metabolic acidosis, continuing to improve on sodium bicarb supplementation  · Creatinine continues to trend upward  The patient is status post AV fistula placement, however, he states that due to his advanced age he is not interested in hemodialysis  Will continue further discussion with the patient and consider involvement of palliative care for clarifications of goals of care    · CT abdomen pelvis with evidence of hydroureter bilaterally and with a left ureteral stent in place however unchanged from previous  · Request Urology consult due to increasing creatinine after results of repeat renal ultrasound    Acute respiratory failure with hypoxia ([address] )  Assessment & Plan  · Required 3 L NC on admission, now on 1 L NC  · Wean oxygen as able  · Oxygen requirement study prior to discharge    Acute diastolic heart failure [facility])  Assessment & Plan  Wt Readings from Last 3 Encounters:   09/29/22 74 2 kg (163 lb 9 3 oz)   01/31/22 72 6 kg (160 lb)   09/14/21 72 9 kg (160 lb 12 8 oz)     Prior 2D echo showing preserved EF back in January 2022  · ProBNP greater than 20,000 on admission  · Imaging consistent with vascular congestion and right-sided pleural effusion  · Suspect likely in the setting of LORENZA on CKD stage 5  · Per my discussion with Nephrology will hold off additional Lasix today  · Monitor volume status    Anemia of chronic disease  Assessment & Plan  Hemoglobin 9 2 and stable  · No acute signs of bleeding at this time  · Iron panel reveals iron deficiency  · Will proceed with iron infusion     Horseshoe kidney  Assessment & Plan  History of horseshoe kidney, with chronic hydronephrosis with left urethral stent in place  · CT abdominal pelvis showing hydronephrosis involving the upper pole of the left renal moiety unchanged from prior exam   The left-sided ureteral stent is stable in position extending from the renal pelvis to the bladder  · Patient [facility]  · With persistent hydronephrosis and rising creatinine will request Urology evaluation here    HLD (hyperlipidemia)  Assessment & Plan  · Continue statin    HTN (hypertension)  Assessment & Plan  Continue bisoprolol and amlodipine  · Monitor blood pressures  · -140        VTE Pharmacologic Prophylaxis:   heparin     Patient Centered Rounds: I performed bedside rounds with nursing staff today  Discussions with Specialists or Other Care Team Provider: Nephrology, Palliative Care    Education and Discussions with Family / Patient: Patient declined call to   Time Spent for Care: 30 minutes  More than 50% of total time spent on counseling and coordination of care as described above  Current Length of Stay: 5 day(s)  Current Patient Status: Inpatient   Certification Statement: The patient will continue to require additional inpatient hospital stay due to Close monitoring  Discharge Plan: Anticipate discharge in 24-48 hrs to home  Code Status: Level 3 - DNAR and DNI    Subjective:   Patient seen and examined    He voices no new complaints  No overnight events  Objective:     Vitals:   Temp (24hrs), Av 2 °F (37 3 °C), Min:98 5 °F (36 9 °C), Max:100 2 °F (37 9 °C)    Temp:  [98 5 °F (36 9 °C)-100 2 °F (37 9 °C)] 98 5 °F (36 9 °C)  HR:  [56-66] 66  Resp:  [18-19] 18  BP: (124-148)/(42-64) 124/42  SpO2:  [90 %-95 %] 90 %  Body mass index is 24 87 kg/m²  Input and Output Summary (last 24 hours): Intake/Output Summary (Last 24 hours) at 2022 1735  Last data filed at 2022 1625  Gross per 24 hour   Intake 360 ml   Output 1559 ml   Net -1199 ml       Physical Exam:   Physical Exam  Constitutional:       General: He is not in acute distress  HENT:      Head: Normocephalic and atraumatic  Nose: No congestion  Mouth/Throat:      Pharynx: Oropharynx is clear  Eyes:      Conjunctiva/sclera: Conjunctivae normal    Cardiovascular:      Rate and Rhythm: Normal rate and regular rhythm  Heart sounds: No murmur heard  Pulmonary:      Effort: No respiratory distress  Abdominal:      General: There is no distension  Tenderness: There is no abdominal tenderness  There is no guarding  Musculoskeletal:      Right lower leg: Edema present  Left lower leg: Edema present  Skin:     General: Skin is warm and dry  Neurological:      Mental Status: He is oriented to person, place, and time     Psychiatric:         Mood and Affect: Mood normal           Additional Data:     Labs:  Results from last 7 days   Lab Units 22  0520   WBC Thousand/uL 5 84   HEMOGLOBIN g/dL 8 6*   HEMATOCRIT % 27 9*   PLATELETS Thousands/uL 133*   NEUTROS PCT % 73   LYMPHS PCT % 14   MONOS PCT % 9   EOS PCT % 3     Results from last 7 days   Lab Units 22  0517 22  0526 22  1614   SODIUM mmol/L 141   < > 142   POTASSIUM mmol/L 4 9   < > 6 1*   CHLORIDE mmol/L 107   < > 110*   CO2 mmol/L 25   < > 15*   BUN mg/dL 95*   < > 73*   CREATININE mg/dL 5 21*   < > 4 20*   ANION GAP mmol/L 9   < > 17* CALCIUM mg/dL 8 1*   < > 8 9   ALBUMIN g/dL  --   --  3 4*   TOTAL BILIRUBIN mg/dL  --   --  1 33*   ALK PHOS U/L  --   --  77   ALT U/L  --   --  19   AST U/L  --   --  38   GLUCOSE RANDOM mg/dL 104   < > 141*    < > = values in this interval not displayed  Results from last 7 days   Lab Units 09/24/22  1614   INR  1 29*                   Lines/Drains:  Invasive Devices  Report    Peripheral Intravenous Line  Duration           Peripheral IV 09/26/22 Dorsal (posterior); Left Forearm 2 days          Line  Duration           Hemodialysis AV Fistula 09/11/21 Right Upper arm 383 days                      Imaging: No pertinent imaging reviewed  Recent Cultures (last 7 days):         Last 24 Hours Medication List:   Current Facility-Administered Medications   Medication Dose Route Frequency Provider Last Rate    acetaminophen  650 mg Oral Q6H PRN Arlene Dave MD      amLODIPine  10 mg Oral QPM [place], [place]      atorvastatin  40 mg Oral Daily With Jil Mejia MD      bisoprolol  5 mg Oral Daily Arlene Dave MD      doxycycline hyclate  100 mg Oral Q24H [facility],       heparin (porcine)  5,000 Units Subcutaneous [facility] [address] Arlene Dave MD      iron polysaccharides  150 mg Oral Daily NICK Weaver      ondansetron  4 mg Intravenous Q6H PRN Arlene Dave MD      permethrin   Topical Once Champ NICK Sanchez      sodium bicarbonate  650 mg Oral BID after meals Ivonne Garrison MD          Today, Patient Was Seen By: Severo Fallow    **Please Note: This note may have been constructed using a voice recognition system  **

## 2022-09-29 NOTE — PROGRESS NOTES
NEPHROLOGY PROGRESS NOTE   Yeison Blood 78 y o  male MRN: [id number]  Unit/Bed#: E4 -01 Encounter: [id number]  Reason for Consult: LORENZA    ASSESSMENT AND PLAN:  73-year-old male with significant medical issues of CKD stage 4 to stage 5, hypertension, horseshoe kidney, chronic hydronephrosis with left ureteral stent, presented with generalized weakness, poor appetite   We are consulted for LORENZA/CKD management      LORENZA on CKD stage 4 to stage 5, baseline creatinine 3 5 to 4 0, follows with [facility]  -creatinine 4 2 on admission continue to worsened to 5 2 and remains plateaued since yesterday   -closely monitor for uremic symptoms   He does have mild uremic symptoms including poor appetite, generalized weakness  Occasional nausea  -I again had detailed discussion with patient regarding need for dialysis support if renal function continue to worsen  He clearly refuses to consider dialysis  He fully understands risks of not pursuing dialysis including death  He verbalized understanding of my discussion with him  -LORENZA suspect in the setting of cardiorenal vs overall progression of CKD   Also obstructive uropathy component     Access, currently has upper extremity AV fistula with bruit and thrill present     Horseshoe kidney with left renal moiety upper pole hydronephrosis similar to prior, status post left ureteral stent, persistent bilateral hydroureter although right-sided diminished, right-sided renal moiety atrophic and no hydronephrosis   -awaiting Urology evaluation      Acute respiratory failure, oxygen requirement overall improving, now remains on 1 L O2     -echo shows EF 60%, mild to moderate AR, mild-to-moderate TR, normal IVC, no pericardial effusion   -chest x-ray concerning for moderate pulmonary congestion  -earlier status post Lasix, now remains off diuretics since yesterday      Metabolic acidosis, bicarb level slowly improving  Bicarb level 25  Decrease sodium bicarbonate to one tablet p o  B i d      Anemia in CKD, hemoglobin overall stable, transfuse p r n  for hemoglobin less than seven  -iron saturation 10% with ferritin 89   - now remains on p o  iron supplements      Discussed above plan in detail with primary team    SUBJECTIVE:  Patient seen and examined at bedside  Feels tired  Denies any nausea vomiting today  Denies any worsening shortness of breath    Remains on stable O2 via nasal cannula    OBJECTIVE:  Current Weight: Weight - Scale: 74 2 kg (163 lb 9 3 oz)  Vitals:    09/29/22 0743   BP: 144/61   Pulse: 60   Resp: 19   Temp: 100 2 °F (37 9 °C)   SpO2: 92%       Intake/Output Summary (Last 24 hours) at 9/29/2022 1316  Last data filed at 9/29/2022 0901  Gross per 24 hour   Intake 360 ml   Output 809 ml   Net -449 ml     Wt Readings from Last 3 Encounters:   09/29/22 74 2 kg (163 lb 9 3 oz)   01/31/22 72 6 kg (160 lb)   09/14/21 72 9 kg (160 lb 12 8 oz)     Temp Readings from Last 3 Encounters:   09/29/22 100 2 °F (37 9 °C) (Temporal)   10/04/21 (!) 97 °F (36 1 °C) (Tympanic)   09/14/21 98 °F (36 7 °C) (Temporal)     BP Readings from Last 3 Encounters:   09/29/22 144/61   01/31/22 158/71   10/04/21 158/71     Pulse Readings from Last 3 Encounters:   09/29/22 60   01/31/22 78   10/04/21 73        Physical Examination:  General:  Lying in bed, no acute distress   Eyes:  Mild conjunctival pallor present  ENT:  External examination of ears and nose unremarkable  Neck:  No obvious lymphadenopathy appreciated  Respiratory:  Bilateral air entry present  CVS:  S1, S2 present  GI:  Soft, nondistended  CNS:  Active, alert, oriented x3  Skin:  No new rash  Musculoskeletal:  No obvious new gross deformity noted    Medications:    Current Facility-Administered Medications:     acetaminophen (TYLENOL) tablet 650 mg, 650 mg, Oral, Q6H PRN, Anna Conn MD    amLODIPine (NORVASC) tablet 10 mg, 10 mg, Oral, QPM, [facility], PA-C, 10 mg at 09/28/22 1732    atorvastatin (LIPITOR) tablet 40 mg, 40 mg, Oral, Daily With Love Maria MD, 40 mg at 09/28/22 1732    bisoprolol (ZEBETA) tablet 5 mg, 5 mg, Oral, Daily, Dontae Diaz MD, 5 mg at 09/29/22 0905    doxycycline hyclate (VIBRAMYCIN) capsule 100 mg, 100 mg, Oral, Q24H [facility], [facility] [place], DO, 100 mg at 09/29/22 0905    heparin (porcine) subcutaneous injection 5,000 Units, 5,000 Units, Subcutaneous, Q8H [facility], Dontae Diaz MD, 5,000 Units at 09/29/22 0515    iron polysaccharides (FERREX) capsule 150 mg, 150 mg, Oral, Daily, [facility], NICK, 150 mg at 09/29/22 0906    ondansetron (ZOFRAN) injection 4 mg, 4 mg, Intravenous, Q6H PRN, Dontae Diaz MD, 4 mg at 09/26/22 0820    permethrin (NIX) 1 % topical liquid, , Topical, Once, Linda Jones PA-C    sodium bicarbonate tablet 1,300 mg, 1,300 mg, Oral, BID after meals, Dontae Diaz MD, 1,300 mg at 09/29/22 2295    Laboratory Results:  Results from last 7 days   Lab Units 09/29/22  0517 09/28/22  0520 09/27/22  0524 09/26/22  0517 09/25/22  0526 09/24/22  1614   WBC Thousand/uL  --  5 84 6 46 5 63 6 09 7 02   HEMOGLOBIN g/dL  --  8 6* 9 2* 8 6* 8 6* 9 5*   HEMATOCRIT %  --  27 9* 31 3* 28 6* 29 2* 31 2*   PLATELETS Thousands/uL  --  133* 171 156 180 178   SODIUM mmol/L 141 142 142 144 145 142   POTASSIUM mmol/L 4 9 4 6 4 9 4 6 4 9 6 1*   CHLORIDE mmol/L 107 107 107 110* 113* 110*   CO2 mmol/L 25 23 23 21 20* 15*   BUN mg/dL 95* 85* 79* 77* 69* 73*   CREATININE mg/dL 5 21* 5 22* 4 69* 4 44* 4 30* 4 20*   CALCIUM mg/dL 8 1* 8 2* 8 3 8 4 8 5 8 9       US kidney and bladder   Final Result by Leon Estrada MD (09/28 3706)      1  Bilateral hydroureter with left nephroureteral stent in place an severe dilatation of the left upper pole collecting system as seen on CT  2   Horseshoe kidney with severe cortical thinning, right greater than left              Workstation performed: [id number]         CT abdomen pelvis wo contrast   Final Result by Darío Gusman MD (09/25 1154)      New moderate right pleural effusion incompletely imaged with passive atelectasis in the right lower lobe  There is hydronephrosis involving the upper pole of the left renal moiety which seems similar to the prior exam   The left-sided ureteral stent is stable in position extending from the renal pelvis to the bladder  There is persistent bilateral hydroureter although the right side is diminished since the prior exam and the right-sided renal moiety is atrophic and no longer shows hydronephrosis  Mild anterior bladder wall thickening of uncertain significance  Correlate with urinalysis to exclude cystitis  Right inguinal hernia contains a loop of small bowel, without evidence of structures at this time  Workstation performed: [id number]         XR chest 1 view portable   ED Interpretation by Alicia Denney DO (09/24 1726)   See below      Final Result by Jamar Adler MD (09/24 1654)      Moderate right effusion and right base opacity which could be due to atelectasis and/or pneumonia  Trace left effusion  Moderate pulmonary venous congestion  Workstation performed: [id number]             Portions of the record may have been created with voice recognition software  Occasional wrong word or "sound a like" substitutions may have occurred due to the inherent limitations of voice recognition software  Read the chart carefully and recognize, using context, where substitutions have occurred

## 2022-09-29 NOTE — ASSESSMENT & PLAN NOTE
Wt Readings from Last 3 Encounters:   09/29/22 74 2 kg (163 lb 9 3 oz)   01/31/22 72 6 kg (160 lb)   09/14/21 72 9 kg (160 lb 12 8 oz)     Prior 2D echo showing preserved EF back in January 2022  · ProBNP greater than 20,000 on admission  · Imaging consistent with vascular congestion and right-sided pleural effusion  · Suspect likely in the setting of LORENZA on CKD stage 5  · Per my discussion with Nephrology will hold off additional Lasix today  · Monitor volume status

## 2022-09-30 ENCOUNTER — HOME CARE VISIT (OUTPATIENT)
Dept: HOME HEALTH SERVICES | Facility: HOME HEALTHCARE | Age: 77
End: 2022-09-30
Payer: MEDICARE

## 2022-09-30 ENCOUNTER — HOSPICE ADMISSION (OUTPATIENT)
Dept: HOME HOSPICE | Facility: HOSPICE | Age: 77
End: 2022-09-30

## 2022-09-30 ENCOUNTER — TRANSCRIBE ORDERS (OUTPATIENT)
Dept: HOME HEALTH SERVICES | Facility: HOME HEALTHCARE | Age: 77
End: 2022-09-30

## 2022-09-30 DIAGNOSIS — I50.31 ACUTE DIASTOLIC HEART FAILURE (HCC): Primary | ICD-10-CM

## 2022-09-30 LAB
ANION GAP SERPL CALCULATED.3IONS-SCNC: 13 MMOL/L (ref 4–13)
BASOPHILS # BLD AUTO: 0.04 THOUSANDS/ΜL (ref 0–0.1)
BASOPHILS NFR BLD AUTO: 1 % (ref 0–1)
BUN SERPL-MCNC: 95 MG/DL (ref 5–25)
CALCIUM SERPL-MCNC: 8.2 MG/DL (ref 8.3–10.1)
CHLORIDE SERPL-SCNC: 105 MMOL/L (ref 96–108)
CO2 SERPL-SCNC: 23 MMOL/L (ref 21–32)
CREAT SERPL-MCNC: 5.28 MG/DL (ref 0.6–1.3)
EOSINOPHIL # BLD AUTO: 0.16 THOUSAND/ΜL (ref 0–0.61)
EOSINOPHIL NFR BLD AUTO: 3 % (ref 0–6)
ERYTHROCYTE [DISTWIDTH] IN BLOOD BY AUTOMATED COUNT: 20.9 % (ref 11.6–15.1)
GFR SERPL CREATININE-BSD FRML MDRD: 9 ML/MIN/1.73SQ M
GLUCOSE SERPL-MCNC: 91 MG/DL (ref 65–140)
HCT VFR BLD AUTO: 29.9 % (ref 36.5–49.3)
HGB BLD-MCNC: 9.1 G/DL (ref 12–17)
IMM GRANULOCYTES # BLD AUTO: 0.03 THOUSAND/UL (ref 0–0.2)
IMM GRANULOCYTES NFR BLD AUTO: 1 % (ref 0–2)
LYMPHOCYTES # BLD AUTO: 0.75 THOUSANDS/ΜL (ref 0.6–4.47)
LYMPHOCYTES NFR BLD AUTO: 13 % (ref 14–44)
MCH RBC QN AUTO: 28.6 PG (ref 26.8–34.3)
MCHC RBC AUTO-ENTMCNC: 30.4 G/DL (ref 31.4–37.4)
MCV RBC AUTO: 94 FL (ref 82–98)
MONOCYTES # BLD AUTO: 0.51 THOUSAND/ΜL (ref 0.17–1.22)
MONOCYTES NFR BLD AUTO: 9 % (ref 4–12)
NEUTROPHILS # BLD AUTO: 4.52 THOUSANDS/ΜL (ref 1.85–7.62)
NEUTS SEG NFR BLD AUTO: 73 % (ref 43–75)
NRBC BLD AUTO-RTO: 0 /100 WBCS
PLATELET # BLD AUTO: 157 THOUSANDS/UL (ref 149–390)
PMV BLD AUTO: 12.6 FL (ref 8.9–12.7)
POTASSIUM SERPL-SCNC: 4.5 MMOL/L (ref 3.5–5.3)
RBC # BLD AUTO: 3.18 MILLION/UL (ref 3.88–5.62)
SODIUM SERPL-SCNC: 141 MMOL/L (ref 135–147)
WBC # BLD AUTO: 6.01 THOUSAND/UL (ref 4.31–10.16)

## 2022-09-30 PROCEDURE — 99232 SBSQ HOSP IP/OBS MODERATE 35: CPT | Performed by: FAMILY MEDICINE

## 2022-09-30 PROCEDURE — 80048 BASIC METABOLIC PNL TOTAL CA: CPT | Performed by: INTERNAL MEDICINE

## 2022-09-30 PROCEDURE — 99222 1ST HOSP IP/OBS MODERATE 55: CPT | Performed by: NURSE PRACTITIONER

## 2022-09-30 PROCEDURE — 85025 COMPLETE CBC W/AUTO DIFF WBC: CPT | Performed by: FAMILY MEDICINE

## 2022-09-30 PROCEDURE — 99232 SBSQ HOSP IP/OBS MODERATE 35: CPT | Performed by: INTERNAL MEDICINE

## 2022-09-30 RX ORDER — IRON POLYSACCHARIDE COMPLEX 150 MG
150 CAPSULE ORAL DAILY
Qty: 30 CAPSULE | Refills: 0 | Status: SHIPPED | OUTPATIENT
Start: 2022-10-01 | End: 2022-10-08

## 2022-09-30 RX ADMIN — HEPARIN SODIUM 5000 UNITS: 5000 INJECTION INTRAVENOUS; SUBCUTANEOUS at 21:18

## 2022-09-30 RX ADMIN — DOXYCYCLINE 100 MG: 100 CAPSULE ORAL at 08:19

## 2022-09-30 RX ADMIN — BISOPROLOL FUMARATE 5 MG: 5 TABLET ORAL at 08:19

## 2022-09-30 RX ADMIN — POLYSACCHARIDE-IRON COMPLEX 150 MG: 150 CAPSULE ORAL at 08:19

## 2022-09-30 RX ADMIN — ATORVASTATIN CALCIUM 40 MG: 40 TABLET, FILM COATED ORAL at 17:29

## 2022-09-30 RX ADMIN — HEPARIN SODIUM 5000 UNITS: 5000 INJECTION INTRAVENOUS; SUBCUTANEOUS at 06:24

## 2022-09-30 RX ADMIN — ACETAMINOPHEN 650 MG: 325 TABLET ORAL at 17:31

## 2022-09-30 RX ADMIN — SODIUM BICARBONATE 650 MG: 650 TABLET ORAL at 08:19

## 2022-09-30 RX ADMIN — HEPARIN SODIUM 5000 UNITS: 5000 INJECTION INTRAVENOUS; SUBCUTANEOUS at 15:00

## 2022-09-30 RX ADMIN — SODIUM BICARBONATE 650 MG: 650 TABLET ORAL at 17:29

## 2022-09-30 RX ADMIN — AMLODIPINE BESYLATE 10 MG: 10 TABLET ORAL at 17:29

## 2022-09-30 NOTE — ASSESSMENT & PLAN NOTE
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

## 2022-09-30 NOTE — PROGRESS NOTES
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 Obstructive component, cardiorenal versus overall progression of CKD  -closely monitor for uremic symptoms  Harrison Churchr does have mild uremic symptoms including poor appetite, generalized weakness   Occasional nausea   -as per my discussion with patient, patient clearly refuses to consider dialysis if ever needed in the future  Harrison Churchr fully understands risks of not pursuing dialysis including death  Harrison Churchr verbalized understanding of my discussion with him   -appreciate palliative care input  -given stable renal function, if patient gets discharged, would recommend outpatient close Nephrology follow-up  Patient has showed interest in pursuing hospice care if his clinical condition declining      Access, currently has upper extremity AV fistula with bruit and thrill present     Horseshoe kidney with left renal moiety upper pole hydronephrosis similar to prior, status post left ureteral stent, persistent bilateral hydroureter although right-sided diminished, right-sided renal moiety atrophic and no hydronephrosis    -appreciate Urology input, consider Granda catheter as per Urology      Acute respiratory failure, oxygen requirement overall improving, now remains on 1 L O2     -echo shows EF 60%, mild to moderate AR, mild-to-moderate TR, normal IVC, no pericardial effusion   -chest x-ray concerning for moderate pulmonary congestion  -status post Lasix earlier, no monitoring off diuretics      Metabolic acidosis, bicarb level overall improved and stable  Continue sodium bicarb one tablet p o  B i d        Anemia in CKD, hemoglobin overall stable, transfuse p r n  for hemoglobin less than seven    -iron saturation 10% with ferritin 89   - now remains on p o  iron supplements      Discussed above plan in detail with primary team    SUBJECTIVE:  Patient seen and examined at bedside  Complaining of feeling tired  Does have fair appetite  Denies any nausea or vomiting today      OBJECTIVE:  Current Weight: Weight - Scale: 73 8 kg (162 lb 11 2 oz)  Vitals:    09/30/22 1156   BP: 152/56   Pulse: 64   Resp: 17   Temp: 98 8 °F (37 1 °C)   SpO2: 94%       Intake/Output Summary (Last 24 hours) at 9/30/2022 1205  Last data filed at 9/30/2022 0845  Gross per 24 hour   Intake 120 ml   Output 1650 ml   Net -1530 ml     Wt Readings from Last 3 Encounters:   09/30/22 73 8 kg (162 lb 11 2 oz)   01/31/22 72 6 kg (160 lb)   09/14/21 72 9 kg (160 lb 12 8 oz)     Temp Readings from Last 3 Encounters:   09/30/22 98 8 °F (37 1 °C) (Temporal)   10/04/21 (!) 97 °F (36 1 °C) (Tympanic)   09/14/21 98 °F (36 7 °C) (Temporal)     BP Readings from Last 3 Encounters:   09/30/22 152/56   01/31/22 158/71   10/04/21 158/71     Pulse Readings from Last 3 Encounters:   09/30/22 64   01/31/22 78   10/04/21 73        Physical Examination:  General:  Lying in bed, no acute distress   Eyes:  Mild conjunctival pallor present  ENT:  External examination of ears and nose unremarkable  Neck:  No obvious lymphadenopathy appreciated  Respiratory:  Bilateral air entry present  CVS:  S1, S2 present  GI:  Soft, nondistended  CNS:  Active, alert, oriented x3  Skin:  No new rash  Musculoskeletal:  No obvious new gross deformity noted    Medications:    Current Facility-Administered Medications:     acetaminophen (TYLENOL) tablet 650 mg, 650 mg, Oral, Q6H PRN, Rissa Rodriguez MD    amLODIPine (NORVASC) tablet 10 mg, 10 mg, Oral, QPM, [facility], PAHaydenC, 10 mg at 09/28/22 1732    atorvastatin (LIPITOR) tablet 40 mg, 40 mg, Oral, Daily With Leonides Ryan MD, 40 mg at 09/29/22 1703    bisoprolol (ZEBETA) tablet 5 mg, 5 mg, Oral, Daily, Jamie Silver MD, 5 mg at 09/30/22 0819    doxycycline hyclate (VIBRAMYCIN) capsule 100 mg, 100 mg, Oral, Q24H [facility], , 100 mg at 09/30/22 8433    heparin (porcine) subcutaneous injection 5,000 Units, 5,000 Units, Subcutaneous, Q8H [facility], Jamie Silver MD, 5,000 Units at 09/30/22 0624    iron polysaccharides (FERREX) capsule 150 mg, 150 mg, Oral, Daily, [facility], PA-C, 150 mg at 09/30/22 0819    ondansetron (ZOFRAN) injection 4 mg, 4 mg, Intravenous, Q6H PRN, Jamie Silver MD, 4 mg at 09/26/22 0820    permethrin (NIX) 1 % topical liquid, , Topical, Once, Lakshmi Pino PA-C    sodium bicarbonate tablet 650 mg, 650 mg, Oral, BID after meals, Alvarez Ang MD, 650 mg at 09/30/22 7987    Laboratory Results:  Results from last 7 days   Lab Units 09/30/22  0616 09/29/22  0517 09/28/22  0520 09/27/22  0524 09/26/22  0517 09/25/22  0526 09/24/22  1614   WBC Thousand/uL 6 01  --  5 84 6 46 5 63 6 09 7 02   HEMOGLOBIN g/dL 9 1*  --  8 6* 9 2* 8 6* 8 6* 9 5*   HEMATOCRIT % 29 9*  --  27 9* 31 3* 28 6* 29 2* 31 2*   PLATELETS Thousands/uL 157  --  133* 171 156 180 178   SODIUM mmol/L 141 141 142 142 144 145 142   POTASSIUM mmol/L 4 5 4 9 4 6 4 9 4 6 4 9 6 1*   CHLORIDE mmol/L 105 107 107 107 110* 113* 110*   CO2 mmol/L 23 25 23 23 21 20* 15*   BUN mg/dL 95* 95* 85* 79* 77* 69* 73*   CREATININE mg/dL 5 28* 5 21* 5 22* 4 69* 4 44* 4 30* 4 20*   CALCIUM mg/dL 8 2* 8 1* 8 2* 8 3 8 4 8 5 8 9       US kidney and bladder   Final Result by Estrellita Aguila MD (09/28 1968)      1  Bilateral hydroureter with left nephroureteral stent in place an severe dilatation of the left upper pole collecting system as seen on CT        2   Horseshoe kidney with severe cortical thinning, right greater than left  Workstation performed: [id number]         CT abdomen pelvis wo contrast   Final Result by Kalia Lemus MD (09/25 1154)      New moderate right pleural effusion incompletely imaged with passive atelectasis in the right lower lobe  There is hydronephrosis involving the upper pole of the left renal moiety which seems similar to the prior exam   The left-sided ureteral stent is stable in position extending from the renal pelvis to the bladder  There is persistent bilateral hydroureter although the right side is diminished since the prior exam and the right-sided renal moiety is atrophic and no longer shows hydronephrosis  Mild anterior bladder wall thickening of uncertain significance  Correlate with urinalysis to exclude cystitis  Right inguinal hernia contains a loop of small bowel, without evidence of structures at this time  Workstation performed: [id number]         XR chest 1 view portable   ED Interpretation by Sumanth Meng DO (09/24 1726)   See below      Final Result by Ramo Jones MD (09/24 1654)      Moderate right effusion and right base opacity which could be due to atelectasis and/or pneumonia  Trace left effusion  Moderate pulmonary venous congestion  Workstation performed: [id number]             Portions of the record may have been created with voice recognition software  Occasional wrong word or "sound a like" substitutions may have occurred due to the inherent limitations of voice recognition software  Read the chart carefully and recognize, using context, where substitutions have occurred

## 2022-09-30 NOTE — ASSESSMENT & PLAN NOTE
Wt Readings from Last 3 Encounters:   09/30/22 73 8 kg (162 lb 11 2 oz)   01/31/22 72 6 kg (160 lb)   09/14/21 72 9 kg (160 lb 12 8 oz)     Prior 2D echo showing preserved EF back in January 2022  · ProBNP greater than 20,000 on admission  · Imaging consistent with vascular congestion and right-sided pleural effusion  · Suspect likely in the setting of LORENZA on CKD stage 5  · Per my discussion with Nephrology will hold off additional Lasix today  · Monitor volume status

## 2022-09-30 NOTE — QUICK NOTE
Full consult to follow- have reviewed films, bladder is distended with left stent in proper position- I think a hurley catheter may benefit the patient in terms of renal function

## 2022-09-30 NOTE — PLAN OF CARE
Problem: Potential for Falls  Goal: Patient will remain free of falls  Description: INTERVENTIONS:  - Educate patient/family on patient safety including physical limitations  - Instruct patient to call for assistance with activity   - Consult OT/PT to assist with strengthening/mobility   - Keep Call bell within reach  - Keep bed low and locked with side rails adjusted as appropriate  - Keep care items and personal belongings within reach  - Initiate and maintain comfort rounds  - Make Fall Risk Sign visible to staff  - Offer Toileting every 2 Hours, in advance of need  - Initiate/Maintain bed alarm  - Obtain necessary fall risk management equipment: bed alarm  - Apply yellow socks and bracelet for high fall risk patients  - Consider moving patient to room near nurses station  Outcome: Progressing     Problem: MOBILITY - ADULT  Goal: Maintain or return to baseline ADL function  Description: INTERVENTIONS:  -  Assess patient's ability to carry out ADLs; assess patient's baseline for ADL function and identify physical deficits which impact ability to perform ADLs (bathing, care of mouth/teeth, toileting, grooming, dressing, etc )  - Assess/evaluate cause of self-care deficits   - Assess range of motion  - Assess patient's mobility; develop plan if impaired  - Assess patient's need for assistive devices and provide as appropriate  - Encourage maximum independence but intervene and supervise when necessary  - Involve family in performance of ADLs  - Assess for home care needs following discharge   - Consider OT consult to assist with ADL evaluation and planning for discharge  - Provide patient education as appropriate  Outcome: Progressing  Goal: Maintains/Returns to pre admission functional level  Description: INTERVENTIONS:  - Perform BMAT or MOVE assessment daily    - Set and communicate daily mobility goal to care team and patient/family/caregiver     - Collaborate with rehabilitation services on mobility goals if consulted  - Ambulate patient 3 times a day  - Out of bed to chair 3 times a day   - Out of bed for meals 3 times a day  - Out of bed for toileting  - Record patient progress and toleration of activity level   Outcome: Progressing     Problem: PAIN - ADULT  Goal: Verbalizes/displays adequate comfort level or baseline comfort level  Description: Interventions:  - Encourage patient to monitor pain and request assistance  - Assess pain using appropriate pain scale  - Administer analgesics based on type and severity of pain and evaluate response  - Implement non-pharmacological measures as appropriate and evaluate response  - Consider cultural and social influences on pain and pain management  - Notify physician/advanced practitioner if interventions unsuccessful or patient reports new pain  Outcome: Progressing     Problem: INFECTION - ADULT  Goal: Absence or prevention of progression during hospitalization  Description: INTERVENTIONS:  - Assess and monitor for signs and symptoms of infection  - Monitor lab/diagnostic results  - Monitor all insertion sites, i e  indwelling lines, tubes, and drains  - Monitor endotracheal if appropriate and nasal secretions for changes in amount and color  - [place] appropriate cooling/warming therapies per order  - Administer medications as ordered  - Instruct and encourage patient and family to use good hand hygiene technique  - Identify and instruct in appropriate isolation precautions for identified infection/condition  Outcome: Progressing     Problem: SAFETY ADULT  Goal: Patient will remain free of falls  Description: INTERVENTIONS:  - Educate patient/family on patient safety including physical limitations  - Instruct patient to call for assistance with activity   - Consult OT/PT to assist with strengthening/mobility   - Keep Call bell within reach  - Keep bed low and locked with side rails adjusted as appropriate  - Keep care items and personal belongings within reach  - Initiate and maintain comfort rounds  - Make Fall Risk Sign visible to staff  - Offer Toileting every 2 Hours, in advance of need  - Initiate/Maintain bed alarm  - Obtain necessary fall risk management equipment: bed alarm  - Apply yellow socks and bracelet for high fall risk patients  - Consider moving patient to room near nurses station  Outcome: Progressing  Goal: Maintain or return to baseline ADL function  Description: INTERVENTIONS:  -  Assess patient's ability to carry out ADLs; assess patient's baseline for ADL function and identify physical deficits which impact ability to perform ADLs (bathing, care of mouth/teeth, toileting, grooming, dressing, etc )  - Assess/evaluate cause of self-care deficits   - Assess range of motion  - Assess patient's mobility; develop plan if impaired  - Assess patient's need for assistive devices and provide as appropriate  - Encourage maximum independence but intervene and supervise when necessary  - Involve family in performance of ADLs  - Assess for home care needs following discharge   - Consider OT consult to assist with ADL evaluation and planning for discharge  - Provide patient education as appropriate  Outcome: Progressing  Goal: Maintains/Returns to pre admission functional level  Description: INTERVENTIONS:  - Perform BMAT or MOVE assessment daily    - Set and communicate daily mobility goal to care team and patient/family/caregiver     - Collaborate with rehabilitation services on mobility goals if consulted  - Stand patient 3 times a day  - Ambulate patient 3 times a day  - Out of bed to chair 3 times a day   - Out of bed for meals 3 times a day  - Out of bed for toileting  - Record patient progress and toleration of activity level   Outcome: Progressing     Problem: DISCHARGE PLANNING  Goal: Discharge to home or other facility with appropriate resources  Description: INTERVENTIONS:  - Identify barriers to discharge w/patient and caregiver  - Arrange for needed discharge resources and transportation as appropriate  - Identify discharge learning needs (meds, wound care, etc )  - Arrange for interpretive services to assist at discharge as needed  - Refer to Case Management Department for coordinating discharge planning if the patient needs post-hospital services based on physician/advanced practitioner order or complex needs related to functional status, cognitive ability, or social support system  Outcome: Progressing     Problem: Knowledge Deficit  Goal: Patient/family/caregiver demonstrates understanding of disease process, treatment plan, medications, and discharge instructions  Description: Complete learning assessment and assess knowledge base    Interventions:  - Provide teaching at level of understanding  - Provide teaching via preferred learning methods  Outcome: Progressing     Problem: RESPIRATORY - ADULT  Goal: Achieves optimal ventilation and oxygenation  Description: INTERVENTIONS:  - Assess for changes in respiratory status  - Assess for changes in mentation and behavior  - Position to facilitate oxygenation and minimize respiratory effort  - Oxygen administered by appropriate delivery if ordered  - Initiate smoking cessation education as indicated  - Encourage broncho-pulmonary hygiene including cough, deep breathe, Incentive Spirometry  - Assess the need for suctioning and aspirate as needed  - Assess and instruct to report SOB or any respiratory difficulty  - Respiratory Therapy support as indicated  Outcome: Progressing     Problem: COPING  Goal: Pt/Family able to verbalize concerns and demonstrate effective coping strategies  Description: INTERVENTIONS:  - Assist patient/family to identify coping skills, available support systems and cultural and spiritual values  - Provide emotional support, including active listening and acknowledgement of concerns of patient and caregivers  - Reduce environmental stimuli, as able  - Provide patient education  - Assess for spiritual pain/suffering and initiate spiritual care, including notification of Pastoral Care or jav[place] based community as needed  - Assess effectiveness of coping strategies  Outcome: Progressing  Goal: Will report anxiety at manageable levels  Description: INTERVENTIONS:  - Administer medication as ordered  - Teach and encourage coping skills  - Provide emotional support  - Assess patient/family for anxiety and ability to cope  Outcome: Progressing

## 2022-09-30 NOTE — CONSULTS
Consult - Urology   Hector Goodpasture 1945, 78 y o  male MRN: [id number]    Unit/Bed#: E4 -01 Encounter: [id number]    * Acute kidney injury superimposed on CKD [facility])  Assessment & Plan  Lab Results   Component Value Date    EGFR 9 09/30/2022    EGFR 9 09/29/2022    EGFR 9 09/28/2022    CREATININE 5 28 (H) 09/30/2022    CREATININE 5 21 (H) 09/29/2022    CREATININE 5 22 (H) 09/28/2022   · Baseline creatinine 3 5-4  · Ultrasound kidney bladder:  Distended bladder with mild thickening and trabeculation, bilateral hydroureter, left nephroureteral stent in correct position, horseshoe kidney, severe cortical thinning right > left  · 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 replaced 08/04/2022 by [facility] urology  · Recommend Granda catheter for at least CIC-patient refuses; he is aware of risk of progression of kidney failure secondary to inadequate bladder emptying  · Check PVR Q shift  · Medical optimization per primary team and Nephrology  ·   Since patient is declining intervention,Urology will sign off but remain available for any further inpatient needs  Please feel free to contact the provider currently covering the Urology [facility] role for this campus with questions or concerns  Subjective:   79 yo male with a history of CKD stage 4  His baseline creatinine of 3 5-4  He presented to the hospital with a creatinine of 4 2 which has increased to 5 2  He follows with [facility] Nephrology for the same  He has a right upper extremity AV fistula with positive bruit and thrill  Patient is adamant about not having dialysis and voices understanding of the risk of worsening kidney function as well as death  He also has a history of a horseshoe kidney, chronic hydronephrosis due to left ureteropelvic junction obstruction with chronic left ureteral stent replaced by [facility] urology 08/04/2022    His CT of the abdomen pelvis 09/25/2022 revealed that the left nephroureteral stent was in correct position  He had a follow-up ultrasound kidney bladder 09/27/2022 that reveals bilateral hydroureter with left nephroureteral stent again visualized  He has a horseshoe kidney with severe cortical thinning right > left  His bladder was distended with mild thickening and trabeculation  He denies any difficulty urinating  And he adamantly declines Granda catheter placement  We also discussed intermittent CIC which he also declines  We discussed that inadequate bladder emptying can cause his kidney function to progress to complete failure and ultimately death  He voiced understanding of the same  He still is not interested in Granda catheter or CIC  He denies family or personal hx of prostate cancer  He refused ROSCOE today  He has been seen by palliative care    Review of Systems   Constitutional: Positive for appetite change and fatigue  Negative for activity change, chills, fever and unexpected weight change  HENT: Negative for facial swelling  Teeth and gum discomfort   Eyes: Negative for discharge  Respiratory: Negative  Negative for cough and shortness of breath  Cardiovascular: Positive for leg swelling  Negative for chest pain  Gastrointestinal: Positive for diarrhea and nausea  Negative for abdominal distention, abdominal pain, constipation and vomiting  Intermittent abdominal cramping associated with diarrhea   Endocrine: Negative  Genitourinary: Negative  Negative for decreased urine volume, difficulty urinating, dysuria, enuresis, flank pain, frequency, genital sores, hematuria and urgency  Musculoskeletal: Negative for back pain and myalgias  Skin: Negative for pallor and rash  Allergic/Immunologic: Negative  Negative for immunocompromised state  Neurological: Negative for facial asymmetry and speech difficulty  Psychiatric/Behavioral: Positive for sleep disturbance  Negative for agitation and confusion         Objective:  Vitals: Blood pressure 152/56, pulse 64, temperature 98 8 °F (37 1 °C), temperature source Temporal, resp  rate 17, weight 73 8 kg (162 lb 11 2 oz), SpO2 94 %  ,Body mass index is 24 74 kg/m²  Physical Exam  Vitals and nursing note reviewed  Constitutional:       General: He is not in acute distress  Appearance: Normal appearance  He is normal weight  He is not ill-appearing, toxic-appearing or diaphoretic  Comments: Chronically ill-appearing   HENT:      Head: Normocephalic  Pulmonary:      Effort: Pulmonary effort is normal  No respiratory distress  Abdominal:      General: Abdomen is flat  There is no distension  Tenderness: There is no abdominal tenderness  There is no guarding or rebound  Genitourinary:     Penis: Circumcised  No hypospadias, erythema, tenderness, discharge, swelling or lesions  Testes:         Right: Mass, tenderness or swelling not present  Left: Mass, tenderness or swelling not present  Comments: Declined ROSCOE  Musculoskeletal:         General: No swelling  Cervical back: Normal range of motion  Right lower leg: Edema present  Skin:     General: Skin is warm and dry  Neurological:      General: No focal deficit present  Mental Status: He is alert and oriented to person, place, and time  Psychiatric:         Mood and Affect: Mood normal          Behavior: Behavior normal          Thought Content: Thought content normal          Judgment: Judgment normal          Imaging:  US Renal: 9/27/2022   RENAL ULTRASOUND     INDICATION:   hydronephrosis, LORENZA with hx of horseshoe kidney      COMPARISON: 9/25/2022, 9/30/2021, 9/12/2021     TECHNIQUE:   Ultrasound of the retroperitoneum was performed with a curvilinear transducer utilizing volumetric sweeps and still imaging techniques       FINDINGS:     KIDNEYS:  There is a horseshoe kidney  Right kidney:  13 4 x 7 3 x 3 4 cm  Volume 173 7 mL  Left kidney:  15 7 x 5 1 x 4 7 cm    Volume 198 7 mL     Right kidney  There is severe diffuse cortical thinning  No mass is identified  No hydronephrosis  No shadowing calculi  No perinephric fluid collections      Left kidney  There is diffuse cortical thinning  No mass is identified  There is severe dilatation of the upper pole collecting system  Lower pole is decompressed  No shadowing calculi  No perinephric fluid collections      URETERS:  There is bilateral hydroureter  Left nephroureteral stent in place      BLADDER:   Normally distended  The bladder is mildly thickened and trabeculated  No focal thickening or mass lesions  The distal portion of a left nephroureteral stent is visualized           There is mild perihepatic ascites      IMPRESSION:     1   Bilateral hydroureter with left nephroureteral stent in place an severe dilatation of the left upper pole collecting system as seen on CT      2   Horseshoe kidney with severe cortical thinning, right greater than left  CT ABDOMEN AND PELVIS WITHOUT IV CONTRAST     INDICATION:   Hydronephrosis  LORENZA, hx of horseshoe kidney with ureteral stent      COMPARISON:  9/12/2021     TECHNIQUE:  CT examination of the abdomen and pelvis was performed without intravenous contrast  Axial, sagittal, and coronal 2D reformatted images were created from the source data and submitted for interpretation       Radiation dose length product (DLP) for this visit:  425 mGy-cm   This examination, like all CT scans performed in the [facility], was performed utilizing techniques to minimize radiation dose exposure, including the use of iterative   reconstruction and automated exposure control       Enteric contrast was not administered       FINDINGS:     ABDOMEN     LOWER CHEST:  Moderate right pleural effusion incompletely imaged      Atelectasis right lung base    Trace left pleural effusion      LIVER/BILIARY TREE:  Unremarkable      GALLBLADDER:  There are gallstones      SPLEEN:  Unremarkable      PANCREAS: Unremarkable      ADRENAL GLANDS:  Unremarkable      KIDNEYS/URETERS:  Horseshoe kidney  The right moiety is severely atrophic  There is hydronephrosis of the left moiety predominantly involving the upper pole collecting system  There is a stent within the left renal pelvis extending along the left   ureter (which is dilated) and into the upper portion of the urinary bladder  The ureter apparently inserts anomalously on the bladder  The right-sided atrophic renal moiety does not seem hydronephrotic, however, the right ureter is dilated and tortuous   down to the level of the pelvis  It is not confidently seen attaching to the urinary bladder  There may be a stricture along the distal portion of the right ureter  It is a little bit less dilated than on the prior exam      STOMACH AND BOWEL:  There is no bowel obstruction  Evaluation of bowel is limited without any contrast material   There is a portion of the distal small bowel which is contained within a right inguinal hernia, without evidence of incarceration      APPENDIX:  No findings to suggest appendicitis      ABDOMINOPELVIC CAVITY:  No ascites  No pneumoperitoneum  No lymphadenopathy      VESSELS:  Atherosclerotic changes are present  No evidence of aneurysm  IVC filter present      PELVIS     REPRODUCTIVE ORGANS:  Unremarkable for patient's age      URINARY BLADDER:  Bladder is moderately distended with urine  There is mild anterior bladder wall thickening  There is a diverticulum posteriorly towards the left  No stones    No obvious masses      ABDOMINAL WALL/INGUINAL REGIONS:  Right inguinal hernia containing nonobstructed loop of small bowel      OSSEOUS STRUCTURES:  No acute fracture or destructive osseous lesion      IMPRESSION:     New moderate right pleural effusion incompletely imaged with passive atelectasis in the right lower lobe      There is hydronephrosis involving the upper pole of the left renal moiety which seems similar to the prior exam   The left-sided ureteral stent is stable in position extending from the renal pelvis to the bladder      There is persistent bilateral hydroureter although the right side is diminished since the prior exam and the right-sided renal moiety is atrophic and no longer shows hydronephrosis      Mild anterior bladder wall thickening of uncertain significance  Correlate with urinalysis to exclude cystitis      Right inguinal hernia contains a loop of small bowel, without evidence of structures at this time      Imaging reviewed - both report and images personally reviewed  Labs:  Recent Labs     22  0520 22  0616   WBC 5 84 6 01     Recent Labs     22  0520 22  0616   HGB 8 6* 9 1*       Recent Labs     22  0520 22  0517 22  0616   CREATININE 5 22* 5 21* 5 28*       Microbiology:n/a    History:  Social History     Socioeconomic History    Marital status:      Spouse name: None    Number of children: None    Years of education: None    Highest education level: None   Occupational History    None   Tobacco Use    Smoking status: Former Smoker     Packs/day: 1 00     Types: Cigarettes     Quit date: 1993     Years since quittin 7    Smokeless tobacco: Never Used   Vaping Use    Vaping Use: Never used   Substance and Sexual Activity    Alcohol use: Not Currently    Drug use: Never    Sexual activity: None   Other Topics Concern    None   Social History Narrative    None     Social Determinants of Health     Financial Resource Strain: Not on file   Food Insecurity: No Food Insecurity    Worried About Running Out of Food in the Last Year: Never true    Timothy of Food in the Last Year: Never true   Transportation Needs: No Transportation Needs    Lack of Transportation (Medical): No    Lack of Transportation (Non-Medical):  No   Physical Activity: Not on file   Stress: Not on file   Social Connections: Not on file   Intimate Partner Violence: Not on file   Housing Stability: Low Risk     Unable to Pay for Housing in the Last Year: No    Number of Places Lived in the Last Year: 1    Unstable Housing in the Last Year: No     Financial Resource Strain: Not on file   Food Insecurity: No Food Insecurity    Worried About [facility] in the Last Year: Never true    [address] in the Last Year: Never true   Transportation Needs: No Transportation Needs    Lack of Transportation (Medical): No    Lack of Transportation (Non-Medical): No   Physical Activity: Not on file   Stress: Not on file   Social Connections: Not on file   Intimate Partner Violence: Not on file   Housing Stability: Low Risk     Unable to Pay for Housing in the Last Year: No    Number of Places Lived in the Last Year: 1    Unstable Housing in the Last Year: No      Diagnosis Date    Coronary artery disease     Renal disorder      Past Surgical History:   Procedure Laterality Date    AORTIC VALVE REPLACEMENT  2005    Tissue valve    BLADDER SURGERY      23 yrs ago      CORONARY ARTERY BYPASS GRAFT  2005    x1    RENAL ARTERY STENT  11/2005     Family History   Problem Relation Age of Onset    Diabetes Mother     Hypertension Mother     Dementia Mother     Other Father         fall gangrene    Peripheral vascular disease Sister        The following portions of the patient's history were reviewed and updated as appropriate: allergies, current medications, past family history, past medical history, past social history, past surgical history and problem list    Ivy Montoya  Date: 9/30/2022 Time: 1:04 PM

## 2022-09-30 NOTE — ASSESSMENT & PLAN NOTE
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 not on dialysis currently  · Follows with [facility]  · Creatinine baseline previously was around 3  · With AG metabolic acidosis, continuing to improve on sodium bicarb supplementation  · Creatinine plateaued  The patient is status post AV fistula placement, however, he states that due to his advanced age he is not interested in hemodialysis  The patient was also evaluated by Urology with recommendations to consider Granda catheter placement, however, the patient declined  Appreciate palliative Care input  Per patient's wishes well consult hospice with considerations to consider to hospice care    · CT abdomen pelvis with evidence of hydroureter bilaterally and with a left ureteral stent in place however unchanged from previous  · Request Urology consult due to increasing creatinine after results of repeat renal ultrasound

## 2022-09-30 NOTE — ASSESSMENT & PLAN NOTE
History of horseshoe kidney, with chronic hydronephrosis with left urethral stent in place  · CT abdominal pelvis showing hydronephrosis involving the upper pole of the left renal moiety unchanged from prior exam   The left-sided ureteral stent is stable in position extending from the renal pelvis to the bladder  · Patient does follow with Urology at [facility]  · With persistent hydronephrosis and rising creatinine Urology evaluation was requested, urology recommended trial of Granda catheter, however, the patient declines  Plan for hospice consult per patient's wishes

## 2022-09-30 NOTE — PROGRESS NOTES
[facility] [facility]  Progress Note - [facility] Setting 1945, 78 y o  male MRN: [id number]  Unit/Bed#: E4 -01 Encounter: [id number]  Primary Care Provider: Karen Ng MD   Date and time admitted to hospital: 9/24/2022  3:21 PM    * Acute kidney injury superimposed on CKD [facility])  Assessment & Plan  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 not on dialysis currently  · Follows with [facility]  · Creatinine baseline previously was around 3  · With AG metabolic acidosis, continuing to improve on sodium bicarb supplementation  · Creatinine plateaued  The patient is status post AV fistula placement, however, he states that due to his advanced age he is not interested in hemodialysis  The patient was also evaluated by Urology with recommendations to consider Granda catheter placement, however, the patient declined  Appreciate palliative Care input  Per patient's wishes well consult hospice with considerations to consider to hospice care    · CT abdomen pelvis with evidence of hydroureter bilaterally and with a left ureteral stent in place however unchanged from previous  · Request Urology consult due to increasing creatinine after results of repeat renal ultrasound    Acute respiratory failure with hypoxia ([address] )  Assessment & Plan  · Required 3 L NC on admission, now on 1 L NC  · Wean oxygen as able  · Oxygen requirement study prior to discharge    Acute diastolic heart failure (HCC)  Assessment & Plan  Wt Readings from Last 3 Encounters:   09/30/22 73 8 kg (162 lb 11 2 oz) 01/31/22 72 6 kg (160 lb)   09/14/21 72 9 kg (160 lb 12 8 oz)     Prior 2D echo showing preserved EF back in January 2022  · ProBNP greater than 20,000 on admission  · Imaging consistent with vascular congestion and right-sided pleural effusion  · Suspect likely in the setting of LORENZA on CKD stage 5  · Per my discussion with Nephrology will hold off additional Lasix today  · Monitor volume status    Anemia of chronic disease  Assessment & Plan  Hemoglobin around 9  · No acute signs of bleeding at this time  · Iron panel reveals iron deficiency  · Will proceed with iron infusion     Horseshoe kidney  Assessment & Plan  History of horseshoe kidney, with chronic hydronephrosis with left urethral stent in place  · CT abdominal pelvis showing hydronephrosis involving the upper pole of the left renal moiety unchanged from prior exam   The left-sided ureteral stent is stable in position extending from the renal pelvis to the bladder  · Patient does follow with [facility]rology at [facility]  · With persistent hydronephrosis and rising creatinine Urology evaluation was requested, urology recommended trial of Granda catheter, however, the patient declines  Plan for hospice consult per patient's wishes  HLD (hyperlipidemia)  Assessment & Plan  · Continue statin    HTN (hypertension)  Assessment & Plan  Continue bisoprolol and amlodipine  · Monitor blood pressures  · -140        VTE Pharmacologic Prophylaxis:   heparin     Patient Centered Rounds: I performed bedside rounds with nursing staff today  Discussions with Specialists or Other Care Team Provider:  Palliative Care, Case Management    Education and Discussions with Family / Patient: Patient  Time Spent for Care: 30 minutes  More than 50% of total time spent on counseling and coordination of care as described above      Current Length of Stay: 6 day(s)  Current Patient Status: Inpatient   Certification Statement: The patient will continue to require additional inpatient hospital stay due to Discharge planning  Discharge Plan: Evaluated for hospice    Code Status: Level 3 - DNAR and DNI    Subjective:   Patient seen and examined  No new complaints, no overnight events  Objective:     Vitals:   Temp (24hrs), Av °F (37 2 °C), Min:98 5 °F (36 9 °C), Max:99 4 °F (37 4 °C)    Temp:  [98 5 °F (36 9 °C)-99 4 °F (37 4 °C)] 98 8 °F (37 1 °C)  HR:  [61-66] 64  Resp:  [17-18] 17  BP: (124-152)/(42-65) 140/62  SpO2:  [89 %-94 %] 89 %  Body mass index is 24 74 kg/m²  Input and Output Summary (last 24 hours): Intake/Output Summary (Last 24 hours) at 2022 1523  Last data filed at 2022 0845  Gross per 24 hour   Intake 120 ml   Output 1650 ml   Net -1530 ml       Physical Exam:   Physical Exam  Constitutional:       General: He is not in acute distress  HENT:      Head: Normocephalic and atraumatic  Nose: No congestion  Mouth/Throat:      Pharynx: Oropharynx is clear  Eyes:      Conjunctiva/sclera: Conjunctivae normal    Cardiovascular:      Rate and Rhythm: Normal rate and regular rhythm  Heart sounds: No murmur heard  Pulmonary:      Effort: No respiratory distress  Breath sounds: No wheezing or rales  Abdominal:      General: There is no distension  Tenderness: There is no abdominal tenderness  There is no guarding  Musculoskeletal:      Right lower leg: Edema present  Left lower leg: Edema present  Skin:     General: Skin is warm and dry  Neurological:      Mental Status: He is oriented to person, place, and time            Additional Data:     Labs:  Results from last 7 days   Lab Units 22  0616   WBC Thousand/uL 6 01   HEMOGLOBIN g/dL 9 1*   HEMATOCRIT % 29 9*   PLATELETS Thousands/uL 157   NEUTROS PCT % 73   LYMPHS PCT % 13*   MONOS PCT % 9   EOS PCT % 3     Results from last 7 days   Lab Units 22  0616 22  0526 22  1614   SODIUM mmol/L 141   < > 142   POTASSIUM mmol/L 4 5   < > 6 1* CHLORIDE mmol/L 105   < > 110*   CO2 mmol/L 23   < > 15*   BUN mg/dL 95*   < > 73*   CREATININE mg/dL 5 28*   < > 4 20*   ANION GAP mmol/L 13   < > 17*   CALCIUM mg/dL 8 2*   < > 8 9   ALBUMIN g/dL  --   --  3 4*   TOTAL BILIRUBIN mg/dL  --   --  1 33*   ALK PHOS U/L  --   --  77   ALT U/L  --   --  19   AST U/L  --   --  38   GLUCOSE RANDOM mg/dL 91   < > 141*    < > = values in this interval not displayed  Results from last 7 days   Lab Units 09/24/22  1614   INR  1 29*                   Lines/Drains:  Invasive Devices  Report    Line  Duration           Hemodialysis AV Fistula 09/11/21 Right Upper arm 384 days                      Imaging: No pertinent imaging reviewed  Recent Cultures (last 7 days):         Last 24 Hours Medication List:   Current Facility-Administered Medications   Medication Dose Route Frequency Provider Last Rate    acetaminophen  650 mg Oral Q6H PRN Arlene Dave MD      amLODIPine  10 mg Oral QPM [place], [place]      atorvastatin  40 mg Oral Daily With Jil Mejia MD      bisoprolol  5 mg Oral Daily Arlene Dave MD      doxycycline hyclate  100 mg Oral Q24H [facility],       heparin (porcine)  5,000 Units Subcutaneous [facility] [address] Arlene Dave MD      iron polysaccharides  150 mg Oral Daily [facility]      ondansetron  4 mg Intravenous Q6H PRN Arlene Dave MD      permethrin   Topical Once [facility], [facility]      sodium bicarbonate  650 mg Oral BID after meals Ivonne Garrison MD          Today, Patient Was Seen By: Severo Fallow    **Please Note: This note may have been constructed using a voice recognition system  **

## 2022-09-30 NOTE — ASSESSMENT & PLAN NOTE
Hemoglobin around 9  · No acute signs of bleeding at this time  · Iron panel reveals iron deficiency  · Will proceed with iron infusion

## 2022-09-30 NOTE — QUICK NOTE
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  He has a chronic UPJ obstruction on the left managed with lower pole moiety stent last changed by [facility] August 4, 2022  He follows with Nephrology baseline creatinine is 3-4  He presented to the hospital with creatinine of 5 2 and markedly distended bladder  He also has hydroureteronephrosis on the left despite lower pole stent and increased hydronephrosis of the upper pole moiety  He refuses indwelling catheter or CIC  He has a Vas-Cath in place for dialysis but is not interested in dialysis  He is being seen by palliative care and may consider transitioning to hospice  From our perspective would recommend Granda catheter placement and monitoring kidney function  If kidney function does not improve would proceed with exchange of the lower pole moiety stent on the left as well as placement of an upper pole moiety stent  The patient was counseled on the risks of no intervention including proceeding to complete renal failure and death by my AP who is on site at [facility] and still refuses intervention  In light of this it is reasonable to  discharge the patient if he so desires

## 2022-10-01 PROBLEM — R53.81 PHYSICAL DECONDITIONING: Status: ACTIVE | Noted: 2022-10-01

## 2022-10-01 PROCEDURE — 99232 SBSQ HOSP IP/OBS MODERATE 35: CPT | Performed by: FAMILY MEDICINE

## 2022-10-01 PROCEDURE — 99233 SBSQ HOSP IP/OBS HIGH 50: CPT | Performed by: STUDENT IN AN ORGANIZED HEALTH CARE EDUCATION/TRAINING PROGRAM

## 2022-10-01 RX ADMIN — BISOPROLOL FUMARATE 5 MG: 5 TABLET ORAL at 08:46

## 2022-10-01 RX ADMIN — SODIUM BICARBONATE 650 MG: 650 TABLET ORAL at 08:46

## 2022-10-01 RX ADMIN — ACETAMINOPHEN 650 MG: 325 TABLET ORAL at 06:14

## 2022-10-01 RX ADMIN — DOXYCYCLINE 100 MG: 100 CAPSULE ORAL at 08:46

## 2022-10-01 RX ADMIN — HEPARIN SODIUM 5000 UNITS: 5000 INJECTION INTRAVENOUS; SUBCUTANEOUS at 14:39

## 2022-10-01 RX ADMIN — ATORVASTATIN CALCIUM 40 MG: 40 TABLET, FILM COATED ORAL at 17:10

## 2022-10-01 RX ADMIN — AMLODIPINE BESYLATE 10 MG: 10 TABLET ORAL at 17:10

## 2022-10-01 RX ADMIN — SODIUM BICARBONATE 650 MG: 650 TABLET ORAL at 17:10

## 2022-10-01 RX ADMIN — HEPARIN SODIUM 5000 UNITS: 5000 INJECTION INTRAVENOUS; SUBCUTANEOUS at 22:22

## 2022-10-01 RX ADMIN — POLYSACCHARIDE-IRON COMPLEX 150 MG: 150 CAPSULE ORAL at 08:46

## 2022-10-01 NOTE — ASSESSMENT & PLAN NOTE
Wt Readings from Last 3 Encounters:   10/01/22 72 9 kg (160 lb 11 5 oz)   01/31/22 72 6 kg (160 lb)   09/14/21 72 9 kg (160 lb 12 8 oz)     Prior 2D echo showing preserved EF back in January 2022  · ProBNP greater than 20,000 on admission  · Imaging consistent with vascular congestion and right-sided pleural effusion  · Suspect likely in the setting of LORENZA on CKD stage 5  · Per my discussion with Nephrology will hold off additional Lasix today  · Monitor volume status

## 2022-10-01 NOTE — PLAN OF CARE
Problem: RESPIRATORY - ADULT  Goal: Achieves optimal ventilation and oxygenation  Description: INTERVENTIONS:  - Assess for changes in respiratory status  - Assess for changes in mentation and behavior  - Position to facilitate oxygenation and minimize respiratory effort  - Oxygen administered by appropriate delivery if ordered  - Initiate smoking cessation education as indicated  - Encourage broncho-pulmonary hygiene including cough, deep breathe, Incentive Spirometry  - Assess the need for suctioning and aspirate as needed  - Assess and instruct to report SOB or any respiratory difficulty  - Respiratory Therapy support as indicated  Outcome: Progressing     Problem: GENITOURINARY - ADULT  Goal: Maintains or returns to baseline urinary function  Description: INTERVENTIONS:  - Assess urinary function  - Encourage oral fluids to ensure adequate hydration if ordered  - Administer IV fluids as ordered to ensure adequate hydration  - Administer ordered medications as needed  - Offer frequent toileting  - Follow urinary retention protocol if ordered  Outcome: Progressing     Problem: METABOLIC, FLUID AND ELECTROLYTES - ADULT  Goal: Fluid balance maintained  Description: INTERVENTIONS:  - Monitor labs   - Monitor I/O and WT  - Instruct patient on fluid and nutrition as appropriate  - Assess for signs & symptoms of volume excess or deficit  Outcome: Progressing     Problem: PAIN - ADULT  Goal: Verbalizes/displays adequate comfort level or baseline comfort level  Description: Interventions:  - Encourage patient to monitor pain and request assistance  - Assess pain using appropriate pain scale  - Administer analgesics based on type and severity of pain and evaluate response  - Implement non-pharmacological measures as appropriate and evaluate response  - Consider cultural and social influences on pain and pain management  - Notify physician/advanced practitioner if interventions unsuccessful or patient reports new pain  Outcome: Progressing     Problem: INFECTION - ADULT  Goal: Absence or prevention of progression during hospitalization  Description: INTERVENTIONS:  - Assess and monitor for signs and symptoms of infection  - Monitor lab/diagnostic results  - Monitor all insertion sites, i e  indwelling lines, tubes, and drains  - Monitor endotracheal if appropriate and nasal secretions for changes in amount and color  - [place] appropriate cooling/warming therapies per order  - Administer medications as ordered  - Instruct and encourage patient and family to use good hand hygiene technique  - Identify and instruct in appropriate isolation precautions for identified infection/condition  Outcome: Progressing     Problem: SAFETY ADULT  Goal: Patient will remain free of falls  Description: INTERVENTIONS:  - Educate patient/family on patient safety including physical limitations  - Instruct patient to call for assistance with activity   - Consult OT/PT to assist with strengthening/mobility   - Keep Call bell within reach  - Keep bed low and locked with side rails adjusted as appropriate  - Keep care items and personal belongings within reach  - Initiate and maintain comfort rounds  - Make Fall Risk Sign visible to staff  - Offer Toileting every 2 Hours, in advance of need  - Initiate/Maintain bed/chair alarm  - Obtain necessary fall risk management equipment: alarms, walker  - Apply yellow socks and bracelet for high fall risk patients  - Consider moving patient to room near nurses station  Outcome: Progressing  Goal: Maintain or return to baseline ADL function  Description: INTERVENTIONS:  -  Assess patient's ability to carry out ADLs; assess patient's baseline for ADL function and identify physical deficits which impact ability to perform ADLs (bathing, care of mouth/teeth, toileting, grooming, dressing, etc )  - Assess/evaluate cause of self-care deficits   - Assess range of motion  - Assess patient's mobility; develop plan if impaired  - Assess patient's need for assistive devices and provide as appropriate  - Encourage maximum independence but intervene and supervise when necessary  - Involve family in performance of ADLs  - Assess for home care needs following discharge   - Consider OT consult to assist with ADL evaluation and planning for discharge  - Provide patient education as appropriate  Outcome: Progressing  Goal: Maintains/Returns to pre admission functional level  Description: INTERVENTIONS:  - Perform BMAT or MOVE assessment daily    - Set and communicate daily mobility goal to care team and patient/family/caregiver  - Collaborate with rehabilitation services on mobility goals if consulted  - Perform Range of Motion 3 times a day  - Reposition patient every 2 hours  - Dangle patient 3 times a day  - Stand patient 3 times a day  - Ambulate patient 3 times a day  - Out of bed to chair for meals  - Out of bed for toileting  - Record patient progress and toleration of activity level   Outcome: Progressing     Problem: DISCHARGE PLANNING  Goal: Discharge to home or other facility with appropriate resources  Description: INTERVENTIONS:  - Identify barriers to discharge w/patient and caregiver  - Arrange for needed discharge resources and transportation as appropriate  - Identify discharge learning needs (meds, wound care, etc )  - Arrange for interpretive services to assist at discharge as needed  - Refer to Case Management Department for coordinating discharge planning if the patient needs post-hospital services based on physician/advanced practitioner order or complex needs related to functional status, cognitive ability, or social support system  Outcome: Progressing     Problem: Knowledge Deficit  Goal: Patient/family/caregiver demonstrates understanding of disease process, treatment plan, medications, and discharge instructions  Description: Complete learning assessment and assess knowledge base    Interventions:  - Provide teaching at level of understanding  - Provide teaching via preferred learning methods  Outcome: Progressing

## 2022-10-01 NOTE — ASSESSMENT & PLAN NOTE
History of horseshoe kidney, with chronic hydronephrosis with left urethral stent in place  · CT abdominal pelvis showing hydronephrosis involving the upper pole of the left renal moiety unchanged from prior exam   The left-sided ureteral stent is stable in position extending from the renal pelvis to the bladder  · Patient does follow with [facility]rology at [facility]  · With persistent hydronephrosis and rising creatinine Urology evaluation was requested, urology recommended trial of Granda catheter, however, the patient declines  Plan for hospice consult per patient's wishes  Will also consult PT/OT for evaluation regarding patient's ability to take care of himself at home upon discharge

## 2022-10-01 NOTE — PROGRESS NOTES
Progress Note - Nephrology   Sincere Massimo 78 y o  male MRN: [id number]  Unit/Bed#: E4 -01 Encounter: [id number]    53-year-old male with significant medical issues of CKD stage 4 to stage 5, hypertension, horseshoe kidney, chronic hydronephrosis with left ureteral stent, presented with generalized weakness, poor appetite   Renal  consulted for LORENZA/CKD management  ASSESSMENT and PLAN:  Acute kidney injury (POA):  On top of Chronic Kidney Disease IV/V  Etiology LORENZA: Suspect obstructive uropathy component versus cardiorenal versus overall progression of Chronic Kidney Disease  Assessment:   After review of medical records through [address] [place] [address] it appears that the patient has a baseline Creatinine of 3 5-4 0   Patient follows with Dr She Alfredo from [address] [place] [address] Nephrology   Admission creatinine 4 20 on 9/24/2022   Creatinine continues to slowly increasing currently 5 28-hoping to plateau   Patient with mild uremic symptoms to include decreased appetite, generalized weakness and occasional nausea-however currently without symptoms today   Patient has right AV fistula for greater than six years-with positive bruit and thrill   As per Dr Steven Briones note 9/30/2022, "patient clearly refuses to consider dialysis if ever needed in the future  Harrison Poser fully understands risks of not pursuing dialysis including death  Harrison Poser verbalized understanding of my discussion with him patient clearly refuses to consider dialysis if ever needed in the future  Harrison Poser fully understands risks of not pursuing dialysis including death  Harrison Poser verbalized understanding of my discussion with him ", " patient has shown interest in pursuing hospice care if his condition declined"  Plan:   Avoid nephrotoxins, adjust meds to appropriate GFR   Optimize hemodynamic status to avoid delay in renal recovery   Recommend outpatient follow-up with his primary nephrologist Dr Jv Atkinson in a       Access:  Assessment and plan:  · Right AV fistula-positive bruit and thrill  · Patient reports never being on dialysis    Blood pressure/Hypertension:  Assessment and Plan:   Current blood pressure: Acceptable   Current medications include:  Amlodipine 10 mg daily,   Maximize hemodynamics to maintain MAP >65   Avoid hypotension or fluctuations in blood pressure   Will continue to trend    H&H/anemia: Likely of CKD  Assessment and Plan:   Current hemoglobin 9 1   Low iron saturation   Now on iron supplements   Per primary team   Transfuse if hemoglobin less than 7 0    Acid-base:  Metabolic acidosis  Assessment and Plan:   On oral bicarb one tablet b i d    Bicarb has improved at 23   No change in current treatment     Acute respiratory failure  Assessment and Plan:  · Remains on 3 L nasal cannula with improved oxygenation  · Echocardiogram reveals EF 60% with mild-to-moderate AR and mild to moderate TR, IVC normal, no pericardial effusion  · Currently off diuretics    Horseshoe kidney with left renal moiety upper pole hydronephrosis similar to prior  Assessment and plan:  · status post left ureteral stent  · Imaging studies revealed persistent bilateral hydroureter although right-sided diminished, right-sided renal moiety atrophic and no hydronephrosis  · Urology following  · Follow[facility] at [facility] as [facility]  · Urology recommending Granda catheter-this point he refuses intervention  · Recommend follow-up on discharge    Goals of care:  · Patient was seen in consultation with palliative care  · As per their discussion he clearly states he does not want to undergo dialysis for life prolongation  · Also noted he is interested in hospice when the time comes  · Patient reports will follow-up with [facility] and PCP or his primary nephrologist  · Palliative care signed off  ·   Disposition:  Patient fairly stable from Nephrology standpoint  Patient clearly not interested in dialysis if needed    Okay for discharge when medically stable per primary team   Recommend follow-up with his primary nephrologist on discharge    Review of systems  Patient seen and examined at bedside:  Review of Systems   Constitutional: Negative  Negative for activity change, appetite change, chills, diaphoresis, fatigue and fever  HENT: Negative  Negative for congestion and facial swelling  Respiratory: Negative  Cardiovascular: Negative  Gastrointestinal: Negative  Endocrine: Negative  Genitourinary: Negative  Musculoskeletal: Negative  Skin: Negative  Allergic/Immunologic: Negative  Neurological: Negative  Hematological: Negative  Psychiatric/Behavioral: Negative  Physical exam:  Current Weight: Weight - Scale: 72 9 kg (160 lb 11 5 oz)  Vitals:    09/30/22 2209 09/30/22 2235 10/01/22 0600 10/01/22 0741   BP:  150/55  143/66   BP Location:  Left arm  Left arm   Pulse:  60  64   Resp:  17  16   Temp:  98 3 °F (36 8 °C)  97 7 °F (36 5 °C)   TempSrc:  Axillary  Temporal   SpO2: 94% 94%  92%   Weight:   72 9 kg (160 lb 11 5 oz)        Intake/Output Summary (Last 24 hours) at 10/1/2022 1148  Last data filed at 9/30/2022 1846  Gross per 24 hour   Intake --   Output 650 ml   Net -650 ml       Physical Exam  Vitals and nursing note reviewed  Constitutional:       Appearance: Normal appearance  HENT:      Head: Normocephalic and atraumatic  Nose: Nose normal       Mouth/Throat:      Mouth: Mucous membranes are moist       Pharynx: Oropharynx is clear  Eyes:      Extraocular Movements: Extraocular movements intact  Conjunctiva/sclera: Conjunctivae normal    Cardiovascular:      Rate and Rhythm: Normal rate and regular rhythm  Pulses: Normal pulses  Heart sounds: Normal heart sounds  Pulmonary:      Breath sounds: Normal breath sounds  Abdominal:      General: Bowel sounds are normal       Palpations: Abdomen is soft  Musculoskeletal:      Cervical back: Normal range of motion and neck supple        Comments: Right AV fistula positive bruit and thrill   Skin:     General: Skin is warm and dry  Neurological:      General: No focal deficit present  Mental Status: He is alert and oriented to person, place, and time  Psychiatric:         Mood and Affect: Mood normal          Behavior: Behavior normal          Thought Content:  Thought content normal          Judgment: Judgment normal             Medications:    Current Facility-Administered Medications:     acetaminophen (TYLENOL) tablet 650 mg, 650 mg, Oral, Q6H PRN, Breana Zapata MD, 650 mg at 10/01/22 0614    amLODIPine (NORVASC) tablet 10 mg, 10 mg, Oral, QPM, [facility], PAC, 10 mg at 09/30/22 1729    atorvastatin (LIPITOR) tablet 40 mg, 40 mg, Oral, Daily With Joseph Chang MD, 40 mg at 09/30/22 1729    bisoprolol (ZEBETA) tablet 5 mg, 5 mg, Oral, Daily, Breana Zapata MD, 5 mg at 10/01/22 0846    doxycycline hyclate (VIBRAMYCIN) capsule 100 mg, 100 mg, Oral, Q24H [address], Peewee Mosqueda DO, 100 mg at 10/01/22 0846    heparin (porcine) subcutaneous injection 5,000 Units, 5,000 Units, Subcutaneous, Q8H [address], Breana Zapata MD, 5,000 Units at 09/30/22 2118    iron polysaccharides (FERREX) capsule 150 mg, 150 mg, Oral, Daily, [facility], [facility], 150 mg at 10/01/22 0846    ondansetron (ZOFRAN) injection 4 mg, 4 mg, Intravenous, Q6H PRN, Breana Zapata MD, 4 mg at 09/26/22 0820    permethrin (NIX) 1 % topical liquid, , Topical, Once, Mai Betancourt PA-C    sodium bicarbonate tablet 650 mg, 650 mg, Oral, BID after meals, Verner Hollering, MD, 650 mg at 10/01/22 0846    Laboratory Results:  Results from last 7 days   Lab Units 09/30/22  0616 09/29/22  0517 09/28/22  0520 09/27/22  0524 09/26/22  0517 09/25/22  0526 09/24/22  1614   WBC Thousand/uL 6 01  --  5 84 6 46 5 63 6 09 7 02   HEMOGLOBIN g/dL 9 1*  --  8 6* 9 2* 8 6* 8 6* 9 5*   HEMATOCRIT % 29 9*  --  27 9* 31 3* 28 6* 29 2* 31 2*   PLATELETS Thousands/uL 157  --  133* 171 156 180 178 SODIUM mmol/L 141 141 142 142 144 145 142   POTASSIUM mmol/L 4 5 4 9 4 6 4 9 4 6 4 9 6 1*   CHLORIDE mmol/L 105 107 107 107 110* 113* 110*   CO2 mmol/L 23 25 23 23 21 20* 15*   BUN mg/dL 95* 95* 85* 79* 77* 69* 73*   CREATININE mg/dL 5 28* 5 21* 5 22* 4 69* 4 44* 4 30* 4 20*   CALCIUM mg/dL 8 2* 8 1* 8 2* 8 3 8 4 8 5 8 9

## 2022-10-01 NOTE — CASE MANAGEMENT
Case Management Discharge Planning Note    Patient name Lulú Ward  Location [address] [place] [address] /[address]-* MRN [id number]  : 1945 Date 10/1/2022       Current Admission Date: 2022  Current Admission Diagnosis:Acute kidney injury superimposed on CKD [facility])   Patient Active Problem List    Diagnosis Date Noted    [facility] 2022    Acute respiratory failure with hypoxia ([place] [address] ) 2022    Moderate protein-calorie malnutrition ([place] [address] ) 2021    Gross hematuria 2021    Ulnar impaction syndrome, left 2021    Right wrist pain 2021    Left wrist pain 2021    Anemia of chronic disease 2021    History of parathyroidectomy ([place] [address] ) 2021    Hypokalemia 2021    Bilateral hydronephrosis 2021    History of aortic valve replacement 2021    Horseshoe kidney 2021    Non-traumatic rhabdomyolysis 2021    Acute kidney injury superimposed on CKD ([place] [address] ) 2021    Elevated troponin 2021    UTI (urinary tract infection) 2021    Prosthetic aortic valve malfunction 2019    Near syncope 2018    Hx of CABG 2018    Chronic kidney disease, stage IV (severe) ([facility] ) 2018    Chronic coronary artery disease 10/03/2016    Endocarditis 10/03/2016    Cerebrovascular accident (CVA) ([facility] ) 10/03/2016    HTN (hypertension) 10/15/2015    HLD (hyperlipidemia) 10/15/2015      LOS (days): 7  Geometric Mean LOS (GMLOS) (days): 3 80  Days to GMLOS:-3 1     OBJECTIVE:  Risk of Unplanned Readmission Score: 20 81         Current admission status: Inpatient   Preferred Pharmacy:   [address] [address]  Phone: [phone] Fax: [phone]    PATIENT/FAMILY REPORTS NO PREFERRED PHARMACY  No address on file      Primary Care Provider: Galileo Call MD    Primary Insurance: [facility] REP  Secondary Insurance:     DISCHARGE DETAILS:    Discharge planning discussed with[de-identified] Patient, son and grandson  Freedom of Choice: Yes  Comments - Freedom of Choice: Agreeable to hospice but will discuss as a family between home with HHAs vs SNF  CM contacted family/caregiver?: Yes  Were Treatment Team discharge recommendations reviewed with patient/caregiver?: Yes  Did patient/caregiver verbalize understanding of patient care needs?: Yes  Were patient/caregiver advised of the risks associated with not following Treatment Team discharge recommendations?: Yes    Contacts  Patient Contacts: Rajiv Farmer ([address]) and Patsy Luna (son)  Relationship to Patient[de-identified] Family  Contact Method: In Person  Reason/Outcome: Continuity of Care, Discharge Planning, Emergency [address]         Is the patient interested in [facility] at discharge?: No    DME Referral Provided  Referral made for DME?: No    Other Referral/Resources/Interventions Provided:  Interventions: HHA, SNF, Hospice         Treatment Team Recommendation: Hospice  Discharge Destination Plan[de-identified] Hospice  Transport at Discharge : Frieda Declan [address] communicated with [facility] Liaison, Baby Primrose whom had a family meeting at 18 today  Patient approved for home hospice, but the concern is that patient resides home alone without a caregiver or any additional support  Therefore, a safe discharge will need to be established  CM met with patient, his son, grandson and his wife at bedside  During this time, family is discussing patient going home but going home independently is not ideal  Therefore, CM discussed Private Duty HHAs  Family will need to talk with patient about finances  Patient and family unsure if patient will qualify for [place] as a secondary  SNF was discussed as an option  CM made them aware that [place] assist with room and board coverage in SNF while patient is receiving hospice care which is covered under Medicare   CM reviewed LOC Determination that is needed through [facility]  Patient signed consent  PASSR and YOBANI completed  CM faxed all clinicals and appropriate paperwork to [facility]

## 2022-10-01 NOTE — ASSESSMENT & PLAN NOTE
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 not on dialysis currently  · Follows with [facility]  · Creatinine baseline previously was around 3  · With AG metabolic acidosis, continuing to improve on sodium bicarb supplementation  · Creatinine plateaued  The patient is status post AV fistula placement, however, he states that due to his advanced age he is not interested in hemodialysis  The patient was also evaluated by Urology with recommendations to consider Granda catheter placement, however, the patient declined  Appreciate palliative Care input  Per patient's wishes consulted hospice with considerations to consider to hospice care    · CT abdomen pelvis with evidence of hydroureter bilaterally and with a left ureteral stent in place however unchanged from previous  · Request Urology consult due to increasing creatinine after results of repeat renal ultrasound

## 2022-10-01 NOTE — PROGRESS NOTES
[address] [facility]  Progress Note - Donnie Blandon 1945, 78 y o  male MRN: [id number]  Unit/Bed#: E4 -01 Encounter: [id number]  Primary Care Provider: Radha Mei MD   Date and time admitted to hospital: 9/24/2022  3:21 PM    * Acute kidney injury superimposed on CKD [facility])  Assessment & Plan  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 not on dialysis currently  · Follows with [facility]  · Creatinine baseline previously was around 3  · With AG metabolic acidosis, continuing to improve on sodium bicarb supplementation  · Creatinine plateaued  The patient is status post AV fistula placement, however, he states that due to his advanced age he is not interested in hemodialysis  The patient was also evaluated by Urology with recommendations to consider Granda catheter placement, however, the patient declined  Appreciate palliative Care input  Per patient's wishes consulted hospice with considerations to consider to hospice care    · CT abdomen pelvis with evidence of hydroureter bilaterally and with a left ureteral stent in place however unchanged from previous  · Request Urology consult due to increasing creatinine after results of repeat renal ultrasound    Physical deconditioning  Assessment & Plan  Family concern regarding patient's ability to take care for himself  Will request PT/OT evaluation    Acute respiratory failure with hypoxia ([address] )  Assessment & Plan  · Required 3 L NC on admission, now on 1 L NC  · Wean oxygen as able  · Oxygen requirement study prior to discharge    Acute diastolic heart failure (HCC)  Assessment & Plan  Wt Readings from Last 3 Encounters:   10/01/22 72 9 kg (160 lb 11 5 oz)   01/31/22 72 6 kg (160 lb)   09/14/21 72 9 kg (160 lb 12 8 oz)     Prior 2D echo showing preserved EF back in January 2022  · ProBNP greater than 20,000 on admission  · Imaging consistent with vascular congestion and right-sided pleural effusion  · Suspect likely in the setting of LORENZA on CKD stage 5  · Per my discussion with Nephrology will hold off additional Lasix today  · Monitor volume status    Anemia of chronic disease  Assessment & Plan  Hemoglobin around 9  · No acute signs of bleeding at this time  · Iron panel reveals iron deficiency  · Will proceed with iron infusion     Horseshoe kidney  Assessment & Plan  History of horseshoe kidney, with chronic hydronephrosis with left urethral stent in place  · CT abdominal pelvis showing hydronephrosis involving the upper pole of the left renal moiety unchanged from prior exam   The left-sided ureteral stent is stable in position extending from the renal pelvis to the bladder  · Patient [facility]  · With persistent hydronephrosis and rising creatinine Urology evaluation was requested, urology recommended trial of Granda catheter, however, the patient declines  Plan for hospice consult per patient's wishes  Will also consult PT/OT for evaluation regarding patient's ability to take care of himself at home upon discharge  HLD (hyperlipidemia)  Assessment & Plan  · Continue statin    HTN (hypertension)  Assessment & Plan  Continue bisoprolol and amlodipine  · Monitor blood pressures  · -140        VTE Pharmacologic Prophylaxis:   heparin     Patient Centered Rounds: I performed bedside rounds with nursing staff today  Discussions with Specialists or Other Care Team Provider:     Education and Discussions with Family / Patient: Updated  (grandson) via phone      Time Spent for Care: 30 minutes  More than 50% of total time spent on counseling and coordination of care as described above  Current Length of Stay: 7 day(s)  Current Patient Status: Inpatient   Certification Statement: The patient will continue to require additional inpatient hospital stay due to close monitoring   Discharge Plan: TBD    Code Status: Level 3 - DNAR and DNI    Subjective:   Patient seen and examined  He continues to wish to transition to hospice  Reports feeling well today  Objective:     Vitals:   Temp (24hrs), Av °F (36 7 °C), Min:97 7 °F (36 5 °C), Max:98 3 °F (36 8 °C)    Temp:  [97 7 °F (36 5 °C)-98 3 °F (36 8 °C)] 97 7 °F (36 5 °C)  HR:  [60-64] 64  Resp:  [16-17] 16  BP: (143-150)/(55-66) 143/66  SpO2:  [89 %-94 %] 92 %  Body mass index is 24 44 kg/m²  Input and Output Summary (last 24 hours): Intake/Output Summary (Last 24 hours) at 10/1/2022 1642  Last data filed at 2022 1846  Gross per 24 hour   Intake --   Output 650 ml   Net -650 ml       Physical Exam:   Physical Exam  Constitutional:       General: He is not in acute distress  HENT:      Head: Normocephalic and atraumatic  Nose: No congestion  Mouth/Throat:      Pharynx: Oropharynx is clear  Eyes:      Conjunctiva/sclera: Conjunctivae normal    Cardiovascular:      Rate and Rhythm: Normal rate and regular rhythm  Heart sounds: No murmur heard  Pulmonary:      Effort: No respiratory distress  Breath sounds: No wheezing or rales  Abdominal:      General: There is no distension  Tenderness: There is no abdominal tenderness  There is no guarding  Musculoskeletal:      Right lower leg: Edema present  Left lower leg: Edema present  Skin:     General: Skin is warm and dry  Neurological:      Mental Status: He is oriented to person, place, and time            Additional Data:     Labs:  Results from last 7 days   Lab Units 22  0616   WBC Thousand/uL 6 01   HEMOGLOBIN g/dL 9 1* HEMATOCRIT % 29 9*   PLATELETS Thousands/uL 157   NEUTROS PCT % 73   LYMPHS PCT % 13*   MONOS PCT % 9   EOS PCT % 3     Results from last 7 days   Lab Units 09/30/22  0616   SODIUM mmol/L 141   POTASSIUM mmol/L 4 5   CHLORIDE mmol/L 105   CO2 mmol/L 23   BUN mg/dL 95*   CREATININE mg/dL 5 28*   ANION GAP mmol/L 13   CALCIUM mg/dL 8 2*   GLUCOSE RANDOM mg/dL 91                       Lines/Drains:  Invasive Devices  Report    Peripheral Intravenous Line  Duration           Peripheral IV 10/01/22 Left;Ventral (anterior) Forearm <1 day          Line  Duration           Hemodialysis AV Fistula 09/11/21 Right Upper arm 385 days                      Imaging: No pertinent imaging reviewed  Recent Cultures (last 7 days):         Last 24 Hours Medication List:   Current Facility-Administered Medications   Medication Dose Route Frequency Provider Last Rate    acetaminophen  650 mg Oral Q6H PRN Jeffrey Riley MD      amLODIPine  10 mg Oral QPM [place], [place]      atorvastatin  40 mg Oral Daily With Loida Godinez MD      bisoprolol  5 mg Oral Daily Jeffrey Riley MD      doxycycline hyclate  100 mg Oral Q24H [facility],       heparin (porcine)  5,000 Units Subcutaneous [facility] [address] Jeffrey Riley MD      iron polysaccharides  150 mg Oral Daily StreeterNICK      ondansetron  4 mg Intravenous Q6H PRN Jeffrey Riley MD      permethrin   Topical Once [facility]      sodium bicarbonate  650 mg Oral BID after meals Urvashi Leon MD          Today, Patient Was Seen By: Joelle Nguyen    **Please Note: This note may have been constructed using a voice recognition system  **

## 2022-10-02 PROCEDURE — 99232 SBSQ HOSP IP/OBS MODERATE 35: CPT | Performed by: FAMILY MEDICINE

## 2022-10-02 PROCEDURE — 99232 SBSQ HOSP IP/OBS MODERATE 35: CPT | Performed by: STUDENT IN AN ORGANIZED HEALTH CARE EDUCATION/TRAINING PROGRAM

## 2022-10-02 RX ORDER — SODIUM BICARBONATE 650 MG/1
650 TABLET ORAL DAILY
Status: DISCONTINUED | OUTPATIENT
Start: 2022-10-03 | End: 2022-10-07 | Stop reason: HOSPADM

## 2022-10-02 RX ADMIN — DOXYCYCLINE 100 MG: 100 CAPSULE ORAL at 08:23

## 2022-10-02 RX ADMIN — HEPARIN SODIUM 5000 UNITS: 5000 INJECTION INTRAVENOUS; SUBCUTANEOUS at 14:17

## 2022-10-02 RX ADMIN — SODIUM BICARBONATE 650 MG: 650 TABLET ORAL at 08:23

## 2022-10-02 RX ADMIN — HEPARIN SODIUM 5000 UNITS: 5000 INJECTION INTRAVENOUS; SUBCUTANEOUS at 21:41

## 2022-10-02 RX ADMIN — POLYSACCHARIDE-IRON COMPLEX 150 MG: 150 CAPSULE ORAL at 08:23

## 2022-10-02 RX ADMIN — ATORVASTATIN CALCIUM 40 MG: 40 TABLET, FILM COATED ORAL at 18:11

## 2022-10-02 RX ADMIN — AMLODIPINE BESYLATE 10 MG: 10 TABLET ORAL at 18:11

## 2022-10-02 RX ADMIN — HEPARIN SODIUM 5000 UNITS: 5000 INJECTION INTRAVENOUS; SUBCUTANEOUS at 06:21

## 2022-10-02 RX ADMIN — BISOPROLOL FUMARATE 5 MG: 5 TABLET ORAL at 08:23

## 2022-10-02 NOTE — ASSESSMENT & PLAN NOTE
History of horseshoe kidney, with chronic hydronephrosis with left urethral stent in place  · CT abdominal pelvis showing hydronephrosis involving the upper pole of the left renal moiety unchanged from prior exam   The left-sided ureteral stent is stable in position extending from the renal pelvis to the bladder  · Patient does follow with Urology at [facility]  · With persistent hydronephrosis and rising creatinine Urology evaluation was requested, urology recommended trial of Granda catheter, however, the patient declines  Plan for discharge on hospice per patient's wishes  Will also consult PT/OT for evaluation regarding patient's ability to take care of himself at home upon discharge

## 2022-10-02 NOTE — PLAN OF CARE
Problem: RESPIRATORY - ADULT  Goal: Achieves optimal ventilation and oxygenation  Description: INTERVENTIONS:  - Assess for changes in respiratory status  - Assess for changes in mentation and behavior  - Position to facilitate oxygenation and minimize respiratory effort  - Oxygen administered by appropriate delivery if ordered  - Initiate smoking cessation education as indicated  - Encourage broncho-pulmonary hygiene including cough, deep breathe, Incentive Spirometry  - Assess the need for suctioning and aspirate as needed  - Assess and instruct to report SOB or any respiratory difficulty  - Respiratory Therapy support as indicated  Outcome: Progressing     Problem: GENITOURINARY - ADULT  Goal: Maintains or returns to baseline urinary function  Description: INTERVENTIONS:  - Assess urinary function  - Encourage oral fluids to ensure adequate hydration if ordered  - Administer IV fluids as ordered to ensure adequate hydration  - Administer ordered medications as needed  - Offer frequent toileting  - Follow urinary retention protocol if ordered  Outcome: Progressing     Problem: METABOLIC, FLUID AND ELECTROLYTES - ADULT  Goal: Fluid balance maintained  Description: INTERVENTIONS:  - Monitor labs   - Monitor I/O and WT  - Instruct patient on fluid and nutrition as appropriate  - Assess for signs & symptoms of volume excess or deficit  Outcome: Progressing     Problem: PAIN - ADULT  Goal: Verbalizes/displays adequate comfort level or baseline comfort level  Description: Interventions:  - Encourage patient to monitor pain and request assistance  - Assess pain using appropriate pain scale  - Administer analgesics based on type and severity of pain and evaluate response  - Implement non-pharmacological measures as appropriate and evaluate response  - Consider cultural and social influences on pain and pain management  - Notify physician/advanced practitioner if interventions unsuccessful or patient reports new pain  Outcome: Progressing     Problem: INFECTION - ADULT  Goal: Absence or prevention of progression during hospitalization  Description: INTERVENTIONS:  - Assess and monitor for signs and symptoms of infection  - Monitor lab/diagnostic results  - Monitor all insertion sites, i e  indwelling lines, tubes, and drains  - Monitor endotracheal if appropriate and nasal secretions for changes in amount and color  - [place] appropriate cooling/warming therapies per order  - Administer medications as ordered  - Instruct and encourage patient and family to use good hand hygiene technique  - Identify and instruct in appropriate isolation precautions for identified infection/condition  Outcome: Progressing     Problem: SAFETY ADULT  Goal: Patient will remain free of falls  Description: INTERVENTIONS:  - Educate patient/family on patient safety including physical limitations  - Instruct patient to call for assistance with activity   - Consult OT/PT to assist with strengthening/mobility   - Keep Call bell within reach  - Keep bed low and locked with side rails adjusted as appropriate  - Keep care items and personal belongings within reach  - Initiate and maintain comfort rounds  - Make Fall Risk Sign visible to staff  - Offer Toileting every 2 Hours, in advance of need  - Initiate/Maintain bed alarm  - Obtain necessary fall risk management equipment:   - Apply yellow socks and bracelet for high fall risk patients  - Consider moving patient to room near nurses station  Outcome: Progressing  Goal: Maintain or return to baseline ADL function  Description: INTERVENTIONS:  -  Assess patient's ability to carry out ADLs; assess patient's baseline for ADL function and identify physical deficits which impact ability to perform ADLs (bathing, care of mouth/teeth, toileting, grooming, dressing, etc )  - Assess/evaluate cause of self-care deficits   - Assess range of motion  - Assess patient's mobility; develop plan if impaired  - Assess patient's need for assistive devices and provide as appropriate  - Encourage maximum independence but intervene and supervise when necessary  - Involve family in performance of ADLs  - Assess for home care needs following discharge   - Consider OT consult to assist with ADL evaluation and planning for discharge  - Provide patient education as appropriate  Outcome: Progressing  Goal: Maintains/Returns to pre admission functional level  Description: INTERVENTIONS:  - Perform BMAT or MOVE assessment daily    - Set and communicate daily mobility goal to care team and patient/family/caregiver  - Collaborate with rehabilitation services on mobility goals if consulted  - Perform Range of Motion 3 times a day  - Reposition patient every 3 hours    - Dangle patient 3 times a day  - Stand patient 3 times a day  - Ambulate patient 3 times a day  - Out of bed to chair 3 times a day   - Out of bed for meals 3 times a day  - Out of bed for toileting  - Record patient progress and toleration of activity level   Outcome: Progressing     Problem: DISCHARGE PLANNING  Goal: Discharge to home or other facility with appropriate resources  Description: INTERVENTIONS:  - Identify barriers to discharge w/patient and caregiver  - Arrange for needed discharge resources and transportation as appropriate  - Identify discharge learning needs (meds, wound care, etc )  - Arrange for interpretive services to assist at discharge as needed  - Refer to Case Management Department for coordinating discharge planning if the patient needs post-hospital services based on physician/advanced practitioner order or complex needs related to functional status, cognitive ability, or social support system  Outcome: Progressing     Problem: Knowledge Deficit  Goal: Patient/family/caregiver demonstrates understanding of disease process, treatment plan, medications, and discharge instructions  Description: Complete learning assessment and assess knowledge base   Interventions:  - Provide teaching at level of understanding  - Provide teaching via preferred learning methods  Outcome: Progressing     Problem: Prexisting or High Potential for Compromised Skin Integrity  Goal: Skin integrity is maintained or improved  Description: INTERVENTIONS:  - Identify patients at risk for skin breakdown  - Assess and monitor skin integrity  - Assess and monitor nutrition and hydration status  - Monitor labs   - Assess for incontinence   - Turn and reposition patient  - Assist with mobility/ambulation  - Relieve pressure over bony prominences  - Avoid friction and shearing  - Provide appropriate hygiene as needed including keeping skin clean and dry  - Evaluate need for skin moisturizer/barrier cream  - Collaborate with interdisciplinary team   - Patient/family teaching  - Consider wound care consult   Outcome: Progressing

## 2022-10-02 NOTE — PROGRESS NOTES
[facility]  Progress Note - Yeison Blood 1945, 78 y o  male MRN: [id number]  Unit/Bed#: E4 -01 Encounter: [id number]  Primary Care Provider: Adilia Miller MD   Date and time admitted to hospital: 9/24/2022  3:21 PM    * Acute kidney injury superimposed on CKD 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 not on dialysis currently  · Follows with [facility]  · Creatinine baseline previously was around 3  · With AG metabolic acidosis, improved on sodium bicarb supplementation  · CT abdomen pelvis with evidence of hydroureter bilaterally and with a left ureteral stent in place however unchanged from previous  · Creatinine plateaued  The patient is status post AV fistula placement, however, he states that due to his advanced age he is not interested in hemodialysis  The patient was also evaluated by Urology with recommendations to consider Granda catheter placement, however, the patient declined  Appreciate palliative Care input  Per patient's wishes consulted hospice  Plan to discharge to facility on hospice care         Physical deconditioning  Assessment & Plan  Family concern regarding patient's ability to take care for himself  Will request PT/OT evaluation  Most likely plan is d/c to facility on hospice status     Acute respiratory failure with hypoxia ([facility] [address] )  Assessment & Plan  · Required 3 L NC on admission, now on 1 L NC  · Wean oxygen as able  · Oxygen requirement study prior to discharge    Acute diastolic heart failure [facility])  Assessment & Plan  Wt Readings from Last 3 Encounters:   10/01/22 72 9 kg (160 lb 11 5 oz)   01/31/22 72 6 kg (160 lb)   09/14/21 72 9 kg (160 lb 12 8 oz)     Prior 2D echo showing preserved EF back in January 2022  · ProBNP greater than 20,000 on admission  · Imaging consistent with vascular congestion and right-sided pleural effusion  · Suspect likely in the setting of LORENZA on CKD stage 5  · Per my discussion with Nephrology continue to hold Lasix for now - patient plans to transition to hospice care  · Monitor volume status    Anemia of chronic disease  Assessment & Plan  Hemoglobin around 9  · No acute signs of bleeding at this time  · Iron panel reveals iron deficiency  · Received iron infusion     Horseshoe kidney  Assessment & Plan  History of horseshoe kidney, with chronic hydronephrosis with left urethral stent in place  · CT abdominal pelvis showing hydronephrosis involving the upper pole of the left renal moiety unchanged from prior exam   The left-sided ureteral stent is stable in position extending from the renal pelvis to the bladder  · Patient does follow with Urology at [facility]  · With persistent hydronephrosis and rising creatinine Urology evaluation was requested, urology recommended trial of Granda catheter, however, the patient declines  Plan for discharge on hospice per patient's wishes  Will also consult PT/OT for evaluation regarding patient's ability to take care of himself at home upon discharge  HLD (hyperlipidemia)  Assessment & Plan  · Continue statin    HTN (hypertension)  Assessment & Plan  Continue bisoprolol and amlodipine            VTE Pharmacologic Prophylaxis:   heparin    Patient Centered Rounds: I performed bedside rounds with nursing staff today  Discussions with Specialists or Other Care Team Provider: Nephrology, CM    Education and Discussions with Family / Patient: grandson earlier    Time Spent for Care: 30 minutes   More than 50% of total time spent on counseling and coordination of care as described above  Current Length of Stay: 8 day(s)  Current Patient Status: Inpatient   Certification Statement: The patient will continue to require additional inpatient hospital stay due to close monitoring   Discharge Plan: facility on hospice when available     Code Status: Level 3 - DNAR and DNI    Subjective:   Patient seen and examined  He states he feels "about the same"  No complaints  Objective:     Vitals:   Temp (24hrs), Av 7 °F (36 5 °C), Min:97 3 °F (36 3 °C), Max:98 1 °F (36 7 °C)    Temp:  [97 3 °F (36 3 °C)-98 1 °F (36 7 °C)] 97 3 °F (36 3 °C)  HR:  [53-59] 53  Resp:  [18] 18  BP: (131-150)/(63-67) 142/63  SpO2:  [94 %-98 %] 95 %  Body mass index is 24 44 kg/m²  Input and Output Summary (last 24 hours): Intake/Output Summary (Last 24 hours) at 10/2/2022 1639  Last data filed at 10/2/2022 1300  Gross per 24 hour   Intake 520 ml   Output --   Net 520 ml       Physical Exam:   Physical Exam  Constitutional:       General: He is not in acute distress  HENT:      Head: Normocephalic and atraumatic  Nose: No congestion  Eyes:      Conjunctiva/sclera: Conjunctivae normal    Cardiovascular:      Rate and Rhythm: Normal rate and regular rhythm  Heart sounds: No murmur heard  Pulmonary:      Effort: No respiratory distress  Breath sounds: No wheezing or rales  Abdominal:      General: There is no distension  Tenderness: There is no abdominal tenderness  There is no guarding  Musculoskeletal:      Right lower leg: Edema present  Left lower leg: Edema present  Skin:     General: Skin is warm and dry  Neurological:      Mental Status: He is oriented to person, place, and time            Additional Data:     Labs:  Results from last 7 days   Lab Units 22  0616   WBC Thousand/uL 6 01   HEMOGLOBIN g/dL 9 1*   HEMATOCRIT % 29 9*   PLATELETS Thousands/uL 157   NEUTROS PCT % 73   LYMPHS PCT % 13*   MONOS PCT % 9   EOS PCT % 3     Results from last 7 days   Lab Units 09/30/22  0616   SODIUM mmol/L 141   POTASSIUM mmol/L 4 5   CHLORIDE mmol/L 105   CO2 mmol/L 23   BUN mg/dL 95*   CREATININE mg/dL 5 28*   ANION GAP mmol/L 13   CALCIUM mg/dL 8 2*   GLUCOSE RANDOM mg/dL 91                       Lines/Drains:  Invasive Devices  Report    Peripheral Intravenous Line  Duration           Peripheral IV 10/01/22 Left;Ventral (anterior) Forearm 1 day          Line  Duration           Hemodialysis AV Fistula 09/11/21 Right Upper arm 386 days                      Imaging: No pertinent imaging reviewed  Recent Cultures (last 7 days):         Last 24 Hours Medication List:   Current Facility-Administered Medications   Medication Dose Route Frequency Provider Last Rate    acetaminophen  650 mg Oral Q6H PRN Venecia Clemente MD      amLODIPine  10 mg Oral QPM [place], [place]      atorvastatin  40 mg Oral Daily With Mansfield Boeck, MD      bisoprolol  5 mg Oral Daily Venecia Clemente MD      doxycycline hyclate  100 mg Oral Q24H [facility],       heparin (porcine)  5,000 Units Subcutaneous [facility] Venecia Clemente MD      iron polysaccharides  150 mg Oral Daily [place], [place]      ondansetron  4 mg Intravenous Q6H PRN Venecia Clemente MD      permethrin   Topical Once Henrietta NICK Lu      [START ON 10/3/2022] sodium bicarbonate  650 mg Oral Daily Ya Ortega MD          Today, Patient Was Seen By: Mary Beth Frank    **Please Note: This note may have been constructed using a voice recognition system  **

## 2022-10-02 NOTE — ASSESSMENT & PLAN NOTE
Family concern regarding patient's ability to take care for himself  Will request PT/OT evaluation  Most likely plan is d/c to facility on hospice status

## 2022-10-02 NOTE — ASSESSMENT & PLAN NOTE
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 not on dialysis currently  · Follows with [facility]  · Creatinine baseline previously was around 3  · With AG metabolic acidosis, improved on sodium bicarb supplementation  · CT abdomen pelvis with evidence of hydroureter bilaterally and with a left ureteral stent in place however unchanged from previous  · Creatinine plateaued  The patient is status post AV fistula placement, however, he states that due to his advanced age he is not interested in hemodialysis  The patient was also evaluated by Urology with recommendations to consider Granda catheter placement, however, the patient declined  Appreciate palliative Care input  Per patient's wishes consulted hospice  Plan to discharge to facility on hospice care

## 2022-10-02 NOTE — PROGRESS NOTES
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, hypertension, horseshoe kidney, chronic hydronephrosis with left ureteral stent, presented with generalized weakness, poor appetite   Renal  consulted for LORENZA/CKD management       ASSESSMENT and PLAN:  Acute kidney injury (POA):  On top of Chronic Kidney Disease IV/V  Etiology LORENZA: Suspect obstructive uropathy component versus cardiorenal versus overall progression of Chronic Kidney Disease  Assessment:  · After review of medical records through [address] [place] [address] it appears that the patient has a baseline Creatinine of 3 5-4 0  · Patient follows with Dr Meek Musa from [address] [place] [address] Nephrology  · Admission creatinine 4 20 on 9/24/2022  · Last Creatinine 5 28  · Refusing HD   Plan:  · Please refusing hemodialysis or other advanced medical care  · Has chosen hospice  · No further labs to be drawn at this time  · Discussed with primary team  · Patient to discuss palliative/Hospice with primary nephrologist as outpatient  · Nephrology will sign off     Access:  Assessment and plan:  · Right AV fistula-positive bruit and thrill  · Patient reports never being on dialysis     Blood pressure/Hypertension:  Assessment and Plan:  · Current blood pressure:  remain acceptable  · Current medications include:  Amlodipine 10 mg daily,  · Maximize hemodynamics to maintain MAP >65  · Avoid hypotension or fluctuations in blood pressure  · Will continue to trend     H&H/anemia: Likely of CKD  Assessment and Plan:  · Recent hemoglobin 9 1  · Low iron saturation  · On iron supplements  · Per primary team  · Transfuse if hemoglobin less than 7 0     Acid-base:  Metabolic acidosis  Assessment and Plan:  · On oral bicarb one tablet b i d   · Bicarb recently improved at 23  · No change in current treatment     Acute respiratory failure  Assessment and Plan:  · Remains on 3 L nasal cannula with improved oxygenation  · Echocardiogram reveals EF 60% with mild-to-moderate AR and mild to moderate TR, IVC normal, no pericardial effusion  · Currently off diuretics     Horseshoe kidney with left renal moiety upper pole hydronephrosis similar to prior  Assessment and plan:  · status post left ureteral stent  · Imaging studies revealed persistent bilateral hydroureter although right-sided diminished, right-sided renal moiety atrophic and no hydronephrosis  · Follows Urology at [facility] as outpatient  · Urology recommended Granda catheter-this point he refuses intervention  · Recommend follow-up on discharge     Goals of care:  · Patient was seen in consultation with palliative care  · As per their discussion he clearly states he does not want to undergo dialysis for life prolongation  · Also noted he is interested in hospice  · Discussed with primary team  · Case management following for home with hospice versus outpatient facility  ·    Disposition:   nephrology will sign off  Please call if we can be of further assistance  Patient agreed        Review of systems  Patient seen and examined at bedside:  Review of Systems   Constitutional: Negative  Negative for activity change, appetite change, chills, diaphoresis, fatigue and fever  HENT: Negative  Negative for congestion and facial swelling  Respiratory: Negative  Cardiovascular: Negative  Gastrointestinal: Negative  Endocrine: Negative  Genitourinary: Negative  Musculoskeletal: Negative  Skin: Negative  Allergic/Immunologic: Negative  Neurological: Negative  Hematological: Negative  Psychiatric/Behavioral: Negative             Physical exam:  Current Weight: Weight - Scale: 72 9 kg (160 lb 11 5 oz)  Vitals:    10/01/22 0741 10/01/22 1900 10/01/22 2337 10/02/22 0816   BP: 143/66 131/63 150/63 150/67   BP Location: Left arm Left arm Left arm Left arm   Pulse: 64 57 59 58   Resp: 16 18 18 18   Temp: 97 7 °F (36 5 °C) 98 1 °F (36 7 °C) 97 7 °F (36 5 °C) 97 5 °F (36 4 °C)   TempSrc: Temporal Temporal Temporal Temporal   SpO2: 92% 98% 95% 94%   Weight:           Intake/Output Summary (Last 24 hours) at 10/2/2022 0904  Last data filed at 10/2/2022 3337  Gross per 24 hour   Intake 300 ml   Output --   Net 300 ml       Physical Exam  Vitals and nursing note reviewed  Constitutional:       Appearance: Normal appearance  HENT:      Head: Normocephalic and atraumatic  Nose: Nose normal       Mouth/Throat:      Mouth: Mucous membranes are moist       Pharynx: Oropharynx is clear  Eyes:      Extraocular Movements: Extraocular movements intact  Conjunctiva/sclera: Conjunctivae normal    Cardiovascular:      Rate and Rhythm: Normal rate and regular rhythm  Pulses: Normal pulses  Heart sounds: Normal heart sounds  Pulmonary:      Effort: Pulmonary effort is normal       Breath sounds: Normal breath sounds  Abdominal:      General: Bowel sounds are normal    Musculoskeletal:         General: Normal range of motion  Cervical back: Normal range of motion and neck supple  Comments: Right AV fistula positive bruit and thrill   Skin:     General: Skin is warm  Neurological:      General: No focal deficit present  Mental Status: He is alert and oriented to person, place, and time  Psychiatric:         Mood and Affect: Mood normal          Behavior: Behavior normal          Thought Content:  Thought content normal          Judgment: Judgment normal             Medications:    Current Facility-Administered Medications:     acetaminophen (TYLENOL) tablet 650 mg, 650 mg, Oral, Q6H PRN, Arlene Dave MD, 650 mg at 10/01/22 0614    amLODIPine (NORVASC) tablet 10 mg, 10 mg, Oral, QPM, [facility], [facility], 10 mg at 10/01/22 1710    atorvastatin (LIPITOR) tablet 40 mg, 40 mg, Oral, Daily With Ayah Chin MD, 40 mg at 10/01/22 1710   bisoprolol (ZEBETA) tablet 5 mg, 5 mg, Oral, Daily, Lis Sargent MD, 5 mg at 10/02/22 0014    doxycycline hyclate (VIBRAMYCIN) capsule 100 mg, 100 mg, Oral, Q24H [facility], [facility], 100 mg at 10/02/22 9825    heparin (porcine) subcutaneous injection 5,000 Units, 5,000 Units, Subcutaneous, Q8H [facility], Lis Sargent MD, 5,000 Units at 10/02/22 0621    iron polysaccharides (FERREX) capsule 150 mg, 150 mg, Oral, Daily, [facility], PAEDEL, 150 mg at 10/02/22 5769    ondansetron (ZOFRAN) injection 4 mg, 4 mg, Intravenous, Q6H PRN, Lis Sargent MD, 4 mg at 09/26/22 0820    permethrin (NIX) 1 % topical liquid, , Topical, Once, Chang Matias PA-C    sodium bicarbonate tablet 650 mg, 650 mg, Oral, BID after meals, Quin Shah MD, 650 mg at 10/02/22 1588    Laboratory Results:  Results from last 7 days   Lab Units 09/30/22  0616 09/29/22  0517 09/28/22  0520 09/27/22  0524 09/26/22  0517   WBC Thousand/uL 6 01  --  5 84 6 46 5 63   HEMOGLOBIN g/dL 9 1*  --  8 6* 9 2* 8 6*   HEMATOCRIT % 29 9*  --  27 9* 31 3* 28 6*   PLATELETS Thousands/uL 157  --  133* 171 156   SODIUM mmol/L 141 141 142 142 144   POTASSIUM mmol/L 4 5 4 9 4 6 4 9 4 6   CHLORIDE mmol/L 105 107 107 107 110*   CO2 mmol/L 23 25 23 23 21   BUN mg/dL 95* 95* 85* 79* 77*   CREATININE mg/dL 5 28* 5 21* 5 22* 4 69* 4 44*   CALCIUM mg/dL 8 2* 8 1* 8 2* 8 3 8 4

## 2022-10-02 NOTE — ASSESSMENT & PLAN NOTE
Hemoglobin around 9  · No acute signs of bleeding at this time  · Iron panel reveals iron deficiency  · Received iron infusion

## 2022-10-02 NOTE — ASSESSMENT & PLAN NOTE
Wt Readings from Last 3 Encounters:   10/01/22 72 9 kg (160 lb 11 5 oz)   01/31/22 72 6 kg (160 lb)   09/14/21 72 9 kg (160 lb 12 8 oz)     Prior 2D echo showing preserved EF back in January 2022  · ProBNP greater than 20,000 on admission  · Imaging consistent with vascular congestion and right-sided pleural effusion  · Suspect likely in the setting of LORENZA on CKD stage 5  · Per my discussion with Nephrology continue to hold Lasix for now - patient plans to transition to hospice care  · Monitor volume status

## 2022-10-03 LAB
ANION GAP SERPL CALCULATED.3IONS-SCNC: 14 MMOL/L (ref 4–13)
BUN SERPL-MCNC: 111 MG/DL (ref 5–25)
CALCIUM SERPL-MCNC: 8.7 MG/DL (ref 8.3–10.1)
CHLORIDE SERPL-SCNC: 107 MMOL/L (ref 96–108)
CO2 SERPL-SCNC: 21 MMOL/L (ref 21–32)
CREAT SERPL-MCNC: 5.34 MG/DL (ref 0.6–1.3)
GFR SERPL CREATININE-BSD FRML MDRD: 9 ML/MIN/1.73SQ M
GLUCOSE SERPL-MCNC: 98 MG/DL (ref 65–140)
POTASSIUM SERPL-SCNC: 4.3 MMOL/L (ref 3.5–5.3)
SODIUM SERPL-SCNC: 142 MMOL/L (ref 135–147)

## 2022-10-03 PROCEDURE — 80048 BASIC METABOLIC PNL TOTAL CA: CPT | Performed by: NURSE PRACTITIONER

## 2022-10-03 PROCEDURE — 97167 OT EVAL HIGH COMPLEX 60 MIN: CPT

## 2022-10-03 PROCEDURE — 99232 SBSQ HOSP IP/OBS MODERATE 35: CPT | Performed by: INTERNAL MEDICINE

## 2022-10-03 PROCEDURE — 97163 PT EVAL HIGH COMPLEX 45 MIN: CPT

## 2022-10-03 RX ADMIN — ATORVASTATIN CALCIUM 40 MG: 40 TABLET, FILM COATED ORAL at 18:24

## 2022-10-03 RX ADMIN — POLYSACCHARIDE-IRON COMPLEX 150 MG: 150 CAPSULE ORAL at 09:00

## 2022-10-03 RX ADMIN — HEPARIN SODIUM 5000 UNITS: 5000 INJECTION INTRAVENOUS; SUBCUTANEOUS at 21:10

## 2022-10-03 RX ADMIN — HEPARIN SODIUM 5000 UNITS: 5000 INJECTION INTRAVENOUS; SUBCUTANEOUS at 05:15

## 2022-10-03 RX ADMIN — SODIUM BICARBONATE 650 MG: 650 TABLET ORAL at 09:00

## 2022-10-03 RX ADMIN — HEPARIN SODIUM 5000 UNITS: 5000 INJECTION INTRAVENOUS; SUBCUTANEOUS at 15:11

## 2022-10-03 RX ADMIN — BISOPROLOL FUMARATE 5 MG: 5 TABLET ORAL at 09:00

## 2022-10-03 RX ADMIN — AMLODIPINE BESYLATE 10 MG: 10 TABLET ORAL at 18:24

## 2022-10-03 RX ADMIN — DOXYCYCLINE 100 MG: 100 CAPSULE ORAL at 09:00

## 2022-10-03 RX ADMIN — ACETAMINOPHEN 650 MG: 325 TABLET ORAL at 09:00

## 2022-10-03 NOTE — ASSESSMENT & PLAN NOTE
Wt Readings from Last 3 Encounters:   10/03/22 73 1 kg (161 lb 2 5 oz)   01/31/22 72 6 kg (160 lb)   09/14/21 72 9 kg (160 lb 12 8 oz)     · Vascular congestion on admission was on IV furosemide  · Patient appears compensated hold further diuretics

## 2022-10-03 NOTE — PLAN OF CARE
Problem: RESPIRATORY - ADULT  Goal: Achieves optimal ventilation and oxygenation  Description: INTERVENTIONS:  - Assess for changes in respiratory status  - Assess for changes in mentation and behavior  - Position to facilitate oxygenation and minimize respiratory effort  - Oxygen administered by appropriate delivery if ordered  - Initiate smoking cessation education as indicated  - Encourage broncho-pulmonary hygiene including cough, deep breathe, Incentive Spirometry  - Assess the need for suctioning and aspirate as needed  - Assess and instruct to report SOB or any respiratory difficulty  - Respiratory Therapy support as indicated  Outcome: Progressing     Problem: GENITOURINARY - ADULT  Goal: Maintains or returns to baseline urinary function  Description: INTERVENTIONS:  - Assess urinary function  - Encourage oral fluids to ensure adequate hydration if ordered  - Administer IV fluids as ordered to ensure adequate hydration  - Administer ordered medications as needed  - Offer frequent toileting  - Follow urinary retention protocol if ordered  Outcome: Progressing     Problem: METABOLIC, FLUID AND ELECTROLYTES - ADULT  Goal: Fluid balance maintained  Description: INTERVENTIONS:  - Monitor labs   - Monitor I/O and WT  - Instruct patient on fluid and nutrition as appropriate  - Assess for signs & symptoms of volume excess or deficit  Outcome: Progressing     Problem: PAIN - ADULT  Goal: Verbalizes/displays adequate comfort level or baseline comfort level  Description: Interventions:  - Encourage patient to monitor pain and request assistance  - Assess pain using appropriate pain scale  - Administer analgesics based on type and severity of pain and evaluate response  - Implement non-pharmacological measures as appropriate and evaluate response  - Consider cultural and social influences on pain and pain management  - Notify physician/advanced practitioner if interventions unsuccessful or patient reports new pain  Outcome: Progressing     Problem: INFECTION - ADULT  Goal: Absence or prevention of progression during hospitalization  Description: INTERVENTIONS:  - Assess and monitor for signs and symptoms of infection  - Monitor lab/diagnostic results  - Monitor all insertion sites, i e  indwelling lines, tubes, and drains  - Monitor endotracheal if appropriate and nasal secretions for changes in amount and color  - [place] appropriate cooling/warming therapies per order  - Administer medications as ordered  - Instruct and encourage patient and family to use good hand hygiene technique  - Identify and instruct in appropriate isolation precautions for identified infection/condition  Outcome: Progressing     Problem: SAFETY ADULT  Goal: Patient will remain free of falls  Description: INTERVENTIONS:  - Educate patient/family on patient safety including physical limitations  - Instruct patient to call for assistance with activity   - Consult OT/PT to assist with strengthening/mobility   - Keep Call bell within reach  - Keep bed low and locked with side rails adjusted as appropriate  - Keep care items and personal belongings within reach  - Initiate and maintain comfort rounds  - Make Fall Risk Sign visible to staff  - Offer Toileting every 2 Hours, in advance of need  - Initiate/Maintain bed alarm  - Obtain necessary fall risk management equipment:  - Apply yellow socks and bracelet for high fall risk patients  - Consider moving patient to room near nurses station  Outcome: Progressing  Goal: Maintain or return to baseline ADL function  Description: INTERVENTIONS:  -  Assess patient's ability to carry out ADLs; assess patient's baseline for ADL function and identify physical deficits which impact ability to perform ADLs (bathing, care of mouth/teeth, toileting, grooming, dressing, etc )  - Assess/evaluate cause of self-care deficits   - Assess range of motion  - Assess patient's mobility; develop plan if impaired  - Assess patient's need for assistive devices and provide as appropriate  - Encourage maximum independence but intervene and supervise when necessary  - Involve family in performance of ADLs  - Assess for home care needs following discharge   - Consider OT consult to assist with ADL evaluation and planning for discharge  - Provide patient education as appropriate  Outcome: Progressing  Goal: Maintains/Returns to pre admission functional level  Description: INTERVENTIONS:  - Perform BMAT or MOVE assessment daily    - Set and communicate daily mobility goal to care team and patient/family/caregiver  - Collaborate with rehabilitation services on mobility goals if consulted  - Perform Range of Motion 3 times a day  - Reposition patient every 2 hours    - Dangle patient 3 times a day  - Stand patient 3 times a day  - Ambulate patient 3 times a day  - Out of bed to chair 3 times a day   - Out of bed for meals 3 times a day  - Out of bed for toileting  - Record patient progress and toleration of activity level   Outcome: Progressing     Problem: DISCHARGE PLANNING  Goal: Discharge to home or other facility with appropriate resources  Description: INTERVENTIONS:  - Identify barriers to discharge w/patient and caregiver  - Arrange for needed discharge resources and transportation as appropriate  - Identify discharge learning needs (meds, wound care, etc )  - Arrange for interpretive services to assist at discharge as needed  - Refer to Case Management Department for coordinating discharge planning if the patient needs post-hospital services based on physician/advanced practitioner order or complex needs related to functional status, cognitive ability, or social support system  Outcome: Progressing     Problem: Knowledge Deficit  Goal: Patient/family/caregiver demonstrates understanding of disease process, treatment plan, medications, and discharge instructions  Description: Complete learning assessment and assess knowledge base   Interventions:  - Provide teaching at level of understanding  - Provide teaching via preferred learning methods  Outcome: Progressing     Problem: Prexisting or High Potential for Compromised Skin Integrity  Goal: Skin integrity is maintained or improved  Description: INTERVENTIONS:  - Identify patients at risk for skin breakdown  - Assess and monitor skin integrity  - Assess and monitor nutrition and hydration status  - Monitor labs   - Assess for incontinence   - Turn and reposition patient  - Assist with mobility/ambulation  - Relieve pressure over bony prominences  - Avoid friction and shearing  - Provide appropriate hygiene as needed including keeping skin clean and dry  - Evaluate need for skin moisturizer/barrier cream  - Collaborate with interdisciplinary team   - Patient/family teaching  - Consider wound care consult   Outcome: Progressing

## 2022-10-03 NOTE — PHYSICAL THERAPY NOTE
PT EVALUATION    Pt  Name: Mya Lambert  Pt  Age: 78 y o  MRN: [id number]  LENGTH OF STAY: 9      Admitting Diagnoses:   Hyperkalemia [E87 5]  CHF (congestive heart failure) ([facility] [address] ) [I50 9]  Known medical problems [Z78 9]  Acute kidney injury superimposed on chronic kidney disease ([facility] [address] ) [N17 9, N18 9]    Past Medical History:   Diagnosis Date    Coronary artery disease     Renal disorder        Past Surgical History:   Procedure Laterality Date    AORTIC VALVE REPLACEMENT  2005    Tissue valve    BLADDER SURGERY      23 yrs ago  CORONARY ARTERY BYPASS GRAFT  2005    x1    RENAL ARTERY STENT  11/2005       Imaging Studies:  US kidney and bladder   Final Result by Darron Rao MD (09/28 5272)      1  Bilateral hydroureter with left nephroureteral stent in place an severe dilatation of the left upper pole collecting system as seen on CT  2   Horseshoe kidney with severe cortical thinning, right greater than left  Workstation performed: [id number]         CT abdomen pelvis wo contrast   Final Result by Erin Schmidt MD (09/25 4785)      New moderate right pleural effusion incompletely imaged with passive atelectasis in the right lower lobe  There is hydronephrosis involving the upper pole of the left renal moiety which seems similar to the prior exam   The left-sided ureteral stent is stable in position extending from the renal pelvis to the bladder  There is persistent bilateral hydroureter although the right side is diminished since the prior exam and the right-sided renal moiety is atrophic and no longer shows hydronephrosis  Mild anterior bladder wall thickening of uncertain significance  Correlate with urinalysis to exclude cystitis  Right inguinal hernia contains a loop of small bowel, without evidence of structures at this time              Workstation performed: [id number]         XR chest 1 view portable   ED Interpretation by Chris Randle DO (09/24 1726)   See below      Final Result by Josh Casas MD (09/24 0166)      Moderate right effusion and right base opacity which could be due to atelectasis and/or pneumonia  Trace left effusion  Moderate pulmonary venous congestion  Workstation performed: [id number]               10/03/22 0853   PT Last Visit   PT Visit Date 10/03/22   Note Type   Note type Evaluation   Pain Assessment   Pain Score 5   Pain Location/Orientation Location: Back; Location: Leg;Orientation: Bilateral   Hospital Pain Intervention(s) Repositioned; Ambulation/increased activity; Emotional support; Rest  (RN made aware)   Restrictions/Precautions   Weight Bearing Precautions Per Order No   Other Precautions Multiple lines;Telemetry;O2;Fall Risk;Pain  (3L O2 NC)   Home Living   Type of Home Apartment  (6th floor)   Home Layout One level;Elevator   Bathroom Shower/Tub Tub/shower unit   Bathroom Toilet Standard   Bathroom Equipment Grab bars in shower; Shower chair   Home Equipment Walker;Cane;Wheelchair-manual   Prior Function   Level of [place] Independent with ADLs and functional mobility  (w/ RW)   Lives With [address] [place] [address] in the last 6 months 0   Comments Pt reports being I w/ RW & ADL's but admits to decline in function & increasing difficulty taking care of himself  General   Additional Pertinent History h/o CVA w/ residual R sided weakness   Family/Caregiver Present No   Cognition   Overall Cognitive Status WFL   Arousal/Participation Alert   Orientation Level Oriented to person;Oriented to place;Oriented to time   Following Commands Follows one step commands without difficulty   Comments pt pleasant & cooperative   Subjective   Subjective Pt agreeable to PT/OT evals     RUE Assessment   RUE Assessment   (refer to OT)   LUE Assessment   LUE Assessment   (refer to OT)   RLE Assessment   RLE Assessment X   Strength RLE   R Hip Flexion 2-/5   R Knee Flexion 2-/5   R Knee Extension 2-/5   R Ankle Dorsiflexion 2-/5   R Ankle Plantar Flexion 2-/5   LLE Assessment   LLE Assessment WFL   Bed Mobility   Supine to Sit 4  Minimal assistance   Additional items Assist x 1; Increased time required;Verbal cues;LE management   Sit to Supine 4  Minimal assistance   Additional items Assist x 1; Increased time required;Verbal cues;LE management   Additional Comments cues for techniques & safety   Transfers   Sit to Stand 4  Minimal assistance   Additional items Assist x 2; Increased time required;Verbal cues   Stand to Sit 4  Minimal assistance   Additional items Assist x 2; Increased time required;Verbal cues   Toilet transfer 4  Minimal assistance   Additional items Assist x 2; Increased time required;Verbal cues;Standard toilet; Other  (grab bar)   Additional Comments cues for techniques & safety especially hand placement   Ambulation/Elevation   Gait pattern Forward Flexion; Wide SUBHASH; Decreased foot clearance; Short stride; Excessively slow; Step to   Gait Assistance 4  Minimal assist   Additional items Assist x 2;Verbal cues; Tactile cues   Assistive Device Rolling walker   Distance 10'x2   Ambulation/Elevation Additional Comments unsteady gait but no gross LOB noted   Balance   Static Sitting Fair   Dynamic Sitting Fair -   Static Standing Poor +   Dynamic Standing Poor   Ambulatory Poor   Endurance Deficit   Endurance Deficit Yes   Endurance Deficit Description weakness; fatigue   Activity Tolerance   Activity Tolerance Patient limited by fatigue;Treatment limited secondary to medical complications (Comment)   Medical Staff Made Aware Unknown Bene; Dr Ivy Cain   Assessment   Prognosis Fair   Problem List Decreased strength;Decreased endurance; Impaired balance;Decreased mobility;Pain   Assessment Pt  77 y  o male presented w/ weakness & SOB   Pt admitted for Acute kidney injury superimposed on CKD ([facility] [address] ) w/ acute respiratory failure w/ hypoxia, acute diastolic heart failure & hyperkalemia  Per chart, pt refusing dialysis treatment & now wishes to be in hospice care  Pt referred to PT for mobility assessment & D/C planning  Please see above for information re: home set-up & PLOF as well as objective findings during PT assessment  PTA, pt reports being fairly I w/ RW but admits to slow decline in function w/ increasing difficulty completing ADL's at baseline  On eval, pt functioning below baseline hence will continue skilled PT to improve function & safety  Pt require minAx1 for bed mobility & minAx2 for transfers & amb w/ RW + cues for techniques & safety  Pt easily fatigued  Gait deviations as above, slow & unsteady but no gross LOB noted  Dec amb tolerance 2* to weakness & fatigue  SpO2 88-91% w/ 3L O2 NC during activity  The patient's AM-PAC Basic Mobility Inpatient Short Form Raw Score is 15  A Raw score of less than or equal to 16 suggests the patient may benefit from discharge to post-acute rehabilitation services  Please also refer to the recommendation of the Physical Therapist for safe discharge planning  From PT standpoint, STR beneficial however pt declined any PT/OT services in hospital, in home & inpt rehab  Pt strongly wishes hospice care at this point  Will D/C PT as per pt request  MD & CM made aware  Nsg staff most recent vital signs as follows: /63 (BP Location: Left arm)   Pulse 58   Temp 98 6 °F (37 °C) (Temporal)   Resp 20   Wt 73 1 kg (161 lb 2 5 oz)   SpO2 96%   BMI 24 50 kg/m²   At end of session, pt back in bed in stable condition, call bell & phone in reach, bed alarm activated, all lines intact  Fall precautions reinforced w/ good understanding  CM to follow  Nsg staff to continue to mobilized pt (OOB in chair for all meals & ambulate in room/unit) as tolerated to prevent further decline in function  Nsg notified  Co-eval was necessary to complete this PT eval for the pt's best interest given pt's medical acuity & complexity     Barriers to Discharge Decreased caregiver support   Barriers to Discharge Comments home alone   Goals   Patient Goals to be on hospice care   PT Treatment Day 0   Plan   Treatment/Interventions Spoke to nursing;Spoke to MD;Spoke to case management;OT  (PT eval only)   PT Frequency Other (Comment)  (D/C PT per pt request)   Recommendation   PT Discharge Recommendation No rehabilitation needs  (Pt stated he does not want any PT/OT services in hospital, in home & does not want inpt rehab )   Equipment Recommended Walker  (pt has)   Lashell Karentesha [address]   Turning in Bed Without Bedrails 3   Lying on Back to Sitting on Edge of Flat Bed 3   Moving Bed to Chair 3   Standing Up From Chair 2   Walk in Room 2   Climb 3-5 Stairs 2   Basic Mobility Inpatient Raw Score 15   Basic Mobility Standardized Score 36 97   Highest Level Of Mobility   -[facility] Goal 4: Move to chair/commode   -HL Achieved 6: Walk 10 steps or more   End of Consult   Patient Position at End of Consult Supine;Bed/Chair alarm activated; All needs within reach   End of Consult Comments Pt in stable condition  All needs in reach  All lines intact     Hx/personal factors: co-morbidities, home alone, advanced age, mutliple lines, telemetry, use of AD, pain, fall risk, assist w/ ADL's, and O2  Examination: dec mobility, dec balance, dec endurance, dec amb, risk for falls, pain  Clinical: unpredictable (ongoing medical status, abnormal lab values, risk for falls, and pain mgt)  Complexity: high    Herminia Lizarraga

## 2022-10-03 NOTE — PROGRESS NOTES
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 follows with [facility] as outpatient  · Patient has AV access but does not want to proceed with dialysis  · Patient also declined Granda catheter placement  · Transition to hospice when renal function worsens    Results from last 7 days   Lab Units 10/03/22  0513 09/30/22  0616 09/29/22  0517 09/28/22  0520 09/27/22  0524   BUN mg/dL 111* 95* 95* 85* 79*   CREATININE mg/dL 5 34* 5 28* 5 21* 5 22* 4 69*   EGFR ml/min/1 73sq m 9 9 9 9 11       Physical deconditioning  Assessment & Plan  · Ambulatory dysfunction unable to care for self  · Consult PT/OT to evaluate for discharge needs    Acute diastolic heart failure (HCC)  Assessment & Plan  Wt Readings from Last 3 Encounters:   10/03/22 73 1 kg (161 lb 2 5 oz)   01/31/22 72 6 kg (160 lb)   09/14/21 72 9 kg (160 lb 12 8 oz)     · Vascular congestion on admission was on IV furosemide  · Patient appears compensated hold further diuretics    Anemia of chronic disease  Assessment & Plan  · Anemia of chronic disease without evidence of bleeding    Results from last 7 days   Lab Units 09/30/22  0616 09/28/22  0520 09/27/22  0524   HEMOGLOBIN g/dL 9 1* 8 6* 9 2*       HLD (hyperlipidemia)  Assessment & Plan  · Continue atorvastatin    HTN (hypertension)  Assessment & Plan  · Continue bisoprolol and amlodipine      VTE Pharmacologic Prophylaxis:   Moderate Risk (Score 3-4) - Pharmacological DVT Prophylaxis Ordered: heparin  Patient Centered Rounds: I have performed bedside rounds with nursing staff today    Discussions with Specialists or Other Care Team Provider:  Case management    Education and Discussions with Family / Patient: Updated  (son) at bedside  Time Spent for Care: 25 mins  More than 50% of total time spent on counseling and coordination of care as described above  Current Length of Stay: 9 day(s)  Current Patient Status: Inpatient   Certification Statement: The patient will continue to require additional inpatient hospital stay due to placement  Consult PT/OT for discharge needs  Discharge Plan / Estimated Discharge Date: Anticipate discharge in 24-48 hrs to discharge location to be determined pending rehab evaluations  Code Status: Level 3 - DNAR and DNI      Subjective:   Patient seen and examined  Feeling very weak  Does not think he can take care of himself at home  Made very clear does not want dialysis    Objective:   Vitals: Blood pressure 157/67, pulse 60, temperature 97 6 °F (36 4 °C), temperature source Temporal, resp  rate 20, weight 73 1 kg (161 lb 2 5 oz), SpO2 93 %  Intake/Output Summary (Last 24 hours) at 10/3/2022 1815  Last data filed at 10/3/2022 0903  Gross per 24 hour   Intake 360 ml   Output --   Net 360 ml       Physical Exam  Vitals reviewed  Constitutional:       General: He is not in acute distress  HENT:      Head: Atraumatic  Cardiovascular:      Rate and Rhythm: Regular rhythm  Heart sounds: Normal heart sounds  Pulmonary:      Effort: Pulmonary effort is normal       Breath sounds: Decreased breath sounds present  No wheezing  Abdominal:      General: Bowel sounds are normal       Palpations: Abdomen is soft  Tenderness: There is no abdominal tenderness  There is no rebound  Musculoskeletal:         General: No swelling or tenderness  Skin:     General: Skin is warm and dry  Neurological:      General: No focal deficit present  Mental Status: He is alert  Cranial Nerves: No cranial nerve deficit     Psychiatric:         Mood and Affect: Mood normal        Additional Data:   Labs:  Results from last 7 days   Lab Units 09/30/22  0616 09/28/22  0520 09/27/22  0524   WBC Thousand/uL 6 01 5 84 6 46   HEMOGLOBIN g/dL 9 1* 8 6* 9 2*   PLATELETS Thousands/uL 157 133* 171   MCV fL 94 94 94     Results from last 7 days   Lab Units 10/03/22  0513 09/30/22  0616 09/29/22  0517   SODIUM mmol/L 142 141 141   POTASSIUM mmol/L 4 3 4 5 4 9   CHLORIDE mmol/L 107 105 107   CO2 mmol/L 21 23 25   ANION GAP mmol/L 14* 13 9   BUN mg/dL 111* 95* 95*   CREATININE mg/dL 5 34* 5 28* 5 21*   CALCIUM mg/dL 8 7 8 2* 8 1*   EGFR ml/min/1 73sq m 9 9 9   GLUCOSE RANDOM mg/dL 98 91 104                     * I Have Reviewed All Lab Data Listed Above  Cultures:                   Lines/Drains:  Invasive Devices  Report    Peripheral Intravenous Line  Duration           Peripheral IV 10/01/22 Left;Ventral (anterior) Forearm 2 days          Line  Duration           Hemodialysis AV Fistula 09/11/21 Right Upper arm 387 days              Telemetry:      Imaging:  Imaging Reports Reviewed Today Include:   CT abdomen pelvis wo contrast    Result Date: 9/25/2022  Impression: New moderate right pleural effusion incompletely imaged with passive atelectasis in the right lower lobe  There is hydronephrosis involving the upper pole of the left renal moiety which seems similar to the prior exam   The left-sided ureteral stent is stable in position extending from the renal pelvis to the bladder  There is persistent bilateral hydroureter although the right side is diminished since the prior exam and the right-sided renal moiety is atrophic and no longer shows hydronephrosis  Mild anterior bladder wall thickening of uncertain significance  Correlate with urinalysis to exclude cystitis  Right inguinal hernia contains a loop of small bowel, without evidence of structures at this time  Workstation performed: [id number]     XR chest 1 view portable    Result Date: 9/24/2022  Impression: Moderate right effusion and right base opacity which could be due to atelectasis and/or pneumonia   Trace left effusion  Moderate pulmonary venous congestion  Workstation performed: [id number]      kidney and bladder    Result Date: 9/28/2022  Impression: 1  Bilateral hydroureter with left nephroureteral stent in place an severe dilatation of the left upper pole collecting system as seen on CT  2   Horseshoe kidney with severe cortical thinning, right greater than left  Workstation performed: [id number]       Scheduled Meds:  Current Facility-Administered Medications   Medication Dose Route Frequency Provider Last Rate    acetaminophen  650 mg Oral Q6H PRN Rivka Jose MD      amLODIPine  10 mg Oral QPM [facility], PA-C      atorvastatin  40 mg Oral Daily With Yanely Harrell MD      bisoprolol  5 mg Oral Daily Rivka Jose MD      doxycycline hyclate  100 mg Oral Q24H [facility],       heparin (porcine)  5,000 Units Subcutaneous [facility] Carl Canela MD      iron polysaccharides  150 mg Oral Daily [place], [place]      ondansetron  4 mg Intravenous Q6H PRN Rivka Jose MD      permethrin   Topical Once [facility], PA-C      sodium bicarbonate  650 mg Oral Daily Jamison Ruth MD         Today, Patient Was Seen By: Doreen Pierre    ** Please Note: Dictation voice to text software may have been used in the creation of this document   **

## 2022-10-03 NOTE — ASSESSMENT & PLAN NOTE
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

## 2022-10-03 NOTE — PLAN OF CARE
Problem: RESPIRATORY - ADULT  Goal: Achieves optimal ventilation and oxygenation  Description: INTERVENTIONS:  - Assess for changes in respiratory status  - Assess for changes in mentation and behavior  - Position to facilitate oxygenation and minimize respiratory effort  - Oxygen administered by appropriate delivery if ordered  - Initiate smoking cessation education as indicated  - Encourage broncho-pulmonary hygiene including cough, deep breathe, Incentive Spirometry  - Assess the need for suctioning and aspirate as needed  - Assess and instruct to report SOB or any respiratory difficulty  - Respiratory Therapy support as indicated  Outcome: Progressing     Problem: GENITOURINARY - ADULT  Goal: Maintains or returns to baseline urinary function  Description: INTERVENTIONS:  - Assess urinary function  - Encourage oral fluids to ensure adequate hydration if ordered  - Administer IV fluids as ordered to ensure adequate hydration  - Administer ordered medications as needed  - Offer frequent toileting  - Follow urinary retention protocol if ordered  Outcome: Progressing     Problem: METABOLIC, FLUID AND ELECTROLYTES - ADULT  Goal: Fluid balance maintained  Description: INTERVENTIONS:  - Monitor labs   - Monitor I/O and WT  - Instruct patient on fluid and nutrition as appropriate  - Assess for signs & symptoms of volume excess or deficit  Outcome: Progressing     Problem: PAIN - ADULT  Goal: Verbalizes/displays adequate comfort level or baseline comfort level  Description: Interventions:  - Encourage patient to monitor pain and request assistance  - Assess pain using appropriate pain scale  - Administer analgesics based on type and severity of pain and evaluate response  - Implement non-pharmacological measures as appropriate and evaluate response  - Consider cultural and social influences on pain and pain management  - Notify physician/advanced practitioner if interventions unsuccessful or patient reports new pain  Outcome: Progressing     Problem: INFECTION - ADULT  Goal: Absence or prevention of progression during hospitalization  Description: INTERVENTIONS:  - Assess and monitor for signs and symptoms of infection  - Monitor lab/diagnostic results  - Monitor all insertion sites, i e  indwelling lines, tubes, and drains  - Monitor endotracheal if appropriate and nasal secretions for changes in amount and color  - [place] appropriate cooling/warming therapies per order  - Administer medications as ordered  - Instruct and encourage patient and family to use good hand hygiene technique  - Identify and instruct in appropriate isolation precautions for identified infection/condition  Outcome: Progressing     Problem: SAFETY ADULT  Goal: Patient will remain free of falls  Description: INTERVENTIONS:  - Educate patient/family on patient safety including physical limitations  - Instruct patient to call for assistance with activity   - Consult OT/PT to assist with strengthening/mobility   - Keep Call bell within reach  - Keep bed low and locked with side rails adjusted as appropriate  - Keep care items and personal belongings within reach  - Initiate and maintain comfort rounds  - Make Fall Risk Sign visible to staff  - Offer Toileting every 2 Hours, in advance of need  - Initiate/Maintain bed alarm  - Obtain necessary fall risk management equipment:   - Apply yellow socks and bracelet for high fall risk patients  - Consider moving patient to room near nurses station  Outcome: Progressing  Goal: Maintain or return to baseline ADL function  Description: INTERVENTIONS:  -  Assess patient's ability to carry out ADLs; assess patient's baseline for ADL function and identify physical deficits which impact ability to perform ADLs (bathing, care of mouth/teeth, toileting, grooming, dressing, etc )  - Assess/evaluate cause of self-care deficits   - Assess range of motion  - Assess patient's mobility; develop plan if impaired  - Assess patient's need for assistive devices and provide as appropriate  - Encourage maximum independence but intervene and supervise when necessary  - Involve family in performance of ADLs  - Assess for home care needs following discharge   - Consider OT consult to assist with ADL evaluation and planning for discharge  - Provide patient education as appropriate  Outcome: Progressing  Goal: Maintains/Returns to pre admission functional level  Description: INTERVENTIONS:  - Perform BMAT or MOVE assessment daily    - Set and communicate daily mobility goal to care team and patient/family/caregiver  - Collaborate with rehabilitation services on mobility goals if consulted  - Perform Range of Motion 3 times a day  - Reposition patient every 3 hours    - Dangle patient 3 times a day  - Stand patient 3 times a day  - Ambulate patient 3 times a day  - Out of bed to chair 3 times a day   - Out of bed for meals 3 times a day  - Out of bed for toileting  - Record patient progress and toleration of activity level   Outcome: Progressing     Problem: DISCHARGE PLANNING  Goal: Discharge to home or other facility with appropriate resources  Description: INTERVENTIONS:  - Identify barriers to discharge w/patient and caregiver  - Arrange for needed discharge resources and transportation as appropriate  - Identify discharge learning needs (meds, wound care, etc )  - Arrange for interpretive services to assist at discharge as needed  - Refer to Case Management Department for coordinating discharge planning if the patient needs post-hospital services based on physician/advanced practitioner order or complex needs related to functional status, cognitive ability, or social support system  Outcome: Progressing     Problem: Knowledge Deficit  Goal: Patient/family/caregiver demonstrates understanding of disease process, treatment plan, medications, and discharge instructions  Description: Complete learning assessment and assess knowledge base   Interventions:  - Provide teaching at level of understanding  - Provide teaching via preferred learning methods  Outcome: Progressing     Problem: Prexisting or High Potential for Compromised Skin Integrity  Goal: Skin integrity is maintained or improved  Description: INTERVENTIONS:  - Identify patients at risk for skin breakdown  - Assess and monitor skin integrity  - Assess and monitor nutrition and hydration status  - Monitor labs   - Assess for incontinence   - Turn and reposition patient  - Assist with mobility/ambulation  - Relieve pressure over bony prominences  - Avoid friction and shearing  - Provide appropriate hygiene as needed including keeping skin clean and dry  - Evaluate need for skin moisturizer/barrier cream  - Collaborate with interdisciplinary team   - Patient/family teaching  - Consider wound care consult   Outcome: Progressing

## 2022-10-03 NOTE — OCCUPATIONAL THERAPY NOTE
Occupational Therapy Evaluation     Patient Name: Stan Ambrose  Today's Date: 10/3/2022  Problem List  Principal Problem:    Acute kidney injury superimposed on CKD [facility])  Active Problems:    HTN (hypertension)    HLD (hyperlipidemia)    Horseshoe kidney    Anemia of chronic disease    Acute diastolic heart failure ([address] )    Acute respiratory failure with hypoxia [facility])    Physical deconditioning    Past Medical History  Past Medical History:   Diagnosis Date    Coronary artery disease     Renal disorder      Past Surgical History  Past Surgical History:   Procedure Laterality Date    AORTIC VALVE REPLACEMENT  2005    Tissue valve    BLADDER SURGERY      23 yrs ago  CORONARY ARTERY BYPASS GRAFT  2005    x1    RENAL ARTERY STENT  11/2005           10/03/22 0852   OT Last Visit   OT Visit Date 10/03/22   Note Type   Note type Evaluation   Restrictions/Precautions   Weight Bearing Precautions Per Order No   Other Precautions Multiple lines;Telemetry;O2;Fall Risk;Pain  (3L O2)   Pain Assessment   Pain Assessment Tool 0-10   Pain Score 5   Pain Location/Orientation Orientation: Bilateral;Location: Back; Location: Leg   Hospital Pain Intervention(s) Repositioned; Ambulation/increased activity; Emotional support; Rest  (RN made aware)   Multiple Pain Sites No   Home Living   Type of Home Apartment  (6th floor)   Home Layout One level;Elevator  (0 ARIAS)   Bathroom Shower/Tub Tub/shower unit   Bathroom Toilet Standard   Bathroom Equipment Grab bars in shower; Shower chair   Bathroom Accessibility Accessible   Home Equipment Walker;Cane;Wheelchair-manual  (has electric scooter, but reports it is broken)   Additional Comments Pt lives alone in a one level apt with 0 ARIAS and elevator access     Prior Function   Level of [place] Independent with ADLs and functional mobility   Lives With Alone   ADL Assistance Independent  (reports increased difficulty x2 months)   IADLs Independent  (reports increased difficulty x2 months) Falls in the last 6 months 0   Vocational Retired   Comments At baseline, pt was I w/ ADLs, IADLs, and functional transfers/mobility w/ use of RW  Pt reports overall functional decline x2 months, mostly staying in bed 2* weakness/pain  (-)   Denies falls PTA  Lifestyle   Autonomy At baseline, pt was I w/ ADLs, IADLs, and functional transfers/mobility w/ use of RW  Pt reports overall functional decline x2 months, mostly staying in bed 2* weakness/pain  (-)   Denies falls PTA  Reciprocal Relationships Son, grandson   Service to Others Retired   Intrinsic Gratification Watching TV   Psychosocial   Psychosocial (WDL) WDL   ADL   Where Assessed Edge of bed   Eating Assistance 7  Independent   Grooming Assistance 5  Supervision/Setup   UB [address] 5  Supervision/Setup   LB [address] 3  Moderate Assistance   UB Dressing Assistance 5  Supervision/Setup    [facility] [address] 3  Moderate [address]  3  Moderate Assistance   Functional Assistance 3  Moderate Assistance   Bed Mobility   Supine to Sit Unable to assess  (Pt seated EOB upon entering room)   Sit to Supine 4  Minimal assistance   Additional items Assist x 1; Increased time required;Verbal cues;LE management   Additional Comments Pt lying supine with bed alarm activated at end of session  Call bell and phone within reach  All needs met and pt reports no further questions for OT at this time  Transfers   Sit to Stand 4  Minimal assistance   Additional items Assist x 2; Increased time required;Verbal cues   Stand to Sit 4  Minimal assistance   Additional items Assist x 2; Increased time required;Verbal cues   Toilet transfer 4  Minimal assistance   Additional items Assist x 2; Increased time required;Verbal cues;Standard toilet  (grab bar use on pt's L)   Additional Comments Cues for safe technique and hand placement   Functional Mobility   Functional Mobility 4  Minimal assistance   Additional Comments Assist x2; Pt c/o SOB  SpO2: 88-91% on 3L O2   Additional items Rolling walker   Balance   Static Sitting Fair   Dynamic Sitting Fair -   Static Standing Fair -   Dynamic Standing Poor +   Ambulatory Poor +   Activity Tolerance   Activity Tolerance Patient limited by fatigue;Patient limited by pain;Treatment limited secondary to medical complications (Comment)   Medical Staff Made Aware Hubert, PT; Marilynelizabeth Jerez, [address] [address] [facility],[place] [address] Made Aware yes; Randell Howell, RN   RUE Assessment   RUE Assessment X  (limited ROM 2* hx of stroke; R shldr flex (40*), elbow-distal=WFL; Pain w/ ROM)   RUE Strength   R Shoulder Flexion 2+/5   R Shoulder Extension 2+/5   R Elbow Flexion 3+/5   R Elbow Extension 3+/5   LUE Assessment   LUE Assessment WFL  (pain w/ ROM)   LUE Strength   LUE Overall Strength Within Functional Limits - able to perform ADL tasks with strength  (4-/5 throughout)   Hand Function   Gross Motor Coordination Functional   Fine Motor Coordination Functional   Sensation   Light Touch No apparent deficits   Proprioception   Proprioception No apparent deficits   Vision-Basic Assessment   Current Vision Wears glasses only for reading   Vision - Complex Assessment   Ocular Range of Motion [facility]   Acuity Able to read clock/calendar on wall without difficulty; Able to read employee name badge without difficulty   Perception   Inattention/Neglect Appears intact   Cognition   Overall Cognitive Status [facility]   Arousal/Participation Alert; Cooperative   Attention Attends with cues to redirect   Orientation Level Oriented to person;Oriented to place;Oriented to time   Memory Decreased recall of precautions   Following Commands Follows one step commands without difficulty   Comments Pleasant and cooperative   Assessment   Prognosis Guarded   Assessment Pt is a 78 y o  male seen for OT evaluation s/p [facility]20[facility] w/ LORENZA superimposed on CKD, physical deconditioning, Acute respiratory failure with hypoxia, Acute diastolic heart failure  Comorbidities affecting pts functional performance include a significant PMH of CVA, CKD stage 4/5, HTN, Horseshoe kidney with chronic hydronephrosis s/p urethral stent, and HLD  Pt with active OT orders and activity orders for Activity as tolerated  Pt lives alone in a one level apt with 0 ARIAS and elevator access  At baseline, pt was I w/ ADLs, IADLs, and functional transfers/mobility w/ use of RW  Pt reports overall functional decline x2 months, mostly staying in bed 2* weakness/pain  (-)   Denies falls PTA  Upon evaluation, pt currently requires Supervision for UB ADLs, Mod A for LB ADLs, Mod A for toileting, Min A for bed mobility, and Min A of 2 for functional mobility/transfers 2* the following deficits impacting occupational performance: decreased ROM, decreased strength, decreased balance, decreased tolerance, decreased safety awareness and increased pain  These impairments, as well at pts fall risk, new O2 requirements, multiple lines, limited home support, difficulty performing ADLS and difficulty performing IADLS  limit pts ability to safely engage in all baseline areas of occupation  Based on the aforementioned OT evaluation, functional performance deficits, and assessments, pt has been identified as a High complexity evaluation  No further acute OT needs identified at this time  Recommend continued mobilization with hospital staff while in the hospital to increase pts endurance and strength upon D/C  From OT standpoint, recommend D/C to environment w/ 24/7 care when medically cleared  D/C pt from OT caseload at this time per pt's request  DANA and MD aware  Please re-consult if needed  Goals   Patient Goals To be on hospice care   Plan   OT Frequency Eval only  (D/C OT)   Recommendation   OT Discharge Recommendation No rehabilitation needs  (recommend D/C to environment with 24/7 care   D/C OT per pt's request  DANA and MD aware)   Additional Comments  The patient's raw score on the AM-PAC Daily Activity inpatient short form is 16, standardized score is 35 96, less than 39 4  Patients at this level are likely to benefit from discharge to post-acute rehabilitation services  Please refer to the recommendation of the Occupational Therapist for safe discharge planning     AM-PAC Daily Activity Inpatient   Lower Body Dressing 2   Bathing 2   Toileting 2   Upper Body Dressing 3   Grooming 3   Eating 4   Daily Activity Raw Score 16   Daily Activity Standardized Score (Calc for Raw Score >=11) 35 96   AM-PAC Applied Cognition Inpatient   Following a Speech/Presentation 4   Understanding Ordinary Conversation 4   Taking Medications 3   Remembering Where Things Are Placed or Put Away 3   Remembering List of 4-5 Errands 3   Taking Care of Complicated Tasks 3   Applied Cognition Raw Score 20   Applied Cognition Standardized Score 41 76        Rhonda Mckeon, OTR/L

## 2022-10-03 NOTE — ASSESSMENT & PLAN NOTE
· Anemia of chronic disease without evidence of bleeding    Results from last 7 days   Lab Units 09/30/22  0616 09/28/22  0520 09/27/22  0524   HEMOGLOBIN g/dL 9 1* 8 6* 9 2*

## 2022-10-04 PROBLEM — Z29.8 INDICATION PRESENT FOR ENDOCARDITIS PROPHYLAXIS: Status: ACTIVE | Noted: 2022-10-04

## 2022-10-04 LAB — SARS-COV-2 RNA RESP QL NAA+PROBE: NEGATIVE

## 2022-10-04 PROCEDURE — 0001A HB IMMUNIZATION ADMIN COVID-19 30MCG/0.3ML FIRST DOSE: CPT | Performed by: INTERNAL MEDICINE

## 2022-10-04 PROCEDURE — 99232 SBSQ HOSP IP/OBS MODERATE 35: CPT | Performed by: INTERNAL MEDICINE

## 2022-10-04 PROCEDURE — U0003 INFECTIOUS AGENT DETECTION BY NUCLEIC ACID (DNA OR RNA); SEVERE ACUTE RESPIRATORY SYNDROME CORONAVIRUS 2 (SARS-COV-2) (CORONAVIRUS DISEASE [COVID-19]), AMPLIFIED PROBE TECHNIQUE, MAKING USE OF HIGH THROUGHPUT TECHNOLOGIES AS DESCRIBED BY CMS-2020-01-R: HCPCS | Performed by: INTERNAL MEDICINE

## 2022-10-04 PROCEDURE — 91300 COVID-19 PFIZER VAC BIVALENT TRIS-SUCROSE 30 MCG/0.3 ML SUSP: CPT | Performed by: INTERNAL MEDICINE

## 2022-10-04 PROCEDURE — U0005 INFEC AGEN DETEC AMPLI PROBE: HCPCS | Performed by: INTERNAL MEDICINE

## 2022-10-04 RX ADMIN — POLYSACCHARIDE-IRON COMPLEX 150 MG: 150 CAPSULE ORAL at 08:49

## 2022-10-04 RX ADMIN — SODIUM BICARBONATE 650 MG: 650 TABLET ORAL at 08:49

## 2022-10-04 RX ADMIN — HEPARIN SODIUM 5000 UNITS: 5000 INJECTION INTRAVENOUS; SUBCUTANEOUS at 05:06

## 2022-10-04 RX ADMIN — BISOPROLOL FUMARATE 5 MG: 5 TABLET ORAL at 08:49

## 2022-10-04 RX ADMIN — HEPARIN SODIUM 5000 UNITS: 5000 INJECTION INTRAVENOUS; SUBCUTANEOUS at 15:55

## 2022-10-04 RX ADMIN — DOXYCYCLINE 100 MG: 100 CAPSULE ORAL at 08:49

## 2022-10-04 RX ADMIN — ATORVASTATIN CALCIUM 40 MG: 40 TABLET, FILM COATED ORAL at 17:34

## 2022-10-04 RX ADMIN — BNT162B2 ORIGINAL AND OMICRON BA.4/BA.5 0.3 ML: .1125; .1125 INJECTION, SUSPENSION INTRAMUSCULAR at 17:33

## 2022-10-04 RX ADMIN — HEPARIN SODIUM 5000 UNITS: 5000 INJECTION INTRAVENOUS; SUBCUTANEOUS at 21:35

## 2022-10-04 RX ADMIN — AMLODIPINE BESYLATE 10 MG: 10 TABLET ORAL at 17:34

## 2022-10-04 NOTE — ASSESSMENT & PLAN NOTE
· LORENZA on CKD 4/5 follows with [facility] as outpatient  · Patient has AV access but does not want to proceed with dialysis  · Patient also declined Granda catheter placement  · Transition to hospice when renal function worsens  · Disposition:  Awaiting placement as patient unable to care for self at home    Results from last 7 days   Lab Units 10/03/22  0513 09/30/22  0616 09/29/22  0517 09/28/22  0520   BUN mg/dL 111* 95* 95* 85*   CREATININE mg/dL 5 34* 5 28* 5 21* 5 22*   EGFR ml/min/1 73sq m 9 9 9 9

## 2022-10-04 NOTE — PLAN OF CARE
Problem: PAIN - ADULT  Goal: Verbalizes/displays adequate comfort level or baseline comfort level  Description: Interventions:  - Encourage patient to monitor pain and request assistance  - Assess pain using appropriate pain scale  - Administer analgesics based on type and severity of pain and evaluate response  - Implement non-pharmacological measures as appropriate and evaluate response  - Consider cultural and social influences on pain and pain management  - Notify physician/advanced practitioner if interventions unsuccessful or patient reports new pain  Outcome: Progressing     Problem: INFECTION - ADULT  Goal: Absence or prevention of progression during hospitalization  Description: INTERVENTIONS:  - Assess and monitor for signs and symptoms of infection  - Monitor lab/diagnostic results  - Monitor all insertion sites, i e  indwelling lines, tubes, and drains  - Monitor endotracheal if appropriate and nasal secretions for changes in amount and color  - [place] appropriate cooling/warming therapies per order  - Administer medications as ordered  - Instruct and encourage patient and family to use good hand hygiene technique  - Identify and instruct in appropriate isolation precautions for identified infection/condition  Outcome: Progressing     Problem: SAFETY ADULT  Goal: Patient will remain free of falls  Description: INTERVENTIONS:  - Educate patient/family on patient safety including physical limitations  - Instruct patient to call for assistance with activity   - Consult OT/PT to assist with strengthening/mobility   - Keep Call bell within reach  - Keep bed low and locked with side rails adjusted as appropriate  - Keep care items and personal belongings within reach  - Initiate and maintain comfort rounds  - Make Fall Risk Sign visible to staff  - Offer Toileting every 2 Hours, in advance of need  - Initiate/Maintain bed alarm  - Obtain necessary fall risk management equipment: bed alarm   - Apply yellow socks and bracelet for high fall risk patients  - Consider moving patient to room near nurses station  Outcome: Progressing  Goal: Maintain or return to baseline ADL function  Description: INTERVENTIONS:  -  Assess patient's ability to carry out ADLs; assess patient's baseline for ADL function and identify physical deficits which impact ability to perform ADLs (bathing, care of mouth/teeth, toileting, grooming, dressing, etc )  - Assess/evaluate cause of self-care deficits   - Assess range of motion  - Assess patient's mobility; develop plan if impaired  - Assess patient's need for assistive devices and provide as appropriate  - Encourage maximum independence but intervene and supervise when necessary  - Involve family in performance of ADLs  - Assess for home care needs following discharge   - Consider OT consult to assist with ADL evaluation and planning for discharge  - Provide patient education as appropriate  Outcome: Progressing  Goal: Maintains/Returns to pre admission functional level  Description: INTERVENTIONS:  - Perform BMAT or MOVE assessment daily    - Set and communicate daily mobility goal to care team and patient/family/caregiver  - Collaborate with rehabilitation services on mobility goals if consulted  - Perform Range of Motion 3 times a day  - Reposition patient every 2 hours    - Dangle patient 3 times a day  - Stand patient 3 times a day  - Ambulate patient 3 times a day  - Out of bed to chair 3 times a day   - Out of bed for meals 3 times a day  - Out of bed for toileting  - Record patient progress and toleration of activity level   Outcome: Progressing     Problem: DISCHARGE PLANNING  Goal: Discharge to home or other facility with appropriate resources  Description: INTERVENTIONS:  - Identify barriers to discharge w/patient and caregiver  - Arrange for needed discharge resources and transportation as appropriate  - Identify discharge learning needs (meds, wound care, etc )  - Arrange for interpretive services to assist at discharge as needed  - Refer to Case Management Department for coordinating discharge planning if the patient needs post-hospital services based on physician/advanced practitioner order or complex needs related to functional status, cognitive ability, or social support system  Outcome: Progressing     Problem: Knowledge Deficit  Goal: Patient/family/caregiver demonstrates understanding of disease process, treatment plan, medications, and discharge instructions  Description: Complete learning assessment and assess knowledge base    Interventions:  - Provide teaching at level of understanding  - Provide teaching via preferred learning methods  Outcome: Progressing     Problem: RESPIRATORY - ADULT  Goal: Achieves optimal ventilation and oxygenation  Description: INTERVENTIONS:  - Assess for changes in respiratory status  - Assess for changes in mentation and behavior  - Position to facilitate oxygenation and minimize respiratory effort  - Oxygen administered by appropriate delivery if ordered  - Initiate smoking cessation education as indicated  - Encourage broncho-pulmonary hygiene including cough, deep breathe, Incentive Spirometry  - Assess the need for suctioning and aspirate as needed  - Assess and instruct to report SOB or any respiratory difficulty  - Respiratory Therapy support as indicated  Outcome: Progressing     Problem: GENITOURINARY - ADULT  Goal: Maintains or returns to baseline urinary function  Description: INTERVENTIONS:  - Assess urinary function  - Encourage oral fluids to ensure adequate hydration if ordered  - Administer IV fluids as ordered to ensure adequate hydration  - Administer ordered medications as needed  - Offer frequent toileting  - Follow urinary retention protocol if ordered  Outcome: Progressing     Problem: METABOLIC, FLUID AND ELECTROLYTES - ADULT  Goal: Fluid balance maintained  Description: INTERVENTIONS:  - Monitor labs   - Monitor I/O and WT  - Instruct patient on fluid and nutrition as appropriate  - Assess for signs & symptoms of volume excess or deficit  Outcome: Progressing     Problem: Prexisting or High Potential for Compromised Skin Integrity  Goal: Skin integrity is maintained or improved  Description: INTERVENTIONS:  - Identify patients at risk for skin breakdown  - Assess and monitor skin integrity  - Assess and monitor nutrition and hydration status  - Monitor labs   - Assess for incontinence   - Turn and reposition patient  - Assist with mobility/ambulation  - Relieve pressure over bony prominences  - Avoid friction and shearing  - Provide appropriate hygiene as needed including keeping skin clean and dry  - Evaluate need for skin moisturizer/barrier cream  - Collaborate with interdisciplinary team   - Patient/family teaching  - Consider wound care consult   Outcome: Progressing     Problem: Nutrition/Hydration-ADULT  Goal: Nutrient/Hydration intake appropriate for improving, restoring or maintaining nutritional needs  Description: Monitor and assess patient's nutrition/hydration status for malnutrition  Collaborate with interdisciplinary team and initiate plan and interventions as ordered  Monitor patient's weight and dietary intake as ordered or per policy  Utilize nutrition screening tool and intervene as necessary  Determine patient's food preferences and provide high-protein, high-caloric foods as appropriate       INTERVENTIONS:  - Monitor oral intake, urinary output, labs, and treatment plans  - Assess nutrition and hydration status and recommend course of action  - Evaluate amount of meals eaten  - Assist patient with eating if necessary   - Allow adequate time for meals  - Recommend/ encourage appropriate diets, oral nutritional supplements, and vitamin/mineral supplements  - Order, calculate, and assess calorie counts as needed  - Recommend, monitor, and adjust tube feedings and TPN/PPN based on assessed needs  - Assess need for intravenous fluids  - Provide specific nutrition/hydration education as appropriate  - Include patient/family/caregiver in decisions related to nutrition  Outcome: Progressing

## 2022-10-04 NOTE — CASE MANAGEMENT
Case Management Discharge Planning Note    Patient name Kevan Ribeiro  Location [place] [address] /[address] MRN [id number]  : 1945 Date 10/4/2022       Current Admission Date: 2022  Current Admission Diagnosis:Acute kidney injury superimposed on CKD [facility])   Patient Active Problem List    Diagnosis Date Noted    Physical deconditioning 10/01/2022    Acute diastolic heart failure ([facility] [address] ) 2022    [facility] failure with hypoxia [facility] 2022    Moderate protein-calorie malnutrition ([facility] ) 2021    Gross hematuria 2021    Ulnar impaction syndrome, left 2021    Right wrist pain 2021    Left wrist pain 2021    Anemia of chronic disease 2021    History of parathyroidectomy ([place] [address] ) 2021    Hypokalemia 2021    Bilateral hydronephrosis 2021    History of aortic valve replacement 2021    Horseshoe kidney 2021    Non-traumatic rhabdomyolysis 2021    Acute kidney injury superimposed on CKD [facility] ) 2021    Elevated troponin 2021    UTI (urinary tract infection) 2021    Prosthetic aortic valve malfunction 2019    Near syncope 2018    Hx of CABG 2018    Chronic kidney disease, stage IV (severe) ([facility] ) 2018    Chronic coronary artery disease 10/03/2016    Endocarditis 10/03/2016    Cerebrovascular accident (CVA) ([facility] ) 10/03/2016    HTN (hypertension) 10/15/2015    HLD (hyperlipidemia) 10/15/2015      LOS (days): 10  Geometric Mean LOS (GMLOS) (days): 4 30  Days to GMLOS:-5 6     OBJECTIVE:  Risk of Unplanned Readmission Score: 17 38         Current admission status: Inpatient   Preferred Pharmacy:   [address] [address]  Phone: [phone] Fax: [phone]    PATIENT/FAMILY REPORTS NO PREFERRED PHARMACY  No address on file      Primary Care Provider: Makayla Doherty MD    Primary Insurance: Bernice BAZZI [address]  Secondary Insurance:     DISCHARGE DETAILS:    Discharge planning discussed with[de-identified] pt & grandson        CM contacted family/caregiver?: Yes  Were Treatment Team discharge recommendations reviewed with patient/caregiver?: Yes  Did patient/caregiver verbalize understanding of patient care needs?: Yes  Were patient/caregiver advised of the risks associated with not following Treatment Team discharge recommendations?: Yes    Contacts  Patient Contacts: Blair Estrada ([address]) and Ivone Weber (son)  Relationship to Patient[de-identified] Family  Contact Method: Phone  Phone Number: Blair Estrada ([place]) [phone];  Cedrick Pollock [facility]) [phone]  Reason/Outcome: Continuity of Care, Discharge Planning, Emergency [address]         Is the patient interested in [facility] at discharge?: No                   Treatment Team Recommendation: Other (no rehab needs)  Discharge Destination Plan[de-identified] SNF                                         Additional Comments: CM met w/ pt at bedside to go over d/c planning  CM let pt know that [address] [place] [address] is the only accepting facility at this time  Pt does want to leave hospital, but feels [facility] is too far for family to come see him  Pt just wants to go home  CM called pt grandson to let him know about our discussion  [address] stated he will talk to pt to try and convince him to go, but can't see him till tomorrow  CM understood, but let grandson know facility cannot hold the bed for him until they commit  Grandson understood  CM to continue to follow pt care & d/c

## 2022-10-04 NOTE — PLAN OF CARE
Problem: RESPIRATORY - ADULT  Goal: Achieves optimal ventilation and oxygenation  Description: INTERVENTIONS:  - Assess for changes in respiratory status  - Assess for changes in mentation and behavior  - Position to facilitate oxygenation and minimize respiratory effort  - Oxygen administered by appropriate delivery if ordered  - Initiate smoking cessation education as indicated  - Encourage broncho-pulmonary hygiene including cough, deep breathe, Incentive Spirometry  - Assess the need for suctioning and aspirate as needed  - Assess and instruct to report SOB or any respiratory difficulty  - Respiratory Therapy support as indicated  Outcome: Progressing     Problem: GENITOURINARY - ADULT  Goal: Maintains or returns to baseline urinary function  Description: INTERVENTIONS:  - Assess urinary function  - Encourage oral fluids to ensure adequate hydration if ordered  - Administer IV fluids as ordered to ensure adequate hydration  - Administer ordered medications as needed  - Offer frequent toileting  - Follow urinary retention protocol if ordered  Outcome: Progressing     Problem: METABOLIC, FLUID AND ELECTROLYTES - ADULT  Goal: Fluid balance maintained  Description: INTERVENTIONS:  - Monitor labs   - Monitor I/O and WT  - Instruct patient on fluid and nutrition as appropriate  - Assess for signs & symptoms of volume excess or deficit  Outcome: Progressing     Problem: PAIN - ADULT  Goal: Verbalizes/displays adequate comfort level or baseline comfort level  Description: Interventions:  - Encourage patient to monitor pain and request assistance  - Assess pain using appropriate pain scale  - Administer analgesics based on type and severity of pain and evaluate response  - Implement non-pharmacological measures as appropriate and evaluate response  - Consider cultural and social influences on pain and pain management  - Notify physician/advanced practitioner if interventions unsuccessful or patient reports new pain  Outcome: Progressing     Problem: INFECTION - ADULT  Goal: Absence or prevention of progression during hospitalization  Description: INTERVENTIONS:  - Assess and monitor for signs and symptoms of infection  - Monitor lab/diagnostic results  - Monitor all insertion sites, i e  indwelling lines, tubes, and drains  - Monitor endotracheal if appropriate and nasal secretions for changes in amount and color  - [place] appropriate cooling/warming therapies per order  - Administer medications as ordered  - Instruct and encourage patient and family to use good hand hygiene technique  - Identify and instruct in appropriate isolation precautions for identified infection/condition  Outcome: Progressing     Problem: SAFETY ADULT  Goal: Patient will remain free of falls  Description: INTERVENTIONS:  - Educate patient/family on patient safety including physical limitations  - Instruct patient to call for assistance with activity   - Consult OT/PT to assist with strengthening/mobility   - Keep Call bell within reach  - Keep bed low and locked with side rails adjusted as appropriate  - Keep care items and personal belongings within reach  - Initiate and maintain comfort rounds  - Make Fall Risk Sign visible to staff  - Offer Toileting every 2 Hours, in advance of need  - Initiate/Maintain bed alarm  - Obtain necessary fall risk management equipment:   - Apply yellow socks and bracelet for high fall risk patients  - Consider moving patient to room near nurses station  Outcome: Progressing  Goal: Maintain or return to baseline ADL function  Description: INTERVENTIONS:  -  Assess patient's ability to carry out ADLs; assess patient's baseline for ADL function and identify physical deficits which impact ability to perform ADLs (bathing, care of mouth/teeth, toileting, grooming, dressing, etc )  - Assess/evaluate cause of self-care deficits   - Assess range of motion  - Assess patient's mobility; develop plan if impaired  - Assess patient's need for assistive devices and provide as appropriate  - Encourage maximum independence but intervene and supervise when necessary  - Involve family in performance of ADLs  - Assess for home care needs following discharge   - Consider OT consult to assist with ADL evaluation and planning for discharge  - Provide patient education as appropriate  Outcome: Progressing  Goal: Maintains/Returns to pre admission functional level  Description: INTERVENTIONS:  - Perform BMAT or MOVE assessment daily    - Set and communicate daily mobility goal to care team and patient/family/caregiver  - Collaborate with rehabilitation services on mobility goals if consulted  - Perform Range of Motion 3 times a day  - Reposition patient every 3 hours    - Dangle patient 3 times a day  - Stand patient 3 times a day  - Ambulate patient 3 times a day  - Out of bed to chair 3 times a day   - Out of bed for meals 3 times a day  - Out of bed for toileting  - Record patient progress and toleration of activity level   Outcome: Progressing     Problem: DISCHARGE PLANNING  Goal: Discharge to home or other facility with appropriate resources  Description: INTERVENTIONS:  - Identify barriers to discharge w/patient and caregiver  - Arrange for needed discharge resources and transportation as appropriate  - Identify discharge learning needs (meds, wound care, etc )  - Arrange for interpretive services to assist at discharge as needed  - Refer to Case Management Department for coordinating discharge planning if the patient needs post-hospital services based on physician/advanced practitioner order or complex needs related to functional status, cognitive ability, or social support system  Outcome: Progressing     Problem: Knowledge Deficit  Goal: Patient/family/caregiver demonstrates understanding of disease process, treatment plan, medications, and discharge instructions  Description: Complete learning assessment and assess knowledge base   Interventions:  - Provide teaching at level of understanding  - Provide teaching via preferred learning methods  Outcome: Progressing     Problem: Prexisting or High Potential for Compromised Skin Integrity  Goal: Skin integrity is maintained or improved  Description: INTERVENTIONS:  - Identify patients at risk for skin breakdown  - Assess and monitor skin integrity  - Assess and monitor nutrition and hydration status  - Monitor labs   - Assess for incontinence   - Turn and reposition patient  - Assist with mobility/ambulation  - Relieve pressure over bony prominences  - Avoid friction and shearing  - Provide appropriate hygiene as needed including keeping skin clean and dry  - Evaluate need for skin moisturizer/barrier cream  - Collaborate with interdisciplinary team   - Patient/family teaching  - Consider wound care consult   Outcome: Progressing     Problem: Nutrition/Hydration-ADULT  Goal: Nutrient/Hydration intake appropriate for improving, restoring or maintaining nutritional needs  Description: Monitor and assess patient's nutrition/hydration status for malnutrition  Collaborate with interdisciplinary team and initiate plan and interventions as ordered  Monitor patient's weight and dietary intake as ordered or per policy  Utilize nutrition screening tool and intervene as necessary  Determine patient's food preferences and provide high-protein, high-caloric foods as appropriate       INTERVENTIONS:  - Monitor oral intake, urinary output, labs, and treatment plans  - Assess nutrition and hydration status and recommend course of action  - Evaluate amount of meals eaten  - Assist patient with eating if necessary   - Allow adequate time for meals  - Recommend/ encourage appropriate diets, oral nutritional supplements, and vitamin/mineral supplements  - Order, calculate, and assess calorie counts as needed  - Recommend, monitor, and adjust tube feedings and TPN/PPN based on assessed needs  - Assess need for intravenous fluids  - Provide specific nutrition/hydration education as appropriate  - Include patient/family/caregiver in decisions related to nutrition  Outcome: Progressing

## 2022-10-04 NOTE — OCCUPATIONAL THERAPY NOTE
Occupational Therapy         Patient Name: Srinivasa Escobar  [id number] Date: 10/4/2022     10/04/22 1530   OT Last Visit   OT Visit Date 10/04/22   Note Type   Note type Screen   Additional Comments OT consult received  Per chart review OT evaluation complete 10/3/22  At that time pt declining further OT services  Spoke to CM who confirmed pt does not require re-evaluation prior to d/c  Will be available for re-consultation per patient request or for d/c needs as necessary     Shahriar Corbett OT

## 2022-10-04 NOTE — PLAN OF CARE
Problem: RESPIRATORY - ADULT  Goal: Achieves optimal ventilation and oxygenation  Description: INTERVENTIONS:  - Assess for changes in respiratory status  - Assess for changes in mentation and behavior  - Position to facilitate oxygenation and minimize respiratory effort  - Oxygen administered by appropriate delivery if ordered  - Initiate smoking cessation education as indicated  - Encourage broncho-pulmonary hygiene including cough, deep breathe, Incentive Spirometry  - Assess the need for suctioning and aspirate as needed  - Assess and instruct to report SOB or any respiratory difficulty  - Respiratory Therapy support as indicated  Outcome: Progressing     Problem: GENITOURINARY - ADULT  Goal: Maintains or returns to baseline urinary function  Description: INTERVENTIONS:  - Assess urinary function  - Encourage oral fluids to ensure adequate hydration if ordered  - Administer IV fluids as ordered to ensure adequate hydration  - Administer ordered medications as needed  - Offer frequent toileting  - Follow urinary retention protocol if ordered  Outcome: Progressing     Problem: METABOLIC, FLUID AND ELECTROLYTES - ADULT  Goal: Fluid balance maintained  Description: INTERVENTIONS:  - Monitor labs   - Monitor I/O and WT  - Instruct patient on fluid and nutrition as appropriate  - Assess for signs & symptoms of volume excess or deficit  Outcome: Progressing     Problem: PAIN - ADULT  Goal: Verbalizes/displays adequate comfort level or baseline comfort level  Description: Interventions:  - Encourage patient to monitor pain and request assistance  - Assess pain using appropriate pain scale  - Administer analgesics based on type and severity of pain and evaluate response  - Implement non-pharmacological measures as appropriate and evaluate response  - Consider cultural and social influences on pain and pain management  - Notify physician/advanced practitioner if interventions unsuccessful or patient reports new pain  Outcome: Progressing     Problem: INFECTION - ADULT  Goal: Absence or prevention of progression during hospitalization  Description: INTERVENTIONS:  - Assess and monitor for signs and symptoms of infection  - Monitor lab/diagnostic results  - Monitor all insertion sites, i e  indwelling lines, tubes, and drains  - Monitor endotracheal if appropriate and nasal secretions for changes in amount and color  - [place] appropriate cooling/warming therapies per order  - Administer medications as ordered  - Instruct and encourage patient and family to use good hand hygiene technique  - Identify and instruct in appropriate isolation precautions for identified infection/condition  Outcome: Progressing     Problem: SAFETY ADULT  Goal: Patient will remain free of falls  Description: INTERVENTIONS:  - Educate patient/family on patient safety including physical limitations  - Instruct patient to call for assistance with activity   - Consult OT/PT to assist with strengthening/mobility   - Keep Call bell within reach  - Keep bed low and locked with side rails adjusted as appropriate  - Keep care items and personal belongings within reach  - Initiate and maintain comfort rounds  - Make Fall Risk Sign visible to staff  - Offer Toileting every  Hours, in advance of need  - Initiate/Maintain alarm  - Obtain necessary fall risk management equipment:   - Apply yellow socks and bracelet for high fall risk patients  - Consider moving patient to room near nurses station  Outcome: Progressing  Goal: Maintain or return to baseline ADL function  Description: INTERVENTIONS:  -  Assess patient's ability to carry out ADLs; assess patient's baseline for ADL function and identify physical deficits which impact ability to perform ADLs (bathing, care of mouth/teeth, toileting, grooming, dressing, etc )  - Assess/evaluate cause of self-care deficits   - Assess range of motion  - Assess patient's mobility; develop plan if impaired  - Assess patient's need for assistive devices and provide as appropriate  - Encourage maximum independence but intervene and supervise when necessary  - Involve family in performance of ADLs  - Assess for home care needs following discharge   - Consider OT consult to assist with ADL evaluation and planning for discharge  - Provide patient education as appropriate  Outcome: Progressing  Goal: Maintains/Returns to pre admission functional level  Description: INTERVENTIONS:  - Perform BMAT or MOVE assessment daily    - Set and communicate daily mobility goal to care team and patient/family/caregiver  - Collaborate with rehabilitation services on mobility goals if consulted  - Perform Range of Motion  times a day  - Reposition patient every  hours    - Dangle patient  times a day  - Stand patient  times a day  - Ambulate patient  times a day  - Out of bed to chair  times a day   - Out of bed for meals times a day  - Out of bed for toileting  - Record patient progress and toleration of activity level   Outcome: Progressing     Problem: DISCHARGE PLANNING  Goal: Discharge to home or other facility with appropriate resources  Description: INTERVENTIONS:  - Identify barriers to discharge w/patient and caregiver  - Arrange for needed discharge resources and transportation as appropriate  - Identify discharge learning needs (meds, wound care, etc )  - Arrange for interpretive services to assist at discharge as needed  - Refer to Case Management Department for coordinating discharge planning if the patient needs post-hospital services based on physician/advanced practitioner order or complex needs related to functional status, cognitive ability, or social support system  Outcome: Progressing     Problem: Knowledge Deficit  Goal: Patient/family/caregiver demonstrates understanding of disease process, treatment plan, medications, and discharge instructions  Description: Complete learning assessment and assess knowledge base   Interventions:  - Provide teaching at level of understanding  - Provide teaching via preferred learning methods  Outcome: Progressing     Problem: Prexisting or High Potential for Compromised Skin Integrity  Goal: Skin integrity is maintained or improved  Description: INTERVENTIONS:  - Identify patients at risk for skin breakdown  - Assess and monitor skin integrity  - Assess and monitor nutrition and hydration status  - Monitor labs   - Assess for incontinence   - Turn and reposition patient  - Assist with mobility/ambulation  - Relieve pressure over bony prominences  - Avoid friction and shearing  - Provide appropriate hygiene as needed including keeping skin clean and dry  - Evaluate need for skin moisturizer/barrier cream  - Collaborate with interdisciplinary team   - Patient/family teaching  - Consider wound care consult   Outcome: Progressing

## 2022-10-04 NOTE — PHYSICAL THERAPY NOTE
Physical Therapy Screen Note             10/04/22 1521   PT Last Visit   PT Visit Date 10/04/22   Note Type   Note type Screen   Additional Comments PT consult received  Per chart review PT evaluation complete 10/3/22  At that time pt declining further PT services  Spoke to CM who confirmed pt does not require re-evaluation prior to d/c  Will be available for re-consultation per patient request or for d/c needs as necessary         Sinan Solano

## 2022-10-04 NOTE — PROGRESS NOTES
[facility]  Progress Note - Lulú Ward 1945, 78 y o  male MRN: [id number]  Unit/Bed#: E4 -01 Encounter: [id number]  Primary Care Provider: Galileo Call MD   Date and time admitted to hospital: 9/24/2022  3:21 PM    * Acute kidney injury superimposed on CKD 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 follows with [facility] as outpatient  · Patient has AV access but does not want to proceed with dialysis  · Patient also declined Granda catheter placement  · Transition to hospice when renal function worsens  · Disposition:  Awaiting placement as patient unable to care for self at home    Results from last 7 days   Lab Units 10/03/22  0513 09/30/22  0616 09/29/22  0517 09/28/22  0520   BUN mg/dL 111* 95* 95* 85*   CREATININE mg/dL 5 34* 5 28* 5 21* 5 22*   EGFR ml/min/1 73sq m 9 9 9 9       Indication present for endocarditis prophylaxis  Assessment & Plan  · Reportedly on doxycycline for endocarditis prophylaxis    Physical deconditioning  Assessment & Plan  · Ambulatory dysfunction unable to care for self  · Reconsulted PT/OT to evaluate for discharge needs    Acute diastolic heart failure (HCC)  Assessment & Plan  Wt Readings from Last 3 Encounters:   10/04/22 73 3 kg (161 lb 9 6 oz)   01/31/22 72 6 kg (160 lb)   09/14/21 72 9 kg (160 lb 12 8 oz)     · Vascular congestion on admission was on IV furosemide  · Patient appears compensated hold further diuretics    Anemia of chronic disease  Assessment & Plan  · Anemia of chronic disease without evidence of bleeding    Results from last 7 days   Lab Units 09/30/22  0616 09/28/22  0520   HEMOGLOBIN g/dL 9 1* 8 6*       Horseshoe kidney  Assessment & Plan  · 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  · Patient declined Granda catheter placement    HLD (hyperlipidemia)  Assessment & Plan  · Continue atorvastatin    HTN (hypertension)  Assessment & Plan  · Continue bisoprolol and amlodipine      VTE Pharmacologic Prophylaxis:   Moderate Risk (Score 3-4) - Pharmacological DVT Prophylaxis Ordered: heparin  Patient Centered Rounds: I have performed bedside rounds with nursing staff today  Discussions with Specialists or Other Care Team Provider:  Case management    Education and Discussions with Family / Patient:     Time Spent for Care: 20 mins  More than 50% of total time spent on counseling and coordination of care as described above  Current Length of Stay: 10 day(s)  Current Patient Status: Inpatient   Certification Statement: The patient will continue to require additional inpatient hospital stay due to placement  Discharge Plan / Estimated Discharge Date: Medically stable waiting placement    Code Status: Level 3 - DNAR and DNI      Subjective:   Patient seen and examined  No new complaints  Wants to go home but understands he is weak    Objective:   Vitals: Blood pressure 141/64, pulse 55, temperature 97 5 °F (36 4 °C), temperature source Temporal, resp  rate 18, weight 73 3 kg (161 lb 9 6 oz), SpO2 95 %  No intake or output data in the 24 hours ending 10/04/22 1735    Physical Exam  Vitals reviewed  Constitutional:       General: He is not in acute distress  Appearance: Normal appearance  HENT:      Head: Atraumatic  Cardiovascular:      Rate and Rhythm: Regular rhythm  Heart sounds: Normal heart sounds  Pulmonary:      Effort: Pulmonary effort is normal       Breath sounds: Decreased breath sounds present  No wheezing  Abdominal:      Palpations: Abdomen is soft  Tenderness: There is no abdominal tenderness  There is no rebound  Musculoskeletal:         General: Swelling present  No tenderness  Cervical back: Normal range of motion  Skin:     General: Skin is warm  Neurological:      General: No focal deficit present  Mental Status: He is alert  Cranial Nerves: No cranial nerve deficit     Psychiatric:         Mood and Affect: Mood normal  Additional Data:   Labs:  Results from last 7 days   Lab Units 09/30/22  0616 09/28/22  0520   WBC Thousand/uL 6 01 5 84   HEMOGLOBIN g/dL 9 1* 8 6*   PLATELETS Thousands/uL 157 133*   MCV fL 94 94     Results from last 7 days   Lab Units 10/03/22  0513 09/30/22  0616 09/29/22  0517   SODIUM mmol/L 142 141 141   POTASSIUM mmol/L 4 3 4 5 4 9   CHLORIDE mmol/L 107 105 107   CO2 mmol/L 21 23 25   ANION GAP mmol/L 14* 13 9   BUN mg/dL 111* 95* 95*   CREATININE mg/dL 5 34* 5 28* 5 21*   CALCIUM mg/dL 8 7 8 2* 8 1*   EGFR ml/min/1 73sq m 9 9 9   GLUCOSE RANDOM mg/dL 98 91 104                     * I Have Reviewed All Lab Data Listed Above  Cultures:         Results from last 7 days   Lab Units 10/04/22  1556   SARS-COV-2  Negative           Lines/Drains:  Invasive Devices  Report    Peripheral Intravenous Line  Duration           Peripheral IV 10/01/22 Left;Ventral (anterior) Forearm 3 days          Line  Duration           Hemodialysis AV Fistula 09/11/21 Right Upper arm 388 days              Telemetry:      Imaging:  Imaging Reports Reviewed Today Include:   CT abdomen pelvis wo contrast    Result Date: 9/25/2022  Impression: New moderate right pleural effusion incompletely imaged with passive atelectasis in the right lower lobe  There is hydronephrosis involving the upper pole of the left renal moiety which seems similar to the prior exam   The left-sided ureteral stent is stable in position extending from the renal pelvis to the bladder  There is persistent bilateral hydroureter although the right side is diminished since the prior exam and the right-sided renal moiety is atrophic and no longer shows hydronephrosis  Mild anterior bladder wall thickening of uncertain significance  Correlate with urinalysis to exclude cystitis  Right inguinal hernia contains a loop of small bowel, without evidence of structures at this time   Workstation performed: [id number]     XR chest 1 view portable    Result Date: 9/24/2022  Impression: Moderate right effusion and right base opacity which could be due to atelectasis and/or pneumonia  Trace left effusion  Moderate pulmonary venous congestion  Workstation performed: [id number]      kidney and bladder    Result Date: 9/28/2022  Impression: 1  Bilateral hydroureter with left nephroureteral stent in place an severe dilatation of the left upper pole collecting system as seen on CT  2   Horseshoe kidney with severe cortical thinning, right greater than left  Workstation performed: [id number]       Scheduled Meds:  Current Facility-Administered Medications   Medication Dose Route Frequency Provider Last Rate    acetaminophen  650 mg Oral Q6H PRN Ilana Mueller MD      amLODIPine  10 mg Oral QPM [place], PA-      atorvastatin  40 mg Oral Daily With Sarai Danielson MD      bisoprolol  5 mg Oral Daily Ilana Mueller MD      doxycycline hyclate  100 mg Oral Q24H [facility],       heparin (porcine)  5,000 Units Subcutaneous [facility] Carl Mathis MD      iron polysaccharides  150 mg Oral Daily [place], [place]      ondansetron  4 mg Intravenous Q6H PRN Ilana Mueller MD      permethrin   Topical Once [facility], PA-C      sodium bicarbonate  650 mg Oral Daily Ansley Singh MD         Today, Patient Was Seen By: Daphney Angelucci    ** Please Note: Dictation voice to text software may have been used in the creation of this document   **

## 2022-10-04 NOTE — ASSESSMENT & PLAN NOTE
Wt Readings from Last 3 Encounters:   10/04/22 73 3 kg (161 lb 9 6 oz)   01/31/22 72 6 kg (160 lb)   09/14/21 72 9 kg (160 lb 12 8 oz)     · Vascular congestion on admission was on IV furosemide  · Patient appears compensated hold further diuretics

## 2022-10-04 NOTE — ASSESSMENT & PLAN NOTE
· Ambulatory dysfunction unable to care for self  · Reconsulted PT/OT to evaluate for discharge needs

## 2022-10-04 NOTE — ASSESSMENT & PLAN NOTE
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

## 2022-10-04 NOTE — ASSESSMENT & PLAN NOTE
· Anemia of chronic disease without evidence of bleeding    Results from last 7 days   Lab Units 09/30/22  0616 09/28/22  0520   HEMOGLOBIN g/dL 9 1* 8 6*

## 2022-10-05 PROCEDURE — 99232 SBSQ HOSP IP/OBS MODERATE 35: CPT | Performed by: INTERNAL MEDICINE

## 2022-10-05 RX ADMIN — HEPARIN SODIUM 5000 UNITS: 5000 INJECTION INTRAVENOUS; SUBCUTANEOUS at 06:12

## 2022-10-05 RX ADMIN — HEPARIN SODIUM 5000 UNITS: 5000 INJECTION INTRAVENOUS; SUBCUTANEOUS at 14:07

## 2022-10-05 RX ADMIN — HEPARIN SODIUM 5000 UNITS: 5000 INJECTION INTRAVENOUS; SUBCUTANEOUS at 22:40

## 2022-10-05 RX ADMIN — AMLODIPINE BESYLATE 10 MG: 10 TABLET ORAL at 17:01

## 2022-10-05 RX ADMIN — DOXYCYCLINE 100 MG: 100 CAPSULE ORAL at 09:34

## 2022-10-05 RX ADMIN — BISOPROLOL FUMARATE 5 MG: 5 TABLET ORAL at 09:34

## 2022-10-05 RX ADMIN — SODIUM BICARBONATE 650 MG: 650 TABLET ORAL at 09:34

## 2022-10-05 RX ADMIN — POLYSACCHARIDE-IRON COMPLEX 150 MG: 150 CAPSULE ORAL at 09:34

## 2022-10-05 RX ADMIN — ATORVASTATIN CALCIUM 40 MG: 40 TABLET, FILM COATED ORAL at 17:01

## 2022-10-05 NOTE — ASSESSMENT & PLAN NOTE
· Ambulatory dysfunction unable to care for self  · Declined PT/OT as he wishes to proceed with hospice

## 2022-10-05 NOTE — UTILIZATION REVIEW
Continued Stay Review    Date: 10/5/22                   Current Patient Class: IP Current Level of Care: MS    HPI:77 y o  male initially admitted on 9/24 LORENZA on CKD    Assessment/Plan:   Continues on oxygen at 3L NC, need home oxygen study eval   Pt is refusing dialysis and a hurley at this time  Pt is electing to proceed with hospice  Pt is unable to care for self at home  He is refusing PT  Family requesting neuropsych eval to determine decision-making capacity  On exam he has decreased breath sounds and generalized swelling  He is alert  Medically cleared for d/c when d/c plan is finalized        Vital Signs:   10/05/22 0718 98 °F (36 7 °C) 57 18 139/57 94 95 % -- -- Nasal cannula Lying   10/04/22 2317 97 3 °F (36 3 °C) Abnormal  64 18 137/54 88 94 % -- -- Nasal cannula Sitting   10/04/22 1528 97 5 °F (36 4 °C) 55 18 141/64 92 95 % 32 3 L/min Nasal cannula Lying   10/04/22 0650 98 °F (36 7 °C) 57 20 138/62 89 95 % 32 3 L/min Nasal cannula Lying   10/03/22 2301 97 7 °F (36 5 °C) 59 20 138/63 90 94 % 32 3 L/min Nasal cannula Lying   10/03/22 1517 97 6 °F (36 4 °C) 60 20 157/67 96 93 % 32 3 L/min Nasal cannula Lying   10/03/22 0900 -- -- -- -- -- -- 32 3 L/min -- --   10/03/22 0709 98 6 °F (37 °C) 58 20 133/63 -- 96 % 32 3 L/min Nasal cannula Lying   10/03/22 0015 98 3 °F (36 8 °C) 60 20 139/65 93 95 % 32 3 L/min Nasal cannula Lying     Pertinent Labs/Diagnostic Results:   Results from last 7 days   Lab Units 10/04/22  1556   SARS-COV-2  Negative     Results from last 7 days   Lab Units 09/30/22  0616   WBC Thousand/uL 6 01   HEMOGLOBIN g/dL 9 1*   HEMATOCRIT % 29 9*   PLATELETS Thousands/uL 157   NEUTROS ABS Thousands/µL 4 52         Results from last 7 days   Lab Units 10/03/22  0513 09/30/22  0616 09/29/22  0517   SODIUM mmol/L 142 141 141   POTASSIUM mmol/L 4 3 4 5 4 9   CHLORIDE mmol/L 107 105 107   CO2 mmol/L 21 23 25   ANION GAP mmol/L 14* 13 9   BUN mg/dL 111* 95* 95*   CREATININE mg/dL 5 34* 5 28* 5 21*   EGFR ml/min/1 73sq m 9 9 9   CALCIUM mg/dL 8 7 8 2* 8 1*             Results from last 7 days   Lab Units 10/03/22  0513 09/30/22  0616 09/29/22  0517   GLUCOSE RANDOM mg/dL 98 91 104     Medications:   Scheduled Medications:  amLODIPine, 10 mg, Oral, QPM  atorvastatin, 40 mg, Oral, Daily With Dinner  bisoprolol, 5 mg, Oral, Daily  doxycycline hyclate, 100 mg, Oral, Q24H GHADA  heparin (porcine), 5,000 Units, Subcutaneous, Q8H GHADA  iron polysaccharides, 150 mg, Oral, Daily  sodium bicarbonate, 650 mg, Oral, Daily      Continuous IV Infusions:     PRN Meds:  acetaminophen, 650 mg, Oral, Q6H PRN  ondansetron, 4 mg, Intravenous, Q6H PRN    Discharge Plan: TBD -needs placement - unsafe at home     Network Utilization Review Department  ATTENTION: Please call with any questions or concerns to [phone] and carefully listen to the prompts so that you are directed to the right person  All voicemails are confidential   [facility] all requests for admission clinical reviews, approved or denied determinations and any other requests to dedicated fax number below belonging to the campus where the patient is receiving treatment   List of dedicated fax numbers for the Facilities:  [address] DENIALS (Administrative/Medical Necessity) [phone]   [address] (Maternity/NICU/Pediatrics) [phone]   [address]  [phone]   [address] [facility] [place] [address] [id number] [place] [address] [place] [phone] [facility]  1500 [facility] [phone]

## 2022-10-05 NOTE — PROGRESS NOTES
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 follows with [facility] as outpatient  · Patient has AV access but does not want to proceed with dialysis  · Patient also declined Granda catheter placement  · Transition to hospice when renal function worsens  · Disposition:  Patient was to return home  Family concerned about inability to care for self    Family requesting neuropsychology evaluation for decision-making capacity    Results from last 7 days   Lab Units 10/03/22  0513 09/30/22  0616 09/29/22  0517   BUN mg/dL 111* 95* 95*   CREATININE mg/dL 5 34* 5 28* 5 21*   EGFR ml/min/1 73sq m 9 9 9       Indication present for endocarditis prophylaxis  Assessment & Plan  · Reportedly on doxycycline for endocarditis prophylaxis    Physical deconditioning  Assessment & Plan  · Ambulatory dysfunction unable to care for self  · Declined PT/OT as he wishes to proceed with hospice    Acute respiratory failure with hypoxia ([address] )  Assessment & Plan  · Required upwards of 3 L nasal cannula secondary to CHF  · Will need oxygen study prior to discharge if going home    Acute diastolic heart failure (HCC)  Assessment & Plan  Wt Readings from Last 3 Encounters:   10/05/22 73 1 kg (161 lb 3 2 oz)   01/31/22 72 6 kg (160 lb)   09/14/21 72 9 kg (160 lb 12 8 oz)     · Vascular congestion on admission was on IV furosemide  · Patient appears compensated holding further diuretics    Anemia of chronic disease  Assessment & Plan  · Anemia of chronic disease without evidence of bleeding    Results from last 7 days   Lab Units 09/30/22  0616   HEMOGLOBIN g/dL 9 1*       Horseshoe kidney  Assessment & Plan  · 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  · Patient declined Granda catheter placement    HLD (hyperlipidemia)  Assessment & Plan  · Continue atorvastatin    HTN (hypertension)  Assessment & Plan  · Continue bisoprolol and amlodipine      VTE Pharmacologic Prophylaxis:   High Risk (Score >/= 5) - Pharmacological DVT Prophylaxis Ordered: heparin  Sequential Compression Devices Ordered  Patient Centered Rounds: I have performed bedside rounds with nursing staff today  Discussions with Specialists or Other Care Team Provider:  Case management    Education and Discussions with Family / Patient: Updated  (Grandson) via phone  Time Spent for Care: 20 mins  More than 50% of total time spent on counseling and coordination of care as described above  Current Length of Stay: 11 day(s)  Current Patient Status: Inpatient   Certification Statement: The patient will continue to require additional inpatient hospital stay due to neuropsychology evaluation  Discharge Plan / Estimated Discharge Date: Patient remains medically cleared  Awaiting final discharge plans    Code Status: Level 3 - DNAR and DNI      Subjective:   Patient seen and examined  No complaints  Wants to go home  Discussed with grandson who states that patient will have difficulty caring for self    Objective:   Vitals: Blood pressure 139/57, pulse 57, temperature 98 °F (36 7 °C), temperature source Temporal, resp  rate 18, weight 73 1 kg (161 lb 3 2 oz), SpO2 95 %  Intake/Output Summary (Last 24 hours) at 10/5/2022 1319  Last data filed at 10/4/2022 2308  Gross per 24 hour   Intake --   Output 800 ml   Net -800 ml       Physical Exam  Vitals reviewed  Constitutional:       General: He is not in acute distress  HENT:      Head: Atraumatic  Cardiovascular:      Rate and Rhythm: Regular rhythm  Heart sounds: Normal heart sounds     Pulmonary:      Effort: Pulmonary effort is normal       Breath sounds: Decreased breath sounds present  No wheezing  Abdominal:      Tenderness: There is no abdominal tenderness  There is no rebound  Musculoskeletal:         General: Swelling present  No tenderness  Skin:     General: Skin is warm  Neurological:      General: No focal deficit present  Mental Status: He is alert  Cranial Nerves: No cranial nerve deficit  Additional Data:   Labs:  Results from last 7 days   Lab Units 09/30/22  0616   WBC Thousand/uL 6 01   HEMOGLOBIN g/dL 9 1*   PLATELETS Thousands/uL 157   MCV fL 94     Results from last 7 days   Lab Units 10/03/22  0513 09/30/22  0616 09/29/22  0517   SODIUM mmol/L 142 141 141   POTASSIUM mmol/L 4 3 4 5 4 9   CHLORIDE mmol/L 107 105 107   CO2 mmol/L 21 23 25   ANION GAP mmol/L 14* 13 9   BUN mg/dL 111* 95* 95*   CREATININE mg/dL 5 34* 5 28* 5 21*   CALCIUM mg/dL 8 7 8 2* 8 1*   EGFR ml/min/1 73sq m 9 9 9   GLUCOSE RANDOM mg/dL 98 91 104                                      * I Have Reviewed All Lab Data Listed Above  Cultures:         Results from last 7 days   Lab Units 10/04/22  1556   SARS-COV-2  Negative           Lines/Drains:  Invasive Devices  Report    Peripheral Intravenous Line  Duration           Peripheral IV 10/01/22 Left;Ventral (anterior) Forearm 4 days          Line  Duration           Hemodialysis AV Fistula 09/11/21 Right Upper arm 389 days              Telemetry:      Imaging:  Imaging Reports Reviewed Today Include:   CT abdomen pelvis wo contrast    Result Date: 9/25/2022  Impression: New moderate right pleural effusion incompletely imaged with passive atelectasis in the right lower lobe  There is hydronephrosis involving the upper pole of the left renal moiety which seems similar to the prior exam   The left-sided ureteral stent is stable in position extending from the renal pelvis to the bladder   There is persistent bilateral hydroureter although the right side is diminished since the prior exam and the right-sided renal moiety is atrophic and no longer shows hydronephrosis  Mild anterior bladder wall thickening of uncertain significance  Correlate with urinalysis to exclude cystitis  Right inguinal hernia contains a loop of small bowel, without evidence of structures at this time  Workstation performed: [id number]     XR chest 1 view portable    Result Date: 9/24/2022  Impression: Moderate right effusion and right base opacity which could be due to atelectasis and/or pneumonia  Trace left effusion  Moderate pulmonary venous congestion  Workstation performed: [id number]     US kidney and bladder    Result Date: 9/28/2022  Impression: 1  Bilateral hydroureter with left nephroureteral stent in place an severe dilatation of the left upper pole collecting system as seen on CT  2   Horseshoe kidney with severe cortical thinning, right greater than left  Workstation performed: [id number]       Scheduled Meds:  Current Facility-Administered Medications   Medication Dose Route Frequency Provider Last Rate    acetaminophen  650 mg Oral Q6H PRN Liliana Lui MD      amLODIPine  10 mg Oral QPM [facility]      atorvastatin  40 mg Oral Daily With Shalom Burton MD      bisoprolol  5 mg Oral Daily Liliana Lui MD      doxycycline hyclate  100 mg Oral Q24H [facility],       heparin (porcine)  5,000 Units Subcutaneous [facility] Carl García MD      iron polysaccharides  150 mg Oral Daily [place], [place]      ondansetron  4 mg Intravenous Q6H PRN Liliana Lui MD      permethrin   Topical Once [facility]      sodium bicarbonate  650 mg Oral Daily Gunjan Marrufo MD         Today, Patient Was Seen By: Josr Putnam    ** Please Note: Dictation voice to text software may have been used in the creation of this document   **

## 2022-10-05 NOTE — ASSESSMENT & PLAN NOTE
· LORENZA on CKD 4/5 follows with [facility] as outpatient  · Patient has AV access but does not want to proceed with dialysis  · Patient also declined Granda catheter placement  · Transition to hospice when renal function worsens  · Disposition:  Patient was to return home  Family concerned about inability to care for self    Family requesting neuropsychology evaluation for decision-making capacity    Results from last 7 days   Lab Units 10/03/22  0513 09/30/22  0616 09/29/22  0517   BUN mg/dL 111* 95* 95*   CREATININE mg/dL 5 34* 5 28* 5 21*   EGFR ml/min/1 73sq m 9 9 9

## 2022-10-05 NOTE — ASSESSMENT & PLAN NOTE
Wt Readings from Last 3 Encounters:   10/05/22 73 1 kg (161 lb 3 2 oz)   01/31/22 72 6 kg (160 lb)   09/14/21 72 9 kg (160 lb 12 8 oz)     · Vascular congestion on admission was on IV furosemide  · Patient appears compensated holding further diuretics

## 2022-10-05 NOTE — ASSESSMENT & PLAN NOTE
· Required upwards of 3 L nasal cannula secondary to CHF  · Will need oxygen study prior to discharge if going home

## 2022-10-05 NOTE — PLAN OF CARE
Problem: RESPIRATORY - ADULT  Goal: Achieves optimal ventilation and oxygenation  Description: INTERVENTIONS:  - Assess for changes in respiratory status  - Assess for changes in mentation and behavior  - Position to facilitate oxygenation and minimize respiratory effort  - Oxygen administered by appropriate delivery if ordered  - Initiate smoking cessation education as indicated  - Encourage broncho-pulmonary hygiene including cough, deep breathe, Incentive Spirometry  - Assess the need for suctioning and aspirate as needed  - Assess and instruct to report SOB or any respiratory difficulty  - Respiratory Therapy support as indicated  Outcome: Progressing     Problem: GENITOURINARY - ADULT  Goal: Maintains or returns to baseline urinary function  Description: INTERVENTIONS:  - Assess urinary function  - Encourage oral fluids to ensure adequate hydration if ordered  - Administer IV fluids as ordered to ensure adequate hydration  - Administer ordered medications as needed  - Offer frequent toileting  - Follow urinary retention protocol if ordered  Outcome: Progressing     Problem: METABOLIC, FLUID AND ELECTROLYTES - ADULT  Goal: Fluid balance maintained  Description: INTERVENTIONS:  - Monitor labs   - Monitor I/O and WT  - Instruct patient on fluid and nutrition as appropriate  - Assess for signs & symptoms of volume excess or deficit  Outcome: Progressing     Problem: PAIN - ADULT  Goal: Verbalizes/displays adequate comfort level or baseline comfort level  Description: Interventions:  - Encourage patient to monitor pain and request assistance  - Assess pain using appropriate pain scale  - Administer analgesics based on type and severity of pain and evaluate response  - Implement non-pharmacological measures as appropriate and evaluate response  - Consider cultural and social influences on pain and pain management  - Notify physician/advanced practitioner if interventions unsuccessful or patient reports new pain  Outcome: Progressing     Problem: INFECTION - ADULT  Goal: Absence or prevention of progression during hospitalization  Description: INTERVENTIONS:  - Assess and monitor for signs and symptoms of infection  - Monitor lab/diagnostic results  - Monitor all insertion sites, i e  indwelling lines, tubes, and drains  - Monitor endotracheal if appropriate and nasal secretions for changes in amount and color  - Murray appropriate cooling/warming therapies per order  - Administer medications as ordered  - Instruct and encourage patient and family to use good hand hygiene technique  - Identify and instruct in appropriate isolation precautions for identified infection/condition  Outcome: Progressing     Problem: SAFETY ADULT  Goal: Patient will remain free of falls  Description: INTERVENTIONS:  - Educate patient/family on patient safety including physical limitations  - Instruct patient to call for assistance with activity   - Consult OT/PT to assist with strengthening/mobility   - Keep Call bell within reach  - Keep bed low and locked with side rails adjusted as appropriate  - Keep care items and personal belongings within reach  - Initiate and maintain comfort rounds  - Make Fall Risk Sign visible to staff  - Offer Toileting every 2 Hours, in advance of need  - Initiate/Maintain bed/chair alarm  - Obtain necessary fall risk management equipment: alarms, walker  - Apply yellow socks and bracelet for high fall risk patients  - Consider moving patient to room near nurses station  Outcome: Progressing  Goal: Maintain or return to baseline ADL function  Description: INTERVENTIONS:  -  Assess patient's ability to carry out ADLs; assess patient's baseline for ADL function and identify physical deficits which impact ability to perform ADLs (bathing, care of mouth/teeth, toileting, grooming, dressing, etc )  - Assess/evaluate cause of self-care deficits   - Assess range of motion  - Assess patient's mobility; develop plan if impaired  - Assess patient's need for assistive devices and provide as appropriate  - Encourage maximum independence but intervene and supervise when necessary  - Involve family in performance of ADLs  - Assess for home care needs following discharge   - Consider OT consult to assist with ADL evaluation and planning for discharge  - Provide patient education as appropriate  Outcome: Progressing  Goal: Maintains/Returns to pre admission functional level  Description: INTERVENTIONS:  - Perform BMAT or MOVE assessment daily    - Set and communicate daily mobility goal to care team and patient/family/caregiver  - Collaborate with rehabilitation services on mobility goals if consulted  - Perform Range of Motion 3 times a day  - Encourage patient to reposition every 2 hours    - Dangle patient 3 times a day  - Stand patient 3 times a day  - Ambulate patient 3 times a day  - Out of bed to chair for meals  - Out of bed for toileting  - Record patient progress and toleration of activity level   Outcome: Progressing     Problem: DISCHARGE PLANNING  Goal: Discharge to home or other facility with appropriate resources  Description: INTERVENTIONS:  - Identify barriers to discharge w/patient and caregiver  - Arrange for needed discharge resources and transportation as appropriate  - Identify discharge learning needs (meds, wound care, etc )  - Arrange for interpretive services to assist at discharge as needed  - Refer to Case Management Department for coordinating discharge planning if the patient needs post-hospital services based on physician/advanced practitioner order or complex needs related to functional status, cognitive ability, or social support system  Outcome: Progressing     Problem: Knowledge Deficit  Goal: Patient/family/caregiver demonstrates understanding of disease process, treatment plan, medications, and discharge instructions  Description: Complete learning assessment and assess knowledge base   Interventions:  - Provide teaching at level of understanding  - Provide teaching via preferred learning methods  Outcome: Progressing     Problem: Prexisting or High Potential for Compromised Skin Integrity  Goal: Skin integrity is maintained or improved  Description: INTERVENTIONS:  - Identify patients at risk for skin breakdown  - Assess and monitor skin integrity  - Assess and monitor nutrition and hydration status  - Monitor labs   - Assess for incontinence   - Turn and reposition patient  - Assist with mobility/ambulation  - Relieve pressure over bony prominences  - Avoid friction and shearing  - Provide appropriate hygiene as needed including keeping skin clean and dry  - Evaluate need for skin moisturizer/barrier cream  - Collaborate with interdisciplinary team   - Patient/family teaching  - Consider wound care consult   Outcome: Progressing     Problem: Nutrition/Hydration-ADULT  Goal: Nutrient/Hydration intake appropriate for improving, restoring or maintaining nutritional needs  Description: Monitor and assess patient's nutrition/hydration status for malnutrition  Collaborate with interdisciplinary team and initiate plan and interventions as ordered  Monitor patient's weight and dietary intake as ordered or per policy  Utilize nutrition screening tool and intervene as necessary  Determine patient's food preferences and provide high-protein, high-caloric foods as appropriate       INTERVENTIONS:  - Monitor oral intake, urinary output, labs, and treatment plans  - Assess nutrition and hydration status and recommend course of action  - Evaluate amount of meals eaten  - Assist patient with eating if necessary   - Allow adequate time for meals  - Recommend/ encourage appropriate diets, oral nutritional supplements, and vitamin/mineral supplements  - Order, calculate, and assess calorie counts as needed  - Assess need for intravenous fluids  - Provide specific nutrition/hydration education as appropriate  - Include patient/family/caregiver in decisions related to nutrition  Outcome: Progressing

## 2022-10-05 NOTE — CASE MANAGEMENT
Case Management Discharge Planning Note    Patient name Danielle Max  Location [address] [place] [address] /[address] MRN [id number]  : 1945 Date 10/5/2022       Current Admission Date: 2022  Current Admission Diagnosis:Acute kidney injury superimposed on CKD [facility])   Patient Active Problem List    Diagnosis Date Noted    Indication present for endocarditis prophylaxis 10/04/2022    Physical deconditioning 10/01/2022    Acute diastolic heart failure ([facility] ) 2022    Acute respiratory failure with hypoxia ([facility] ) 2022    Moderate protein-calorie malnutrition ([facility] ) 2021    Gross hematuria 2021    Ulnar impaction syndrome, left 2021    Right wrist pain 2021    Left wrist pain 2021    Anemia of chronic disease 2021    [facility] of [facility]    Hypokalemia 2021    Bilateral hydronephrosis 2021    History of aortic valve replacement 2021    Horseshoe kidney 2021    Non-traumatic rhabdomyolysis 2021    Acute kidney injury superimposed on CKD ([facility] ) 2021    Elevated troponin 2021    UTI (urinary tract infection) 2021    Prosthetic aortic valve malfunction 2019    Near syncope 2018    Hx of CABG 2018    Chronic kidney disease, stage IV (severe) ([facility] ) 2018    Chronic coronary artery disease 10/03/2016    Endocarditis 10/03/2016    Cerebrovascular accident (CVA) ([facility] ) 10/03/2016    HTN (hypertension) 10/15/2015    HLD (hyperlipidemia) 10/15/2015      LOS (days): 11  Geometric Mean LOS (GMLOS) (days): 4 30  Days to GMLOS:-6 6     OBJECTIVE:  Risk of Unplanned Readmission Score: 17 49         Current admission status: Inpatient   Preferred Pharmacy:   [address] [place] [id number]  Phone: [phone] Fax: [phone]    PATIENT/FAMILY REPORTS NO PREFERRED PHARMACY  No address on file      Primary Care Provider: Nena Murray MD    Primary Insurance[facility]  Secondary Insurance:     DISCHARGE DETAILS:    Discharge planning discussed with[de-identified] Pt & son  Freedom of Choice: Yes  Comments - Freedom of Choice: Pt would like to go home on hospice  CM contacted family/caregiver?: Yes  Were Treatment Team discharge recommendations reviewed with patient/caregiver?: Yes  Did patient/caregiver verbalize understanding of patient care needs?: Yes  Were patient/caregiver advised of the risks associated with not following Treatment Team discharge recommendations?: Yes    Contacts  Patient Contacts: Airam Pastrana ([address]) and La Burnham (son)  Relationship to Patient[de-identified] Family  Contact Method: In Person  Phone Number: Airam Kotharibe (GrandSon) [phone];  Brayden Kane [facility]) [phone]  Reason/Outcome: Continuity of Care, Discharge Planning, Emergency Contact                        Treatment Team Recommendation: Other (no rehab needs)  Discharge Destination Plan[de-identified] Hospice                                         Additional Comments: CM met w/ pt & son at bedside to discuss d/c planning  Son requested a cognitive eval to be done since he believes pt cannot take care of himself & does not have capacity to make his own decisions  Dr Sean San has deemed pt to have capacity & can make decisoins for himself  CM explained to son that since pt can choose for himself, we cannot make him go to rehab  Son expressed frustration & stress, but understands theres not anything else we can do since we are not medically treating him  Pt is set up w/ [facility]  CM also made a [facility] referral as well as gave son [facility] contact  Son also has a list of private home caregivers he can call to assist pt w/ at home  CM to continue to follow pt care & d/c

## 2022-10-05 NOTE — ASSESSMENT & PLAN NOTE
· Anemia of chronic disease without evidence of bleeding    Results from last 7 days   Lab Units 09/30/22  0616   HEMOGLOBIN g/dL 9 1*

## 2022-10-05 NOTE — CONSULTS
Consultation - Neuropsychology/Psychology Department  Ramo Styles 78 y o  male MRN: [id number]  Unit/Bed#: E4 -01 Encounter: [id number]        Reason for Consultation:  Ramo Styles is a 78y o  year old male who was referred for a Neuropsychological Exam to assess cognitive functioning and comment on capacity to make informed medical decisions  History of Present Illness  Shortness of breath    Physician Requesting Consult: Didi Grider DO    PROBLEM LIST:  Patient Active Problem List   Diagnosis    Chronic coronary artery disease    Chronic kidney disease, stage IV (severe) [facility] )    Endocarditis    HTN (hypertension)    HLD (hyperlipidemia)    Hx of CABG    [facility]    Near syncope    Prosthetic aortic valve malfunction    Non-traumatic rhabdomyolysis    Acute kidney injury superimposed on CKD (HCC)    Elevated troponin    UTI (urinary tract infection)    Horseshoe kidney    Bilateral hydronephrosis    History of aortic valve replacement    Anemia of chronic disease    [facility]roidectomy ([facility])    Hypokalemia    Ulnar impaction syndrome, left    Right wrist pain    Left wrist pain    Moderate protein-calorie malnutrition ([facility] )    Gross hematuria    Acute diastolic heart failure (HCC)    Acute respiratory failure with hypoxia ([facility])    Physical deconditioning    Indication present for endocarditis prophylaxis         Historical Information   Past Medical History:   Diagnosis Date    Coronary artery disease     Renal disorder      Past Surgical History:   Procedure Laterality Date    AORTIC VALVE REPLACEMENT  2005    Tissue valve    BLADDER SURGERY      23 yrs ago      CORONARY ARTERY BYPASS GRAFT  2005    x1    RENAL ARTERY STENT  11/2005     Social History   Social History     Substance and Sexual Activity   Alcohol Use Not Currently     Social History     Substance and Sexual Activity   Drug Use Never     Social History     Tobacco Use   Smoking Status Former Smoker    Packs/day:  00    Types: Cigarettes    Quit date: 1993    Years since quittin 7   Smokeless Tobacco Never Used     Family History:   Family History   Problem Relation Age of Onset    Diabetes Mother     Hypertension Mother     Dementia Mother     Other Father         fall gangrene    Peripheral vascular disease Sister        Meds/Allergies   current meds:   Current Facility-Administered Medications   Medication Dose Route Frequency    acetaminophen (TYLENOL) tablet 650 mg  650 mg Oral Q6H PRN    amLODIPine (NORVASC) tablet 10 mg  10 mg Oral QPM    atorvastatin (LIPITOR) tablet 40 mg  40 mg Oral Daily With Dinner    bisoprolol (ZEBETA) tablet 5 mg  5 mg Oral Daily    doxycycline hyclate (VIBRAMYCIN) capsule 100 mg  100 mg Oral Q24H [address]    heparin (porcine) subcutaneous injection 5,000 Units  5,000 Units Subcutaneous Q8H [address]    iron polysaccharides (FERREX) capsule 150 mg  150 mg Oral Daily    ondansetron (ZOFRAN) injection 4 mg  4 mg Intravenous Q6H PRN    sodium bicarbonate tablet 650 mg  650 mg Oral Daily       Allergies   Allergen Reactions    Latex     Nitrofurantoin Other (See Comments)     neck pain    Other Itching         Family and Social Support:   Discharge planning discussed with[de-identified] pt & grandson      Behavioral Observations: Patient was alert, oriented x 3, reported experiencing headache and tiredness, possibly somewhat compromising his cognitive functioning  Patient admitted to depressed mood and anxiety and denied psychiatric history; he appeared aware of reason for hospitalization and medical history;    Cognitive Examination    General Cognitive Functioning MMSE = Adequate orientation, registration of information, and working memory with deficits in recall; Attention/Concentration Auditory Selective Attention = Impaired;     Frontal Systems/Executive Functioning Mental Flexibility/Cognitive Control = Impaired;  Working Memory = Low Average Abstract Reasoning = Average; Commonsense Reasoning and Judgement = Average    Language Functioning Comprehension of Complex Ideational Material = Within Normal Limits;     Memory Functioning Three word recall = Impaired    Visuo-Spatial Abilities Not Assessed    Functional Knowledge  Health & Safety Knowledge = Within Normal Limits;     Summary/Impression:  Results of Neuropsychological Exam revealed cognitive deficits in recall, auditory selective attention, mental flexibility and on a measure assessing awareness of personal health status and ability to evaluate health problems, handle medical emergencies and take safety precautions, patient performed within normal limits  During this encounter, patient appeared to have capacity to make informed medical decisions   Unspecified Neurocognitive Disorder

## 2022-10-05 NOTE — PLAN OF CARE
Problem: PAIN - ADULT  Goal: Verbalizes/displays adequate comfort level or baseline comfort level  Description: Interventions:  - Encourage patient to monitor pain and request assistance  - Assess pain using appropriate pain scale  - Administer analgesics based on type and severity of pain and evaluate response  - Implement non-pharmacological measures as appropriate and evaluate response  - Consider cultural and social influences on pain and pain management  - Notify physician/advanced practitioner if interventions unsuccessful or patient reports new pain  Outcome: Progressing     Problem: INFECTION - ADULT  Goal: Absence or prevention of progression during hospitalization  Description: INTERVENTIONS:  - Assess and monitor for signs and symptoms of infection  - Monitor lab/diagnostic results  - Monitor all insertion sites, i e  indwelling lines, tubes, and drains  - Monitor endotracheal if appropriate and nasal secretions for changes in amount and color  - [place] appropriate cooling/warming therapies per order  - Administer medications as ordered  - Instruct and encourage patient and family to use good hand hygiene technique  - Identify and instruct in appropriate isolation precautions for identified infection/condition  Outcome: Progressing     Problem: SAFETY ADULT  Goal: Patient will remain free of falls  Description: INTERVENTIONS:  - Educate patient/family on patient safety including physical limitations  - Instruct patient to call for assistance with activity   - Consult OT/PT to assist with strengthening/mobility   - Keep Call bell within reach  - Keep bed low and locked with side rails adjusted as appropriate  - Keep care items and personal belongings within reach  - Initiate and maintain comfort rounds  - Make Fall Risk Sign visible to staff  - Offer Toileting every 2 Hours, in advance of need  - Initiate/Maintain bed alarm  - Obtain necessary fall risk management equipment: bed alarm   - Apply yellow socks and bracelet for high fall risk patients  - Consider moving patient to room near nurses station  Outcome: Progressing  Goal: Maintain or return to baseline ADL function  Description: INTERVENTIONS:  -  Assess patient's ability to carry out ADLs; assess patient's baseline for ADL function and identify physical deficits which impact ability to perform ADLs (bathing, care of mouth/teeth, toileting, grooming, dressing, etc )  - Assess/evaluate cause of self-care deficits   - Assess range of motion  - Assess patient's mobility; develop plan if impaired  - Assess patient's need for assistive devices and provide as appropriate  - Encourage maximum independence but intervene and supervise when necessary  - Involve family in performance of ADLs  - Assess for home care needs following discharge   - Consider OT consult to assist with ADL evaluation and planning for discharge  - Provide patient education as appropriate  Outcome: Progressing  Goal: Maintains/Returns to pre admission functional level  Description: INTERVENTIONS:  - Perform BMAT or MOVE assessment daily    - Set and communicate daily mobility goal to care team and patient/family/caregiver  - Collaborate with rehabilitation services on mobility goals if consulted  - Perform Range of Motion 3 times a day  - Reposition patient every 2 hours    - Dangle patient 3 times a day  - Stand patient 3 times a day  - Ambulate patient 3 times a day  - Out of bed to chair 3 times a day   - Out of bed for meals 3 times a day  - Out of bed for toileting  - Record patient progress and toleration of activity level   Outcome: Progressing     Problem: DISCHARGE PLANNING  Goal: Discharge to home or other facility with appropriate resources  Description: INTERVENTIONS:  - Identify barriers to discharge w/patient and caregiver  - Arrange for needed discharge resources and transportation as appropriate  - Identify discharge learning needs (meds, wound care, etc )  - Arrange for interpretive services to assist at discharge as needed  - Refer to Case Management Department for coordinating discharge planning if the patient needs post-hospital services based on physician/advanced practitioner order or complex needs related to functional status, cognitive ability, or social support system  Outcome: Progressing     Problem: Knowledge Deficit  Goal: Patient/family/caregiver demonstrates understanding of disease process, treatment plan, medications, and discharge instructions  Description: Complete learning assessment and assess knowledge base    Interventions:  - Provide teaching at level of understanding  - Provide teaching via preferred learning methods  Outcome: Progressing     Problem: RESPIRATORY - ADULT  Goal: Achieves optimal ventilation and oxygenation  Description: INTERVENTIONS:  - Assess for changes in respiratory status  - Assess for changes in mentation and behavior  - Position to facilitate oxygenation and minimize respiratory effort  - Oxygen administered by appropriate delivery if ordered  - Initiate smoking cessation education as indicated  - Encourage broncho-pulmonary hygiene including cough, deep breathe, Incentive Spirometry  - Assess the need for suctioning and aspirate as needed  - Assess and instruct to report SOB or any respiratory difficulty  - Respiratory Therapy support as indicated  Outcome: Progressing     Problem: GENITOURINARY - ADULT  Goal: Maintains or returns to baseline urinary function  Description: INTERVENTIONS:  - Assess urinary function  - Encourage oral fluids to ensure adequate hydration if ordered  - Administer IV fluids as ordered to ensure adequate hydration  - Administer ordered medications as needed  - Offer frequent toileting  - Follow urinary retention protocol if ordered  Outcome: Progressing     Problem: METABOLIC, FLUID AND ELECTROLYTES - ADULT  Goal: Fluid balance maintained  Description: INTERVENTIONS:  - Monitor labs   - Monitor I/O and WT  - Instruct patient on fluid and nutrition as appropriate  - Assess for signs & symptoms of volume excess or deficit  Outcome: Progressing     Problem: Prexisting or High Potential for Compromised Skin Integrity  Goal: Skin integrity is maintained or improved  Description: INTERVENTIONS:  - Identify patients at risk for skin breakdown  - Assess and monitor skin integrity  - Assess and monitor nutrition and hydration status  - Monitor labs   - Assess for incontinence   - Turn and reposition patient  - Assist with mobility/ambulation  - Relieve pressure over bony prominences  - Avoid friction and shearing  - Provide appropriate hygiene as needed including keeping skin clean and dry  - Evaluate need for skin moisturizer/barrier cream  - Collaborate with interdisciplinary team   - Patient/family teaching  - Consider wound care consult   Outcome: Progressing     Problem: Nutrition/Hydration-ADULT  Goal: Nutrient/Hydration intake appropriate for improving, restoring or maintaining nutritional needs  Description: Monitor and assess patient's nutrition/hydration status for malnutrition  Collaborate with interdisciplinary team and initiate plan and interventions as ordered  Monitor patient's weight and dietary intake as ordered or per policy  Utilize nutrition screening tool and intervene as necessary  Determine patient's food preferences and provide high-protein, high-caloric foods as appropriate       INTERVENTIONS:  - Monitor oral intake, urinary output, labs, and treatment plans  - Assess nutrition and hydration status and recommend course of action  - Evaluate amount of meals eaten  - Assist patient with eating if necessary   - Allow adequate time for meals  - Recommend/ encourage appropriate diets, oral nutritional supplements, and vitamin/mineral supplements  - Order, calculate, and assess calorie counts as needed  - Recommend, monitor, and adjust tube feedings and TPN/PPN based on assessed needs  - Assess need for intravenous fluids  - Provide specific nutrition/hydration education as appropriate  - Include patient/family/caregiver in decisions related to nutrition  Outcome: Progressing

## 2022-10-06 LAB
ALBUMIN SERPL BCP-MCNC: 3 G/DL (ref 3.5–5)
ALP SERPL-CCNC: 63 U/L (ref 46–116)
ALT SERPL W P-5'-P-CCNC: 25 U/L (ref 12–78)
ANION GAP SERPL CALCULATED.3IONS-SCNC: 13 MMOL/L (ref 4–13)
AST SERPL W P-5'-P-CCNC: 21 U/L (ref 5–45)
BILIRUB SERPL-MCNC: 0.6 MG/DL (ref 0.2–1)
BUN SERPL-MCNC: 107 MG/DL (ref 5–25)
CALCIUM ALBUM COR SERPL-MCNC: 9.7 MG/DL (ref 8.3–10.1)
CALCIUM SERPL-MCNC: 8.9 MG/DL (ref 8.3–10.1)
CHLORIDE SERPL-SCNC: 109 MMOL/L (ref 96–108)
CO2 SERPL-SCNC: 21 MMOL/L (ref 21–32)
CREAT SERPL-MCNC: 4.91 MG/DL (ref 0.6–1.3)
ERYTHROCYTE [DISTWIDTH] IN BLOOD BY AUTOMATED COUNT: 20.7 % (ref 11.6–15.1)
GFR SERPL CREATININE-BSD FRML MDRD: 10 ML/MIN/1.73SQ M
GLUCOSE SERPL-MCNC: 111 MG/DL (ref 65–140)
HCT VFR BLD AUTO: 29.3 % (ref 36.5–49.3)
HGB BLD-MCNC: 8.7 G/DL (ref 12–17)
MCH RBC QN AUTO: 28.6 PG (ref 26.8–34.3)
MCHC RBC AUTO-ENTMCNC: 29.7 G/DL (ref 31.4–37.4)
MCV RBC AUTO: 96 FL (ref 82–98)
PLATELET # BLD AUTO: 178 THOUSANDS/UL (ref 149–390)
PMV BLD AUTO: 12.4 FL (ref 8.9–12.7)
POTASSIUM SERPL-SCNC: 4.3 MMOL/L (ref 3.5–5.3)
PROT SERPL-MCNC: 8.5 G/DL (ref 6.4–8.4)
RBC # BLD AUTO: 3.04 MILLION/UL (ref 3.88–5.62)
SODIUM SERPL-SCNC: 143 MMOL/L (ref 135–147)
WBC # BLD AUTO: 4.39 THOUSAND/UL (ref 4.31–10.16)

## 2022-10-06 PROCEDURE — 99232 SBSQ HOSP IP/OBS MODERATE 35: CPT | Performed by: INTERNAL MEDICINE

## 2022-10-06 PROCEDURE — 80053 COMPREHEN METABOLIC PANEL: CPT | Performed by: INTERNAL MEDICINE

## 2022-10-06 PROCEDURE — 85027 COMPLETE CBC AUTOMATED: CPT | Performed by: INTERNAL MEDICINE

## 2022-10-06 RX ADMIN — DOXYCYCLINE 100 MG: 100 CAPSULE ORAL at 08:00

## 2022-10-06 RX ADMIN — HEPARIN SODIUM 5000 UNITS: 5000 INJECTION INTRAVENOUS; SUBCUTANEOUS at 05:16

## 2022-10-06 RX ADMIN — ATORVASTATIN CALCIUM 40 MG: 40 TABLET, FILM COATED ORAL at 17:04

## 2022-10-06 RX ADMIN — POLYSACCHARIDE-IRON COMPLEX 150 MG: 150 CAPSULE ORAL at 08:00

## 2022-10-06 RX ADMIN — SODIUM BICARBONATE 650 MG: 650 TABLET ORAL at 08:00

## 2022-10-06 RX ADMIN — HEPARIN SODIUM 5000 UNITS: 5000 INJECTION INTRAVENOUS; SUBCUTANEOUS at 14:19

## 2022-10-06 RX ADMIN — AMLODIPINE BESYLATE 10 MG: 10 TABLET ORAL at 17:04

## 2022-10-06 RX ADMIN — HEPARIN SODIUM 5000 UNITS: 5000 INJECTION INTRAVENOUS; SUBCUTANEOUS at 22:53

## 2022-10-06 RX ADMIN — BISOPROLOL FUMARATE 5 MG: 5 TABLET ORAL at 08:00

## 2022-10-06 NOTE — ASSESSMENT & PLAN NOTE
· LORENZA on CKD 4/5 follows with [facility] as outpatient  · Patient has AV access but does not want to proceed with dialysis  · Patient also declined Granda catheter placement  · Disposition:  Patient was to return home  Family concerned about inability to care for self  Family requesting neuropsychology evaluation for decision-making capacity  Patient has capacity    Patient will be discharged home with hospice tomorrow per patient's wishes    Results from last 7 days   Lab Units 10/06/22  0514 10/03/22  0513 09/30/22  0616   BUN mg/dL 107* 111* 95*   CREATININE mg/dL 4 91* 5 34* 5 28*   EGFR ml/min/1 73sq m 10 9 9

## 2022-10-06 NOTE — ASSESSMENT & PLAN NOTE
· Anemia of chronic disease without evidence of bleeding    Results from last 7 days   Lab Units 10/06/22  0514 09/30/22  0616   HEMOGLOBIN g/dL 8 7* 9 1*

## 2022-10-06 NOTE — CASE MANAGEMENT
Case Management Discharge Planning Note    Patient name Mya Lambert  Location [address] [place] [address] /[address] MRN [id number]  : 1945 Date 10/6/2022       Current Admission Date: 2022  Current Admission Diagnosis:Acute kidney injury superimposed on CKD [facility])   Patient Active Problem List    Diagnosis Date Noted    Indication present for endocarditis prophylaxis 10/04/2022    Physical deconditioning 10/01/2022    Acute diastolic heart failure ([facility] [address] ) 2022    [facility] failure with hypoxia [email or web address] 20[facility]    Moderate protein-calorie malnutrition ([email or web address] 2021    Gross hematuria 2021    Ulnar impaction syndrome, left 2021    Right wrist pain 2021    Left wrist pain 2021    Anemia of chronic disease 2021    [facility] of [facility]ctomy ([facility] ) 2021    Hypokalemia 2021    Bilateral hydronephrosis 2021    History of aortic valve replacement 2021    Horseshoe kidney 2021    Non-traumatic rhabdomyolysis 2021    Acute kidney injury superimposed on CKD ([facility] [facility] ) 2021    Elevated troponin 2021    UTI (urinary tract infection) 2021    Prosthetic aortic valve malfunction 2019    Near syncope 2018    Hx of CABG 2018    Chronic kidney disease, stage IV (severe) ([email or web address] 2018    Chronic coronary artery disease 10/03/2016    Endocarditis 10/03/2016    Cerebrovascular accident (CVA) ([email or web address] 10/03/2016    HTN (hypertension) 10/15/2015    HLD (hyperlipidemia) 10/15/2015      LOS (days): 12  Geometric Mean LOS (GMLOS) (days): 4 30  Days to GMLOS:-7 3     OBJECTIVE:  Risk of Unplanned Readmission Score: 19 3         Current admission status: Inpatient   Preferred Pharmacy:   [address] [place] [id number]  Phone: [phone] Fax: [phone]    PATIENT/FAMILY REPORTS NO PREFERRED PHARMACY  No address on file      Primary Care Provider: Trey Posada MD    Primary Insurance: Sonido Neves [facility] REP  Secondary Insurance:     DISCHARGE DETAILS:     CM spoke with liaison from [address] [place] [address] hospice who states that she spoke with patients son regarding start of home hospice care  She states she cannot get DME that patient needs until tomorrow, 10/7/22  CM will set up BLS transportation for 10/7/22, requested time 11am  CM will update  home hospice liaison with transport time  CM will continue to follow

## 2022-10-06 NOTE — PLAN OF CARE
Problem: RESPIRATORY - ADULT  Goal: Achieves optimal ventilation and oxygenation  Description: INTERVENTIONS:  - Assess for changes in respiratory status  - Assess for changes in mentation and behavior  - Position to facilitate oxygenation and minimize respiratory effort  - Oxygen administered by appropriate delivery if ordered  - Initiate smoking cessation education as indicated  - Encourage broncho-pulmonary hygiene including cough, deep breathe, Incentive Spirometry  - Assess the need for suctioning and aspirate as needed  - Assess and instruct to report SOB or any respiratory difficulty  - Respiratory Therapy support as indicated  Outcome: Progressing     Problem: GENITOURINARY - ADULT  Goal: Maintains or returns to baseline urinary function  Description: INTERVENTIONS:  - Assess urinary function  - Encourage oral fluids to ensure adequate hydration if ordered  - Administer IV fluids as ordered to ensure adequate hydration  - Administer ordered medications as needed  - Offer frequent toileting  - Follow urinary retention protocol if ordered  Outcome: Progressing     Problem: METABOLIC, FLUID AND ELECTROLYTES - ADULT  Goal: Fluid balance maintained  Description: INTERVENTIONS:  - Monitor labs   - Monitor I/O and WT  - Instruct patient on fluid and nutrition as appropriate  - Assess for signs & symptoms of volume excess or deficit  Outcome: Progressing     Problem: PAIN - ADULT  Goal: Verbalizes/displays adequate comfort level or baseline comfort level  Description: Interventions:  - Encourage patient to monitor pain and request assistance  - Assess pain using appropriate pain scale  - Administer analgesics based on type and severity of pain and evaluate response  - Implement non-pharmacological measures as appropriate and evaluate response  - Consider cultural and social influences on pain and pain management  - Notify physician/advanced practitioner if interventions unsuccessful or patient reports new pain  Outcome: Progressing     Problem: INFECTION - ADULT  Goal: Absence or prevention of progression during hospitalization  Description: INTERVENTIONS:  - Assess and monitor for signs and symptoms of infection  - Monitor lab/diagnostic results  - Monitor all insertion sites, i e  indwelling lines, tubes, and drains  - Monitor endotracheal if appropriate and nasal secretions for changes in amount and color  - [place] appropriate cooling/warming therapies per order  - Administer medications as ordered  - Instruct and encourage patient and family to use good hand hygiene technique  - Identify and instruct in appropriate isolation precautions for identified infection/condition  Outcome: Progressing     Problem: SAFETY ADULT  Goal: Patient will remain free of falls  Description: INTERVENTIONS:  - Educate patient/family on patient safety including physical limitations  - Instruct patient to call for assistance with activity   - Consult OT/PT to assist with strengthening/mobility   - Keep Call bell within reach  - Keep bed low and locked with side rails adjusted as appropriate  - Keep care items and personal belongings within reach  - Initiate and maintain comfort rounds  - Make Fall Risk Sign visible to staff  - Offer Toileting every 2 Hours, in advance of need  - Initiate/Maintain bed/chair alarm  - Obtain necessary fall risk management equipment: alarm, walker  - Apply yellow socks and bracelet for high fall risk patients  - Consider moving patient to room near nurses station  Outcome: Progressing  Goal: Maintain or return to baseline ADL function  Description: INTERVENTIONS:  -  Assess patient's ability to carry out ADLs; assess patient's baseline for ADL function and identify physical deficits which impact ability to perform ADLs (bathing, care of mouth/teeth, toileting, grooming, dressing, etc )  - Assess/evaluate cause of self-care deficits   - Assess range of motion  - Assess patient's mobility; develop plan if impaired  - Assess patient's need for assistive devices and provide as appropriate  - Encourage maximum independence but intervene and supervise when necessary  - Involve family in performance of ADLs  - Assess for home care needs following discharge   - Consider OT consult to assist with ADL evaluation and planning for discharge  - Provide patient education as appropriate  Outcome: Progressing  Goal: Maintains/Returns to pre admission functional level  Description: INTERVENTIONS:  - Perform BMAT or MOVE assessment daily    - Set and communicate daily mobility goal to care team and patient/family/caregiver  - Collaborate with rehabilitation services on mobility goals if consulted  - Perform Range of Motion 3 times a day  - Reposition patient every 2 hours  - Dangle patient 3 times a day  - Stand patient 3 times a day  - Ambulate patient 3 times a day  - Out of bed to chair for meals  - Out of bed for toileting  - Record patient progress and toleration of activity level   Outcome: Progressing     Problem: DISCHARGE PLANNING  Goal: Discharge to home or other facility with appropriate resources  Description: INTERVENTIONS:  - Identify barriers to discharge w/patient and caregiver  - Arrange for needed discharge resources and transportation as appropriate  - Identify discharge learning needs (meds, wound care, etc )  - Arrange for interpretive services to assist at discharge as needed  - Refer to Case Management Department for coordinating discharge planning if the patient needs post-hospital services based on physician/advanced practitioner order or complex needs related to functional status, cognitive ability, or social support system  Outcome: Progressing     Problem: Knowledge Deficit  Goal: Patient/family/caregiver demonstrates understanding of disease process, treatment plan, medications, and discharge instructions  Description: Complete learning assessment and assess knowledge base    Interventions:  - Provide teaching at level of understanding  - Provide teaching via preferred learning methods  Outcome: Progressing     Problem: Prexisting or High Potential for Compromised Skin Integrity  Goal: Skin integrity is maintained or improved  Description: INTERVENTIONS:  - Identify patients at risk for skin breakdown  - Assess and monitor skin integrity  - Assess and monitor nutrition and hydration status  - Monitor labs   - Assess for incontinence   - Turn and reposition patient  - Assist with mobility/ambulation  - Relieve pressure over bony prominences  - Avoid friction and shearing  - Provide appropriate hygiene as needed including keeping skin clean and dry  - Evaluate need for skin moisturizer/barrier cream  - Collaborate with interdisciplinary team   - Patient/family teaching  - Consider wound care consult   Outcome: Progressing     Problem: Nutrition/Hydration-ADULT  Goal: Nutrient/Hydration intake appropriate for improving, restoring or maintaining nutritional needs  Description: Monitor and assess patient's nutrition/hydration status for malnutrition  Collaborate with interdisciplinary team and initiate plan and interventions as ordered  Monitor patient's weight and dietary intake as ordered or per policy  Utilize nutrition screening tool and intervene as necessary  Determine patient's food preferences and provide high-protein, high-caloric foods as appropriate       INTERVENTIONS:  - Monitor oral intake, urinary output, labs, and treatment plans  - Assess nutrition and hydration status and recommend course of action  - Evaluate amount of meals eaten  - Assist patient with eating if necessary   - Allow adequate time for meals  - Recommend/ encourage appropriate diets, oral nutritional supplements, and vitamin/mineral supplements  - Order, calculate, and assess calorie counts as needed  - Assess need for intravenous fluids  - Provide specific nutrition/hydration education as appropriate  - Include patient/family/caregiver in decisions related to nutrition  Outcome: Progressing

## 2022-10-06 NOTE — PLAN OF CARE
Problem: PAIN - ADULT  Goal: Verbalizes/displays adequate comfort level or baseline comfort level  Description: Interventions:  - Encourage patient to monitor pain and request assistance  - Assess pain using appropriate pain scale  - Administer analgesics based on type and severity of pain and evaluate response  - Implement non-pharmacological measures as appropriate and evaluate response  - Consider cultural and social influences on pain and pain management  - Notify physician/advanced practitioner if interventions unsuccessful or patient reports new pain  Outcome: Progressing     Problem: INFECTION - ADULT  Goal: Absence or prevention of progression during hospitalization  Description: INTERVENTIONS:  - Assess and monitor for signs and symptoms of infection  - Monitor lab/diagnostic results  - Monitor all insertion sites, i e  indwelling lines, tubes, and drains  - Monitor endotracheal if appropriate and nasal secretions for changes in amount and color  - [place] appropriate cooling/warming therapies per order  - Administer medications as ordered  - Instruct and encourage patient and family to use good hand hygiene technique  - Identify and instruct in appropriate isolation precautions for identified infection/condition  Outcome: Progressing     Problem: SAFETY ADULT  Goal: Patient will remain free of falls  Description: INTERVENTIONS:  - Educate patient/family on patient safety including physical limitations  - Instruct patient to call for assistance with activity   - Consult OT/PT to assist with strengthening/mobility   - Keep Call bell within reach  - Keep bed low and locked with side rails adjusted as appropriate  - Keep care items and personal belongings within reach  - Initiate and maintain comfort rounds  - Make Fall Risk Sign visible to staff  - Offer Toileting every 2 Hours, in advance of need  - Initiate/Maintain bed alarm  - Obtain necessary fall risk management equipment: bed alarm   - Apply yellow socks and bracelet for high fall risk patients  - Consider moving patient to room near nurses station  Outcome: Progressing  Goal: Maintain or return to baseline ADL function  Description: INTERVENTIONS:  -  Assess patient's ability to carry out ADLs; assess patient's baseline for ADL function and identify physical deficits which impact ability to perform ADLs (bathing, care of mouth/teeth, toileting, grooming, dressing, etc )  - Assess/evaluate cause of self-care deficits   - Assess range of motion  - Assess patient's mobility; develop plan if impaired  - Assess patient's need for assistive devices and provide as appropriate  - Encourage maximum independence but intervene and supervise when necessary  - Involve family in performance of ADLs  - Assess for home care needs following discharge   - Consider OT consult to assist with ADL evaluation and planning for discharge  - Provide patient education as appropriate  Outcome: Progressing  Goal: Maintains/Returns to pre admission functional level  Description: INTERVENTIONS:  - Perform BMAT or MOVE assessment daily    - Set and communicate daily mobility goal to care team and patient/family/caregiver  - Collaborate with rehabilitation services on mobility goals if consulted  - Perform Range of Motion 3 times a day  - Reposition patient every 2 hours    - Dangle patient 3 times a day  - Stand patient 3 times a day  - Ambulate patient 3 times a day  - Out of bed to chair 3 times a day   - Out of bed for meals 3 times a day  - Out of bed for toileting  - Record patient progress and toleration of activity level   Outcome: Progressing     Problem: DISCHARGE PLANNING  Goal: Discharge to home or other facility with appropriate resources  Description: INTERVENTIONS:  - Identify barriers to discharge w/patient and caregiver  - Arrange for needed discharge resources and transportation as appropriate  - Identify discharge learning needs (meds, wound care, etc )  - Arrange for interpretive services to assist at discharge as needed  - Refer to Case Management Department for coordinating discharge planning if the patient needs post-hospital services based on physician/advanced practitioner order or complex needs related to functional status, cognitive ability, or social support system  Outcome: Progressing     Problem: Knowledge Deficit  Goal: Patient/family/caregiver demonstrates understanding of disease process, treatment plan, medications, and discharge instructions  Description: Complete learning assessment and assess knowledge base    Interventions:  - Provide teaching at level of understanding  - Provide teaching via preferred learning methods  Outcome: Progressing     Problem: RESPIRATORY - ADULT  Goal: Achieves optimal ventilation and oxygenation  Description: INTERVENTIONS:  - Assess for changes in respiratory status  - Assess for changes in mentation and behavior  - Position to facilitate oxygenation and minimize respiratory effort  - Oxygen administered by appropriate delivery if ordered  - Initiate smoking cessation education as indicated  - Encourage broncho-pulmonary hygiene including cough, deep breathe, Incentive Spirometry  - Assess the need for suctioning and aspirate as needed  - Assess and instruct to report SOB or any respiratory difficulty  - Respiratory Therapy support as indicated  Outcome: Progressing     Problem: GENITOURINARY - ADULT  Goal: Maintains or returns to baseline urinary function  Description: INTERVENTIONS:  - Assess urinary function  - Encourage oral fluids to ensure adequate hydration if ordered  - Administer IV fluids as ordered to ensure adequate hydration  - Administer ordered medications as needed  - Offer frequent toileting  - Follow urinary retention protocol if ordered  Outcome: Progressing     Problem: METABOLIC, FLUID AND ELECTROLYTES - ADULT  Goal: Fluid balance maintained  Description: INTERVENTIONS:  - Monitor labs   - Monitor I/O and WT  - Instruct patient on fluid and nutrition as appropriate  - Assess for signs & symptoms of volume excess or deficit  Outcome: Progressing     Problem: Prexisting or High Potential for Compromised Skin Integrity  Goal: Skin integrity is maintained or improved  Description: INTERVENTIONS:  - Identify patients at risk for skin breakdown  - Assess and monitor skin integrity  - Assess and monitor nutrition and hydration status  - Monitor labs   - Assess for incontinence   - Turn and reposition patient  - Assist with mobility/ambulation  - Relieve pressure over bony prominences  - Avoid friction and shearing  - Provide appropriate hygiene as needed including keeping skin clean and dry  - Evaluate need for skin moisturizer/barrier cream  - Collaborate with interdisciplinary team   - Patient/family teaching  - Consider wound care consult   Outcome: Progressing     Problem: Nutrition/Hydration-ADULT  Goal: Nutrient/Hydration intake appropriate for improving, restoring or maintaining nutritional needs  Description: Monitor and assess patient's nutrition/hydration status for malnutrition  Collaborate with interdisciplinary team and initiate plan and interventions as ordered  Monitor patient's weight and dietary intake as ordered or per policy  Utilize nutrition screening tool and intervene as necessary  Determine patient's food preferences and provide high-protein, high-caloric foods as appropriate       INTERVENTIONS:  - Monitor oral intake, urinary output, labs, and treatment plans  - Assess nutrition and hydration status and recommend course of action  - Evaluate amount of meals eaten  - Assist patient with eating if necessary   - Allow adequate time for meals  - Recommend/ encourage appropriate diets, oral nutritional supplements, and vitamin/mineral supplements  - Order, calculate, and assess calorie counts as needed  - Recommend, monitor, and adjust tube feedings and TPN/PPN based on assessed needs  - Assess need for intravenous fluids  - Provide specific nutrition/hydration education as appropriate  - Include patient/family/caregiver in decisions related to nutrition  Outcome: Progressing

## 2022-10-06 NOTE — PROGRESS NOTES
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 follows with [facility] as outpatient  · Patient has AV access but does not want to proceed with dialysis  · Patient also declined Granda catheter placement  · Disposition:  Patient was to return home  Family concerned about inability to care for self  Family requesting neuropsychology evaluation for decision-making capacity  Patient has capacity    Patient will be discharged home with hospice tomorrow per patient's wishes    Results from last 7 days   Lab Units 10/06/22  0514 10/03/22  0513 09/30/22  0616   BUN mg/dL 107* 111* 95*   CREATININE mg/dL 4 91* 5 34* 5 28*   EGFR ml/min/1 73sq m 10 9 9       Acute diastolic heart failure (HCC)  Assessment & Plan  Wt Readings from Last 3 Encounters:   10/05/22 73 1 kg (161 lb 3 2 oz)   01/31/22 72 6 kg (160 lb)   09/14/21 72 9 kg (160 lb 12 8 oz)     · Vascular congestion on admission was on IV furosemide  · Patient appears compensated holding further diuretics    Indication present for endocarditis prophylaxis  Assessment & Plan  · Reportedly on doxycycline for endocarditis prophylaxis    Physical deconditioning  Assessment & Plan  · Ambulatory dysfunction unable to care for self  · Declined PT/OT as he wishes to proceed with hospice    Acute respiratory failure with hypoxia ([address] )  Assessment & Plan  · Required upwards of 3 L nasal cannula secondary to CHF  · Will need oxygen study prior to discharge if going home    Anemia of chronic disease  Assessment & Plan  · Anemia of chronic disease without evidence of bleeding    Results from last 7 days   Lab Units 10/06/22  0514 09/30/22  0616   HEMOGLOBIN g/dL 8 7* 9 1*       [place] kidney  Assessment & Plan  · 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  · Patient declined Granda catheter placement    HLD (hyperlipidemia)  Assessment & Plan  · Continue atorvastatin    HTN (hypertension)  Assessment & Plan  · Continue bisoprolol and amlodipine        VTE Pharmacologic Prophylaxis:   Moderate Risk (Score 3-4) - Pharmacological DVT Prophylaxis Ordered: heparin  Patient Centered Rounds: I performed bedside rounds with nursing staff today  Discussions with Specialists or Other Care Team Provider:  Nursing, case management    Education and Discussions with Family / Patient: Alena Mortimer to reach son over the phone  Updated grandson over the phone  Time Spent for Care: 20 minutes  More than 50% of total time spent on counseling and coordination of care as described above  Current Length of Stay: 12 day(s)  Current Patient Status: Inpatient   Certification Statement: The patient will continue to require additional inpatient hospital stay due to End-stage renal disease  Discharge Plan: Anticipate discharge tomorrow to home with home services  Code Status: Level 3 - DNAR and DNI    Subjective:   Patient was seen evaluated bedside, he is comfortable, has no complaints  He does not want to be in hospital he wants to go home with hospice  Objective:     Vitals:   Temp (24hrs), Av 6 °F (37 °C), Min:98 °F (36 7 °C), Max:99 1 °F (37 3 °C)    Temp:  [98 °F (36 7 °C)-99 1 °F (37 3 °C)] 98 °F (36 7 °C)  HR:  [60-62] 60  Resp:  [20-22] 22  BP: (127-137)/(57-60) 137/60  SpO2:  [94 %-95 %] 95 %  Body mass index is 24 51 kg/m²  Input and Output Summary (last 24 hours): Intake/Output Summary (Last 24 hours) at 10/6/2022 1456  Last data filed at 10/6/2022 0843  Gross per 24 hour   Intake 240 ml   Output --   Net 240 ml       Physical Exam:   Physical Exam  Constitutional:       General: He is not in acute distress  HENT:      Head: Atraumatic     Cardiovascular: Rate and Rhythm: Normal rate and regular rhythm  Heart sounds: No murmur heard  No friction rub  No gallop  Pulmonary:      Effort: Pulmonary effort is normal  No respiratory distress  Breath sounds: Normal breath sounds  No wheezing  Abdominal:      General: Bowel sounds are normal  There is no distension  Palpations: Abdomen is soft  Tenderness: There is no abdominal tenderness  Musculoskeletal:         General: No swelling  Cervical back: Neck supple  Skin:     General: Skin is warm and dry  Neurological:      General: No focal deficit present  Mental Status: He is alert     Psychiatric:         Mood and Affect: Mood normal           Additional Data:     Labs:  Results from last 7 days   Lab Units 10/06/22  0514 09/30/22  0616   WBC Thousand/uL 4 39 6 01   HEMOGLOBIN g/dL 8 7* 9 1*   HEMATOCRIT % 29 3* 29 9*   PLATELETS Thousands/uL 178 157   NEUTROS PCT %  --  73   LYMPHS PCT %  --  13*   MONOS PCT %  --  9   EOS PCT %  --  3     Results from last 7 days   Lab Units 10/06/22  0514   SODIUM mmol/L 143   POTASSIUM mmol/L 4 3   CHLORIDE mmol/L 109*   CO2 mmol/L 21   BUN mg/dL 107*   CREATININE mg/dL 4 91*   ANION GAP mmol/L 13   CALCIUM mg/dL 8 9   ALBUMIN g/dL 3 0*   TOTAL BILIRUBIN mg/dL 0 60   ALK PHOS U/L 63   ALT U/L 25   AST U/L 21   GLUCOSE RANDOM mg/dL 111                       Lines/Drains:  Invasive Devices  Report    Line  Duration           Hemodialysis AV Fistula 09/11/21 Right Upper arm 390 days                      Imaging: Reviewed radiology reports from this admission including: abdominal/pelvic CT, xray(s) and ultrasound(s)    Recent Cultures (last 7 days):         Last 24 Hours Medication List:   Current Facility-Administered Medications   Medication Dose Route Frequency Provider Last Rate    acetaminophen  650 mg Oral Q6H PRN Breana Zapata MD      amLODIPine  10 mg Oral QPM [facility], PAEDEL      atorvastatin  40 mg Oral Daily With [employer] Breana Zapata MD      bisoprolol  5 mg Oral Daily Breana Zapata MD      doxycycline hyclate  100 mg Oral Q24H [facility],       heparin (porcine)  5,000 Units Subcutaneous [facility] Breana Zapata MD      iron polysaccharides  150 mg Oral Daily [address]      ondansetron  4 mg Intravenous Q6H PRN Breana Zapata MD      sodium bicarbonate  650 mg Oral Daily Izaiah Cooley MD          Today, Patient Was Seen By: Ramila An    **Please Note: This note may have been constructed using a voice recognition system  **

## 2022-10-07 ENCOUNTER — HOME CARE VISIT (OUTPATIENT)
Dept: HOME HEALTH SERVICES | Facility: HOME HEALTHCARE | Age: 77
End: 2022-10-07
Payer: MEDICARE

## 2022-10-07 VITALS
WEIGHT: 161.2 LBS | DIASTOLIC BLOOD PRESSURE: 55 MMHG | BODY MASS INDEX: 24.51 KG/M2 | HEART RATE: 62 BPM | TEMPERATURE: 97.7 F | RESPIRATION RATE: 20 BRPM | SYSTOLIC BLOOD PRESSURE: 153 MMHG | OXYGEN SATURATION: 90 %

## 2022-10-07 VITALS
SYSTOLIC BLOOD PRESSURE: 132 MMHG | TEMPERATURE: 97.8 F | RESPIRATION RATE: 24 BRPM | DIASTOLIC BLOOD PRESSURE: 56 MMHG | HEART RATE: 56 BPM

## 2022-10-07 PROCEDURE — 99239 HOSP IP/OBS DSCHRG MGMT >30: CPT | Performed by: INTERNAL MEDICINE

## 2022-10-07 PROCEDURE — G0299 HHS/HOSPICE OF RN EA 15 MIN: HCPCS

## 2022-10-07 RX ADMIN — DOXYCYCLINE 100 MG: 100 CAPSULE ORAL at 08:23

## 2022-10-07 RX ADMIN — BISOPROLOL FUMARATE 5 MG: 5 TABLET ORAL at 08:23

## 2022-10-07 RX ADMIN — SODIUM BICARBONATE 650 MG: 650 TABLET ORAL at 08:23

## 2022-10-07 RX ADMIN — HEPARIN SODIUM 5000 UNITS: 5000 INJECTION INTRAVENOUS; SUBCUTANEOUS at 06:04

## 2022-10-07 RX ADMIN — POLYSACCHARIDE-IRON COMPLEX 150 MG: 150 CAPSULE ORAL at 08:23

## 2022-10-07 NOTE — DISCHARGE SUMMARY
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 follows with [address] [place] [address] as outpatient  · Patient has AV access but does not want to proceed with dialysis  · Patient also declined Granda catheter placement  · Disposition:  Patient was to return home  Family concerned about inability to care for self  Family requesting neuropsychology evaluation for decision-making capacity  Patient has capacity    Patient will be discharged home with hospic per patient's wishes    Results from last 7 days   Lab Units 10/06/22  0514 10/03/22  0513   BUN mg/dL 107* 111*   CREATININE mg/dL 4 91* 5 34*   EGFR ml/min/1 73sq m 10 9       Acute diastolic heart failure (HCC)  Assessment & Plan  Wt Readings from Last 3 Encounters:   10/05/22 73 1 kg (161 lb 3 2 oz)   01/31/22 72 6 kg (160 lb)   09/14/21 72 9 kg (160 lb 12 8 oz)     · Vascular congestion on admission was on IV furosemide  · Patient appears compensated holding further diuretics    Indication present for endocarditis prophylaxis  Assessment & Plan  · Reportedly on doxycycline for endocarditis prophylaxis    Physical deconditioning  Assessment & Plan  · Ambulatory dysfunction unable to care for self  · Declined PT/OT as he wishes to proceed with hospice    Acute respiratory failure with hypoxia ([address] )  Assessment & Plan  · Required upwards of 3 L nasal cannula secondary to CHF  · Oxygen arranged by hospice    Anemia of chronic disease  Assessment & Plan  · Anemia of chronic disease without evidence of bleeding    Results from last 7 days   Lab Units 10/06/22  0514   HEMOGLOBIN g/dL 8 7*       Horseshoe kidney  Assessment & Plan  · 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  · Patient declined Granda catheter placement    HLD (hyperlipidemia)  Assessment & Plan  · Continue atorvastatin    HTN (hypertension)  Assessment & Plan  · Continue bisoprolol and amlodipine        Medical Problems             Resolved Problems  Date Reviewed: 10/7/2022   None               Discharging Physician / Practitioner: Lala Marina  PCP: Adilia Miller MD  Admission Date:   Admission Orders (From admission, onward)     Ordered        09/24/22 1752  INPATIENT ADMISSION  Once                      Discharge Date: 10/07/22    Consultations During Hospital Stay:  · Nephrology  · Palliative care  · Neurology  · Neuropsych    Procedures Performed:   · none    Significant Findings / Test Results:   · 09/24/2022 chest x-ray  IMPRESSION:  Moderate right effusion and right base opacity which could be due to atelectasis and/or pneumonia  Trace left effusion  Moderate pulmonary venous congestion  · 09/25/2022 CT abdomen pelvis without contrast  IMPRESSION:  New moderate right pleural effusion incompletely imaged with passive atelectasis in the right lower lobe  There is hydronephrosis involving the upper pole of the left renal moiety which seems similar to the prior exam   The left-sided ureteral stent is stable in position extending from the renal pelvis to the bladder  There is persistent bilateral hydroureter although the right side is diminished since the prior exam and the right-sided renal moiety is atrophic and no longer shows hydronephrosis  Mild anterior bladder wall thickening of uncertain significance  Correlate with urinalysis to exclude cystitis  Right inguinal hernia contains a loop of small bowel, without evidence of structures at this time      · 09/27/2022 Kidney ultrasound  IMPRESSION:  1  Bilateral hydroureter with left nephroureteral stent in place an severe dilatation of the left upper pole collecting system as seen on CT    2   Horseshoe kidney with severe cortical thinning, right greater than left  Incidental Findings:   · none    Test Results Pending at Discharge (will require follow up):   · none     Outpatient Tests Requested:  · none    Complications:  none    Reason for Admission:  Rj and CKD stage 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    Hospital Course: John Perez is a 78 y o  male patient who originally presented to the hospital on 9/24/2022 due to shortness of breath  Patient has a history of hydronephrosis, CKD stage 4/5  Chest x-ray showed vascular congestion, received IV Lasix  Patient has an AV access, evaluated by Nephrology  recommended Granda catheter placement which patient refused  Nephrology recommended dialysis which patient refused  Due to hydronephrosis and existing stent he was evaluated by Urology, patient refused any further management like stent exchange  He requested to be transitioned to hospice  Family requested neuropsych eval   He was evaluated by neuropsych and patient has decision-making capacity  Patient will be discharged home with home hospice  Please see above list of diagnoses and related plan for additional information  Condition at Discharge: fair    Discharge Day Visit / Exam:   Subjective:  Patient was seen evaluated bedside, no acute distress  Denies chest pain palpitation shortness of breath  Vitals: Blood Pressure: 153/55 (10/07/22 0834)  Pulse: 62 (10/07/22 0834)  Temperature: 97 7 °F (36 5 °C) (10/07/22 0834)  Temp Source: Temporal (10/07/22 0834)  Respirations: 20 (10/07/22 0834)  Weight - Scale: 73 1 kg (161 lb 3 2 oz) (10/05/22 0600)  SpO2: 90 % (10/07/22 0834)  Exam:   Physical Exam  Constitutional:       General: He is not in acute distress  HENT:      Head: Atraumatic  Cardiovascular:      Rate and Rhythm: Normal rate and regular rhythm  Heart sounds: No murmur heard  No friction rub  No gallop  Pulmonary:      Effort: Pulmonary effort is normal  No respiratory distress  Breath sounds: Normal breath sounds   No wheezing  Abdominal:      General: Bowel sounds are normal  There is no distension  Palpations: Abdomen is soft  Tenderness: There is no abdominal tenderness  Musculoskeletal:         General: No swelling  Cervical back: Neck supple  Skin:     General: Skin is warm and dry  Neurological:      General: No focal deficit present  Mental Status: He is alert  Psychiatric:         Mood and Affect: Mood normal         Discussion with Family:  Discussed with grandson over the phone yesterday, he is aware about discharge today  Everything is set up at home for home hospice  Discharge instructions/Information to patient and family:   See after visit summary for information provided to patient and family  Provisions for Follow-Up Care:  See after visit summary for information related to follow-up care and any pertinent home health orders  Disposition:   Other: Home with home hospice    Planned Readmission: no     Discharge Statement:  I spent 40 minutes discharging the patient  This time was spent on the day of discharge  I had direct contact with the patient on the day of discharge  Greater than 50% of the total time was spent examining patient, answering all patient questions, arranging and discussing plan of care with patient as well as directly providing post-discharge instructions  Additional time then spent on discharge activities  Discharge Medications:  See after visit summary for reconciled discharge medications provided to patient and/or family        **Please Note: This note may have been constructed using a voice recognition system**

## 2022-10-07 NOTE — CASE MANAGEMENT
Case Management Discharge Planning Note    Patient name Les   Location [place] [address] /E4 [address]-* MRN [id number]  : 1945 Date 10/7/2022       Current Admission Date: 2022  Current Admission Diagnosis:Acute kidney injury superimposed on CKD [facility])   Patient Active Problem List    Diagnosis Date Noted    Indication present for endocarditis prophylaxis 10/04/2022    Physical deconditioning 10/01/2022    Acute diastolic heart failure ([facility] ) 2022    Acute respiratory failure with hypoxia ([facility] ) 2022    Moderate protein-calorie malnutrition ([facility] ) 2021    Gross hematuria 2021    Ulnar impaction syndrome, left 2021    Right wrist pain 2021    Left wrist pain 2021    Anemia of chronic disease 2021    History of parathyroidectomy ([place] [address] ) 2021    Hypokalemia 2021    Bilateral hydronephrosis 2021    History of aortic valve replacement 2021    Horseshoe kidney 2021    Non-traumatic rhabdomyolysis 2021    Acute kidney injury superimposed on CKD ([facility] ) 2021    Elevated troponin 2021    UTI (urinary tract infection) 2021    Prosthetic aortic valve malfunction 2019    Near syncope 2018    Hx of CABG 2018    Chronic kidney disease, stage IV (severe) ([facility] ) 2018    Chronic coronary artery disease 10/03/2016    Endocarditis 10/03/2016    Cerebrovascular accident (CVA) ([facility] ) 10/03/2016    HTN (hypertension) 10/15/2015    HLD (hyperlipidemia) 10/15/2015      LOS (days): 13  Geometric Mean LOS (GMLOS) (days): 4 30  Days to GMLOS:-8 4     OBJECTIVE:  Risk of Unplanned Readmission Score: 19 03         Current admission status: Inpatient   Preferred Pharmacy:   [address] [place] [address] [address] [address]  Phone: [phone] Fax: [phone]    PATIENT/FAMILY REPORTS NO PREFERRED PHARMACY  No address on file      Primary Care Provider: Galileo Call MD    Primary Insurance: [facility]  Secondary Insurance:     DISCHARGE DETAILS:    Discharge planning discussed with[de-identified] patient        CM contacted family/caregiver?: No- see comments (pt declined)  Were Treatment Team discharge recommendations reviewed with patient/caregiver?: Yes  Did patient/caregiver verbalize understanding of patient care needs?: Yes  Were patient/caregiver advised of the risks associated with not following Treatment Team discharge recommendations?: Yes                             Treatment Team Recommendation: Other (no rehab needs)  Discharge Destination Plan[de-identified] Hospice  Transport at Discharge : [facility]     Number/Name of Dispatcher: Roundtrip  Transported by [employer] and Unit #): [facility] @ 1100  ETA of Transport (Date): 10/07/22  ETA of Transport (Time): 1100              IMM Given (Date):: 10/07/22 (signed)  IMM Given to[de-identified] Patient     Additional Comments: CM met w/ pt at bedside to go over d/c planning  Pt is to be d/c to home on hospice w/ [facility]  Pt very happy with this plan  CM confimed he has a BLS ride via [employer] @ 1100 to take him home  Pt thankful for everyones help  Pt denied having any other questions or concerns at this time  CM to continue to follow pt care & d/c

## 2022-10-07 NOTE — CASE COMMUNICATION
Ship to Pt   Home  Branch: [facility]     Tab Briefs  Med [id number]      12/pk   1                  Large [id number]    12/pk   1                  Underpad, reusable [id number]  [id number]   4

## 2022-10-07 NOTE — PLAN OF CARE
Problem: RESPIRATORY - ADULT  Goal: Achieves optimal ventilation and oxygenation  Description: INTERVENTIONS:  - Assess for changes in respiratory status  - Assess for changes in mentation and behavior  - Position to facilitate oxygenation and minimize respiratory effort  - Oxygen administered by appropriate delivery if ordered  - Initiate smoking cessation education as indicated  - Encourage broncho-pulmonary hygiene including cough, deep breathe, Incentive Spirometry  - Assess the need for suctioning and aspirate as needed  - Assess and instruct to report SOB or any respiratory difficulty  - Respiratory Therapy support as indicated  Outcome: Progressing     Problem: GENITOURINARY - ADULT  Goal: Maintains or returns to baseline urinary function  Description: INTERVENTIONS:  - Assess urinary function  - Encourage oral fluids to ensure adequate hydration if ordered  - Administer IV fluids as ordered to ensure adequate hydration  - Administer ordered medications as needed  - Offer frequent toileting  - Follow urinary retention protocol if ordered  Outcome: Progressing     Problem: METABOLIC, FLUID AND ELECTROLYTES - ADULT  Goal: Fluid balance maintained  Description: INTERVENTIONS:  - Monitor labs   - Monitor I/O and WT  - Instruct patient on fluid and nutrition as appropriate  - Assess for signs & symptoms of volume excess or deficit  Outcome: Progressing     Problem: PAIN - ADULT  Goal: Verbalizes/displays adequate comfort level or baseline comfort level  Description: Interventions:  - Encourage patient to monitor pain and request assistance  - Assess pain using appropriate pain scale  - Administer analgesics based on type and severity of pain and evaluate response  - Implement non-pharmacological measures as appropriate and evaluate response  - Consider cultural and social influences on pain and pain management  - Notify physician/advanced practitioner if interventions unsuccessful or patient reports new pain  Outcome: Progressing     Problem: INFECTION - ADULT  Goal: Absence or prevention of progression during hospitalization  Description: INTERVENTIONS:  - Assess and monitor for signs and symptoms of infection  - Monitor lab/diagnostic results  - Monitor all insertion sites, i e  indwelling lines, tubes, and drains  - Monitor endotracheal if appropriate and nasal secretions for changes in amount and color  - [place] appropriate cooling/warming therapies per order  - Administer medications as ordered  - Instruct and encourage patient and family to use good hand hygiene technique  - Identify and instruct in appropriate isolation precautions for identified infection/condition  Outcome: Progressing     Problem: SAFETY ADULT  Goal: Patient will remain free of falls  Description: INTERVENTIONS:  - Educate patient/family on patient safety including physical limitations  - Instruct patient to call for assistance with activity   - Consult OT/PT to assist with strengthening/mobility   - Keep Call bell within reach  - Keep bed low and locked with side rails adjusted as appropriate  - Keep care items and personal belongings within reach  - Initiate and maintain comfort rounds  - Make Fall Risk Sign visible to staff  - Offer Toileting every 2 Hours, in advance of need  - Initiate/Maintain bed alarm  - Obtain necessary fall risk management equipment: alarm, walker  - Apply yellow socks and bracelet for high fall risk patients  - Consider moving patient to room near nurses station  Outcome: Progressing  Goal: Maintain or return to baseline ADL function  Description: INTERVENTIONS:  -  Assess patient's ability to carry out ADLs; assess patient's baseline for ADL function and identify physical deficits which impact ability to perform ADLs (bathing, care of mouth/teeth, toileting, grooming, dressing, etc )  - Assess/evaluate cause of self-care deficits   - Assess range of motion  - Assess patient's mobility; develop plan if impaired  - Assess patient's need for assistive devices and provide as appropriate  - Encourage maximum independence but intervene and supervise when necessary  - Involve family in performance of ADLs  - Assess for home care needs following discharge   - Consider OT consult to assist with ADL evaluation and planning for discharge  - Provide patient education as appropriate  Outcome: Progressing  Goal: Maintains/Returns to pre admission functional level  Description: INTERVENTIONS:  - Perform BMAT or MOVE assessment daily    - Set and communicate daily mobility goal to care team and patient/family/caregiver  - Collaborate with rehabilitation services on mobility goals if consulted  - Perform Range of Motion 3 times a day  - Encourage patient to reposition every 2 hours    - Dangle patient 3 times a day  - Stand patient 3 times a day  - Ambulate patient 3 times a day  - Out of bed to chair for meals  - Out of bed for toileting  - Record patient progress and toleration of activity level   Outcome: Progressing     Problem: DISCHARGE PLANNING  Goal: Discharge to home or other facility with appropriate resources  Description: INTERVENTIONS:  - Identify barriers to discharge w/patient and caregiver  - Arrange for needed discharge resources and transportation as appropriate  - Identify discharge learning needs (meds, wound care, etc )  - Arrange for interpretive services to assist at discharge as needed  - Refer to Case Management Department for coordinating discharge planning if the patient needs post-hospital services based on physician/advanced practitioner order or complex needs related to functional status, cognitive ability, or social support system  Outcome: Progressing     Problem: Knowledge Deficit  Goal: Patient/family/caregiver demonstrates understanding of disease process, treatment plan, medications, and discharge instructions  Description: Complete learning assessment and assess knowledge base   Interventions:  - Provide teaching at level of understanding  - Provide teaching via preferred learning methods  Outcome: Progressing     Problem: Prexisting or High Potential for Compromised Skin Integrity  Goal: Skin integrity is maintained or improved  Description: INTERVENTIONS:  - Identify patients at risk for skin breakdown  - Assess and monitor skin integrity  - Assess and monitor nutrition and hydration status  - Monitor labs   - Assess for incontinence   - Turn and reposition patient  - Assist with mobility/ambulation  - Relieve pressure over bony prominences  - Avoid friction and shearing  - Provide appropriate hygiene as needed including keeping skin clean and dry  - Evaluate need for skin moisturizer/barrier cream  - Collaborate with interdisciplinary team   - Patient/family teaching  - Consider wound care consult   Outcome: Progressing     Problem: Nutrition/Hydration-ADULT  Goal: Nutrient/Hydration intake appropriate for improving, restoring or maintaining nutritional needs  Description: Monitor and assess patient's nutrition/hydration status for malnutrition  Collaborate with interdisciplinary team and initiate plan and interventions as ordered  Monitor patient's weight and dietary intake as ordered or per policy  Utilize nutrition screening tool and intervene as necessary  Determine patient's food preferences and provide high-protein, high-caloric foods as appropriate       INTERVENTIONS:  - Monitor oral intake, urinary output, labs, and treatment plans  - Assess nutrition and hydration status and recommend course of action  - Evaluate amount of meals eaten  - Assist patient with eating if necessary   - Allow adequate time for meals  - Recommend/ encourage appropriate diets, oral nutritional supplements, and vitamin/mineral supplements  - Order, calculate, and assess calorie counts as needed  - Recommend, monitor, and adjust tube feedings and TPN/PPN based on assessed needs  - Assess need for intravenous fluids  - Provide specific nutrition/hydration education as appropriate  - Include patient/family/caregiver in decisions related to nutrition  Outcome: Progressing

## 2022-10-07 NOTE — ASSESSMENT & PLAN NOTE
· LORENZA on CKD 4/5 follows with [facility] as outpatient  · Patient has AV access but does not want to proceed with dialysis  · Patient also declined Granda catheter placement  · Disposition:  Patient was to return home  Family concerned about inability to care for self  Family requesting neuropsychology evaluation for decision-making capacity  Patient has capacity    Patient will be discharged home with hospic per patient's wishes    Results from last 7 days   Lab Units 10/06/22  0514 10/03/22  0513   BUN mg/dL 107* 111*   CREATININE mg/dL 4 91* 5 34*   EGFR ml/min/1 73sq m 10 9

## 2022-10-07 NOTE — ASSESSMENT & PLAN NOTE
· Anemia of chronic disease without evidence of bleeding    Results from last 7 days   Lab Units 10/06/22  0514   HEMOGLOBIN g/dL 8 7*

## 2022-10-08 ENCOUNTER — HOME CARE VISIT (OUTPATIENT)
Dept: HOME HOSPICE | Facility: HOSPICE | Age: 77
End: 2022-10-08
Payer: MEDICARE

## 2022-10-10 ENCOUNTER — HOME CARE VISIT (OUTPATIENT)
Dept: HOME HOSPICE | Facility: HOSPICE | Age: 77
End: 2022-10-10
Payer: MEDICARE

## 2022-10-10 ENCOUNTER — HOME CARE VISIT (OUTPATIENT)
Dept: HOME HEALTH SERVICES | Facility: HOME HEALTHCARE | Age: 77
End: 2022-10-10
Payer: MEDICARE

## 2022-10-10 PROCEDURE — G0156 HHCP-SVS OF AIDE,EA 15 MIN: HCPCS

## 2022-10-10 NOTE — UTILIZATION REVIEW
Notification of Discharge   This is a Notification of Discharge from our facility [address]  Please be advised that this patient has been discharge from our facility  Below you will find the admission and discharge date and time including the patients disposition  UTILIZATION REVIEW CONTACT:  Frank Sharma  Utilization   Network Utilization Review Department  Phone: [phone] x carefully listen to the prompts  All voicemails are confidential   Email: [email or web address]     PHYSICIAN ADVISORY SERVICES:  FOR [id number] REVIEW - MEDICAL NECESSITY DENIAL  Phone: [phone]  Fax: [phone]  Email: [email or web address]     PRESENTATION DATE: 9/24/2022  3:21 PM    INPATIENT ADMISSION DATE: 9/24/22  5:52 PM   DISCHARGE DATE: 10/7/2022 11:41 AM  DISPOSITION: Home with [address] with Hospice Care      IMPORTANT INFORMATION:  Send all requests for admission clinical reviews, approved or denied determinations and any other requests to dedicated fax number below belonging to the campus where the patient is receiving treatment   List of dedicated fax numbers:  [address] DENIALS (Administrative/Medical Necessity) [phone]   1000  [facility] (Maternity/NICU/Pediatrics) [phone]   [facility] [phone]   [address] [place] [address] Road [phone]   [address] [place] [address] [phone]   2000 [facility] [address] [place] [address],[address] [place] [address] [phone]   [facility]  [phone]   [address] [place] [address], [address]  2401 [facility] 1000 [facility] [phone]

## 2022-10-11 ENCOUNTER — HOME CARE VISIT (OUTPATIENT)
Dept: HOME HEALTH SERVICES | Facility: HOME HEALTHCARE | Age: 77
End: 2022-10-11
Payer: MEDICARE

## 2022-10-11 ENCOUNTER — HOME CARE VISIT (OUTPATIENT)
Dept: HOME HOSPICE | Facility: HOSPICE | Age: 77
End: 2022-10-11
Payer: MEDICARE

## 2022-10-11 PROCEDURE — G0156 HHCP-SVS OF AIDE,EA 15 MIN: HCPCS

## 2022-10-11 PROCEDURE — G0155 HHCP-SVS OF CSW,EA 15 MIN: HCPCS

## 2022-10-12 ENCOUNTER — HOME CARE VISIT (OUTPATIENT)
Dept: HOME HEALTH SERVICES | Facility: HOME HEALTHCARE | Age: 77
End: 2022-10-12
Payer: MEDICARE

## 2022-10-12 PROBLEM — N39.0 UTI (URINARY TRACT INFECTION): Status: RESOLVED | Noted: 2021-09-07 | Resolved: 2022-10-12

## 2022-10-12 PROCEDURE — G0156 HHCP-SVS OF AIDE,EA 15 MIN: HCPCS

## 2022-10-12 PROCEDURE — G0299 HHS/HOSPICE OF RN EA 15 MIN: HCPCS

## 2022-10-13 VITALS
TEMPERATURE: 97.7 F | OXYGEN SATURATION: 92 % | SYSTOLIC BLOOD PRESSURE: 140 MMHG | HEART RATE: 63 BPM | DIASTOLIC BLOOD PRESSURE: 60 MMHG | RESPIRATION RATE: 16 BRPM

## 2022-10-14 ENCOUNTER — HOME CARE VISIT (OUTPATIENT)
Dept: HOME HEALTH SERVICES | Facility: HOME HEALTHCARE | Age: 77
End: 2022-10-14
Payer: MEDICARE

## 2022-10-14 PROCEDURE — G0156 HHCP-SVS OF AIDE,EA 15 MIN: HCPCS

## 2022-10-17 ENCOUNTER — HOME CARE VISIT (OUTPATIENT)
Dept: HOME HEALTH SERVICES | Facility: HOME HEALTHCARE | Age: 77
End: 2022-10-17
Payer: MEDICARE

## 2022-10-17 PROCEDURE — G0156 HHCP-SVS OF AIDE,EA 15 MIN: HCPCS

## 2022-10-19 ENCOUNTER — HOME CARE VISIT (OUTPATIENT)
Dept: HOME HEALTH SERVICES | Facility: HOME HEALTHCARE | Age: 77
End: 2022-10-19
Payer: MEDICARE

## 2022-10-19 VITALS
SYSTOLIC BLOOD PRESSURE: 142 MMHG | DIASTOLIC BLOOD PRESSURE: 58 MMHG | OXYGEN SATURATION: 94 % | RESPIRATION RATE: 20 BRPM | TEMPERATURE: 101.5 F | HEART RATE: 76 BPM

## 2022-10-19 PROCEDURE — G0156 HHCP-SVS OF AIDE,EA 15 MIN: HCPCS

## 2022-10-19 PROCEDURE — G0299 HHS/HOSPICE OF RN EA 15 MIN: HCPCS

## 2022-10-20 ENCOUNTER — HOME CARE VISIT (OUTPATIENT)
Dept: HOME HEALTH SERVICES | Facility: HOME HEALTHCARE | Age: 77
End: 2022-10-20
Payer: MEDICARE

## 2022-10-20 PROCEDURE — G0156 HHCP-SVS OF AIDE,EA 15 MIN: HCPCS

## 2022-10-21 ENCOUNTER — HOME CARE VISIT (OUTPATIENT)
Dept: HOME HEALTH SERVICES | Facility: HOME HEALTHCARE | Age: 77
End: 2022-10-21
Payer: MEDICARE

## 2022-10-21 PROCEDURE — G0156 HHCP-SVS OF AIDE,EA 15 MIN: HCPCS

## 2022-10-22 ENCOUNTER — HOME CARE VISIT (OUTPATIENT)
Dept: HOME HOSPICE | Facility: HOSPICE | Age: 77
End: 2022-10-22
Payer: MEDICARE

## 2022-10-22 DIAGNOSIS — Z51.5 HOSPICE CARE: Primary | ICD-10-CM

## 2022-10-22 DIAGNOSIS — Z51.5 HOSPICE CARE: ICD-10-CM

## 2022-10-22 RX ORDER — AMLODIPINE BESYLATE 10 MG/1
10 TABLET ORAL DAILY
Qty: 5 TABLET | Refills: 0 | Status: SHIPPED | OUTPATIENT
Start: 2022-10-22

## 2022-10-22 RX ORDER — AMLODIPINE BESYLATE 10 MG/1
10 TABLET ORAL DAILY
Qty: 5 TABLET | Refills: 0 | Status: SHIPPED | OUTPATIENT
Start: 2022-10-22 | End: 2022-10-22 | Stop reason: SDUPTHER

## 2022-10-22 RX ORDER — DOXYCYCLINE HYCLATE 100 MG
100 TABLET ORAL DAILY
Qty: 3 TABLET | Refills: 0 | Status: SHIPPED | OUTPATIENT
Start: 2022-10-22 | End: 2022-10-22 | Stop reason: SDUPTHER

## 2022-10-22 RX ORDER — DOXYCYCLINE HYCLATE 100 MG
100 TABLET ORAL DAILY
Qty: 3 TABLET | Refills: 0 | Status: SHIPPED | OUTPATIENT
Start: 2022-10-22 | End: 2022-10-25

## 2022-10-23 ENCOUNTER — HOME CARE VISIT (OUTPATIENT)
Dept: HOME HEALTH SERVICES | Facility: HOME HEALTHCARE | Age: 77
End: 2022-10-23
Payer: MEDICARE

## 2022-10-24 ENCOUNTER — HOME CARE VISIT (OUTPATIENT)
Dept: HOME HOSPICE | Facility: HOSPICE | Age: 77
End: 2022-10-24
Payer: MEDICARE

## 2022-10-24 ENCOUNTER — HOME CARE VISIT (OUTPATIENT)
Dept: HOME HEALTH SERVICES | Facility: HOME HEALTHCARE | Age: 77
End: 2022-10-24
Payer: MEDICARE

## 2022-10-24 PROCEDURE — G0156 HHCP-SVS OF AIDE,EA 15 MIN: HCPCS

## 2022-10-25 ENCOUNTER — HOME CARE VISIT (OUTPATIENT)
Dept: HOME HEALTH SERVICES | Facility: HOME HEALTHCARE | Age: 77
End: 2022-10-25
Payer: MEDICARE

## 2022-10-25 PROCEDURE — G0156 HHCP-SVS OF AIDE,EA 15 MIN: HCPCS

## 2022-10-26 ENCOUNTER — HOME CARE VISIT (OUTPATIENT)
Dept: HOME HEALTH SERVICES | Facility: HOME HEALTHCARE | Age: 77
End: 2022-10-26
Payer: MEDICARE

## 2022-10-26 PROCEDURE — G0156 HHCP-SVS OF AIDE,EA 15 MIN: HCPCS

## 2022-10-27 ENCOUNTER — HOME CARE VISIT (OUTPATIENT)
Dept: HOME HEALTH SERVICES | Facility: HOME HEALTHCARE | Age: 77
End: 2022-10-27
Payer: MEDICARE

## 2022-10-27 VITALS — SYSTOLIC BLOOD PRESSURE: 110 MMHG | DIASTOLIC BLOOD PRESSURE: 70 MMHG | RESPIRATION RATE: 18 BRPM | HEART RATE: 80 BPM

## 2022-10-27 PROCEDURE — G0299 HHS/HOSPICE OF RN EA 15 MIN: HCPCS

## 2022-10-27 PROCEDURE — G0156 HHCP-SVS OF AIDE,EA 15 MIN: HCPCS

## 2022-10-28 ENCOUNTER — HOME CARE VISIT (OUTPATIENT)
Dept: HOME HEALTH SERVICES | Facility: HOME HEALTHCARE | Age: 77
End: 2022-10-28
Payer: MEDICARE

## 2022-10-28 PROCEDURE — G0156 HHCP-SVS OF AIDE,EA 15 MIN: HCPCS

## 2022-10-31 ENCOUNTER — HOME CARE VISIT (OUTPATIENT)
Dept: HOME HEALTH SERVICES | Facility: HOME HEALTHCARE | Age: 77
End: 2022-10-31

## 2022-11-01 ENCOUNTER — HOME CARE VISIT (OUTPATIENT)
Dept: HOME HEALTH SERVICES | Facility: HOME HEALTHCARE | Age: 77
End: 2022-11-01

## 2022-11-02 ENCOUNTER — HOME CARE VISIT (OUTPATIENT)
Dept: HOME HEALTH SERVICES | Facility: HOME HEALTHCARE | Age: 77
End: 2022-11-02

## 2022-11-02 VITALS
TEMPERATURE: 97.5 F | OXYGEN SATURATION: 93 % | HEART RATE: 76 BPM | RESPIRATION RATE: 16 BRPM | SYSTOLIC BLOOD PRESSURE: 142 MMHG | DIASTOLIC BLOOD PRESSURE: 58 MMHG

## 2022-11-04 ENCOUNTER — HOME CARE VISIT (OUTPATIENT)
Dept: HOME HEALTH SERVICES | Facility: HOME HEALTHCARE | Age: 77
End: 2022-11-04

## 2022-11-05 DIAGNOSIS — I13.2: ICD-10-CM

## 2022-11-05 DIAGNOSIS — Z51.5 HOSPICE CARE: Primary | ICD-10-CM

## 2022-11-05 RX ORDER — FUROSEMIDE 40 MG/1
40 TABLET ORAL DAILY
Qty: 6 TABLET | Refills: 0 | Status: SHIPPED | OUTPATIENT
Start: 2022-11-05

## 2022-11-05 NOTE — TELEPHONE ENCOUNTER
11/5/2022 4:08 PM  [facility] hospice home patient requests emergency fill until [facility] order arrives and [employer] has been unable to deliver to patient's apartment per RN (signature required)  Filled electronically via Epic as per [place] State Law  Requested Prescriptions     Signed Prescriptions Disp Refills   • furosemide (LASIX) 40 mg tablet 6 tablet 0     Sig: Take 1 tablet (40 mg total) by mouth daily     Authorizing Provider: [facility]  Hospice Answering Service: [phone]  You can find me on [employer]!

## 2022-11-07 ENCOUNTER — HOME CARE VISIT (OUTPATIENT)
Dept: HOME HEALTH SERVICES | Facility: HOME HEALTHCARE | Age: 77
End: 2022-11-07

## 2022-11-07 ENCOUNTER — HOME CARE VISIT (OUTPATIENT)
Dept: HOME HOSPICE | Facility: HOSPICE | Age: 77
End: 2022-11-07

## 2022-11-08 ENCOUNTER — HOME CARE VISIT (OUTPATIENT)
Dept: HOME HEALTH SERVICES | Facility: HOME HEALTHCARE | Age: 77
End: 2022-11-08

## 2022-11-08 VITALS
RESPIRATION RATE: 24 BRPM | SYSTOLIC BLOOD PRESSURE: 130 MMHG | TEMPERATURE: 97.3 F | OXYGEN SATURATION: 91 % | HEART RATE: 94 BPM | DIASTOLIC BLOOD PRESSURE: 62 MMHG

## 2022-11-09 ENCOUNTER — HOME CARE VISIT (OUTPATIENT)
Dept: HOME HEALTH SERVICES | Facility: HOME HEALTHCARE | Age: 77
End: 2022-11-09

## 2022-11-10 ENCOUNTER — HOME CARE VISIT (OUTPATIENT)
Dept: HOME HEALTH SERVICES | Facility: HOME HEALTHCARE | Age: 77
End: 2022-11-10

## 2022-11-11 ENCOUNTER — HOME CARE VISIT (OUTPATIENT)
Dept: HOME HEALTH SERVICES | Facility: HOME HEALTHCARE | Age: 77
End: 2022-11-11

## 2022-11-14 ENCOUNTER — HOME CARE VISIT (OUTPATIENT)
Dept: HOME HOSPICE | Facility: HOSPICE | Age: 77
End: 2022-11-14

## 2022-11-14 ENCOUNTER — HOME CARE VISIT (OUTPATIENT)
Dept: HOME HEALTH SERVICES | Facility: HOME HEALTHCARE | Age: 77
End: 2022-11-14

## 2022-11-14 VITALS — DIASTOLIC BLOOD PRESSURE: 58 MMHG | SYSTOLIC BLOOD PRESSURE: 130 MMHG | RESPIRATION RATE: 16 BRPM | HEART RATE: 70 BPM

## 2022-11-15 ENCOUNTER — HOME CARE VISIT (OUTPATIENT)
Dept: HOME HOSPICE | Facility: HOSPICE | Age: 77
End: 2022-11-15

## 2022-11-15 ENCOUNTER — HOME CARE VISIT (OUTPATIENT)
Dept: HOME HEALTH SERVICES | Facility: HOME HEALTHCARE | Age: 77
End: 2022-11-15

## 2022-11-16 ENCOUNTER — HOME CARE VISIT (OUTPATIENT)
Dept: HOME HOSPICE | Facility: HOSPICE | Age: 77
End: 2022-11-16

## 2022-11-16 ENCOUNTER — HOME CARE VISIT (OUTPATIENT)
Dept: HOME HEALTH SERVICES | Facility: HOME HEALTHCARE | Age: 77
End: 2022-11-16

## 2022-11-17 ENCOUNTER — HOME CARE VISIT (OUTPATIENT)
Dept: HOME HEALTH SERVICES | Facility: HOME HEALTHCARE | Age: 77
End: 2022-11-17

## 2022-11-18 ENCOUNTER — HOME CARE VISIT (OUTPATIENT)
Dept: HOME HEALTH SERVICES | Facility: HOME HEALTHCARE | Age: 77
End: 2022-11-18

## 2022-11-21 ENCOUNTER — HOME CARE VISIT (OUTPATIENT)
Dept: HOME HEALTH SERVICES | Facility: HOME HEALTHCARE | Age: 77
End: 2022-11-21

## 2022-11-22 ENCOUNTER — HOME CARE VISIT (OUTPATIENT)
Dept: HOME HEALTH SERVICES | Facility: HOME HEALTHCARE | Age: 77
End: 2022-11-22

## 2022-11-23 ENCOUNTER — HOME CARE VISIT (OUTPATIENT)
Dept: HOME HEALTH SERVICES | Facility: HOME HEALTHCARE | Age: 77
End: 2022-11-23

## 2022-11-23 VITALS
HEART RATE: 88 BPM | OXYGEN SATURATION: 90 % | TEMPERATURE: 98.4 F | DIASTOLIC BLOOD PRESSURE: 62 MMHG | RESPIRATION RATE: 16 BRPM | SYSTOLIC BLOOD PRESSURE: 142 MMHG

## 2022-11-24 ENCOUNTER — HOME CARE VISIT (OUTPATIENT)
Dept: HOME HEALTH SERVICES | Facility: HOME HEALTHCARE | Age: 77
End: 2022-11-24

## 2022-11-25 ENCOUNTER — HOME CARE VISIT (OUTPATIENT)
Dept: HOME HEALTH SERVICES | Facility: HOME HEALTHCARE | Age: 77
End: 2022-11-25

## 2022-11-28 ENCOUNTER — HOME CARE VISIT (OUTPATIENT)
Dept: HOME HEALTH SERVICES | Facility: HOME HEALTHCARE | Age: 77
End: 2022-11-28

## 2022-11-29 ENCOUNTER — HOME CARE VISIT (OUTPATIENT)
Dept: HOME HOSPICE | Facility: HOSPICE | Age: 77
End: 2022-11-29

## 2022-11-30 ENCOUNTER — HOME CARE VISIT (OUTPATIENT)
Dept: HOME HEALTH SERVICES | Facility: HOME HEALTHCARE | Age: 77
End: 2022-11-30

## 2022-11-30 ENCOUNTER — HOME CARE VISIT (OUTPATIENT)
Dept: HOME HOSPICE | Facility: HOSPICE | Age: 77
End: 2022-11-30

## 2022-11-30 VITALS
RESPIRATION RATE: 20 BRPM | TEMPERATURE: 97.9 F | SYSTOLIC BLOOD PRESSURE: 128 MMHG | DIASTOLIC BLOOD PRESSURE: 56 MMHG | HEART RATE: 78 BPM | OXYGEN SATURATION: 93 %

## 2022-12-01 ENCOUNTER — HOME CARE VISIT (OUTPATIENT)
Dept: HOME HEALTH SERVICES | Facility: HOME HEALTHCARE | Age: 77
End: 2022-12-01

## 2022-12-02 ENCOUNTER — HOME CARE VISIT (OUTPATIENT)
Dept: HOME HEALTH SERVICES | Facility: HOME HEALTHCARE | Age: 77
End: 2022-12-02

## 2022-12-05 ENCOUNTER — HOME CARE VISIT (OUTPATIENT)
Dept: HOME HEALTH SERVICES | Facility: HOME HEALTHCARE | Age: 77
End: 2022-12-05

## 2022-12-06 ENCOUNTER — HOME CARE VISIT (OUTPATIENT)
Dept: HOME HOSPICE | Facility: HOSPICE | Age: 77
End: 2022-12-06

## 2022-12-06 ENCOUNTER — HOME CARE VISIT (OUTPATIENT)
Dept: HOME HEALTH SERVICES | Facility: HOME HEALTHCARE | Age: 77
End: 2022-12-06

## 2022-12-07 ENCOUNTER — HOME CARE VISIT (OUTPATIENT)
Dept: HOME HEALTH SERVICES | Facility: HOME HEALTHCARE | Age: 77
End: 2022-12-07

## 2022-12-07 VITALS
RESPIRATION RATE: 16 BRPM | DIASTOLIC BLOOD PRESSURE: 58 MMHG | OXYGEN SATURATION: 91 % | SYSTOLIC BLOOD PRESSURE: 138 MMHG | TEMPERATURE: 97.5 F | HEART RATE: 80 BPM

## 2022-12-08 ENCOUNTER — HOME CARE VISIT (OUTPATIENT)
Dept: HOME HEALTH SERVICES | Facility: HOME HEALTHCARE | Age: 77
End: 2022-12-08

## 2022-12-09 ENCOUNTER — HOME CARE VISIT (OUTPATIENT)
Dept: HOME HEALTH SERVICES | Facility: HOME HEALTHCARE | Age: 77
End: 2022-12-09

## 2022-12-12 ENCOUNTER — HOME CARE VISIT (OUTPATIENT)
Dept: HOME HEALTH SERVICES | Facility: HOME HEALTHCARE | Age: 77
End: 2022-12-12

## 2022-12-13 ENCOUNTER — HOME CARE VISIT (OUTPATIENT)
Dept: HOME HOSPICE | Facility: HOSPICE | Age: 77
End: 2022-12-13

## 2022-12-13 ENCOUNTER — HOME CARE VISIT (OUTPATIENT)
Dept: HOME HEALTH SERVICES | Facility: HOME HEALTHCARE | Age: 77
End: 2022-12-13

## 2022-12-14 ENCOUNTER — HOME CARE VISIT (OUTPATIENT)
Dept: HOME HEALTH SERVICES | Facility: HOME HEALTHCARE | Age: 77
End: 2022-12-14

## 2022-12-14 VITALS
TEMPERATURE: 97.5 F | RESPIRATION RATE: 20 BRPM | HEART RATE: 68 BPM | SYSTOLIC BLOOD PRESSURE: 144 MMHG | DIASTOLIC BLOOD PRESSURE: 68 MMHG | OXYGEN SATURATION: 98 %

## 2022-12-16 ENCOUNTER — HOME CARE VISIT (OUTPATIENT)
Dept: HOME HEALTH SERVICES | Facility: HOME HEALTHCARE | Age: 77
End: 2022-12-16

## 2022-12-20 ENCOUNTER — HOME CARE VISIT (OUTPATIENT)
Dept: HOME HEALTH SERVICES | Facility: HOME HEALTHCARE | Age: 77
End: 2022-12-20

## 2022-12-21 ENCOUNTER — HOME CARE VISIT (OUTPATIENT)
Dept: HOME HOSPICE | Facility: HOSPICE | Age: 77
End: 2022-12-21

## 2022-12-21 ENCOUNTER — HOME CARE VISIT (OUTPATIENT)
Dept: HOME HEALTH SERVICES | Facility: HOME HEALTHCARE | Age: 77
End: 2022-12-21

## 2022-12-22 ENCOUNTER — HOME CARE VISIT (OUTPATIENT)
Dept: HOME HEALTH SERVICES | Facility: HOME HEALTHCARE | Age: 77
End: 2022-12-22

## 2022-12-22 ENCOUNTER — HOME CARE VISIT (OUTPATIENT)
Dept: HOME HOSPICE | Facility: HOSPICE | Age: 77
End: 2022-12-22

## 2022-12-22 VITALS
HEART RATE: 78 BPM | SYSTOLIC BLOOD PRESSURE: 128 MMHG | DIASTOLIC BLOOD PRESSURE: 58 MMHG | TEMPERATURE: 97.3 F | RESPIRATION RATE: 20 BRPM | OXYGEN SATURATION: 92 %

## 2022-12-23 ENCOUNTER — HOME CARE VISIT (OUTPATIENT)
Dept: HOME HEALTH SERVICES | Facility: HOME HEALTHCARE | Age: 77
End: 2022-12-23

## 2022-12-23 ENCOUNTER — HOME CARE VISIT (OUTPATIENT)
Dept: HOME HOSPICE | Facility: HOSPICE | Age: 77
End: 2022-12-23

## 2022-12-27 ENCOUNTER — HOME CARE VISIT (OUTPATIENT)
Dept: HOME HEALTH SERVICES | Facility: HOME HEALTHCARE | Age: 77
End: 2022-12-27

## 2022-12-28 ENCOUNTER — HOME CARE VISIT (OUTPATIENT)
Dept: HOME HEALTH SERVICES | Facility: HOME HEALTHCARE | Age: 77
End: 2022-12-28

## 2022-12-29 ENCOUNTER — HOME CARE VISIT (OUTPATIENT)
Dept: HOME HEALTH SERVICES | Facility: HOME HEALTHCARE | Age: 77
End: 2022-12-29

## 2022-12-29 VITALS — SYSTOLIC BLOOD PRESSURE: 134 MMHG | RESPIRATION RATE: 20 BRPM | HEART RATE: 78 BPM | DIASTOLIC BLOOD PRESSURE: 58 MMHG

## 2023-01-01 ENCOUNTER — HOME CARE VISIT (OUTPATIENT)
Dept: HOME HEALTH SERVICES | Facility: HOME HEALTHCARE | Age: 78
End: 2023-01-01

## 2023-01-01 ENCOUNTER — HOME CARE VISIT (OUTPATIENT)
Dept: HOME HOSPICE | Facility: HOSPICE | Age: 78
End: 2023-01-01

## 2023-01-01 VITALS
RESPIRATION RATE: 20 BRPM | TEMPERATURE: 97.7 F | SYSTOLIC BLOOD PRESSURE: 122 MMHG | DIASTOLIC BLOOD PRESSURE: 46 MMHG | HEART RATE: 68 BPM

## 2023-01-01 VITALS
OXYGEN SATURATION: 93 % | TEMPERATURE: 97.5 F | SYSTOLIC BLOOD PRESSURE: 150 MMHG | DIASTOLIC BLOOD PRESSURE: 58 MMHG | RESPIRATION RATE: 20 BRPM | HEART RATE: 71 BPM

## 2023-01-03 ENCOUNTER — HOME CARE VISIT (OUTPATIENT)
Dept: HOME HOSPICE | Facility: HOSPICE | Age: 78
End: 2023-01-03

## 2023-01-04 ENCOUNTER — HOME CARE VISIT (OUTPATIENT)
Dept: HOME HEALTH SERVICES | Facility: HOME HEALTHCARE | Age: 78
End: 2023-01-04

## 2023-01-04 VITALS
SYSTOLIC BLOOD PRESSURE: 160 MMHG | OXYGEN SATURATION: 81 % | RESPIRATION RATE: 24 BRPM | HEART RATE: 84 BPM | DIASTOLIC BLOOD PRESSURE: 68 MMHG | TEMPERATURE: 98 F

## 2023-01-23 ENCOUNTER — HOME CARE VISIT (OUTPATIENT)
Dept: HOME HOSPICE | Facility: HOSPICE | Age: 78
End: 2023-01-23

## 2023-01-24 ENCOUNTER — HOME CARE VISIT (OUTPATIENT)
Dept: HOME HOSPICE | Facility: HOSPICE | Age: 78
End: 2023-01-24

## 2024-02-05 NOTE — ASSESSMENT & PLAN NOTE
Problem: Adult Inpatient Plan of Care  Goal: Plan of Care Review  Outcome: Ongoing, Progressing  Goal: Patient-Specific Goal (Individualized)  Outcome: Ongoing, Progressing  Goal: Absence of Hospital-Acquired Illness or Injury  Outcome: Ongoing, Progressing  Goal: Optimal Comfort and Wellbeing  Outcome: Ongoing, Progressing  Goal: Readiness for Transition of Care  Outcome: Ongoing, Progressing  Intervention: Mutually Develop Transition Plan  Flowsheets (Taken 2/5/2024 1329)  Transportation Anticipated: family or friend will provide     Problem: Diabetes Comorbidity  Goal: Blood Glucose Level Within Targeted Range  Outcome: Ongoing, Progressing  Intervention: Monitor and Manage Glycemia  Flowsheets (Taken 2/5/2024 1329)  Glycemic Management: blood glucose monitored     Problem: Skin Injury Risk Increased  Goal: Skin Health and Integrity  Outcome: Ongoing, Progressing     Problem: Fall Injury Risk  Goal: Absence of Fall and Fall-Related Injury  Outcome: Ongoing, Progressing  Intervention: Identify and Manage Contributors  Flowsheets (Taken 2/5/2024 1329)  Self-Care Promotion: safe use of adaptive equipment encouraged  Medication Review/Management: medications reviewed     Problem: Oral Intake Inadequate  Goal: Improved Oral Intake  Outcome: Ongoing, Progressing     Problem: Malnutrition  Goal: Improved Nutritional Intake  Outcome: Ongoing, Progressing     Problem: Chest Pain  Goal: Resolution of Chest Pain Symptoms  Outcome: Ongoing, Progressing     Problem: Violence Risk or Actual  Goal: Anger and Impulse Control  Outcome: Ongoing, Progressing  Intervention: Minimize Safety Risk  Flowsheets (Taken 2/5/2024 1329)  Enhanced Safety Measures:   bed alarm set   chair alarm set  Intervention: Promote Self-Control  Flowsheets (Taken 2/5/2024 1329)  Supportive Measures: active listening utilized  Environmental Support: calm environment promoted     Problem: Breathing Pattern Ineffective  Goal: Effective Breathing  Lab Results   Component Value Date    EGFR 11 09/27/2022    EGFR 12 09/26/2022    EGFR 12 09/25/2022    CREATININE 4 69 (H) 09/27/2022    CREATININE 4 44 (H) 09/26/2022    CREATININE 4 30 (H) 09/25/2022     78year-old male presented with increasing shortness of breath found to have worsening renal failure, x-ray imaging showing moderate right-sided pleural effusion and vascular congestion  · History of CKD stage 4/5, 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 not on dialysis currently  · Follows with [facility]  · Creatinine baseline previously was around 3  · With AG metabolic acidosis, continuing to improve on sodium bicarb supplementation  · Nephrology consulted  · Continue lasix 40mg IV BID  · Urinary retention protocol  · Dialysis consent obtained however holding off at this time  · CT abdomen pelvis with evidence of hydroureter bilaterally and with a left ureteral stent in place however unchanged from previous  · Consider Urology consult due to increasing creatinine after results of repeat renal ultrasound Pattern  Outcome: Ongoing, Progressing  Intervention: Promote Improved Breathing Pattern  Flowsheets (Taken 2/5/2024 0068)  Supportive Measures: active listening utilized     Problem: Gas Exchange Impaired  Goal: Optimal Gas Exchange  Outcome: Ongoing, Progressing

## 2025-05-30 NOTE — ASSESSMENT & PLAN NOTE
Suspect component of hemolysis, treated with insulin, sodium bicarb, and Lasix  Monitor BMP  Resolved yes